# Patient Record
Sex: FEMALE | Race: WHITE | NOT HISPANIC OR LATINO | Employment: FULL TIME | URBAN - METROPOLITAN AREA
[De-identification: names, ages, dates, MRNs, and addresses within clinical notes are randomized per-mention and may not be internally consistent; named-entity substitution may affect disease eponyms.]

---

## 2019-12-16 RX ORDER — ZOLPIDEM TARTRATE 5 MG/1
TABLET ORAL
Refills: 0 | COMMUNITY
Start: 2019-11-25 | End: 2020-05-14

## 2019-12-16 RX ORDER — PREDNISONE 20 MG/1
TABLET ORAL
Refills: 0 | COMMUNITY
Start: 2019-12-06 | End: 2020-05-14

## 2019-12-16 RX ORDER — LEVOFLOXACIN 500 MG/1
TABLET, FILM COATED ORAL
Refills: 0 | COMMUNITY
Start: 2019-12-06 | End: 2020-05-14

## 2019-12-17 LAB — HBA1C MFR BLD HPLC: 6.1 %

## 2019-12-18 ENCOUNTER — OFFICE VISIT (OUTPATIENT)
Dept: GASTROENTEROLOGY | Facility: CLINIC | Age: 54
End: 2019-12-18
Payer: COMMERCIAL

## 2019-12-18 VITALS
SYSTOLIC BLOOD PRESSURE: 122 MMHG | HEIGHT: 71 IN | DIASTOLIC BLOOD PRESSURE: 82 MMHG | WEIGHT: 293 LBS | BODY MASS INDEX: 41.02 KG/M2 | HEART RATE: 86 BPM

## 2019-12-18 DIAGNOSIS — R10.11 RUQ PAIN: Primary | ICD-10-CM

## 2019-12-18 DIAGNOSIS — R11.2 NAUSEA AND VOMITING, INTRACTABILITY OF VOMITING NOT SPECIFIED, UNSPECIFIED VOMITING TYPE: ICD-10-CM

## 2019-12-18 PROCEDURE — 99203 OFFICE O/P NEW LOW 30 MIN: CPT | Performed by: PHYSICIAN ASSISTANT

## 2019-12-18 RX ORDER — OXYCODONE HYDROCHLORIDE AND ACETAMINOPHEN 5; 325 MG/1; MG/1
TABLET ORAL
COMMUNITY
Start: 2018-01-26 | End: 2020-05-14

## 2019-12-18 RX ORDER — SULFACETAMIDE SODIUM 100 MG/ML
LOTION TOPICAL
COMMUNITY
Start: 2018-03-08 | End: 2020-05-14

## 2019-12-18 RX ORDER — HYDROCODONE BITARTRATE AND ACETAMINOPHEN 5; 300 MG/1; MG/1
TABLET ORAL
COMMUNITY
Start: 2018-01-18 | End: 2020-05-14

## 2019-12-18 RX ORDER — OMEPRAZOLE 40 MG/1
CAPSULE, DELAYED RELEASE ORAL
COMMUNITY
Start: 2018-01-23

## 2019-12-18 RX ORDER — RANITIDINE 300 MG/1
TABLET ORAL
COMMUNITY
Start: 2018-01-23 | End: 2020-05-14

## 2019-12-18 RX ORDER — OLOPATADINE HYDROCHLORIDE 1 MG/ML
SOLUTION/ DROPS OPHTHALMIC
COMMUNITY
Start: 2018-02-22 | End: 2020-05-14

## 2019-12-18 RX ORDER — IBUPROFEN 800 MG/1
TABLET ORAL
COMMUNITY
Start: 2018-02-20 | End: 2020-05-14

## 2019-12-18 RX ORDER — LEVOTHYROXINE SODIUM 175 UG/1
TABLET ORAL
COMMUNITY
Start: 2019-03-16 | End: 2020-05-14

## 2019-12-18 RX ORDER — METHYLPREDNISOLONE 4 MG/1
TABLET ORAL
COMMUNITY
Start: 2018-04-21 | End: 2020-05-14

## 2019-12-18 RX ORDER — FLUTICASONE PROPIONATE 50 MCG
SPRAY, SUSPENSION (ML) NASAL
COMMUNITY
Start: 2018-02-20 | End: 2021-08-03

## 2019-12-18 RX ORDER — ONDANSETRON 4 MG/1
TABLET, FILM COATED ORAL
COMMUNITY
Start: 2018-01-19 | End: 2020-05-14

## 2019-12-18 RX ORDER — LEVOTHYROXINE SODIUM 0.15 MG/1
TABLET ORAL
COMMUNITY
Start: 2018-02-20

## 2019-12-18 NOTE — PATIENT INSTRUCTIONS
Abdominal Pain   WHAT YOU NEED TO KNOW:   Abdominal pain can be dull, achy, or sharp  You may have pain in one area of your abdomen, or in your entire abdomen  Your pain may be caused by a condition such as constipation, food sensitivity or poisoning, infection, or a blockage  Abdominal pain can also be from a hernia, appendicitis, or an ulcer  Liver, gallbladder, or kidney conditions can also cause abdominal pain  The cause of your abdominal pain may be unknown  DISCHARGE INSTRUCTIONS:   Return to the emergency department if:   · You have new chest pain or shortness of breath  · You have pulsing pain in your upper abdomen or lower back that suddenly becomes constant  · Your pain is in the right lower abdominal area and worsens with movement  · You have a fever over 100 4°F (38°C) or shaking chills  · You are vomiting and cannot keep food or liquids down  · Your pain does not improve or gets worse over the next 8 to 12 hours  · You see blood in your vomit or bowel movements, or they look black and tarry  · Your skin or the whites of your eyes turn yellow  · You are a woman and have a large amount of vaginal bleeding that is not your monthly period  Contact your healthcare provider if:   · You have pain in your lower back  · You are a man and have pain in your testicles  · You have pain when you urinate  · You have questions or concerns about your condition or care  Follow up with your healthcare provider within 24 hours or as directed:  Write down your questions so you remember to ask them during your visits  Medicines:   · Medicines  may be given to calm your stomach and prevent vomiting or to decrease pain  Ask how to take pain medicine safely  · Take your medicine as directed  Contact your healthcare provider if you think your medicine is not helping or if you have side effects  Tell him of her if you are allergic to any medicine   Keep a list of the medicines, vitamins, and herbs you take  Include the amounts, and when and why you take them  Bring the list or the pill bottles to follow-up visits  Carry your medicine list with you in case of an emergency  © 2017 [employer] Information is for End User's use only and may not be sold, redistributed or otherwise used for commercial purposes  All illustrations and images included in CareNotes® are the copyrighted property of A [employer]  or [employer]  The above information is an  only  It is not intended as medical advice for individual conditions or treatments  Talk to your doctor, nurse or pharmacist before following any medical regimen to see if it is safe and effective for you

## 2019-12-18 NOTE — PROGRESS NOTES
[facility] - Outpatient Consultation  Meeta Bahena 62 y o  female MRN: [id number]  Encounter: [id number]          ASSESSMENT AND PLAN:      1  RUQ pain  2  Nausea and vomiting, intractability of vomiting not specified, unspecified vomiting type  Will obtain laboratories from yesterday ordered by her primary care doctor  Will plan for MRI with and without contrast/MRCP  Patient may require ERCP secondary to sphincter of Oddi dysfunction or sludge within the bile duct  I have encouraged patient to eat bland foods over the next several weeks  Patient will call the office if she does have another attack  ______________________________________________________________________    HPI:    64-year-old female who is here with right upper quadrant abdominal pain and nausea that has been persistent for the past several months  Patient reports that many years ago she did undergo a cholecystectomy  She reports that she had this due to biliary dyskinesia  Patient reports that for the past several months or longer she has been suffering with right upper quadrant abdominal pain radiating to her shoulder blade as well as into her chest   Patient reports fevers and chills during these attacks with associated nausea vomiting and dry heaving  Patient reports this is really starting to impact her life  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  She denies any significant issues with lower abdominal pains diarrhea or constipation  She denies any melena or rectal bleeding  She is up-to-date with her screening colonoscopy which is a was in May of 2019  Patient recently had a right upper quadrant ultrasound done that did not show any evidence of obstruction of the duct  Patient did have laboratories with her primary care doctor yesterday that I do not have these available for viewing as of yet          REVIEW OF SYSTEMS:    CONSTITUTIONAL: Denies any fever, chills, rigors, and weight loss  HEENT: No earache or tinnitus  Denies hearing loss or visual disturbances  CARDIOVASCULAR: No chest pain or palpitations  RESPIRATORY: Denies any cough, hemoptysis, shortness of breath or dyspnea on exertion  GASTROINTESTINAL: As noted in the History of Present Illness  GENITOURINARY: No problems with urination  Denies any hematuria or dysuria  NEUROLOGIC: No dizziness or vertigo, denies headaches  MUSCULOSKELETAL: Denies any muscle or joint pain  SKIN: Denies skin rashes or itching  ENDOCRINE: Denies excessive thirst  Denies intolerance to heat or cold  PSYCHOSOCIAL: Denies depression or anxiety  Denies any recent memory loss  Historical Information   History reviewed  No pertinent past medical history  Past Surgical History:   Procedure Laterality Date    CHOLECYSTECTOMY       Social History   Social History     Substance and Sexual Activity   Alcohol Use Not on file     Social History     Substance and Sexual Activity   Drug Use Not on file     Social History     Tobacco Use   Smoking Status Not on file     No family history on file      Meds/Allergies       Current Outpatient Medications:     levothyroxine (SYNTHROID) 150 mcg tablet    omeprazole (PriLOSEC) 40 MG capsule    ranitidine (ZANTAC) 300 MG tablet    fluticasone (FLONASE) 50 mcg/act nasal spray    HYDROcodone-acetaminophen (XODOL) 5-300 MG per tablet    ibuprofen (MOTRIN) 800 mg tablet    levofloxacin (LEVAQUIN) 500 mg tablet    levothyroxine 175 mcg tablet    methylPREDNISolone 4 MG tablet therapy pack    metroNIDAZOLE (METROCREAM) 0 75 % cream    mupirocin (BACTROBAN) 2 % ointment    olopatadine (PATANOL) 0 1 % ophthalmic solution    ondansetron (ZOFRAN) 4 mg tablet    oxyCODONE-acetaminophen (PERCOCET) 5-325 mg per tablet    predniSONE 20 mg tablet    Sulfacetamide Sodium, Acne, 10 % LOTN    zolpidem (AMBIEN) 5 mg tablet    Allergies Allergen Reactions    Fluconazole Swelling     Of lips      Sulfamethoxazole-Trimethoprim Swelling     Of lips    Erythromycin Rash     Breaks aout on white pimples      Penicillins Rash     Breaks out on white pimples       Tetracycline Rash     Breaks out on white pimples             Objective     Blood pressure 122/82, pulse 86, height 5' 11" (1 803 m), weight 133 kg (294 lb 3 2 oz)  Body mass index is 41 03 kg/m²  PHYSICAL EXAM:      General Appearance:   Alert, cooperative, no distress   HEENT:   Normocephalic, atraumatic, anicteric      Neck:  Supple, symmetrical, trachea midline   Lungs:   Clear to auscultation bilaterally; no rales, rhonchi or wheezing; respirations unlabored    Heart[de-identified]   Regular rate and rhythm; no murmur, rub, or gallop  Abdomen:   Soft, non-tender, non-distended; normal bowel sounds; no masses, no organomegaly    Genitalia:   Deferred    Rectal:   Deferred    Extremities:  No cyanosis, clubbing or edema    Pulses:  2+ and symmetric    Skin:  No jaundice, rashes, or lesions    Lymph nodes:  No palpable cervical lymphadenopathy        Lab Results:   No visits with results within 1 Day(s) from this visit  Latest known visit with results is:   No results found for any previous visit  Radiology Results:   No results found

## 2019-12-18 NOTE — LETTER
December 18, 2019     Vanessa Rea MD  [address] [place] [address]    Patient: Kim Cotto   YOB: 1965   Date of Visit: 12/18/2019       Dear Dr Yeison Ochoa: Thank you for referring Kim Cotto to me for evaluation  Below are my notes for this consultation  If you have questions, please do not hesitate to call me  I look forward to following your patient along with you  Sincerely,        Danette De Souza PA-C        CC: No Recipients  Danette De Souza, Neihart Beverage  12/18/2019  5:36 PM  Sign at close encounter  [facility] - Outpatient Consult[facility]  Kim Cotto 62 y o  female MRN: [id number]  Encounter: [id number]          ASSESSMENT AND PLAN:      1  RUQ pain  2  Nausea and vomiting, intractability of vomiting not specified, unspecified vomiting type  Will obtain laboratories from yesterday ordered by her primary care doctor  Will plan for MRI with and without contrast/MRCP  Patient may require ERCP secondary to sphincter of Oddi dysfunction or sludge within the bile duct  I have encouraged patient to eat bland foods over the next several weeks  Patient will call the office if she does have another attack  ______________________________________________________________________    HPI:    41-year-old female who is here with right upper quadrant abdominal pain and nausea that has been persistent for the past several months  Patient reports that many years ago she did undergo a cholecystectomy  She reports that she had this due to biliary dyskinesia  Patient reports that for the past several months or longer she has been suffering with right upper quadrant abdominal pain radiating to her shoulder blade as well as into her chest   Patient reports fevers and chills during these attacks with associated nausea vomiting and dry heaving  Patient reports this is really starting to impact her life    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  She denies any significant issues with lower abdominal pains diarrhea or constipation  She denies any melena or rectal bleeding  She is up-to-date with her screening colonoscopy which is a was in May of 2019  Patient recently had a right upper quadrant ultrasound done that did not show any evidence of obstruction of the duct  Patient did have laboratories with her primary care doctor yesterday that I do not have these available for viewing as of yet  REVIEW OF SYSTEMS:    CONSTITUTIONAL: Denies any fever, chills, rigors, and weight loss  HEENT: No earache or tinnitus  Denies hearing loss or visual disturbances  CARDIOVASCULAR: No chest pain or palpitations  RESPIRATORY: Denies any cough, hemoptysis, shortness of breath or dyspnea on exertion  GASTROINTESTINAL: As noted in the History of Present Illness  GENITOURINARY: No problems with urination  Denies any hematuria or dysuria  NEUROLOGIC: No dizziness or vertigo, denies headaches  MUSCULOSKELETAL: Denies any muscle or joint pain  SKIN: Denies skin rashes or itching  ENDOCRINE: Denies excessive thirst  Denies intolerance to heat or cold  PSYCHOSOCIAL: Denies depression or anxiety  Denies any recent memory loss  Historical Information   History reviewed  No pertinent past medical history  Past Surgical History:   Procedure Laterality Date    CHOLECYSTECTOMY       Social History   Social History     Substance and Sexual Activity   Alcohol Use Not on file     Social History     Substance and Sexual Activity   Drug Use Not on file     Social History     Tobacco Use   Smoking Status Not on file     No family history on file      Meds/Allergies       Current Outpatient Medications:     levothyroxine (SYNTHROID) 150 mcg tablet    omeprazole (PriLOSEC) 40 MG capsule    ranitidine (ZANTAC) 300 MG tablet    fluticasone (FLONASE) 50 mcg/act nasal spray   HYDROcodone-acetaminophen (XODOL) 5-300 MG per tablet    ibuprofen (MOTRIN) 800 mg tablet    levofloxacin (LEVAQUIN) 500 mg tablet    levothyroxine 175 mcg tablet    methylPREDNISolone 4 MG tablet therapy pack    metroNIDAZOLE (METROCREAM) 0 75 % cream    mupirocin (BACTROBAN) 2 % ointment    olopatadine (PATANOL) 0 1 % ophthalmic solution    ondansetron (ZOFRAN) 4 mg tablet    oxyCODONE-acetaminophen (PERCOCET) 5-325 mg per tablet    predniSONE 20 mg tablet    Sulfacetamide Sodium, Acne, 10 % LOTN    zolpidem (AMBIEN) 5 mg tablet    Allergies   Allergen Reactions    Fluconazole Swelling     Of lips      Sulfamethoxazole-Trimethoprim Swelling     Of lips    Erythromycin Rash     Breaks aout on white pimples      Penicillins Rash     Breaks out on white pimples       Tetracycline Rash     Breaks out on white pimples             Objective     Blood pressure 122/82, pulse 86, height 5' 11" (1 803 m), weight 133 kg (294 lb 3 2 oz)  Body mass index is 41 03 kg/m²  PHYSICAL EXAM:      General Appearance:   Alert, cooperative, no distress   HEENT:   Normocephalic, atraumatic, anicteric      Neck:  Supple, symmetrical, trachea midline   Lungs:   Clear to auscultation bilaterally; no rales, rhonchi or wheezing; respirations unlabored    Heart[de-identified]   Regular rate and rhythm; no murmur, rub, or gallop  Abdomen:   Soft, non-tender, non-distended; normal bowel sounds; no masses, no organomegaly    Genitalia:   Deferred    Rectal:   Deferred    Extremities:  No cyanosis, clubbing or edema    Pulses:  2+ and symmetric    Skin:  No jaundice, rashes, or lesions    Lymph nodes:  No palpable cervical lymphadenopathy        Lab Results:   No visits with results within 1 Day(s) from this visit  Latest known visit with results is:   No results found for any previous visit  Radiology Results:   No results found

## 2019-12-19 ENCOUNTER — TELEPHONE (OUTPATIENT)
Dept: GASTROENTEROLOGY | Facility: CLINIC | Age: 54
End: 2019-12-19

## 2019-12-19 NOTE — TELEPHONE ENCOUNTER
Insurance is actually [employer] to get MRI prior auth  Tracking number [id number]   Need to send clinicals to     Faxed

## 2019-12-19 NOTE — TELEPHONE ENCOUNTER
Emmett Rg pt - Pt states that at [facility] cannot get her in for an MRI till January  If she goes to a place in the / [place] [address] she could get in on Tuesday of next week however she would need a prior auth  Please call [phone]   Ty

## 2019-12-20 ENCOUNTER — TELEPHONE (OUTPATIENT)
Dept: GASTROENTEROLOGY | Facility: CLINIC | Age: 54
End: 2019-12-20

## 2019-12-20 NOTE — TELEPHONE ENCOUNTER
Called pt and advised  Pt stated she has  The MRI scheduled at [facility] on the 30th and is concerned about getting the auth in time  Pt said she had an US last week and we should have the results since she brought them in

## 2019-12-23 ENCOUNTER — TELEPHONE (OUTPATIENT)
Dept: GASTROENTEROLOGY | Facility: CLINIC | Age: 54
End: 2019-12-23

## 2019-12-23 NOTE — TELEPHONE ENCOUNTER
Guerda  - [facility] called, they states the pre Nayeli Mohan was done with the wrong site  It was done with [place] [facility] as the site  It needs to be corrected to [facility], they said a phone call will fix the issue  Their NPI is [id number]  The prior auth # is [id number]   Ty

## 2020-01-03 ENCOUNTER — TELEPHONE (OUTPATIENT)
Dept: GASTROENTEROLOGY | Facility: CLINIC | Age: 55
End: 2020-01-03

## 2020-01-07 ENCOUNTER — TELEPHONE (OUTPATIENT)
Dept: GASTROENTEROLOGY | Facility: CLINIC | Age: 55
End: 2020-01-07

## 2020-01-07 NOTE — TELEPHONE ENCOUNTER
Please call patient and inform MRI was reviewed do  There was no evidence of stones within the bile duct  There was evidence of fatty liver disease  There was no significant abnormalities within the abdominal area    Thank you

## 2020-01-07 NOTE — TELEPHONE ENCOUNTER
Patient has been calling since Friday  Please call patient with results of MRI or to at least let patient know that we received results   ty Please call [phone] ty

## 2020-01-07 NOTE — TELEPHONE ENCOUNTER
Guerda-patient called lm on  to see if we received records please call patient and advise this is her 3rd call and she is upset

## 2020-01-07 NOTE — TELEPHONE ENCOUNTER
Pt was called about her MRI results          LMOM requesting pt to call back in regurads to her message

## 2020-05-14 ENCOUNTER — OFFICE VISIT (OUTPATIENT)
Dept: OBGYN CLINIC | Facility: CLINIC | Age: 55
End: 2020-05-14
Payer: COMMERCIAL

## 2020-05-14 ENCOUNTER — APPOINTMENT (OUTPATIENT)
Dept: RADIOLOGY | Facility: CLINIC | Age: 55
End: 2020-05-14
Payer: COMMERCIAL

## 2020-05-14 VITALS
HEART RATE: 77 BPM | SYSTOLIC BLOOD PRESSURE: 143 MMHG | BODY MASS INDEX: 37.95 KG/M2 | WEIGHT: 280.2 LBS | HEIGHT: 72 IN | DIASTOLIC BLOOD PRESSURE: 89 MMHG

## 2020-05-14 DIAGNOSIS — M79.644 THUMB PAIN, RIGHT: Primary | ICD-10-CM

## 2020-05-14 DIAGNOSIS — M79.644 THUMB PAIN, RIGHT: ICD-10-CM

## 2020-05-14 DIAGNOSIS — M18.11 ARTHRITIS OF CARPOMETACARPAL (CMC) JOINT OF RIGHT THUMB: ICD-10-CM

## 2020-05-14 PROCEDURE — 73110 X-RAY EXAM OF WRIST: CPT

## 2020-05-14 PROCEDURE — 20600 DRAIN/INJ JOINT/BURSA W/O US: CPT | Performed by: ORTHOPAEDIC SURGERY

## 2020-05-14 PROCEDURE — 99213 OFFICE O/P EST LOW 20 MIN: CPT | Performed by: ORTHOPAEDIC SURGERY

## 2020-05-14 RX ORDER — LIDOCAINE HYDROCHLORIDE 10 MG/ML
2.5 INJECTION, SOLUTION INFILTRATION; PERINEURAL
Status: COMPLETED | OUTPATIENT
Start: 2020-05-14 | End: 2020-05-14

## 2020-05-14 RX ORDER — TRIAMCINOLONE ACETONIDE 40 MG/ML
20 INJECTION, SUSPENSION INTRA-ARTICULAR; INTRAMUSCULAR
Status: COMPLETED | OUTPATIENT
Start: 2020-05-14 | End: 2020-05-14

## 2020-05-14 RX ADMIN — LIDOCAINE HYDROCHLORIDE 2.5 ML: 10 INJECTION, SOLUTION INFILTRATION; PERINEURAL at 11:17

## 2020-05-14 RX ADMIN — TRIAMCINOLONE ACETONIDE 20 MG: 40 INJECTION, SUSPENSION INTRA-ARTICULAR; INTRAMUSCULAR at 11:17

## 2020-05-14 RX ADMIN — Medication 0.05 MEQ: at 11:17

## 2020-05-28 ENCOUNTER — OFFICE VISIT (OUTPATIENT)
Dept: OBGYN CLINIC | Facility: CLINIC | Age: 55
End: 2020-05-28
Payer: COMMERCIAL

## 2020-05-28 VITALS
WEIGHT: 280.2 LBS | DIASTOLIC BLOOD PRESSURE: 84 MMHG | BODY MASS INDEX: 37.95 KG/M2 | SYSTOLIC BLOOD PRESSURE: 132 MMHG | HEART RATE: 73 BPM | HEIGHT: 72 IN

## 2020-05-28 DIAGNOSIS — M18.11 ARTHRITIS OF CARPOMETACARPAL (CMC) JOINT OF RIGHT THUMB: Primary | ICD-10-CM

## 2020-05-28 PROCEDURE — 99213 OFFICE O/P EST LOW 20 MIN: CPT | Performed by: ORTHOPAEDIC SURGERY

## 2020-05-28 PROCEDURE — 20600 DRAIN/INJ JOINT/BURSA W/O US: CPT | Performed by: ORTHOPAEDIC SURGERY

## 2020-05-28 RX ORDER — TRIAMCINOLONE ACETONIDE 40 MG/ML
20 INJECTION, SUSPENSION INTRA-ARTICULAR; INTRAMUSCULAR
Status: COMPLETED | OUTPATIENT
Start: 2020-05-28 | End: 2020-05-28

## 2020-05-28 RX ORDER — LIDOCAINE HYDROCHLORIDE 10 MG/ML
2.5 INJECTION, SOLUTION INFILTRATION; PERINEURAL
Status: COMPLETED | OUTPATIENT
Start: 2020-05-28 | End: 2020-05-28

## 2020-05-28 RX ORDER — BUDESONIDE 0.5 MG/2ML
INHALANT ORAL
COMMUNITY
Start: 2020-05-17 | End: 2021-05-18 | Stop reason: ALTCHOICE

## 2020-05-28 RX ADMIN — TRIAMCINOLONE ACETONIDE 20 MG: 40 INJECTION, SUSPENSION INTRA-ARTICULAR; INTRAMUSCULAR at 10:26

## 2020-05-28 RX ADMIN — Medication 0.05 MEQ: at 10:26

## 2020-05-28 RX ADMIN — LIDOCAINE HYDROCHLORIDE 2.5 ML: 10 INJECTION, SOLUTION INFILTRATION; PERINEURAL at 10:26

## 2020-06-11 ENCOUNTER — OFFICE VISIT (OUTPATIENT)
Dept: OBGYN CLINIC | Facility: CLINIC | Age: 55
End: 2020-06-11
Payer: COMMERCIAL

## 2020-06-11 VITALS
HEART RATE: 73 BPM | BODY MASS INDEX: 37.95 KG/M2 | DIASTOLIC BLOOD PRESSURE: 86 MMHG | HEIGHT: 72 IN | SYSTOLIC BLOOD PRESSURE: 136 MMHG | WEIGHT: 280.2 LBS

## 2020-06-11 DIAGNOSIS — M18.11 ARTHRITIS OF CARPOMETACARPAL (CMC) JOINT OF RIGHT THUMB: Primary | ICD-10-CM

## 2020-06-11 PROCEDURE — 99213 OFFICE O/P EST LOW 20 MIN: CPT | Performed by: ORTHOPAEDIC SURGERY

## 2020-06-11 RX ORDER — TOBRAMYCIN AND DEXAMETHASONE 3; 1 MG/ML; MG/ML
SUSPENSION/ DROPS OPHTHALMIC
COMMUNITY
Start: 2020-06-01 | End: 2021-08-03

## 2020-09-03 ENCOUNTER — OFFICE VISIT (OUTPATIENT)
Dept: OBGYN CLINIC | Facility: CLINIC | Age: 55
End: 2020-09-03
Payer: COMMERCIAL

## 2020-09-03 VITALS
WEIGHT: 280.2 LBS | SYSTOLIC BLOOD PRESSURE: 138 MMHG | BODY MASS INDEX: 37.95 KG/M2 | HEIGHT: 72 IN | DIASTOLIC BLOOD PRESSURE: 84 MMHG | HEART RATE: 77 BPM

## 2020-09-03 DIAGNOSIS — M71.349 SYNOVIAL CYST OF HAND: Primary | ICD-10-CM

## 2020-09-03 PROCEDURE — 20612 ASPIRATE/INJ GANGLION CYST: CPT | Performed by: ORTHOPAEDIC SURGERY

## 2020-09-03 PROCEDURE — 99213 OFFICE O/P EST LOW 20 MIN: CPT | Performed by: ORTHOPAEDIC SURGERY

## 2020-09-03 RX ORDER — TRIAMCINOLONE ACETONIDE 40 MG/ML
20 INJECTION, SUSPENSION INTRA-ARTICULAR; INTRAMUSCULAR
Status: COMPLETED | OUTPATIENT
Start: 2020-09-03 | End: 2020-09-03

## 2020-09-03 RX ORDER — LEVOTHYROXINE SODIUM 200 MCG
TABLET ORAL
COMMUNITY
Start: 2020-07-14 | End: 2021-08-03

## 2020-09-03 RX ORDER — LIDOCAINE HYDROCHLORIDE 10 MG/ML
0.5 INJECTION, SOLUTION INFILTRATION; PERINEURAL
Status: COMPLETED | OUTPATIENT
Start: 2020-09-03 | End: 2020-09-03

## 2020-09-03 RX ORDER — FAMOTIDINE 40 MG/1
TABLET, FILM COATED ORAL
COMMUNITY
Start: 2020-07-16

## 2020-09-03 RX ORDER — SULFACETAMIDE SODIUM 100 MG/ML
LOTION TOPICAL
COMMUNITY
Start: 2020-07-18 | End: 2021-08-03

## 2020-09-03 RX ADMIN — LIDOCAINE HYDROCHLORIDE 0.5 ML: 10 INJECTION, SOLUTION INFILTRATION; PERINEURAL at 13:16

## 2020-09-03 RX ADMIN — TRIAMCINOLONE ACETONIDE 20 MG: 40 INJECTION, SUSPENSION INTRA-ARTICULAR; INTRAMUSCULAR at 13:16

## 2020-09-03 NOTE — PROGRESS NOTES
CHIEF COMPLAINT:  Chief Complaint   Patient presents with    Right Thumb - Follow-up       SUBJECTIVE:  Tiffanie Pierre is a 62y o  year old female who presents right thumb pain  Patient has a history of right thumb CMC arthritis of which she was given a cortisone injection in the past   She states that this has helped for her however she noted a bump in the 1st webspace which has become tender  She denies any injuries or trauma to the area  She denies any numbness or tingling  PAST MEDICAL HISTORY:  History reviewed  No pertinent past medical history  PAST SURGICAL HISTORY:  Past Surgical History:   Procedure Laterality Date    CHOLECYSTECTOMY         FAMILY HISTORY:  History reviewed  No pertinent family history  SOCIAL HISTORY:  Social History     Tobacco Use    Smoking status: Never Smoker    Smokeless tobacco: Never Used   Substance Use Topics    Alcohol use: Never     Frequency: Never    Drug use: Never       MEDICATIONS:    Current Outpatient Medications:     budesonide (PULMICORT) 0 5 mg/2 mL nebulizer solution, MIX contents of 1 vial in 8 ounces OF NORMAL SALINE SOLUTION TO I   (REFER TO PRESCRIPTION NOTES)  , Disp: , Rfl:     famotidine (PEPCID) 40 MG tablet, , Disp: , Rfl:     fluticasone (FLONASE) 50 mcg/act nasal spray, , Disp: , Rfl:     levothyroxine (SYNTHROID) 150 mcg tablet, , Disp: , Rfl:     omeprazole (PriLOSEC) 40 MG capsule, , Disp: , Rfl:     Sulfacetamide Sodium, Acne, 10 % LOTN, apply twice a day to FACE, Disp: , Rfl:     Synthroid 200 MCG tablet, , Disp: , Rfl:     tobramycin-dexamethasone (TOBRADEX) ophthalmic suspension, 2 drops left eye twice a day, Disp: , Rfl:     ALLERGIES:  Allergies   Allergen Reactions    Fluconazole Swelling     Of lips      Sulfamethoxazole-Trimethoprim Swelling     Of lips    Erythromycin Rash     Breaks aout on white pimples      Penicillins Rash     Breaks out on white pimples       Tetracycline Rash     Breaks out on white pimples         REVIEW OF SYSTEMS:  Review of Systems  ROS:   General: no fever, no chills  HEENT:  No loss of hearing or eyesight problems  Eyes:  No red eyes  Respiratory:  No coughing, shortness of breath or wheezing  Cardiovascular:  No chest pain, no palpitations  GI:  Abdomen soft nontender, denies nausea  Endocrine:  No muscle weakness, no frequent urination, no excessive thirst  Urinary:  No dysuria, no incontinence  Musculoskeletal: see HPI and PE  SKIN:  No skin rash, no dry skin  Neurological:  No headaches, no confusion  Psychiatric:  No suicide thoughts, no anxiety, no depression  Review of all other systems is negative    VITALS:  Vitals:    09/03/20 1141   BP: 138/84   Pulse: 77       LABS:  HgA1c:   Lab Results   Component Value Date    HGBA1C 6 1 12/17/2019     BMP: No results found for: GLUCOSE, CALCIUM, NA, K, CO2, CL, BUN, CREATININE    _____________________________________________________  PHYSICAL EXAMINATION:  General: well developed and well nourished, alert, oriented times 3 and appears comfortable  Psychiatric: Normal  HEENT: Trachea Midline, No torticollis  Pulmonary: No audible wheezing or strider  Cardiovascular: No discernable arrhythmia   Skin: No masses, erythema, lacerations, fluctation, ulcerations  Neurovascular: Sensation Intact to the Median, Ulnar, Radial Nerve, Motor Intact to the Median, Ulnar, Radial Nerve and Pulses Intact    MUSCULOSKELETAL EXAMINATION:  Right hand  No erythema edema or ecchymosis noted, skin is warm to touch  Palpable bump appreciated in the 1st web space which is tender to palpation  Patient is able to move the thumb in all directions with no tenderness  Patient is neurovascularly intact     ___________________________________________________  STUDIES REVIEWED:  No studies reviewed         PROCEDURES PERFORMED:  Hand/upper extremity injection: R wrist  Date/Time: 9/3/2020 1:16 PM  Consent given by: patient  Site marked: site marked  Timeout: Immediately prior to procedure a time out was called to verify the correct patient, procedure, equipment, support staff and site/side marked as required   Supporting Documentation  Indications: pain   Procedure Details  Condition:dorsal carpal ganglion Site: R wrist (1st webspace)   Preparation: Patient was prepped and draped in the usual sterile fashion  Needle size: 25 G  Approach: radial  Medications administered: 0 5 mL lidocaine 1 %; 20 mg triamcinolone acetonide 40 mg/mL    Patient tolerance: patient tolerated the procedure well with no immediate complications  Dressing:  Sterile dressing applied               _____________________________________________________  ASSESSMENT/PLAN:    Right 1st webspace cyst  - patient was given cortisone injection to pop the cyst which was successful  - patient was advised to take Tylenol and ibuprofen for pain  - she was advised that he can return and if it were she is to give us a call  - she will follow-up with us as needed    Follow Up:  Return if symptoms worsen or fail to improve      Work/school status:  No restrictions    To Do Next Visit:  Re-evaluation of current issue        Scribe Attestation    I,:   Rhonda Caballero PA-C am acting as a scribe while in the presence of the attending physician :        I,:   July Clark MD personally performed the services described in this documentation    as scribed in my presence :

## 2020-10-05 ENCOUNTER — APPOINTMENT (EMERGENCY)
Dept: RADIOLOGY | Facility: HOSPITAL | Age: 55
End: 2020-10-05
Payer: COMMERCIAL

## 2020-10-05 ENCOUNTER — HOSPITAL ENCOUNTER (EMERGENCY)
Facility: HOSPITAL | Age: 55
Discharge: HOME | End: 2020-10-05
Attending: EMERGENCY MEDICINE | Admitting: EMERGENCY MEDICINE
Payer: COMMERCIAL

## 2020-10-05 VITALS
SYSTOLIC BLOOD PRESSURE: 157 MMHG | HEIGHT: 72 IN | HEART RATE: 85 BPM | BODY MASS INDEX: 33.18 KG/M2 | RESPIRATION RATE: 18 BRPM | DIASTOLIC BLOOD PRESSURE: 76 MMHG | WEIGHT: 245 LBS | OXYGEN SATURATION: 95 % | TEMPERATURE: 97.4 F

## 2020-10-05 DIAGNOSIS — R06.02 SOB (SHORTNESS OF BREATH): Primary | ICD-10-CM

## 2020-10-05 LAB
ALBUMIN SERPL BCP-MCNC: 3.5 G/DL (ref 3.5–5)
ALP SERPL-CCNC: 104 U/L (ref 46–116)
ALT SERPL W P-5'-P-CCNC: 31 U/L (ref 12–78)
ANION GAP SERPL CALCULATED.3IONS-SCNC: 8 MMOL/L (ref 4–13)
AST SERPL W P-5'-P-CCNC: 27 U/L (ref 5–45)
BASOPHILS # BLD AUTO: 0.07 THOUSANDS/ΜL (ref 0–0.1)
BASOPHILS NFR BLD AUTO: 1 % (ref 0–1)
BILIRUB SERPL-MCNC: 0.5 MG/DL (ref 0.2–1)
BUN SERPL-MCNC: 16 MG/DL (ref 5–25)
CALCIUM SERPL-MCNC: 8.6 MG/DL (ref 8.3–10.1)
CHLORIDE SERPL-SCNC: 107 MMOL/L (ref 100–108)
CO2 SERPL-SCNC: 27 MMOL/L (ref 21–32)
CREAT SERPL-MCNC: 0.79 MG/DL (ref 0.6–1.3)
EOSINOPHIL # BLD AUTO: 0.48 THOUSAND/ΜL (ref 0–0.61)
EOSINOPHIL NFR BLD AUTO: 9 % (ref 0–6)
ERYTHROCYTE [DISTWIDTH] IN BLOOD BY AUTOMATED COUNT: 13.2 % (ref 11.6–15.1)
GFR SERPL CREATININE-BSD FRML MDRD: 83 ML/MIN/1.73SQ M
GLUCOSE SERPL-MCNC: 105 MG/DL (ref 65–140)
HCT VFR BLD AUTO: 44.5 % (ref 34.8–46.1)
HGB BLD-MCNC: 14.3 G/DL (ref 11.5–15.4)
IMM GRANULOCYTES # BLD AUTO: 0.01 THOUSAND/UL (ref 0–0.2)
IMM GRANULOCYTES NFR BLD AUTO: 0 % (ref 0–2)
LYMPHOCYTES # BLD AUTO: 1.22 THOUSANDS/ΜL (ref 0.6–4.47)
LYMPHOCYTES NFR BLD AUTO: 22 % (ref 14–44)
MCH RBC QN AUTO: 27.2 PG (ref 26.8–34.3)
MCHC RBC AUTO-ENTMCNC: 32.1 G/DL (ref 31.4–37.4)
MCV RBC AUTO: 85 FL (ref 82–98)
MONOCYTES # BLD AUTO: 0.47 THOUSAND/ΜL (ref 0.17–1.22)
MONOCYTES NFR BLD AUTO: 9 % (ref 4–12)
NEUTROPHILS # BLD AUTO: 3.21 THOUSANDS/ΜL (ref 1.85–7.62)
NEUTS SEG NFR BLD AUTO: 59 % (ref 43–75)
NRBC BLD AUTO-RTO: 0 /100 WBCS
PLATELET # BLD AUTO: 224 THOUSANDS/UL (ref 149–390)
PMV BLD AUTO: 10.4 FL (ref 8.9–12.7)
POTASSIUM SERPL-SCNC: 4 MMOL/L (ref 3.5–5.3)
PROT SERPL-MCNC: 7.1 G/DL (ref 6.4–8.2)
RBC # BLD AUTO: 5.26 MILLION/UL (ref 3.81–5.12)
SARS-COV-2 RNA RESP QL NAA+PROBE: NEGATIVE
SODIUM SERPL-SCNC: 142 MMOL/L (ref 136–145)
WBC # BLD AUTO: 5.46 THOUSAND/UL (ref 4.31–10.16)

## 2020-10-05 PROCEDURE — 93005 ELECTROCARDIOGRAM TRACING: CPT

## 2020-10-05 PROCEDURE — 80053 COMPREHEN METABOLIC PANEL: CPT | Performed by: PHYSICIAN ASSISTANT

## 2020-10-05 PROCEDURE — 36415 COLL VENOUS BLD VENIPUNCTURE: CPT | Performed by: PHYSICIAN ASSISTANT

## 2020-10-05 PROCEDURE — 87635 SARS-COV-2 COVID-19 AMP PRB: CPT | Performed by: PHYSICIAN ASSISTANT

## 2020-10-05 PROCEDURE — 94640 AIRWAY INHALATION TREATMENT: CPT

## 2020-10-05 PROCEDURE — 71045 X-RAY EXAM CHEST 1 VIEW: CPT

## 2020-10-05 PROCEDURE — 85025 COMPLETE CBC W/AUTO DIFF WBC: CPT | Performed by: PHYSICIAN ASSISTANT

## 2020-10-05 PROCEDURE — 99285 EMERGENCY DEPT VISIT HI MDM: CPT

## 2020-10-05 PROCEDURE — 96374 THER/PROPH/DIAG INJ IV PUSH: CPT

## 2020-10-05 PROCEDURE — 99285 EMERGENCY DEPT VISIT HI MDM: CPT | Performed by: PHYSICIAN ASSISTANT

## 2020-10-05 RX ORDER — PREDNISONE 20 MG/1
50 TABLET ORAL DAILY
Qty: 13 TABLET | Refills: 0 | Status: SHIPPED | OUTPATIENT
Start: 2020-10-05 | End: 2020-10-10

## 2020-10-05 RX ORDER — IPRATROPIUM BROMIDE AND ALBUTEROL SULFATE 2.5; .5 MG/3ML; MG/3ML
3 SOLUTION RESPIRATORY (INHALATION)
Status: DISCONTINUED | OUTPATIENT
Start: 2020-10-05 | End: 2020-10-05

## 2020-10-05 RX ORDER — ALBUTEROL SULFATE 90 UG/1
1-2 AEROSOL, METERED RESPIRATORY (INHALATION) EVERY 6 HOURS PRN
Qty: 1 INHALER | Refills: 0 | Status: SHIPPED | OUTPATIENT
Start: 2020-10-05 | End: 2021-02-25

## 2020-10-05 RX ORDER — IPRATROPIUM BROMIDE AND ALBUTEROL SULFATE 2.5; .5 MG/3ML; MG/3ML
3 SOLUTION RESPIRATORY (INHALATION) ONCE
Status: COMPLETED | OUTPATIENT
Start: 2020-10-05 | End: 2020-10-05

## 2020-10-05 RX ORDER — METHYLPREDNISOLONE SODIUM SUCCINATE 125 MG/2ML
125 INJECTION, POWDER, LYOPHILIZED, FOR SOLUTION INTRAMUSCULAR; INTRAVENOUS ONCE
Status: COMPLETED | OUTPATIENT
Start: 2020-10-05 | End: 2020-10-05

## 2020-10-05 RX ADMIN — IPRATROPIUM BROMIDE AND ALBUTEROL SULFATE 3 ML: 2.5; .5 SOLUTION RESPIRATORY (INHALATION) at 06:05

## 2020-10-05 RX ADMIN — METHYLPREDNISOLONE SODIUM SUCCINATE 125 MG: 125 INJECTION, POWDER, FOR SOLUTION INTRAMUSCULAR; INTRAVENOUS at 05:59

## 2020-10-06 LAB
ATRIAL RATE: 85 BPM
P AXIS: 63 DEGREES
PR INTERVAL: 162 MS
QRS AXIS: 85 DEGREES
QRSD INTERVAL: 80 MS
QT INTERVAL: 374 MS
QTC INTERVAL: 445 MS
T WAVE AXIS: 71 DEGREES
VENTRICULAR RATE: 85 BPM

## 2020-10-06 PROCEDURE — 93010 ELECTROCARDIOGRAM REPORT: CPT | Performed by: INTERNAL MEDICINE

## 2020-12-21 ENCOUNTER — HOSPITAL ENCOUNTER (EMERGENCY)
Facility: HOSPITAL | Age: 55
Discharge: HOME | End: 2020-12-21
Attending: EMERGENCY MEDICINE | Admitting: EMERGENCY MEDICINE
Payer: COMMERCIAL

## 2020-12-21 ENCOUNTER — TELEPHONE (OUTPATIENT)
Dept: PULMONOLOGY | Facility: CLINIC | Age: 55
End: 2020-12-21

## 2020-12-21 ENCOUNTER — APPOINTMENT (EMERGENCY)
Dept: CT IMAGING | Facility: HOSPITAL | Age: 55
End: 2020-12-21
Payer: COMMERCIAL

## 2020-12-21 VITALS
HEART RATE: 90 BPM | BODY MASS INDEX: 36.43 KG/M2 | HEIGHT: 72 IN | DIASTOLIC BLOOD PRESSURE: 60 MMHG | SYSTOLIC BLOOD PRESSURE: 135 MMHG | OXYGEN SATURATION: 94 % | WEIGHT: 268.96 LBS | RESPIRATION RATE: 26 BRPM | TEMPERATURE: 97.8 F

## 2020-12-21 DIAGNOSIS — J45.901 ASTHMA EXACERBATION: Primary | ICD-10-CM

## 2020-12-21 LAB
ALBUMIN SERPL BCP-MCNC: 3.5 G/DL (ref 3.5–5)
ALP SERPL-CCNC: 93 U/L (ref 46–116)
ALT SERPL W P-5'-P-CCNC: 30 U/L (ref 12–78)
ANION GAP SERPL CALCULATED.3IONS-SCNC: 10 MMOL/L (ref 4–13)
AST SERPL W P-5'-P-CCNC: 34 U/L (ref 5–45)
BASE EX.OXY STD BLDV CALC-SCNC: 94.4 % (ref 60–80)
BASE EXCESS BLDV CALC-SCNC: -0.6 MMOL/L
BASOPHILS # BLD AUTO: 0.06 THOUSANDS/ΜL (ref 0–0.1)
BASOPHILS NFR BLD AUTO: 1 % (ref 0–1)
BILIRUB SERPL-MCNC: 0.6 MG/DL (ref 0.2–1)
BUN SERPL-MCNC: 9 MG/DL (ref 5–25)
CALCIUM SERPL-MCNC: 8.7 MG/DL (ref 8.3–10.1)
CHLORIDE SERPL-SCNC: 107 MMOL/L (ref 100–108)
CO2 SERPL-SCNC: 26 MMOL/L (ref 21–32)
CREAT SERPL-MCNC: 0.74 MG/DL (ref 0.6–1.3)
EOSINOPHIL # BLD AUTO: 0.44 THOUSAND/ΜL (ref 0–0.61)
EOSINOPHIL NFR BLD AUTO: 8 % (ref 0–6)
ERYTHROCYTE [DISTWIDTH] IN BLOOD BY AUTOMATED COUNT: 13.4 % (ref 11.6–15.1)
GFR SERPL CREATININE-BSD FRML MDRD: 90 ML/MIN/1.73SQ M
GLUCOSE SERPL-MCNC: 108 MG/DL (ref 65–140)
HCO3 BLDV-SCNC: 24.3 MMOL/L (ref 24–30)
HCT VFR BLD AUTO: 41.6 % (ref 34.8–46.1)
HGB BLD-MCNC: 12.9 G/DL (ref 11.5–15.4)
IMM GRANULOCYTES # BLD AUTO: 0.01 THOUSAND/UL (ref 0–0.2)
IMM GRANULOCYTES NFR BLD AUTO: 0 % (ref 0–2)
LYMPHOCYTES # BLD AUTO: 1.16 THOUSANDS/ΜL (ref 0.6–4.47)
LYMPHOCYTES NFR BLD AUTO: 20 % (ref 14–44)
MCH RBC QN AUTO: 25.7 PG (ref 26.8–34.3)
MCHC RBC AUTO-ENTMCNC: 31 G/DL (ref 31.4–37.4)
MCV RBC AUTO: 83 FL (ref 82–98)
MONOCYTES # BLD AUTO: 0.52 THOUSAND/ΜL (ref 0.17–1.22)
MONOCYTES NFR BLD AUTO: 9 % (ref 4–12)
NEUTROPHILS # BLD AUTO: 3.61 THOUSANDS/ΜL (ref 1.85–7.62)
NEUTS SEG NFR BLD AUTO: 62 % (ref 43–75)
NRBC BLD AUTO-RTO: 0 /100 WBCS
NT-PROBNP SERPL-MCNC: 27 PG/ML
O2 CT BLDV-SCNC: 18.5 ML/DL
PCO2 BLDV: 41.1 MM HG (ref 42–50)
PH BLDV: 7.39 [PH] (ref 7.3–7.4)
PLATELET # BLD AUTO: 218 THOUSANDS/UL (ref 149–390)
PMV BLD AUTO: 10.9 FL (ref 8.9–12.7)
PO2 BLDV: 86.8 MM HG (ref 35–45)
POTASSIUM SERPL-SCNC: 4.2 MMOL/L (ref 3.5–5.3)
PROT SERPL-MCNC: 6.9 G/DL (ref 6.4–8.2)
RBC # BLD AUTO: 5.01 MILLION/UL (ref 3.81–5.12)
SODIUM SERPL-SCNC: 143 MMOL/L (ref 136–145)
TROPONIN I SERPL-MCNC: <0.02 NG/ML
WBC # BLD AUTO: 5.8 THOUSAND/UL (ref 4.31–10.16)

## 2020-12-21 PROCEDURE — 84484 ASSAY OF TROPONIN QUANT: CPT | Performed by: PHYSICIAN ASSISTANT

## 2020-12-21 PROCEDURE — G1004 CDSM NDSC: HCPCS

## 2020-12-21 PROCEDURE — 99285 EMERGENCY DEPT VISIT HI MDM: CPT | Performed by: PHYSICIAN ASSISTANT

## 2020-12-21 PROCEDURE — 82805 BLOOD GASES W/O2 SATURATION: CPT | Performed by: PHYSICIAN ASSISTANT

## 2020-12-21 PROCEDURE — 93005 ELECTROCARDIOGRAM TRACING: CPT

## 2020-12-21 PROCEDURE — 83880 ASSAY OF NATRIURETIC PEPTIDE: CPT | Performed by: PHYSICIAN ASSISTANT

## 2020-12-21 PROCEDURE — 71260 CT THORAX DX C+: CPT

## 2020-12-21 PROCEDURE — 99285 EMERGENCY DEPT VISIT HI MDM: CPT

## 2020-12-21 PROCEDURE — 80053 COMPREHEN METABOLIC PANEL: CPT | Performed by: PHYSICIAN ASSISTANT

## 2020-12-21 PROCEDURE — 94640 AIRWAY INHALATION TREATMENT: CPT

## 2020-12-21 PROCEDURE — 96374 THER/PROPH/DIAG INJ IV PUSH: CPT

## 2020-12-21 PROCEDURE — 85025 COMPLETE CBC W/AUTO DIFF WBC: CPT | Performed by: PHYSICIAN ASSISTANT

## 2020-12-21 PROCEDURE — 36415 COLL VENOUS BLD VENIPUNCTURE: CPT | Performed by: PHYSICIAN ASSISTANT

## 2020-12-21 RX ORDER — IPRATROPIUM BROMIDE AND ALBUTEROL SULFATE 2.5; .5 MG/3ML; MG/3ML
3 SOLUTION RESPIRATORY (INHALATION)
Status: DISCONTINUED | OUTPATIENT
Start: 2020-12-21 | End: 2020-12-21 | Stop reason: HOSPADM

## 2020-12-21 RX ORDER — ALBUTEROL SULFATE 90 UG/1
2 AEROSOL, METERED RESPIRATORY (INHALATION) EVERY 4 HOURS PRN
Qty: 1 INHALER | Refills: 0 | Status: SHIPPED | OUTPATIENT
Start: 2020-12-21 | End: 2021-02-25

## 2020-12-21 RX ORDER — METHYLPREDNISOLONE SODIUM SUCCINATE 125 MG/2ML
125 INJECTION, POWDER, LYOPHILIZED, FOR SOLUTION INTRAMUSCULAR; INTRAVENOUS ONCE
Status: COMPLETED | OUTPATIENT
Start: 2020-12-21 | End: 2020-12-21

## 2020-12-21 RX ORDER — PREDNISONE 20 MG/1
40 TABLET ORAL DAILY
Qty: 10 TABLET | Refills: 0 | Status: SHIPPED | OUTPATIENT
Start: 2020-12-21 | End: 2020-12-26

## 2020-12-21 RX ADMIN — IOHEXOL 85 ML: 350 INJECTION, SOLUTION INTRAVENOUS at 06:00

## 2020-12-21 RX ADMIN — METHYLPREDNISOLONE SODIUM SUCCINATE 125 MG: 125 INJECTION, POWDER, FOR SOLUTION INTRAMUSCULAR; INTRAVENOUS at 04:56

## 2020-12-21 RX ADMIN — IPRATROPIUM BROMIDE AND ALBUTEROL SULFATE 3 ML: 2.5; .5 SOLUTION RESPIRATORY (INHALATION) at 04:56

## 2020-12-22 LAB
ATRIAL RATE: 102 BPM
P AXIS: 79 DEGREES
PR INTERVAL: 166 MS
QRS AXIS: 90 DEGREES
QRSD INTERVAL: 78 MS
QT INTERVAL: 330 MS
QTC INTERVAL: 430 MS
T WAVE AXIS: 63 DEGREES
VENTRICULAR RATE: 102 BPM

## 2020-12-22 PROCEDURE — 93010 ELECTROCARDIOGRAM REPORT: CPT | Performed by: INTERNAL MEDICINE

## 2021-01-13 ENCOUNTER — TELEPHONE (OUTPATIENT)
Dept: PULMONOLOGY | Facility: CLINIC | Age: 56
End: 2021-01-13

## 2021-01-13 ENCOUNTER — CONSULT (OUTPATIENT)
Dept: PULMONOLOGY | Facility: CLINIC | Age: 56
End: 2021-01-13
Payer: COMMERCIAL

## 2021-01-13 VITALS
SYSTOLIC BLOOD PRESSURE: 126 MMHG | DIASTOLIC BLOOD PRESSURE: 84 MMHG | HEIGHT: 72 IN | OXYGEN SATURATION: 92 % | HEART RATE: 86 BPM | WEIGHT: 266 LBS | TEMPERATURE: 97.7 F | BODY MASS INDEX: 36.03 KG/M2

## 2021-01-13 DIAGNOSIS — R06.02 SOB (SHORTNESS OF BREATH): ICD-10-CM

## 2021-01-13 DIAGNOSIS — J45.991 COUGH VARIANT ASTHMA: ICD-10-CM

## 2021-01-13 DIAGNOSIS — R06.2 WHEEZING: ICD-10-CM

## 2021-01-13 DIAGNOSIS — R05.3 CHRONIC COUGH: Primary | ICD-10-CM

## 2021-01-13 DIAGNOSIS — E66.9 OBESITY (BMI 30-39.9): ICD-10-CM

## 2021-01-13 PROCEDURE — 99204 OFFICE O/P NEW MOD 45 MIN: CPT | Performed by: INTERNAL MEDICINE

## 2021-01-13 RX ORDER — PREDNISONE 10 MG/1
TABLET ORAL
Qty: 18 TABLET | Refills: 0 | Status: SHIPPED | OUTPATIENT
Start: 2021-01-13 | End: 2021-02-25

## 2021-01-13 RX ORDER — MONTELUKAST SODIUM 10 MG/1
10 TABLET ORAL
Qty: 30 TABLET | Refills: 1 | Status: SHIPPED | OUTPATIENT
Start: 2021-01-13 | End: 2021-02-23 | Stop reason: SDUPTHER

## 2021-01-13 NOTE — PROGRESS NOTES
Assessment/Plan:     Diagnoses and all orders for this visit:    Chronic cough  -     [facility] Panel, Adult; Future  -     Hypersensitivity pnuemonitis profile; Future  -     Complete PFT with post bronchodilator; Future    Cough variant asthma  -     Peak Flow Meter  -     predniSONE 10 mg tablet; Use 3 tablets for 3 days 2 tablets for 3 days and 1 tablet for 3 days  -     montelukast (SINGULAIR) 10 mg tablet; Take 1 tablet (10 mg total) by mouth daily at bedtime  -     fluticasone-salmeterol (ADVAIR, WIXELA) 500-50 mcg/dose inhaler; Inhale 1 puff 2 (two) times a day Rinse mouth after use  SOB (shortness of breath)    Wheezing    Obesity (BMI 30-39 9)      chronic cough likely secondary to cough variant asthma  Will get respiratory allergy panel with IgE and hypersensitivity pneumonitis profile  Her office had been renovated from an old building 4 years ago and she states since then the cough had started  She does not know if there is any significant mold exposure there  Discussed regarding complete PFT with post bronchodilator  She has been using her albuterol via nebulizer 4 times  And the budesonide that was given she has been using it only once a day  Discussed that she has to use the budesonide twice a day  Given still with the expiratory wheezing bilaterally will add long-acting bronchodilator along with inhaled corticosteroid  Add Advair 500/51 puff twice daily  Rinse mouth after use  Singulair 10 mg daily at bedtime  Short course of prednisone tapering down dose  Peak flow meter to monitor asthma control and action plan discussed  Also CT of the chest report and images reviewed with the patient  Call if any worsening symptoms otherwise will see her back in 4 weeks  Return in about 4 weeks (around 2/10/2021)  All questions are answered to the patient's satisfaction and understanding  She verbalizes understanding    She is encouraged to call with any further questions or concerns  Portions of the record may have been created with voice recognition software  Occasional wrong word or "sound a like" substitutions may have occurred due to the inherent limitations of voice recognition software  Read the chart carefully and recognize, using context, where substitutions have occurred  a    Electronically Signed by Tom Riggs MD    ______________________________________________________________________    Chief Complaint:   Chief Complaint   Patient presents with    Wheezing    Shortness of Breath    Cough        Patient ID: Anibal Garcia is a 60 y o  y o  female has a past medical history of Asthma and Hypothyroidism  1/13/2021  Patient presents today for initial visit  Patient is a very pleasant 57-year-old lady who has never smoked but has been exposed to a lot of secondhand smoke when she was young from parents, currently working in a [employer]  She states she was diagnosed with asthma when she was very young and she ought grew it  For the past 4 years has been having trouble with the chronic cough for which she had the testing done with PFTs and she was given inhaler albuterol she states  Still with significant dry cough but she states she did not have any shortness of breath or wheezing then  For the past several months has been having worsening cough along with shortness of breath and wheezing for which she had to go into the ER twice with a prednisone tapering down dose and into urgent care which she was given prednisone again  And also treated with antibiotic  Recently also had a CT of the chest done  Occupational/Exposure history:  Works in a [employer]  Pets/Enviroment:  Dogs and cats at home  Travel history:  Recently has not traveled  Review of Systems   Constitutional: Positive for fatigue  HENT: Positive for postnasal drip  Eyes: Negative  Respiratory: Positive for cough, shortness of breath and wheezing      Cardiovascular: Negative  Gastrointestinal: Negative  Endocrine: Negative  Genitourinary: Negative  Musculoskeletal: Negative  Allergic/Immunologic: Positive for environmental allergies  Neurological: Negative  Hematological: Negative  Psychiatric/Behavioral: Negative  Social history: She reports that she has never smoked  She has never used smokeless tobacco  She reports that she does not drink alcohol or use drugs  Past surgical history:   Past Surgical History:   Procedure Laterality Date    APPENDECTOMY      CHOLECYSTECTOMY      TONSILLECTOMY       Family history: No family history on file  There is no immunization history on file for this patient  Current Outpatient Medications   Medication Sig Dispense Refill    albuterol (PROVENTIL HFA,VENTOLIN HFA) 90 mcg/act inhaler Inhale 2 puffs every 4 (four) hours as needed for wheezing 1 Inhaler 0    budesonide (PULMICORT) 0 5 mg/2 mL nebulizer solution MIX contents of 1 vial in 8 ounces OF NORMAL SALINE SOLUTION TO I   (REFER TO PRESCRIPTION NOTES)   famotidine (PEPCID) 40 MG tablet       fluticasone (FLONASE) 50 mcg/act nasal spray       levothyroxine (SYNTHROID) 150 mcg tablet       omeprazole (PriLOSEC) 40 MG capsule       Sulfacetamide Sodium, Acne, 10 % LOTN apply twice a day to FACE      Synthroid 200 MCG tablet       tobramycin-dexamethasone (TOBRADEX) ophthalmic suspension 2 drops left eye twice a day      albuterol (PROVENTIL HFA,VENTOLIN HFA) 90 mcg/act inhaler Inhale 1-2 puffs every 6 (six) hours as needed for wheezing (Patient not taking: Reported on 1/13/2021) 1 Inhaler 0    fluticasone-salmeterol (ADVAIR, WIXELA) 500-50 mcg/dose inhaler Inhale 1 puff 2 (two) times a day Rinse mouth after use   1 Inhaler 3    montelukast (SINGULAIR) 10 mg tablet Take 1 tablet (10 mg total) by mouth daily at bedtime 30 tablet 1    predniSONE 10 mg tablet Use 3 tablets for 3 days 2 tablets for 3 days and 1 tablet for 3 days 18 tablet 0     No current facility-administered medications for this visit  Allergies: Fluconazole, Sulfamethoxazole-trimethoprim, Erythromycin, Penicillins, and Tetracycline    Objective:  Vitals:    01/13/21 1444   BP: 126/84   Pulse: 86   Temp: 97 7 °F (36 5 °C)   SpO2: 92%   Weight: 121 kg (266 lb)   Height: 6' 1" (1 854 m)   Oxygen Therapy  SpO2: 92 %    Wt Readings from Last 3 Encounters:   01/13/21 121 kg (266 lb)   12/21/20 122 kg (268 lb 15 4 oz)   10/05/20 111 kg (245 lb)     Body mass index is 35 09 kg/m²  Physical Exam  Constitutional:       Appearance: She is well-developed  HENT:      Head: Normocephalic and atraumatic  Eyes:      Pupils: Pupils are equal, round, and reactive to light  Neck:      Musculoskeletal: Normal range of motion and neck supple  Comments: Short and wide neck  Cardiovascular:      Rate and Rhythm: Normal rate and regular rhythm  Heart sounds: Normal heart sounds  Pulmonary:      Effort: Pulmonary effort is normal       Breath sounds: Wheezing (occ expiratory rhonchi) present  No rales  Chest:      Chest wall: No tenderness  Abdominal:      General: Bowel sounds are normal       Palpations: Abdomen is soft  Musculoskeletal: Normal range of motion  Skin:     General: Skin is warm and dry  Neurological:      Mental Status: She is alert and oriented to person, place, and time  Psychiatric:         Behavior: Behavior normal            Diagnostics:  I have personally reviewed pertinent films in PACS  Ct Chest With Contrast    Result Date: 12/21/2020  Narrative: CT CHEST WITH IV CONTRAST INDICATION:   Shortness of breath sob  COMPARISON:  None  TECHNIQUE: CT examination of the chest was performed  Axial, sagittal, and coronal 2D reformatted images were created from the source data and submitted for interpretation  Radiation dose length product (DLP) for this visit:  773 mGy-cm     This examination, like all CT scans performed in the [facility], was performed utilizing techniques to minimize radiation dose exposure, including the use of iterative reconstruction and automated exposure control  IV Contrast:  85 mL of iohexol (OMNIPAQUE) FINDINGS: LUNGS:  Mild bronchial wall thickening is present, suggestive of small airways inflammation  Subsegmental atelectasis is present within the left lower lobe  There are no focal consolidations or tracheal lesions  PLEURA:  Unremarkable  HEART/GREAT VESSELS:  Unremarkable for patient's age  MEDIASTINUM AND SARAH:  Unremarkable  CHEST WALL AND LOWER NECK:   Unremarkable  VISUALIZED STRUCTURES IN THE UPPER ABDOMEN:  Unremarkable  OSSEOUS STRUCTURES:  No acute fracture or destructive osseous lesion  Impression: Mild bronchial wall thickening, suggestive of small airways inflammation  Subsegmental atelectasis is present within the left lower lobe  No focal consolidations   Workstation performed: [id number]

## 2021-01-13 NOTE — PATIENT INSTRUCTIONS
USE ALBUTEROL VIA NEBULIZER 4 TIMES DAILY  USE BUDESONIDE VIA NEBULIZER TWICE DAILY  MONTELUKAST 10 MG DAILY AT BEDTIME  USE ADVAIR 1 PUFF TWICE DAILY  PREDNISONE TAPERING DOWN DOSE TO COMPLETE  GET THE BLOOD WORK DONE FOR RESPIRATORY ALLERGY PANEL WHEN YOU ARE NOT ON THE PREDNISONE  COMPLETE PFT

## 2021-02-09 ENCOUNTER — OFFICE VISIT (OUTPATIENT)
Dept: PULMONOLOGY | Facility: CLINIC | Age: 56
End: 2021-02-09
Payer: COMMERCIAL

## 2021-02-09 VITALS
OXYGEN SATURATION: 98 % | HEART RATE: 89 BPM | HEIGHT: 72 IN | TEMPERATURE: 97.1 F | BODY MASS INDEX: 36.03 KG/M2 | WEIGHT: 266 LBS

## 2021-02-09 DIAGNOSIS — Z91.09 ENVIRONMENTAL ALLERGIES: ICD-10-CM

## 2021-02-09 DIAGNOSIS — J45.40 MODERATE PERSISTENT ASTHMA WITHOUT COMPLICATION: Primary | ICD-10-CM

## 2021-02-09 PROCEDURE — 99213 OFFICE O/P EST LOW 20 MIN: CPT | Performed by: PHYSICIAN ASSISTANT

## 2021-02-10 NOTE — PROGRESS NOTES
Virtual Regular Visit      Assessment/Plan:    Problem List Items Addressed This Visit     None      Visit Diagnoses     Moderate persistent asthma without complication    -  Primary    Environmental allergies               Video visit was done today for follow-up  She is feeling significantly improved from her last visit with us  She has been using budesonide via the nebulizer,  Singulair, albuterol as needed  She notes when she cleans the house that she has increased symptoms as well as when she is trying to exercise  Prior to Leninbrianda Clement she had someone clean her house but now notes that the dust aggravates her asthma  She she did have [facility] allergy panel which shows allergies to dust mites, cats and dogs  She does have cats and dogs at home but does not notice any worsening of her asthma symptoms with her pets  She does have Advair at home which I did tell her to start using, she can hold off on the budesonide for now if she is using Advair  She will continue with the albuterol 4 times per day as needed  I did tell her to use the albuterol prior to cleaning as well as prior to exercise  She does also wear a mask when she is cleaning which seems to help      she will follow-up with us in 3 months or sooner if necessary  Reason for visit is   Chief Complaint   Patient presents with    Follow-up    Virtual Regular Visit        Encounter provider Ashlyn Ching PA-C    Provider located at [address] [place] [address],[address] [place] [id number]      Recent Visits  Date Type Provider Dept   02/09/21 Office Visit Ashlyn Ching PA-C  [facility]   Showing recent visits within past 7 days and meeting all other requirements     Future Appointments  No visits were found meeting these conditions     Showing future appointments within next 150 days and meeting all other requirements        The patient was identified by name and date of birth  Jeremias Smith was informed that this is a telemedicine visit and that the visit is being conducted through [employer] and patient was informed that this is not a secure, HIPAA-compliant platform  She agrees to proceed     My office door was closed  No one else was in the room  She acknowledged consent and understanding of privacy and security of the video platform  The patient has agreed to participate and understands they can discontinue the visit at any time  Patient is aware this is a billable service  Subjective  Jeremias Smith is a 60 y o  female  Patient is a 75-year-old female nonsmoker with secondhand smoke exposure, past medical history of asthma  Asthma had been well controlled for many years, worsened over the last several months  Over the last several months she has had increasing asthma symptoms with ER visits and requiring prednisone and antibiotics  At her last visit, she was started on Advair  Video visit was done today for follow-up  She has noted improvement in her symptoms, has not been using the Advair but has been using budesonide via the nebulizer as well as albuterol as needed  She notices that her asthma is worsened when she cleans the house, prior to [place] she had a company come clean her house on a regular basis, finds that the dust aggravates her asthma  She does have pets at home, dog and cat, they do not seem to aggravate her asthma  primary symptoms  Pertinent negatives include no chest pain, fever, headaches, myalgias or sore throat          Past Medical History:   Diagnosis Date    Asthma     Hypothyroidism        Past Surgical History:   Procedure Laterality Date    APPENDECTOMY      CHOLECYSTECTOMY      TONSILLECTOMY         Current Outpatient Medications   Medication Sig Dispense Refill    budesonide (PULMICORT) 0 5 mg/2 mL nebulizer solution MIX contents of 1 vial in 8 ounces OF NORMAL SALINE SOLUTION TO I   (REFER TO PRESCRIPTION NOTES)   famotidine (PEPCID) 40 MG tablet       fluticasone (FLONASE) 50 mcg/act nasal spray       levothyroxine (SYNTHROID) 150 mcg tablet       omeprazole (PriLOSEC) 40 MG capsule       Sulfacetamide Sodium, Acne, 10 % LOTN apply twice a day to FACE      Synthroid 200 MCG tablet       tobramycin-dexamethasone (TOBRADEX) ophthalmic suspension 2 drops left eye twice a day      albuterol (PROVENTIL HFA,VENTOLIN HFA) 90 mcg/act inhaler Inhale 1-2 puffs every 6 (six) hours as needed for wheezing (Patient not taking: Reported on 1/13/2021) 1 Inhaler 0    albuterol (PROVENTIL HFA,VENTOLIN HFA) 90 mcg/act inhaler Inhale 2 puffs every 4 (four) hours as needed for wheezing (Patient not taking: Reported on 2/9/2021) 1 Inhaler 0    fluticasone-salmeterol (ADVAIR, WIXELA) 500-50 mcg/dose inhaler Inhale 1 puff 2 (two) times a day Rinse mouth after use  (Patient not taking: Reported on 2/9/2021) 1 Inhaler 3    montelukast (SINGULAIR) 10 mg tablet Take 1 tablet (10 mg total) by mouth daily at bedtime (Patient not taking: Reported on 2/9/2021) 30 tablet 1    predniSONE 10 mg tablet Use 3 tablets for 3 days 2 tablets for 3 days and 1 tablet for 3 days (Patient not taking: Reported on 2/9/2021) 18 tablet 0     No current facility-administered medications for this visit  Allergies   Allergen Reactions    Fluconazole Swelling     Of lips      Sulfamethoxazole-Trimethoprim Swelling     Of lips    Erythromycin Rash     Breaks aout on white pimples      Penicillins Rash     Breaks out on white pimples       Tetracycline Rash     Breaks out on white pimples         Review of Systems   Constitutional: Negative  Negative for appetite change and fever  HENT: Positive for postnasal drip  Negative for ear pain, rhinorrhea, sneezing, sore throat and trouble swallowing  Respiratory: Positive for wheezing  Cardiovascular: Negative  Negative for chest pain     Gastrointestinal: Negative  Genitourinary: Negative  Musculoskeletal: Negative  Negative for myalgias  Skin: Negative  Allergic/Immunologic: Negative  Neurological: Negative  Negative for headaches  Psychiatric/Behavioral: Negative  Video Exam    Vitals:    02/09/21 1137   Pulse: 89   Temp: (!) 97 1 °F (36 2 °C)   SpO2: 98%   Weight: 121 kg (266 lb)   Height: 6' 1" (1 854 m)       Physical Exam  Vitals signs reviewed  Constitutional:       Appearance: Normal appearance  HENT:      Head: Normocephalic and atraumatic  Eyes:      Conjunctiva/sclera: Conjunctivae normal    Neck:      Musculoskeletal: Normal range of motion  Cardiovascular:      Rate and Rhythm: Normal rate  Pulmonary:      Effort: Pulmonary effort is normal  No respiratory distress  Musculoskeletal: Normal range of motion  Right lower leg: No edema  Left lower leg: No edema  Skin:     Coloration: Skin is not pale  Neurological:      Mental Status: She is alert and oriented to person, place, and time  I spent 15 minutes directly with the patient during this visit      VIRTUAL VISIT DISCLAIMER    Jamila Skinner acknowledges that she has consented to an online visit or consultation  She understands that the online visit is based solely on information provided by her, and that, in the absence of a face-to-face physical evaluation by the physician, the diagnosis she receives is both limited and provisional in terms of accuracy and completeness  This is not intended to replace a full medical face-to-face evaluation by the physician  Jamila Skinner understands and accepts these terms    Answers for HPI/ROS submitted by the patient on 2/8/2021   Primary symptoms  Do you have difficulty breathing?: Yes  Chronicity: new  When did you first notice your symptoms?: more than 1 month ago  How often do your symptoms occur?: every several days  Since you first noticed this problem, how has it changed?: gradually improving  Do you have shortness of breath that occurs with effort or exertion?: Yes  Do you have ear congestion?: No  Do you have heartburn?: No  Do you have fatigue?: No  Do you have nasal congestion?: Yes  Do you have shortness of breath when lying flat?: No  Do you have shortness of breath when you wake up?: No  Do you have sweats?: No  Have you experienced weight loss?: No  Which of the following makes your symptoms worse?: climbing stairs, exercise, exposure to fumes  Which of the following makes your symptoms better?: oral steroids  Risk factors for lung disease: animal exposure, occupational exposure, travel

## 2021-02-23 ENCOUNTER — TELEPHONE (OUTPATIENT)
Dept: PULMONOLOGY | Facility: CLINIC | Age: 56
End: 2021-02-23

## 2021-02-23 DIAGNOSIS — J45.991 COUGH VARIANT ASTHMA: ICD-10-CM

## 2021-02-23 RX ORDER — MONTELUKAST SODIUM 10 MG/1
10 TABLET ORAL
Qty: 30 TABLET | Refills: 1 | Status: SHIPPED | OUTPATIENT
Start: 2021-02-23 | End: 2021-05-10

## 2021-02-25 ENCOUNTER — OFFICE VISIT (OUTPATIENT)
Dept: OBGYN CLINIC | Facility: CLINIC | Age: 56
End: 2021-02-25
Payer: COMMERCIAL

## 2021-02-25 VITALS
BODY MASS INDEX: 36.03 KG/M2 | HEIGHT: 72 IN | WEIGHT: 266 LBS | DIASTOLIC BLOOD PRESSURE: 79 MMHG | SYSTOLIC BLOOD PRESSURE: 122 MMHG | HEART RATE: 71 BPM

## 2021-02-25 DIAGNOSIS — M18.11 ARTHRITIS OF CARPOMETACARPAL (CMC) JOINT OF RIGHT THUMB: Primary | ICD-10-CM

## 2021-02-25 PROCEDURE — 99213 OFFICE O/P EST LOW 20 MIN: CPT | Performed by: ORTHOPAEDIC SURGERY

## 2021-02-25 PROCEDURE — 20600 DRAIN/INJ JOINT/BURSA W/O US: CPT | Performed by: ORTHOPAEDIC SURGERY

## 2021-02-25 RX ORDER — TRIAMCINOLONE ACETONIDE 40 MG/ML
20 INJECTION, SUSPENSION INTRA-ARTICULAR; INTRAMUSCULAR
Status: COMPLETED | OUTPATIENT
Start: 2021-02-25 | End: 2021-02-25

## 2021-02-25 RX ORDER — ESOMEPRAZOLE MAGNESIUM 40 MG/1
CAPSULE, DELAYED RELEASE ORAL
COMMUNITY
Start: 2021-02-16

## 2021-02-25 RX ORDER — PEAK FLOW METER
EACH MISCELLANEOUS
COMMUNITY
Start: 2021-01-15

## 2021-02-25 RX ORDER — LIDOCAINE HYDROCHLORIDE 10 MG/ML
2.5 INJECTION, SOLUTION INFILTRATION; PERINEURAL
Status: COMPLETED | OUTPATIENT
Start: 2021-02-25 | End: 2021-02-25

## 2021-02-25 RX ORDER — HYDROCODONE BITARTRATE AND ACETAMINOPHEN 7.5; 325 MG/1; MG/1
1 TABLET ORAL EVERY 6 HOURS PRN
COMMUNITY
Start: 2021-02-18 | End: 2021-08-03

## 2021-02-25 RX ORDER — LEVOFLOXACIN 500 MG/1
500 TABLET, FILM COATED ORAL DAILY
COMMUNITY
Start: 2021-02-10 | End: 2021-08-03

## 2021-02-25 RX ADMIN — Medication 0.05 MEQ: at 14:41

## 2021-02-25 RX ADMIN — LIDOCAINE HYDROCHLORIDE 2.5 ML: 10 INJECTION, SOLUTION INFILTRATION; PERINEURAL at 14:41

## 2021-02-25 RX ADMIN — TRIAMCINOLONE ACETONIDE 20 MG: 40 INJECTION, SUSPENSION INTRA-ARTICULAR; INTRAMUSCULAR at 14:41

## 2021-02-25 NOTE — PROGRESS NOTES
CHIEF COMPLAINT:  Chief Complaint   Patient presents with    Right Hand - Follow-up       SUBJECTIVE:  Edgar Jay is a 60y o  year old female who presents for follow-up regarding  Right hand CMC arthritis  Patient has had a cortisone injection which provided her much relief for the last 9 months  The cortisone injection was in May of 2020  She states the pain started to come back a few months ago  She would like a repeat cortisone injection for this issue  PAST MEDICAL HISTORY:  Past Medical History:   Diagnosis Date    Asthma     Hypothyroidism        PAST SURGICAL HISTORY:  Past Surgical History:   Procedure Laterality Date    APPENDECTOMY      CHOLECYSTECTOMY      TONSILLECTOMY         FAMILY HISTORY:  History reviewed  No pertinent family history  SOCIAL HISTORY:  Social History     Tobacco Use    Smoking status: Never Smoker    Smokeless tobacco: Never Used   Substance Use Topics    Alcohol use: Never     Frequency: Never    Drug use: Never       MEDICATIONS:    Current Outpatient Medications:     budesonide (PULMICORT) 0 5 mg/2 mL nebulizer solution, MIX contents of 1 vial in 8 ounces OF NORMAL SALINE SOLUTION TO I   (REFER TO PRESCRIPTION NOTES)  , Disp: , Rfl:     esomeprazole (NexIUM) 40 MG capsule, , Disp: , Rfl:     famotidine (PEPCID) 40 MG tablet, , Disp: , Rfl:     fluticasone (FLONASE) 50 mcg/act nasal spray, , Disp: , Rfl:     fluticasone-salmeterol (ADVAIR, WIXELA) 500-50 mcg/dose inhaler, Inhale 1 puff 2 (two) times a day Rinse mouth after use , Disp: 1 Inhaler, Rfl: 3    HYDROcodone-acetaminophen (NORCO) 7 5-325 mg per tablet, Take 1 tablet by mouth every 6 (six) hours as needed, Disp: , Rfl:     levofloxacin (LEVAQUIN) 500 mg tablet, Take 500 mg by mouth daily, Disp: , Rfl:     levothyroxine (SYNTHROID) 150 mcg tablet, , Disp: , Rfl:     montelukast (SINGULAIR) 10 mg tablet, Take 1 tablet (10 mg total) by mouth daily at bedtime, Disp: 30 tablet, Rfl: 1   omeprazole (PriLOSEC) 40 MG capsule, , Disp: , Rfl:     Peak Air Peak Flow Meter TRIP, Use as directed, Disp: , Rfl:     Sulfacetamide Sodium, Acne, 10 % LOTN, apply twice a day to FACE, Disp: , Rfl:     Synthroid 200 MCG tablet, , Disp: , Rfl:     tobramycin-dexamethasone (TOBRADEX) ophthalmic suspension, 2 drops left eye twice a day, Disp: , Rfl:     ALLERGIES:  Allergies   Allergen Reactions    Fluconazole Swelling     Of lips      Sulfamethoxazole-Trimethoprim Swelling     Of lips    Erythromycin Rash     Breaks aout on white pimples      Penicillins Rash     Breaks out on white pimples       Tetracycline Rash     Breaks out on white pimples         REVIEW OF SYSTEMS:  Review of Systems    ROS:   General: no fever, no chills  HEENT:  No loss of hearing or eyesight problems  Eyes:  No red eyes  Respiratory:  No coughing, shortness of breath or wheezing  Cardiovascular:  No chest pain, no palpitations  GI:  Abdomen soft nontender, denies nausea  Endocrine:  No muscle weakness, no frequent urination, no excessive thirst  Urinary:  No dysuria, no incontinence  Musculoskeletal: see HPI and PE  SKIN:  No skin rash, no dry skin  Neurological:  No headaches, no confusion  Psychiatric:  No suicide thoughts, no anxiety, no depression  Review of all other systems is negative    VITALS:  Vitals:    02/25/21 1415   BP: 122/79   Pulse: 71       LABS:  HgA1c:   Lab Results   Component Value Date    HGBA1C 6 1 12/17/2019     BMP:   Lab Results   Component Value Date    CALCIUM 8 7 12/21/2020    K 4 2 12/21/2020    CO2 26 12/21/2020     12/21/2020    BUN 9 12/21/2020    CREATININE 0 74 12/21/2020       _____________________________________________________  PHYSICAL EXAMINATION:  General: well developed and well nourished, alert, oriented times 3 and appears comfortable  Psychiatric: Normal  HEENT: Trachea Midline, No torticollis  Pulmonary: No audible wheezing or respiratory distress   Skin: No masses, erythema, lacerations, fluctation, ulcerations  Neurovascular: Sensation Intact to the Median, Ulnar, Radial Nerve, Motor Intact to the Median, Ulnar, Radial Nerve and Pulses Intact    MUSCULOSKELETAL EXAMINATION:  right CMC Exam:  No adduction contracture  No hyperextension deformity of MCP joint  Positive localized tenderness over radial and dorsal aspect of thumb (CMC joint)  Grind test is Positive for pain and Positive for crepitus  No triggering or tenderness over the A1 pulley  No pain with Finkelsteins maneuver             ___________________________________________________  STUDIES REVIEWED:  No studies reviewed  PROCEDURES PERFORMED:  Small joint arthrocentesis: R thumb CMC  [place] Protocol:  Consent: Verbal consent obtained  Risks and benefits: risks, benefits and alternatives were discussed  Consent given by: patient  Time out: Immediately prior to procedure a "time out" was called to verify the correct patient, procedure, equipment, support staff and site/side marked as required    Patient understanding: patient states understanding of the procedure being performed  Patient consent: the patient's understanding of the procedure matches consent given  Site marked: the operative site was marked  Patient identity confirmed: verbally with patient    Supporting Documentation  Indications: pain   Procedure Details  Location: thumb - R thumb CMC  Preparation: Patient was prepped and draped in the usual sterile fashion  Needle size: 25 G  Approach: dorsal  Medications administered: 0 05 mEq sodium bicarbonate 8 4 %; 2 5 mL lidocaine 1 %; 20 mg triamcinolone acetonide 40 mg/mL    Patient tolerance: patient tolerated the procedure well with no immediate complications  Dressing:  Sterile dressing applied             _____________________________________________________  ASSESSMENT/PLAN:      Diagnoses and all orders for this visit:    Arthritis of carpometacarpal (CMC) joint of right thumb  -  Patient was given cortisone injection today  She Tolerated well  -  She was advised that we repeat the cortisone injections every 3-4 months as needed  -she can take Tylenol and anti-inflammatories as needed for pain  - she will follow-up us as needed  Follow Up:  Return if symptoms worsen or fail to improve  Work/school status:    No restrictions    To Do Next Visit:  Re-evaluation of current issue    General Discussions:  CMC Arthritis: The anatomy and physiology of carpometacarpal joint arthritis was discussed with the patient today in the office  Deterioration of the articular cartilage eventually leads to hypermobility at the thumb [facility] joint, resulting in joint subluxation, osteophyte formation, cystic changes within the trapezium and base of the first metacarpal, as well as subchondral sclerosis  Eventually, pain, limited mobility, and compensatory hyperextension at the metacarpophalangeal joint may develop  While normal activity and usage of the thumb joint may provide a painful experience to the patient, this typically does not result in damage to the thumb or hand  Treatment options include resting thumb spica splints to decreased joint edema, pain, and inflammation  Therapy exercises to strengthen the thenar musculature may relieve pain, but do not alter the overall continued development of osteoarthritis  Oral medications, topical medications, corticosteroid injections may decrease pain and increase overall function  Eventually, approximately 5% of patients may require surgical intervention                                                                                                                                                                                                     Scribe Attestation    I,:  Rose Estrada PA-C am acting as a scribe while in the presence of the attending physician :       I,:  Conrad Montoya MD personally performed the services described in this documentation    as scribed in my presence :           Portions of the record may have been created with voice recognition software  Occasional wrong word or "sound a like" substitutions may have occurred due to the inherent limitations of voice recognition software  Read the chart carefully and recognize, using context, where substitutions have occurred

## 2021-03-10 DIAGNOSIS — Z23 ENCOUNTER FOR IMMUNIZATION: ICD-10-CM

## 2021-03-25 ENCOUNTER — TRANSCRIBE ORDERS (OUTPATIENT)
Dept: ADMINISTRATIVE | Facility: HOSPITAL | Age: 56
End: 2021-03-25

## 2021-03-25 DIAGNOSIS — R10.13 EPIGASTRIC PAIN: Primary | ICD-10-CM

## 2021-04-30 ENCOUNTER — HOSPITAL ENCOUNTER (OUTPATIENT)
Dept: NUCLEAR MEDICINE | Facility: HOSPITAL | Age: 56
Discharge: HOME | End: 2021-04-30
Payer: COMMERCIAL

## 2021-04-30 DIAGNOSIS — R10.13 EPIGASTRIC PAIN: ICD-10-CM

## 2021-04-30 PROCEDURE — 78227 HEPATOBIL SYST IMAGE W/DRUG: CPT

## 2021-04-30 PROCEDURE — A9537 TC99M MEBROFENIN: HCPCS

## 2021-04-30 RX ADMIN — SINCALIDE 2.3 MCG: 5 INJECTION, POWDER, LYOPHILIZED, FOR SOLUTION INTRAVENOUS at 13:10

## 2021-05-10 DIAGNOSIS — J45.991 COUGH VARIANT ASTHMA: ICD-10-CM

## 2021-05-10 RX ORDER — MONTELUKAST SODIUM 10 MG/1
TABLET ORAL
Qty: 30 TABLET | Refills: 1 | Status: SHIPPED | OUTPATIENT
Start: 2021-05-10 | End: 2021-06-10 | Stop reason: SDUPTHER

## 2021-05-18 ENCOUNTER — OFFICE VISIT (OUTPATIENT)
Dept: PULMONOLOGY | Facility: CLINIC | Age: 56
End: 2021-05-18
Payer: COMMERCIAL

## 2021-05-18 VITALS
HEART RATE: 78 BPM | DIASTOLIC BLOOD PRESSURE: 90 MMHG | WEIGHT: 267 LBS | TEMPERATURE: 97.4 F | OXYGEN SATURATION: 96 % | BODY MASS INDEX: 36.16 KG/M2 | SYSTOLIC BLOOD PRESSURE: 124 MMHG | HEIGHT: 72 IN

## 2021-05-18 DIAGNOSIS — R06.02 SOB (SHORTNESS OF BREATH): ICD-10-CM

## 2021-05-18 DIAGNOSIS — Z91.09 ENVIRONMENTAL ALLERGIES: ICD-10-CM

## 2021-05-18 DIAGNOSIS — R05.3 CHRONIC COUGH: ICD-10-CM

## 2021-05-18 DIAGNOSIS — J45.40 MODERATE PERSISTENT ASTHMA WITHOUT COMPLICATION: ICD-10-CM

## 2021-05-18 DIAGNOSIS — J45.991 COUGH VARIANT ASTHMA: Primary | ICD-10-CM

## 2021-05-18 DIAGNOSIS — E66.9 OBESITY (BMI 30-39.9): ICD-10-CM

## 2021-05-18 PROCEDURE — 99214 OFFICE O/P EST MOD 30 MIN: CPT | Performed by: INTERNAL MEDICINE

## 2021-05-18 NOTE — PROGRESS NOTES
Assessment/Plan:   Diagnoses and all orders for this visit:    Cough variant asthma    Moderate persistent asthma without complication    Environmental allergies    Chronic cough    SOB (shortness of breath)    Obesity (BMI 30-39  9)          Cough variant asthma significantly better with the Singulair  Respiratory allergy panel with increased IgE for dust mites, cats and dogs  continue with Advair 1 puff twice daily   Rinse mouth after use   Continue with Singulair 10 mg daily at bedtime   Continue with albuterol MDI 2 puffs 4 times daily as needed  She is is still due to schedule the PFT which she will get it done and then will follow-up after that  Call if any increased use of the rescue MDI she can and discussed with the patient that she can continue to use the albuterol MDI 2 puffs prior to half an hour prior to her exercise  CT of the chest done in December of 2020 with mild bronchial wall thickening suggestive of small airway inflammation    Return in about 6 months (around 11/18/2021)  All questions are answered to the patient's satisfaction and understanding  She verbalizes understanding  She is encouraged to call with any further questions or concerns  Portions of the record may have been created with voice recognition software  Occasional wrong word or "sound a like" substitutions may have occurred due to the inherent limitations of voice recognition software  Read the chart carefully and recognize, using context, where substitutions have occurred  Electronically Signed by Miriam Duncan MD    ______________________________________________________________________    Chief Complaint:   Chief Complaint   Patient presents with    Follow-up       Patient ID: Rocio Gross is a 60 y o  y o  female has a past medical history of Asthma and Hypothyroidism  5/18/2021  Patient presents today for follow-up visit    Patient is a very pleasant 47-year-old lady who has never smoked but has been exposed to a lot of secondhand smoke when she was young from parents, currently working in a [address] [place] [address] [employer]  She states she was diagnosed with asthma when she was very young and she ought grew it  For the past 4 years has been having trouble with the chronic cough for which she had the testing done with PFTs and she was given inhaler albuterol she states  Still with significant dry cough but she states she did not have any shortness of breath or wheezing then  For the past several months has been having worsening cough along with shortness of breath and wheezing for which she had to go into the ER twice with a prednisone tapering down dose and into urgent care which she was given prednisone again  And also treated with antibiotic  Recently also had a CT of the chest done  Since seen in January she has been placed on Singulair and Advair 1 puff twice daily which she states has been only using it once a day, and she has been doing very well the cough has significantly decreased and she is not having the need to use her rescue inhaler frequently, she has been using her rescue inhaler half an hour prior to the exercise only  primary symptoms  Pertinent negatives include no chest pain, fever, headaches or myalgias  Review of Systems   Constitutional: Negative  Negative for appetite change and fever  HENT: Negative  Negative for ear pain  Eyes: Negative  Respiratory: Negative  Cardiovascular: Negative  Negative for chest pain  Gastrointestinal: Negative  Endocrine: Negative  Genitourinary: Negative  Musculoskeletal: Negative  Negative for myalgias  Allergic/Immunologic: Negative  Neurological: Negative  Negative for headaches  Hematological: Negative  Psychiatric/Behavioral: Negative  Smoking history: She reports that she has never smoked   She has never used smokeless tobacco     The following portions of the patient's history were reviewed and updated as appropriate: allergies, current medications, past family history, past medical history, past social history, past surgical history and problem list     Immunization History   Administered Date(s) Administered    SARS-CoV-2 / COVID-19 mRNA IM (Pf[employer]) 03/11/2021, 04/01/2021     Current Outpatient Medications   Medication Sig Dispense Refill    esomeprazole (NexIUM) 40 MG capsule       famotidine (PEPCID) 40 MG tablet       fluticasone (FLONASE) 50 mcg/act nasal spray       fluticasone-salmeterol (ADVAIR, WIXELA) 500-50 mcg/dose inhaler Inhale 1 puff 2 (two) times a day Rinse mouth after use  1 Inhaler 3    levothyroxine (SYNTHROID) 150 mcg tablet       montelukast (SINGULAIR) 10 mg tablet take 1 tablet by mouth at bedtime 30 tablet 1    omeprazole (PriLOSEC) 40 MG capsule       Peak Air Peak Flow Meter TRIP Use as directed      Sulfacetamide Sodium, Acne, 10 % LOTN apply twice a day to FACE      tobramycin-dexamethasone [facility]) ophthalmic suspension 2 drops left eye twice a day      HYDROcodone-acetaminophen (NORCO) 7 5-325 mg per tablet Take 1 tablet by mouth every 6 (six) hours as needed      levofloxacin (LEVAQUIN) 500 mg tablet Take 500 mg by mouth daily      Synthroid 200 MCG tablet        No current facility-administered medications for this visit  Allergies: Fluconazole, Sulfamethoxazole-trimethoprim, Erythromycin, Penicillins, and Tetracycline    Objective:  Vitals:    05/18/21 1529   BP: 124/90   Pulse: 78   Temp: (!) 97 4 °F (36 3 °C)   SpO2: 96%   Weight: 121 kg (267 lb)   Height: 6' 1" (1 854 m)   Oxygen Therapy  SpO2: 96 %    Wt Readings from Last 3 Encounters:   05/18/21 121 kg (267 lb)   02/25/21 121 kg (266 lb)   02/09/21 121 kg (266 lb)     Body mass index is 35 23 kg/m²  Physical Exam  Vitals signs and nursing note reviewed  Constitutional:       Appearance: She is well-developed  HENT:      Head: Normocephalic and atraumatic     Eyes: Conjunctiva/sclera: Conjunctivae normal       Pupils: Pupils are equal, round, and reactive to light  Neck:      Musculoskeletal: Normal range of motion and neck supple  Thyroid: No thyromegaly  Vascular: No JVD  Cardiovascular:      Rate and Rhythm: Normal rate and regular rhythm  Heart sounds: Normal heart sounds  No murmur  No friction rub  No gallop  Pulmonary:      Effort: Pulmonary effort is normal  No respiratory distress  Breath sounds: Normal breath sounds  No wheezing or rales  Chest:      Chest wall: No tenderness  Musculoskeletal: Normal range of motion  General: No tenderness or deformity  Lymphadenopathy:      Cervical: No cervical adenopathy  Skin:     General: Skin is warm and dry  Neurological:      Mental Status: She is alert and oriented to person, place, and time  Diagnostics:  I have personally reviewed pertinent reports         Answers for HPI/ROS submitted by the patient on 5/18/2021   Primary symptoms  Do you have difficulty breathing?: Yes  Chronicity: recurrent  When did you first notice your symptoms?: more than 1 year ago  How often do your symptoms occur?: rarely  Since you first noticed this problem, how has it changed?: gradually improving  Do you have shortness of breath that occurs with effort or exertion?: No  Do you have ear congestion?: No  Do you have heartburn?: Yes  Do you have fatigue?: No  Which of the following makes your symptoms worse?: lying down  Which of the following makes your symptoms better?: steroid inhaler

## 2021-06-10 DIAGNOSIS — J45.991 COUGH VARIANT ASTHMA: ICD-10-CM

## 2021-06-10 RX ORDER — MONTELUKAST SODIUM 10 MG/1
10 TABLET ORAL
Qty: 30 TABLET | Refills: 3 | Status: SHIPPED | OUTPATIENT
Start: 2021-06-10 | End: 2021-06-23 | Stop reason: SDUPTHER

## 2021-06-23 ENCOUNTER — TELEPHONE (OUTPATIENT)
Dept: PULMONOLOGY | Facility: CLINIC | Age: 56
End: 2021-06-23

## 2021-06-23 NOTE — TELEPHONE ENCOUNTER
Patient has questions regarding her pre for tomorrow's pft test   Can you please give her a call when you have a moment? She is also requesting a 90 supply of [facility]  Can you send that over as well? Thank you

## 2021-06-24 ENCOUNTER — HOSPITAL ENCOUNTER (OUTPATIENT)
Dept: PULMONOLOGY | Facility: HOSPITAL | Age: 56
Discharge: HOME | End: 2021-06-24
Attending: INTERNAL MEDICINE
Payer: COMMERCIAL

## 2021-06-24 DIAGNOSIS — R05.3 CHRONIC COUGH: ICD-10-CM

## 2021-06-24 PROCEDURE — 94729 DIFFUSING CAPACITY: CPT

## 2021-06-24 PROCEDURE — 94060 EVALUATION OF WHEEZING: CPT | Performed by: INTERNAL MEDICINE

## 2021-06-24 PROCEDURE — 94727 GAS DIL/WSHOT DETER LNG VOL: CPT | Performed by: INTERNAL MEDICINE

## 2021-06-24 PROCEDURE — 94727 GAS DIL/WSHOT DETER LNG VOL: CPT

## 2021-06-24 PROCEDURE — 94729 DIFFUSING CAPACITY: CPT | Performed by: INTERNAL MEDICINE

## 2021-06-24 PROCEDURE — 94060 EVALUATION OF WHEEZING: CPT

## 2021-06-24 PROCEDURE — 94760 N-INVAS EAR/PLS OXIMETRY 1: CPT

## 2021-06-24 RX ORDER — ALBUTEROL SULFATE 2.5 MG/3ML
2.5 SOLUTION RESPIRATORY (INHALATION) ONCE
Status: COMPLETED | OUTPATIENT
Start: 2021-06-24 | End: 2021-06-24

## 2021-06-24 RX ADMIN — ALBUTEROL SULFATE 2.5 MG: 2.5 SOLUTION RESPIRATORY (INHALATION) at 11:51

## 2021-08-03 ENCOUNTER — OFFICE VISIT (OUTPATIENT)
Dept: OBGYN CLINIC | Facility: CLINIC | Age: 56
End: 2021-08-03
Payer: COMMERCIAL

## 2021-08-03 VITALS
SYSTOLIC BLOOD PRESSURE: 132 MMHG | HEIGHT: 72 IN | HEART RATE: 71 BPM | BODY MASS INDEX: 36.87 KG/M2 | DIASTOLIC BLOOD PRESSURE: 82 MMHG | WEIGHT: 272.2 LBS

## 2021-08-03 DIAGNOSIS — M18.11 ARTHRITIS OF CARPOMETACARPAL (CMC) JOINT OF RIGHT THUMB: Primary | ICD-10-CM

## 2021-08-03 PROCEDURE — 99213 OFFICE O/P EST LOW 20 MIN: CPT | Performed by: ORTHOPAEDIC SURGERY

## 2021-08-03 PROCEDURE — 20600 DRAIN/INJ JOINT/BURSA W/O US: CPT | Performed by: ORTHOPAEDIC SURGERY

## 2021-08-03 RX ORDER — LEVOTHYROXINE SODIUM 175 MCG
TABLET ORAL
COMMUNITY
Start: 2021-06-07 | End: 2021-08-03

## 2021-08-03 RX ORDER — TRIAMCINOLONE ACETONIDE 40 MG/ML
20 INJECTION, SUSPENSION INTRA-ARTICULAR; INTRAMUSCULAR
Status: COMPLETED | OUTPATIENT
Start: 2021-08-03 | End: 2021-08-03

## 2021-08-03 RX ORDER — PREDNISOLONE ACETATE 10 MG/ML
SUSPENSION/ DROPS OPHTHALMIC
COMMUNITY
Start: 2021-07-08 | End: 2021-08-03

## 2021-08-03 RX ORDER — ASCORBIC ACID 500 MG
TABLET ORAL
COMMUNITY

## 2021-08-03 RX ORDER — IBUPROFEN 600 MG/1
TABLET ORAL
COMMUNITY
End: 2021-08-03

## 2021-08-03 RX ORDER — BROMFENAC 1.03 MG/ML
SOLUTION/ DROPS OPHTHALMIC
COMMUNITY
Start: 2021-07-08 | End: 2021-08-03

## 2021-08-03 RX ORDER — HYOSCYAMINE SULFATE EXTENDED-RELEASE 0.38 MG/1
0.38 TABLET ORAL 2 TIMES DAILY
COMMUNITY
Start: 2021-05-03 | End: 2021-08-03

## 2021-08-03 RX ORDER — ALBUTEROL SULFATE 2.5 MG/3ML
SOLUTION RESPIRATORY (INHALATION)
COMMUNITY
End: 2021-08-03

## 2021-08-03 RX ORDER — OFLOXACIN 3 MG/ML
SOLUTION/ DROPS OPHTHALMIC
COMMUNITY
Start: 2021-07-08 | End: 2021-08-03

## 2021-08-03 RX ORDER — LIDOCAINE HYDROCHLORIDE 10 MG/ML
2.5 INJECTION, SOLUTION INFILTRATION; PERINEURAL
Status: COMPLETED | OUTPATIENT
Start: 2021-08-03 | End: 2021-08-03

## 2021-08-03 RX ADMIN — LIDOCAINE HYDROCHLORIDE 2.5 ML: 10 INJECTION, SOLUTION INFILTRATION; PERINEURAL at 10:36

## 2021-08-03 RX ADMIN — Medication 0.05 MEQ: at 10:36

## 2021-08-03 RX ADMIN — TRIAMCINOLONE ACETONIDE 20 MG: 40 INJECTION, SUSPENSION INTRA-ARTICULAR; INTRAMUSCULAR at 10:36

## 2021-08-03 NOTE — PROGRESS NOTES
CHIEF COMPLAINT:  Chief Complaint   Patient presents with    Right Thumb - Follow-up       SUBJECTIVE:  Eloina Mitchell is a 60y o  year old  female who presents to the office for a follow up for right [facility] OA  PT has been treated with CSI that give her lasting relief  Pt's last CSI was administered 2/25/2021  Pt states that her pain returned about 5 weeks ago  PAST MEDICAL HISTORY:  Past Medical History:   Diagnosis Date    Asthma     Hypothyroidism        PAST SURGICAL HISTORY:  Past Surgical History:   Procedure Laterality Date    APPENDECTOMY      CHOLECYSTECTOMY      TONSILLECTOMY         FAMILY HISTORY:  History reviewed  No pertinent family history  SOCIAL HISTORY:  Social History     Tobacco Use    Smoking status: Never Smoker    Smokeless tobacco: Never Used   Vaping Use    Vaping Use: Never used   Substance Use Topics    Alcohol use: Never    Drug use: Never       MEDICATIONS:    Current Outpatient Medications:     esomeprazole (NexIUM) 40 MG capsule, , Disp: , Rfl:     famotidine (PEPCID) 40 MG tablet, , Disp: , Rfl:     levothyroxine (SYNTHROID) 150 mcg tablet, , Disp: , Rfl:     Melatonin 300 MCG TABS, melatonin 300 mcg tablet  PRN, Disp: , Rfl:     montelukast (SINGULAIR) 10 mg tablet, Take 1 tablet (10 mg total) by mouth daily at bedtime, Disp: 90 tablet, Rfl: 3    omeprazole (PriLOSEC) 40 MG capsule, , Disp: , Rfl:     Peak Air Peak Flow Meter TRIP, Use as directed, Disp: , Rfl:     ALLERGIES:  Allergies   Allergen Reactions    Fluconazole Swelling     Of lips      Sulfamethoxazole-Trimethoprim Swelling     Of lips    Erythromycin Rash     Breaks aout on white pimples      Penicillins Rash     Breaks out on white pimples       Tetracycline Rash     Breaks out on white pimples         REVIEW OF SYSTEMS:  Review of Systems   Constitutional: Negative for chills, fever and unexpected weight change  HENT: Negative for hearing loss, nosebleeds and sore throat      Eyes: Negative for pain, redness and visual disturbance  Respiratory: Negative for cough, shortness of breath and wheezing  Cardiovascular: Negative for chest pain, palpitations and leg swelling  Gastrointestinal: Negative for abdominal pain, nausea and vomiting  Endocrine: Negative for polydipsia and polyuria  Genitourinary: Negative for dysuria and hematuria  Skin: Negative for rash and wound  Neurological: Negative for dizziness and headaches  Psychiatric/Behavioral: Negative for decreased concentration, dysphoric mood and suicidal ideas  The patient is not nervous/anxious  VITALS:  Vitals:    08/03/21 1017   BP: 132/82   Pulse: 71       LABS:  HgA1c:   Lab Results   Component Value Date    HGBA1C 6 1 12/17/2019     BMP:   Lab Results   Component Value Date    CALCIUM 8 7 12/21/2020    K 4 2 12/21/2020    CO2 26 12/21/2020     12/21/2020    BUN 9 12/21/2020    CREATININE 0 74 12/21/2020       _____________________________________________________  PHYSICAL EXAMINATION:  General: well developed and well nourished, alert, oriented times 3 and appears comfortable  Psychiatric: Normal  HEENT: Trachea Midline, No torticollis  Pulmonary: No audible wheezing or strider  Cardiovascular: No discernable arrhythmia   Skin: No masses, erythema, lacerations, fluctation, ulcerations  Neurovascular: Sensation Intact to the Median, Ulnar, Radial Nerve, Motor Intact to the Median, Ulnar, Radial Nerve and Pulses Intact    MUSCULOSKELETAL EXAMINATION:    right CMC Exam:  No adduction contracture  No hyperextension deformity of MCP joint  Positive localized tenderness over radial and dorsal aspect of thumb (CMC joint)  Grind test is Positive for pain and Positive for crepitus  No triggering or tenderness over the A1 pulley  No pain with Finkelsteins maneuver             ___________________________________________________  STUDIES REVIEWED:  No studies reviewed         PROCEDURES PERFORMED:  Small joint arthrocentesis: R thumb CMC  Universal Protocol:  Consent: Verbal consent obtained  Risks and benefits: risks, benefits and alternatives were discussed  Consent given by: patient  Patient understanding: patient states understanding of the procedure being performed  Patient consent: the patient's understanding of the procedure matches consent given  Site marked: the operative site was marked  Supporting Documentation  Indications: pain   Procedure Details  Location: thumb - R thumb CMC  Preparation: Patient was prepped and draped in the usual sterile fashion  Needle size: 27 G  Ultrasound guidance: no  Medications administered: 0 05 mEq sodium bicarbonate 8 4 %; 20 mg triamcinolone acetonide 40 mg/mL; 2 5 mL lidocaine 1 %    Patient tolerance: patient tolerated the procedure well with no immediate complications  Dressing:  Sterile dressing applied            _____________________________________________________  ASSESSMENT/PLAN:    Right CMC Osteoarthritis  * repeat CSI was administered today without complications  *Pt was advised that they may have increased pain for the next 24-36 hours from the CSI before feeling relief, during this time pt was advised to take NSAIDs and apply ice  *After the initial 36 hours pt is advised to apply heat and continue motion of the thumb to decrease discomfort  *Pt was advised that activity modification such as resting after increased activity or taking NSAIDs prior to increased activity may be necessary    *Pt was advised that these CSI should not be administered more frequently than 3 months apart  *Pt will follow up as needed          Follow Up:  Return if symptoms worsen or fail to improve        To Do Next Visit:  Re-evaluation of current issue        Scribe Attestation    I,:  Eve Hernadez am acting as a scribe while in the presence of the attending physician :       I,:  Vijaya Chaudhary MD personally performed the services described in this documentation    as scribed in my presence :

## 2021-09-09 ENCOUNTER — TELEPHONE (OUTPATIENT)
Dept: PULMONOLOGY | Facility: CLINIC | Age: 56
End: 2021-09-09

## 2021-09-09 NOTE — TELEPHONE ENCOUNTER
I called patient to make a follow up from our recall list in November  She stated she had not heard back about her PFT test   Is her November appointment ok to wait to hear for results or do you need to discuss sooner  Please advise  Thank you

## 2021-10-26 ENCOUNTER — TELEPHONE (OUTPATIENT)
Dept: PULMONOLOGY | Facility: CLINIC | Age: 56
End: 2021-10-26

## 2021-11-03 ENCOUNTER — TELEMEDICINE (OUTPATIENT)
Dept: PULMONOLOGY | Facility: CLINIC | Age: 56
End: 2021-11-03
Payer: COMMERCIAL

## 2021-11-03 VITALS — BODY MASS INDEX: 35.91 KG/M2 | HEIGHT: 72 IN

## 2021-11-03 DIAGNOSIS — J30.89 ENVIRONMENTAL AND SEASONAL ALLERGIES: ICD-10-CM

## 2021-11-03 DIAGNOSIS — K21.9 HIATAL HERNIA WITH GERD: ICD-10-CM

## 2021-11-03 DIAGNOSIS — J45.30 MILD PERSISTENT ASTHMA WITHOUT COMPLICATION: ICD-10-CM

## 2021-11-03 DIAGNOSIS — R05.3 CHRONIC COUGH: Primary | ICD-10-CM

## 2021-11-03 DIAGNOSIS — K44.9 HIATAL HERNIA WITH GERD: ICD-10-CM

## 2021-11-03 PROCEDURE — 99213 OFFICE O/P EST LOW 20 MIN: CPT | Performed by: PHYSICIAN ASSISTANT

## 2021-11-03 RX ORDER — NITROFURANTOIN 25; 75 MG/1; MG/1
100 CAPSULE ORAL 2 TIMES DAILY
COMMUNITY
Start: 2021-10-09

## 2021-11-03 RX ORDER — SULFACETAMIDE SODIUM 100 MG/ML
LOTION TOPICAL
COMMUNITY
Start: 2021-10-05

## 2021-11-03 RX ORDER — LEVOTHYROXINE SODIUM 175 MCG
TABLET ORAL
COMMUNITY
Start: 2021-08-04

## 2021-11-03 RX ORDER — MULTIVIT-MIN/IRON/FOLIC ACID/K 18-600-40
1 CAPSULE ORAL
COMMUNITY

## 2021-12-17 ENCOUNTER — OFFICE VISIT (OUTPATIENT)
Dept: OBGYN CLINIC | Facility: CLINIC | Age: 56
End: 2021-12-17
Payer: COMMERCIAL

## 2021-12-17 VITALS
HEIGHT: 72 IN | DIASTOLIC BLOOD PRESSURE: 90 MMHG | HEART RATE: 82 BPM | SYSTOLIC BLOOD PRESSURE: 131 MMHG | BODY MASS INDEX: 36.84 KG/M2 | WEIGHT: 272 LBS

## 2021-12-17 DIAGNOSIS — M18.11 ARTHRITIS OF CARPOMETACARPAL (CMC) JOINT OF RIGHT THUMB: Primary | ICD-10-CM

## 2021-12-17 DIAGNOSIS — M77.8 TENDINITIS OF RIGHT WRIST: ICD-10-CM

## 2021-12-17 PROCEDURE — 20600 DRAIN/INJ JOINT/BURSA W/O US: CPT | Performed by: FAMILY MEDICINE

## 2021-12-17 PROCEDURE — 99213 OFFICE O/P EST LOW 20 MIN: CPT | Performed by: FAMILY MEDICINE

## 2021-12-17 RX ORDER — LIDOCAINE HYDROCHLORIDE 10 MG/ML
0.5 INJECTION, SOLUTION INFILTRATION; PERINEURAL
Status: COMPLETED | OUTPATIENT
Start: 2021-12-17 | End: 2021-12-17

## 2021-12-17 RX ORDER — TRIAMCINOLONE ACETONIDE 40 MG/ML
20 INJECTION, SUSPENSION INTRA-ARTICULAR; INTRAMUSCULAR
Status: COMPLETED | OUTPATIENT
Start: 2021-12-17 | End: 2021-12-17

## 2021-12-17 RX ORDER — LIDOCAINE HYDROCHLORIDE 10 MG/ML
1 INJECTION, SOLUTION INFILTRATION; PERINEURAL
Status: COMPLETED | OUTPATIENT
Start: 2021-12-17 | End: 2021-12-17

## 2021-12-17 RX ADMIN — LIDOCAINE HYDROCHLORIDE 1 ML: 10 INJECTION, SOLUTION INFILTRATION; PERINEURAL at 11:18

## 2021-12-17 RX ADMIN — LIDOCAINE HYDROCHLORIDE 0.5 ML: 10 INJECTION, SOLUTION INFILTRATION; PERINEURAL at 11:18

## 2021-12-17 RX ADMIN — TRIAMCINOLONE ACETONIDE 20 MG: 40 INJECTION, SUSPENSION INTRA-ARTICULAR; INTRAMUSCULAR at 11:18

## 2022-03-18 ENCOUNTER — TELEPHONE (OUTPATIENT)
Dept: PULMONOLOGY | Facility: CLINIC | Age: 57
End: 2022-03-18

## 2022-03-18 NOTE — TELEPHONE ENCOUNTER
Called patient to advise her that we received a request for medication and they she was due for an appt and that we had to set one up before sending in her Advair  She started to say that she didn't understand and that she was suppose to have [facility]  I told her that is where we were going to send it but we needed to set up a follow up first  She then said she wanted to express that she always has issues with our office and our staff  She said she would see Dr Hawa Ventura and she would tell her that she did not send in a medication but she would have the receipt that has her name on it  I told her I was just going by her last virtual appt that said she needed to be seen in 3 months which was February 2022  She said "that's another thing  No one called me and told me I was due for an appt"  I told her I was just doing my job  She said she would like a call from Dr Hawa Ventura  I told her I would send a message

## 2022-03-21 ENCOUNTER — TELEPHONE (OUTPATIENT)
Dept: OTHER | Facility: OTHER | Age: 57
End: 2022-03-21

## 2022-03-21 NOTE — TELEPHONE ENCOUNTER
Called and left a message for the patient to give us a call back I did resend the Advair prescription for [facility] months to [facility]

## 2022-03-21 NOTE — TELEPHONE ENCOUNTER
I spoke w/ patient  She is aware script was sent to [facility] and scheduled w/ Dr Mare Lewis on 6/14

## 2022-03-28 ENCOUNTER — TELEPHONE (OUTPATIENT)
Dept: PULMONOLOGY | Facility: HOSPITAL | Age: 57
End: 2022-03-28

## 2022-03-28 NOTE — TELEPHONE ENCOUNTER
We do not have any Breo, correct? Can we give her a sample of Trelegy to use until she receives her medication? The other option is to send 1 month of her Advair to the local pharmacy which may be too costly for her

## 2022-03-28 NOTE — TELEPHONE ENCOUNTER
Received a call from [facility] that they needed clarification on the advair prescription because Yury Mason failed to put directions on the script  I answered their questions and they stated they would start working on getting the medication out to the patient  The patient then left a message shortly after so I called her back to let her know I spoke with them and they are working on it  She then explained she won't have enough medication to last her until the [facility] delivery comes  I stated I could contact the [address] office and see if they have a sample they could provide to last her  She then asked if we were going to "overnight fed ex" it to her house  I explained that she would have to come to the office to pick it up  She then stated "oh so now i'm being inconvenienced because of your lack of competence and i'm the loser when your office is the one who constantly has issues filling my medications for no apparent reason" I explained that the pharmacy just needed to clarify information about her script  She then stated that she wants it noted how upset she is with our office and it is our fault and not the pharmacies  She also requested to speak with a manager again since the one she spoke with "did nothing for her"  Also, if someone could coordinate her picking up a sample   Please advise

## 2022-03-28 NOTE — TELEPHONE ENCOUNTER
Can you check with phyllis to see what medication the patient can  to hold her over till her script is delivered

## 2022-03-28 NOTE — TELEPHONE ENCOUNTER
Spoke with patient and she expressed her frustration with our office  This is not the first time she has issues with her medications and she does not understand why this keeps happening  She stated that [facility] along with [facility] has called our office multiple times and never gets a call back  I did apologize for er troubles and that we would pull a sample of a medication to hold her over until her Advair gets delivered via mail  She then stated that she will let it slide this time again but the next time this happens she will go over our heads and Dr Brice Mead head to speak to the people in charge of [employer] to ensure this does not happen again

## 2022-03-29 NOTE — TELEPHONE ENCOUNTER
No we don't, Spoke to pt, she will come on Friday 4/1/22 to our office to  the trelegy sample, I asked pt if she has enough medication until Friday, she said yes, left sample at the

## 2022-03-31 ENCOUNTER — TELEPHONE (OUTPATIENT)
Dept: PULMONOLOGY | Facility: CLINIC | Age: 57
End: 2022-03-31

## 2022-03-31 DIAGNOSIS — J45.30 MILD PERSISTENT ASTHMA WITHOUT COMPLICATION: Primary | ICD-10-CM

## 2022-03-31 RX ORDER — FLUTICASONE FUROATE, UMECLIDINIUM BROMIDE AND VILANTEROL TRIFENATATE 100; 62.5; 25 UG/1; UG/1; UG/1
1 POWDER RESPIRATORY (INHALATION) DAILY
Qty: 60 BLISTER | Refills: 0 | Status: CANCELLED | OUTPATIENT
Start: 2022-03-31 | End: 2022-04-30

## 2022-03-31 NOTE — TELEPHONE ENCOUNTER
Patient calling asking to speak to Dr Stanford Chaudhary office  I told her that we answer for her office and how I can help her  She said she would like to make this message very clear and said "yet again, [facility] have not gotten the information they need " Then said "they will know what I'm talking about'  Please advise and call patient with an update

## 2022-03-31 NOTE — TELEPHONE ENCOUNTER
Rx clarification request faxed three times to [facility], spoke to [place] (Isaiah) and Buck Barajas from [facility], ref # [id number], they have everything they need from our office, patient will received the medication in 2 or 3 business days, left 1 more trelegy sample at the , patient notified

## 2022-06-14 ENCOUNTER — OFFICE VISIT (OUTPATIENT)
Dept: PULMONOLOGY | Facility: CLINIC | Age: 57
End: 2022-06-14
Payer: COMMERCIAL

## 2022-06-14 VITALS
BODY MASS INDEX: 36.57 KG/M2 | DIASTOLIC BLOOD PRESSURE: 84 MMHG | OXYGEN SATURATION: 94 % | HEIGHT: 72 IN | TEMPERATURE: 97.6 F | WEIGHT: 270 LBS | SYSTOLIC BLOOD PRESSURE: 124 MMHG | HEART RATE: 86 BPM

## 2022-06-14 DIAGNOSIS — J45.991 COUGH VARIANT ASTHMA: ICD-10-CM

## 2022-06-14 DIAGNOSIS — J30.89 ENVIRONMENTAL AND SEASONAL ALLERGIES: ICD-10-CM

## 2022-06-14 DIAGNOSIS — R05.3 CHRONIC COUGH: ICD-10-CM

## 2022-06-14 DIAGNOSIS — J45.30 MILD PERSISTENT ASTHMA WITHOUT COMPLICATION: Primary | ICD-10-CM

## 2022-06-14 DIAGNOSIS — E66.9 OBESITY (BMI 30-39.9): ICD-10-CM

## 2022-06-14 PROCEDURE — 99214 OFFICE O/P EST MOD 30 MIN: CPT | Performed by: INTERNAL MEDICINE

## 2022-06-14 RX ORDER — MONTELUKAST SODIUM 10 MG/1
10 TABLET ORAL
Qty: 90 TABLET | Refills: 3 | Status: SHIPPED | OUTPATIENT
Start: 2022-06-14

## 2022-06-14 NOTE — PROGRESS NOTES
Assessment/Plan:   Diagnoses and all orders for this visit:    Mild persistent asthma without complication    Chronic cough    Environmental and seasonal allergies    Obesity (BMI 30-39  9)    Cough variant asthma  -     montelukast (SINGULAIR) 10 mg tablet; Take 1 tablet (10 mg total) by mouth daily at bedtime       Cough variant asthma significantly better with the Singulair  Respiratory allergy panel with increased IgE for dust mites, cats and dogs  Continue with Singulair 10 mg daily at bedtime   Continue with albuterol MDI 2 puffs 4 times daily as needed  Discussed regarding Advair she can hold off on the Advair given not using it daily and not having the need to use the rescue inhaler  PFTs in June of 2021 demonstrating spirometry was normal lung volumes were normal and the DLCO was mildly decreased at 69 percent  Recommend weight loss   Follow-up in 1 year or p r n  earlier as needed  No follow-ups on file  All questions are answered to the patient's satisfaction and understanding  She verbalizes understanding  She is encouraged to call with any further questions or concerns  Portions of the record may have been created with voice recognition software  Occasional wrong word or "sound a like" substitutions may have occurred due to the inherent limitations of voice recognition software  Read the chart carefully and recognize, using context, where substitutions have occurred  Electronically Signed by Sahil Reilly MD    ______________________________________________________________________    Chief Complaint:   Chief Complaint   Patient presents with    Follow-up       Patient ID: Shikha Maldonado is a 60 y o  y o  female has a past medical history of Asthma and Hypothyroidism  6/14/2022  Patient presents today for follow-up visit    Patient is a very pleasant 24-year-old lady who has never smoked but has been exposed to a lot of secondhand smoke when she was young from parents, currently working in a medical device company  She states she was diagnosed with asthma when she was very young and she ought grew it  For the past 4 years has been having trouble with the chronic cough for which she had the testing done with PFTs and she was given inhaler albuterol she states  Still with significant dry cough but she states she did not have any shortness of breath or wheezing then  For the past several months has been having worsening cough along with shortness of breath and wheezing for which she had to go into the ER twice with a prednisone tapering down dose and into urgent care which she was given prednisone again  And also treated with antibiotic  Recently also had a CT of the chest done  Since seen in January she has been placed on Singulair and Advair 1 puff twice daily which she states has been only using it once a day, until recently currently only using it 2 or 3 times in a week and she has been doing very well the cough has significantly decreased and she is not having the need to use her rescue inhaler frequently, she has been using her rescue inhaler half an hour prior to the exercise only  Pertinent negatives include no chest pain, fever, headaches or myalgias  Review of Systems   Constitutional: Negative  Negative for fever  HENT: Negative  Eyes: Negative  Respiratory: Negative  Cardiovascular: Negative  Negative for chest pain  Gastrointestinal: Negative  Endocrine: Negative  Genitourinary: Negative  Musculoskeletal: Negative  Negative for myalgias  Allergic/Immunologic: Negative  Neurological: Negative  Negative for headaches  Hematological: Negative  Psychiatric/Behavioral: Negative  Smoking history: She reports that she has never smoked   She has never used smokeless tobacco     The following portions of the patient's history were reviewed and updated as appropriate: allergies, current medications, past family history, past medical history, past social history, past surgical history and problem list     Immunization History   Administered Date(s) Administered    COVID-19 PFIZER VACCINE 0 3 ML IM 03/11/2021, 04/01/2021     Current Outpatient Medications   Medication Sig Dispense Refill    esomeprazole (NexIUM) 40 MG capsule  (Patient not taking: Reported on 12/17/2021 )      famotidine (PEPCID) 40 MG tablet       fluticasone-salmeterol (Advair) 100-50 mcg/dose inhaler Rinse mouth after use  180 blister 1    levothyroxine (SYNTHROID) 150 mcg tablet        Melatonin 300 MCG TABS melatonin 300 mcg tablet   PRN (Patient not taking: Reported on 12/17/2021)      metroNIDAZOLE (METROCREAM) 0 75 % cream       montelukast (SINGULAIR) 10 mg tablet Take 1 tablet (10 mg total) by mouth daily at bedtime 90 tablet 3    nitrofurantoin (MACROBID) 100 mg capsule Take 100 mg by mouth 2 (two) times a day (Patient not taking: Reported on 11/3/2021)      omeprazole (PriLOSEC) 40 MG capsule       Peak Air Peak Flow Meter TRIP Use as directed (Patient not taking: Reported on 11/3/2021)      Sulfacetamide Sodium, Acne, 10 % LOTN       Synthroid 175 MCG tablet       Vitamin D, Cholecalciferol, 50 MCG (2000 UT) CAPS Take 1 capsule by mouth       No current facility-administered medications for this visit  Allergies: Fluconazole, Sulfamethoxazole-trimethoprim, Erythromycin, Penicillins, and Tetracycline    Objective:  Vitals:    06/14/22 1536   Weight: 122 kg (270 lb)   Height: 6' 1" (1 854 m)        Wt Readings from Last 3 Encounters:   06/14/22 122 kg (270 lb)   12/17/21 123 kg (272 lb)   08/03/21 123 kg (272 lb 3 2 oz)     Body mass index is 35 62 kg/m²  Physical Exam  Constitutional:       Appearance: She is well-developed  HENT:      Head: Normocephalic and atraumatic  Eyes:      Pupils: Pupils are equal, round, and reactive to light  Cardiovascular:      Rate and Rhythm: Normal rate and regular rhythm        Heart sounds: Normal heart sounds  Pulmonary:      Effort: Pulmonary effort is normal       Breath sounds: Normal breath sounds  Musculoskeletal:         General: Normal range of motion  Cervical back: Normal range of motion and neck supple  Skin:     General: Skin is warm and dry  Neurological:      Mental Status: She is alert and oriented to person, place, and time  Psychiatric:         Behavior: Behavior normal            Diagnostics:  I have personally reviewed pertinent reports

## 2022-09-22 NOTE — TELEPHONE ENCOUNTER
Patient is just calling to verify if she is taking the nebulizer with the albuterol along with the steroid tablets  Can you please confirm and I will inform patient? Thank you  X Size Of Lesion In Cm (Optional): 1.1

## 2022-10-31 ENCOUNTER — TELEPHONE (OUTPATIENT)
Dept: PULMONOLOGY | Facility: CLINIC | Age: 57
End: 2022-10-31

## 2022-10-31 DIAGNOSIS — J45.20 MILD INTERMITTENT ASTHMA WITHOUT COMPLICATION: Primary | ICD-10-CM

## 2022-10-31 RX ORDER — ALBUTEROL SULFATE 90 UG/1
2 AEROSOL, METERED RESPIRATORY (INHALATION) EVERY 6 HOURS PRN
Qty: 18 G | Refills: 2 | Status: SHIPPED | OUTPATIENT
Start: 2022-10-31 | End: 2023-01-25 | Stop reason: SDUPTHER

## 2022-10-31 NOTE — TELEPHONE ENCOUNTER
Patient called and asked if she could get a refill on her Albuterol Inhaler due to her allergies getting worse during this time of year  Please send to [facility] in [place]   Please advise

## 2022-12-01 ENCOUNTER — HOSPITAL ENCOUNTER (EMERGENCY)
Facility: HOSPITAL | Age: 57
Discharge: HOME | End: 2022-12-01
Attending: EMERGENCY MEDICINE

## 2022-12-01 ENCOUNTER — APPOINTMENT (EMERGENCY)
Dept: CT IMAGING | Facility: HOSPITAL | Age: 57
End: 2022-12-01

## 2022-12-01 VITALS
DIASTOLIC BLOOD PRESSURE: 83 MMHG | RESPIRATION RATE: 16 BRPM | TEMPERATURE: 97.9 F | OXYGEN SATURATION: 96 % | HEART RATE: 67 BPM | SYSTOLIC BLOOD PRESSURE: 146 MMHG

## 2022-12-01 DIAGNOSIS — R11.0 NAUSEA: ICD-10-CM

## 2022-12-01 DIAGNOSIS — G44.309 POST-TRAUMATIC HEADACHE: ICD-10-CM

## 2022-12-01 DIAGNOSIS — M54.50 LOW BACK PAIN: ICD-10-CM

## 2022-12-01 DIAGNOSIS — S09.90XA CLOSED HEAD INJURY, INITIAL ENCOUNTER: ICD-10-CM
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
## 2022-12-01 DIAGNOSIS — M62.838 TRAPEZIUS MUSCLE SPASM: ICD-10-CM

## 2022-12-01 RX ORDER — IBUPROFEN 600 MG/1
600 TABLET ORAL ONCE
Status: COMPLETED | OUTPATIENT
Start: 2022-12-01 | End: 2022-12-01

## 2022-12-01 RX ORDER — ONDANSETRON 4 MG/1
4 TABLET, FILM COATED ORAL EVERY 8 HOURS PRN
Qty: 20 TABLET | Refills: 0 | Status: SHIPPED | OUTPATIENT
Start: 2022-12-01 | End: 2022-12-01 | Stop reason: SDUPTHER

## 2022-12-01 RX ORDER — ONDANSETRON 4 MG/1
4 TABLET, ORALLY DISINTEGRATING ORAL ONCE
Status: COMPLETED | OUTPATIENT
Start: 2022-12-01 | End: 2022-12-01

## 2022-12-01 RX ORDER — ONDANSETRON 4 MG/1
4 TABLET, FILM COATED ORAL EVERY 8 HOURS PRN
Qty: 20 TABLET | Refills: 0 | Status: SHIPPED | OUTPATIENT
Start: 2022-12-01

## 2022-12-01 RX ORDER — ONDANSETRON 4 MG/1
4 TABLET, ORALLY DISINTEGRATING ORAL EVERY 6 HOURS PRN
Qty: 20 TABLET | Refills: 0 | Status: SHIPPED | OUTPATIENT
Start: 2022-12-01

## 2022-12-01 RX ORDER — METHOCARBAMOL 500 MG/1
500 TABLET, FILM COATED ORAL 3 TIMES DAILY PRN
Qty: 28 TABLET | Refills: 0 | Status: SHIPPED | OUTPATIENT
Start: 2022-12-01

## 2022-12-01 RX ORDER — METHOCARBAMOL 500 MG/1
500 TABLET, FILM COATED ORAL ONCE
Status: COMPLETED | OUTPATIENT
Start: 2022-12-01 | End: 2022-12-01

## 2022-12-01 RX ADMIN — ONDANSETRON 4 MG: 4 TABLET, ORALLY DISINTEGRATING ORAL at 09:37

## 2022-12-01 RX ADMIN — IBUPROFEN 600 MG: 600 TABLET, FILM COATED ORAL at 07:51

## 2022-12-01 RX ADMIN — METHOCARBAMOL 500 MG: 500 TABLET ORAL at 07:51
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
## 2022-12-01 NOTE — Clinical Note
Abram Smith was seen and treated in our emergency department on 12/1/2022  Diagnosis:     Emilia Starks  may return to work on return date  She may return on this date: 12/05/2022         If you have any questions or concerns, please don't hesitate to call        Genna Cobos RN    ______________________________           _______________          _______________  Hospital Representative                              Date                                Time Unable to assess

## 2022-12-01 NOTE — DISCHARGE INSTRUCTIONS
Take ibuprofen (Motrin, Advil) or acetaminophen (Tylenol) as needed for pain as per the instructions  Use ice or heat, and topical pain medications, as well as the muscle relaxer as needed for continued severe pain  Take the nausea medication as needed  Do activities as you are able to tolerate, but avoid ones that worsen your symptoms  Do your normal daily activities, and stretching exercises, to keep the muscles in her neck and back loose, but avoid strenuous activities or heavy lifting until you begin to feel better  Follow-up with your primary care doctor in the question clinic for further evaluation and management of your symptoms

## 2022-12-02 ENCOUNTER — TELEPHONE (OUTPATIENT)
Dept: NEUROSURGERY | Facility: CLINIC | Age: 57
End: 2022-12-02

## 2022-12-02 ENCOUNTER — TELEPHONE (OUTPATIENT)
Dept: NEUROLOGY | Facility: CLINIC | Age: 57
End: 2022-12-02
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
## 2022-12-02 NOTE — TELEPHONE ENCOUNTER
12/2/22 CALLED PT AND GAVE NUMBER FOR [facility] FOR CONCUSSION PER REQUIREMENTS FOR CONCUSSION CLINIC

## 2022-12-02 NOTE — TELEPHONE ENCOUNTER
Patient called to schedule new patient appointment for concussion on 11/30/2022  Testing done and seen in ER  Triage intake sent

## 2022-12-28 ENCOUNTER — TELEPHONE (OUTPATIENT)
Dept: NEUROLOGY | Facility: CLINIC | Age: 57
End: 2022-12-28

## 2022-12-28 NOTE — TELEPHONE ENCOUNTER
Called patient and left voicemail to confirm their upcoming appointment with Dr Deandre Maynard  Informed patient about arriving in the [place] location 15 minutes prior to appointment to get checked in and go over chart

## 2023-01-06 ENCOUNTER — OFFICE VISIT (OUTPATIENT)
Dept: NEUROLOGY | Facility: CLINIC | Age: 58
End: 2023-01-06

## 2023-01-06 VITALS
HEIGHT: 72 IN | DIASTOLIC BLOOD PRESSURE: 89 MMHG | WEIGHT: 275 LBS | BODY MASS INDEX: 37.25 KG/M2 | HEART RATE: 88 BPM | SYSTOLIC BLOOD PRESSURE: 140 MMHG | TEMPERATURE: 98.6 F

## 2023-01-06 DIAGNOSIS — G43.009 MIGRAINE WITHOUT AURA AND WITHOUT STATUS MIGRAINOSUS, NOT INTRACTABLE: ICD-10-CM

## 2023-01-06 DIAGNOSIS — G44.319 ACUTE POST-TRAUMATIC HEADACHE, NOT INTRACTABLE: Primary | ICD-10-CM

## 2023-01-06 DIAGNOSIS — R11.0 NAUSEA: ICD-10-CM

## 2023-01-06 RX ORDER — ONDANSETRON 4 MG/1
4 TABLET, ORALLY DISINTEGRATING ORAL EVERY 6 HOURS PRN
Qty: 20 TABLET | Refills: 3 | Status: SHIPPED | OUTPATIENT
Start: 2023-01-06

## 2023-01-06 RX ORDER — KETOROLAC TROMETHAMINE 30 MG/ML
60 INJECTION, SOLUTION INTRAMUSCULAR; INTRAVENOUS ONCE
Status: COMPLETED | OUTPATIENT
Start: 2023-01-06 | End: 2023-01-06

## 2023-01-06 RX ORDER — FLUTICASONE PROPIONATE AND SALMETEROL 100; 50 UG/1; UG/1
POWDER RESPIRATORY (INHALATION) AS NEEDED
COMMUNITY

## 2023-01-06 RX ORDER — METHOCARBAMOL 500 MG/1
TABLET, FILM COATED ORAL
Qty: 20 TABLET | Refills: 0 | Status: SHIPPED | OUTPATIENT
Start: 2023-01-06

## 2023-01-06 RX ORDER — FLUTICASONE PROPIONATE 50 MCG
SPRAY, SUSPENSION (ML) NASAL
COMMUNITY
Start: 2022-11-09

## 2023-01-06 RX ORDER — CLOBETASOL PROPIONATE 0.5 MG/G
CREAM TOPICAL
COMMUNITY
Start: 2022-10-26

## 2023-01-06 RX ORDER — CONJUGATED ESTROGENS 0.62 MG/G
CREAM VAGINAL
COMMUNITY

## 2023-01-06 RX ORDER — CONJUGATED ESTROGENS 0.62 MG/G
CREAM VAGINAL
COMMUNITY
Start: 2022-10-11

## 2023-01-06 RX ORDER — RIZATRIPTAN BENZOATE 10 MG/1
10 TABLET ORAL ONCE AS NEEDED
Qty: 9 TABLET | Refills: 6 | Status: SHIPPED | OUTPATIENT
Start: 2023-01-06

## 2023-01-06 RX ADMIN — KETOROLAC TROMETHAMINE 60 MG: 30 INJECTION, SOLUTION INTRAMUSCULAR; INTRAVENOUS at 11:58

## 2023-01-06 NOTE — PROGRESS NOTES
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 are negative  I have spent 73 minutes with Patient today in which greater than 50% of this time was spent in counseling/coordination of care regarding Diagnostic results, Prognosis, Risks and benefits of tx options, Intructions for management, Patient and family education, Importance of tx compliance, Risk factor reductions and Impressions  I also spent 18 minutes non face to face for this patient the same day           Author:  Ramin Jaime MD 1/6/2023 5:55 PM

## 2023-01-06 NOTE — PATIENT INSTRUCTIONS
We discussed if you had a concussion it would be over by now  This is called posttraumatic headache with migrainous features      Headache/migraine treatment:   Rescue medications (for immediate treatment of a headache): It is ok to take ibuprofen, acetaminophen or naproxen (Advil, Tylenol,  Aleve, Excedrin) if they help your headaches you should limit these to No more than 3 times a week to avoid medication overuse/rebound headaches  For nausea:  -     ondansetron (ZOFRAN-ODT) 4 mg disintegrating tablet; Take 1 tablet (4 mg total) by mouth every 6 (six) hours as needed for nausea or vomiting    For your more moderate to severe migraines take this medication early   Maxalt (rizatriptan) 10mg tabs - take one at the onset of headache  May repeat one time after 2 hours if pain has not resolved  (Max 2 a day and 9 a month)     -     methocarbamol (ROBAXIN) 500 mg tablet; Take one tab PO for muscle spasms at night if needed (can cause sedation)    Over the counter preventive supplements for headaches/migraines (if you try, try for 3 months straight)  (to take every day to help prevent headaches - not to take at the time of headache): There are combo pills online of these - none of which regulated by FDA and double check dosing - take appropriate dose only once a day- preventa migraine, migravent, mind ease, migrelief   [] Magnesium 400mg daily (If any diarrhea or upset stomach, decrease dose  as tolerated)  [] Riboflavin (Vitamin B2) 400mg daily - try online   (FYI B2 may make your urine bright/neon yellow)  AND/OR  [] Herbal medication: Petasites/Butterbur 150 mg daily - try online  (When choosing your Butterbur online or in the store, beware that there are some poor preps containing pyrrolizidine alkaloids (PAs) that can be harmful to the liver   Therefore, do not use butterbur products that are not labeled as PA-free )    Sleep and headache prevention:   [x] Melatonin - you may take 1-3 mg nightly for sleep or headache prevention  You should take this 1 hour prior to bedtime consistently every night for it to work  It works by gradually helping to adjust your sleep time over days to weeks, rather than immediately making you feel sleepy  Prescription preventive medications for headaches/migraines   (to take every day to help prevent headaches - not to take at the time of headache):  [x] we have options if needed      *Typically these types of medications take time until you see the benefit, although some may see improvement in days, often it may take weeks, especially if the medication is being titrated up to a beneficial level  Please contact us if there are any concerns or questions regarding the medication  Lifestyle Recommendations:  [x] SLEEP - Maintain a regular sleep schedule: Adults need at least 7-8 hours of uninterrupted a night  Maintain good sleep hygiene:  Going to bed and waking up at consistent times, avoiding excessive daytime naps, avoiding caffeinated beverages in the evening, avoid excessive stimulation in the evening and generally using bed primarily for sleeping  One hour before bedtime would recommend turning lights down lower, decreasing your activity (may read quietly, listen to music at a low volume)  When you get into bed, should eliminate all technology (no texting, emailing, playing with your phone, iPad or tablet in bed)  [x] HYDRATION - Maintain good hydration  Drink  2L of fluid a day (4 typical small water bottles)  [x] DIET - Maintain good nutrition  In particular don't skip meals and try and eat healthy balanced meals regularly  [x] TRIGGERS - Look for other triggers and avoid them: Limit caffeine to 1-2 cups a day or less  Avoid dietary triggers that you have noticed bring on your headaches (this could include aged cheese, peanuts, MSG, aspartame and nitrates)    [x] EXERCISE - physical exercise as we all know is good for you in many ways, and not only is good for your heart, but also is beneficial for your mental health, cognitive health and  chronic pain/headaches  I would encourage at the least 5 days of physical exercise weekly for at least 30 minutes  Education and Follow-up  [x] Please call with any questions or concerns  Of course if any new concerning symptoms go to the emergency department    [x] Follow up 2-8 weeks, sooner if needed

## 2023-01-06 NOTE — PROGRESS NOTES
Review of Systems   Constitutional: Negative for appetite change and fever  HENT: Positive for tinnitus  Negative for hearing loss, trouble swallowing and voice change  Eyes: Positive for photophobia and discharge  Negative for pain  Left eye twitching and watery   Respiratory: Negative  Negative for shortness of breath  Cardiovascular: Negative  Negative for palpitations  Gastrointestinal: Positive for nausea  Negative for vomiting  Endocrine: Negative  Negative for cold intolerance  Genitourinary: Negative  Negative for dysuria, frequency and urgency  Musculoskeletal: Negative  Negative for myalgias and neck pain  Skin: Negative  Negative for rash  Neurological: Positive for dizziness and headaches  Negative for tremors, seizures, syncope, facial asymmetry, speech difficulty, weakness, light-headedness and numbness  Hematological: Negative  Does not bruise/bleed easily  Psychiatric/Behavioral: Negative  Negative for confusion, hallucinations and sleep disturbance  All other systems reviewed and are negative

## 2023-01-25 DIAGNOSIS — J45.20 MILD INTERMITTENT ASTHMA WITHOUT COMPLICATION: ICD-10-CM

## 2023-01-25 RX ORDER — ALBUTEROL SULFATE 90 UG/1
2 AEROSOL, METERED RESPIRATORY (INHALATION) EVERY 6 HOURS PRN
Qty: 25.5 G | Refills: 3 | Status: SHIPPED | OUTPATIENT
Start: 2023-01-25

## 2023-02-17 ENCOUNTER — TELEPHONE (OUTPATIENT)
Dept: NEUROLOGY | Facility: CLINIC | Age: 58
End: 2023-02-17

## 2023-02-17 NOTE — TELEPHONE ENCOUNTER
Called and spoke to patient to confirm their upcoming appointment with Dr Lacy Perez  Informed patient about arriving in the [place] location 15 minutes prior to their appointment to get checked in and going over chart

## 2023-02-24 ENCOUNTER — OFFICE VISIT (OUTPATIENT)
Dept: NEUROLOGY | Facility: CLINIC | Age: 58
End: 2023-02-24

## 2023-02-24 VITALS
HEIGHT: 72 IN | DIASTOLIC BLOOD PRESSURE: 80 MMHG | HEART RATE: 78 BPM | BODY MASS INDEX: 37.38 KG/M2 | OXYGEN SATURATION: 96 % | WEIGHT: 276 LBS | SYSTOLIC BLOOD PRESSURE: 136 MMHG

## 2023-02-24 DIAGNOSIS — G43.009 MIGRAINE WITHOUT AURA AND WITHOUT STATUS MIGRAINOSUS, NOT INTRACTABLE: Primary | ICD-10-CM

## 2023-02-24 DIAGNOSIS — G44.319 ACUTE POST-TRAUMATIC HEADACHE, NOT INTRACTABLE: ICD-10-CM

## 2023-02-24 NOTE — PROGRESS NOTES
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 Negative  Negative for dizziness, tremors, seizures, syncope, facial asymmetry, speech difficulty, weakness, light-headedness, numbness and headaches  Hematological: Negative  Does not bruise/bleed easily  Psychiatric/Behavioral: Negative  Negative for confusion, hallucinations and sleep disturbance  All other systems reviewed and are negative  I have spent 24 minutes with Patient  today in which greater than 50% of this time was spent in counseling/coordination of care regarding Instructions for management, Risk factor reductions, Impressions, Counseling / Coordination of care, Documenting in the medical record and Obtaining or reviewing history    I also spent 8 minutes non face to face for this patient the same day         Author:  Yanick Merida MD 2/24/2023 11:31 AM

## 2023-02-24 NOTE — PATIENT INSTRUCTIONS
Follow up with ENT as scheduled and mention the tinnitus to see what they think  We discussed the multiple possible etiologies and at any time could consider MRI or sleep study if needed  Headache/migraine treatment:   Rescue medications (for immediate treatment of a headache): It is ok to take ibuprofen, acetaminophen or naproxen (Advil, Tylenol,  Aleve, Excedrin) if they help your headaches you should limit these to No more than 3 times a week to avoid medication overuse/rebound headaches  For nausea:  -     ondansetron (ZOFRAN-ODT) 4 mg disintegrating tablet; Take 1 tablet (4 mg total) by mouth every 6 (six) hours as needed for nausea or vomiting    For your more moderate to severe migraines take this medication early   Maxalt (rizatriptan) 10mg tabs - take one at the onset of headache  May repeat one time after 2 hours if pain has not resolved  (Max 2 a day and 9 a month)     -     methocarbamol (ROBAXIN) 500 mg tablet; Take one tab PO for muscle spasms at night if needed (can cause sedation)    Over the counter preventive supplements for headaches/migraines (if you try, try for 3 months straight)  (to take every day to help prevent headaches - not to take at the time of headache): There are combo pills online of these - none of which regulated by FDA and double check dosing - take appropriate dose only once a day- preventa migraine, migravent, mind ease, migrelief   [] Magnesium 400mg daily (If any diarrhea or upset stomach, decrease dose  as tolerated)  [] Riboflavin (Vitamin B2) 400mg daily - try online   (FYI B2 may make your urine bright/neon yellow)  AND/OR  [] Herbal medication: Petasites/Butterbur 150 mg daily - try online  (When choosing your Butterbur online or in the store, beware that there are some poor preps containing pyrrolizidine alkaloids (PAs) that can be harmful to the liver   Therefore, do not use butterbur products that are not labeled as PA-free )    Sleep and headache prevention:   [x] Melatonin - you may take 1-3 mg nightly for sleep or headache prevention  You should take this 1 hour prior to bedtime consistently every night for it to work  It works by gradually helping to adjust your sleep time over days to weeks, rather than immediately making you feel sleepy  Prescription preventive medications for headaches/migraines   (to take every day to help prevent headaches - not to take at the time of headache):  [x] we have options if needed      *Typically these types of medications take time until you see the benefit, although some may see improvement in days, often it may take weeks, especially if the medication is being titrated up to a beneficial level  Please contact us if there are any concerns or questions regarding the medication  Lifestyle Recommendations:  [x] SLEEP - Maintain a regular sleep schedule: Adults need at least 7-8 hours of uninterrupted a night  Maintain good sleep hygiene:  Going to bed and waking up at consistent times, avoiding excessive daytime naps, avoiding caffeinated beverages in the evening, avoid excessive stimulation in the evening and generally using bed primarily for sleeping  One hour before bedtime would recommend turning lights down lower, decreasing your activity (may read quietly, listen to music at a low volume)  When you get into bed, should eliminate all technology (no texting, emailing, playing with your phone, iPad or tablet in bed)  [x] HYDRATION - Maintain good hydration  Drink  2L of fluid a day (4 typical small water bottles)  [x] DIET - Maintain good nutrition  In particular don't skip meals and try and eat healthy balanced meals regularly  [x] TRIGGERS - Look for other triggers and avoid them: Limit caffeine to 1-2 cups a day or less  Avoid dietary triggers that you have noticed bring on your headaches (this could include aged cheese, peanuts, MSG, aspartame and nitrates)    [x] EXERCISE - physical exercise as we all know is good for you in many ways, and not only is good for your heart, but also is beneficial for your mental health, cognitive health and  chronic pain/headaches  I would encourage at the least 5 days of physical exercise weekly for at least 30 minutes  Education and Follow-up  [x] Please call with any questions or concerns  Of course if any new concerning symptoms go to the emergency department    [x] Follow up 3-4 months and if not needed can push back to 6 months, sooner if needed

## 2023-03-21 DIAGNOSIS — G44.319 ACUTE POST-TRAUMATIC HEADACHE, NOT INTRACTABLE: ICD-10-CM

## 2023-03-21 RX ORDER — METHOCARBAMOL 500 MG/1
TABLET, FILM COATED ORAL
Qty: 20 TABLET | Refills: 6 | Status: SHIPPED | OUTPATIENT
Start: 2023-03-21

## 2023-03-21 NOTE — TELEPHONE ENCOUNTER
Received fax from [facility] aid requesting refill of methocarbamol    Rx entered, Pls review and sign off    thanks

## 2023-05-18 ENCOUNTER — OFFICE VISIT (OUTPATIENT)
Dept: URGENT CARE | Facility: CLINIC | Age: 58
End: 2023-05-18

## 2023-05-18 VITALS
SYSTOLIC BLOOD PRESSURE: 140 MMHG | DIASTOLIC BLOOD PRESSURE: 90 MMHG | OXYGEN SATURATION: 95 % | BODY MASS INDEX: 37.25 KG/M2 | RESPIRATION RATE: 18 BRPM | WEIGHT: 275 LBS | HEART RATE: 85 BPM | HEIGHT: 72 IN | TEMPERATURE: 98 F
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
## 2023-05-18 DIAGNOSIS — S40.862A INSECT BITE OF LEFT UPPER EXTREMITY, INITIAL ENCOUNTER: Primary | ICD-10-CM

## 2023-05-18 RX ORDER — PREDNISONE 20 MG/1
20 TABLET ORAL DAILY
Qty: 7 TABLET | Refills: 0 | Status: SHIPPED | OUTPATIENT
Start: 2023-05-18 | End: 2023-05-25

## 2023-05-18 RX ORDER — METFORMIN HYDROCHLORIDE 500 MG/1
1000 TABLET, EXTENDED RELEASE ORAL DAILY
COMMUNITY
Start: 2023-05-01

## 2023-05-18 NOTE — PROGRESS NOTES
[facility]        NAME: Tunde Osei is a 59 y o  female  : 1965    MRN: [id number]  DATE: May 18, 2023  TIME: 10:22 AM    Assessment and Plan   Insect bite of left upper extremity, initial encounter [X60 132X, W57  XXXA]  1  Insect bite of left upper extremity, initial encounter  predniSONE 20 mg tablet        Suspect inflammatory response to insect bite  Patient instructed to proceed to ED if ankle swelling persists or worsens or if at any point she develops SOB or CP  Patient also encouraged to follow up with her PCP     Patient Instructions       Follow up with PCP in 3-5 days  Proceed to  ER if symptoms worsen  Chief Complaint     Chief Complaint   Patient presents with   • Insect Bite     Not sure exactly but red  painful spot on left forearm  Ankles and elbows swelling  3/4 days          History of Present Illness       Patient is a 58 yo female who presents for evaluation of a painful, red, swollen spot on her left forearm x 3-4 days  Patient believes she may have been bitten by an inset  She also feels as if her ankles and elbows are swollen  She denies any fevers, chills, SOB, CP, palpitations, leg pain or swelling  Review of Systems   Review of Systems   Respiratory: Negative for shortness of breath  Cardiovascular: Negative for chest pain, palpitations and leg swelling  Musculoskeletal: Positive for arthralgias and joint swelling          Right arm pain and swelling          Current Medications       Current Outpatient Medications:   •  albuterol (Ventolin HFA) 90 mcg/act inhaler, Inhale 2 puffs every 6 (six) hours as needed for wheezing, Disp: 25 5 g, Rfl: 3  •  ascorbic acid (VITAMIN C) 1000 MG tablet, Take 1,000 mg by mouth daily, Disp: , Rfl:   •  clobetasol (TEMOVATE) 0 05 % cream, , Disp: , Rfl:   •  estrogens, conjugated (Premarin) vaginal cream, Premarin 0 625 mg/gram vaginal cream  insert 0 5 applicatorfuls vaginally three times a week, Disp: , Rfl:   • fluticasone (FLONASE) 50 mcg/act nasal spray, , Disp: , Rfl:   •  fluticasone-salmeterol (Advair) 100-50 mcg/dose inhaler, Rinse mouth after use , Disp: 180 blister, Rfl: 1  •  levothyroxine 150 mcg tablet,  , Disp: , Rfl:   •  Melatonin 300 MCG TABS, melatonin 300 mcg tablet  PRN, Disp: , Rfl:   •  metFORMIN (GLUCOPHAGE-XR) 500 mg 24 hr tablet, Take 1,000 mg by mouth daily, Disp: , Rfl:   •  methocarbamol (ROBAXIN) 500 mg tablet, Take one tab PO for muscle spasms at night if needed (can cause sedation), Disp: 20 tablet, Rfl: 6  •  metroNIDAZOLE (METROCREAM) 0 75 % cream, , Disp: , Rfl:   •  montelukast (SINGULAIR) 10 mg tablet, Take 1 tablet (10 mg total) by mouth daily at bedtime, Disp: 90 tablet, Rfl: 3  •  predniSONE 20 mg tablet, Take 1 tablet (20 mg total) by mouth daily for 7 days, Disp: 7 tablet, Rfl: 0  •  Sulfacetamide Sodium, Acne, 10 % LOTN, , Disp: , Rfl:   •  Vitamin D, Cholecalciferol, 50 MCG (2000 UT) CAPS, Take 1 capsule by mouth, Disp: , Rfl:   •  esomeprazole (NexIUM) 40 MG capsule, , Disp: , Rfl:   •  famotidine (PEPCID) 40 MG tablet, , Disp: , Rfl:   •  Fluticasone-Salmeterol (Advair) 100-50 mcg/dose inhaler, if needed (Patient not taking: Reported on 2/24/2023), Disp: , Rfl:   •  nitrofurantoin (MACROBID) 100 mg capsule, Take 100 mg by mouth 2 (two) times a day (Patient not taking: Reported on 11/3/2021), Disp: , Rfl:   •  omeprazole (PriLOSEC) 40 MG capsule, , Disp: , Rfl:   •  ondansetron (ZOFRAN-ODT) 4 mg disintegrating tablet, Take 1 tablet (4 mg total) by mouth every 6 (six) hours as needed for nausea or vomiting (Patient not taking: Reported on 5/18/2023), Disp: 20 tablet, Rfl: 3  •  Peak Air Peak Flow Meter TRIP, Use as directed (Patient not taking: Reported on 11/3/2021), Disp: , Rfl:   •  Premarin vaginal cream, insert 0 5 applicatorfuls vaginally three times a week (Patient not taking: Reported on 2/24/2023), Disp: , Rfl:   •  rizatriptan (MAXALT) 10 mg tablet, Take 1 tablet (10 mg "total) by mouth once as needed for migraine May repeat in 2 hours if needed  Max 2/24 hours, 9/month  (Patient not taking: Reported on 5/18/2023), Disp: 9 tablet, Rfl: 6  •  Synthroid 175 MCG tablet, , Disp: , Rfl:     Current Allergies     Allergies as of 05/18/2023 - Reviewed 05/18/2023   Allergen Reaction Noted   • Fluconazole Swelling 04/21/2018   • Sulfamethoxazole-trimethoprim Swelling 04/21/2018   • Erythromycin Rash 04/21/2018   • Penicillins Rash 04/21/2018   • Tetracycline Rash 04/21/2018            The following portions of the patient's history were reviewed and updated as appropriate: allergies, current medications, past family history, past medical history, past social history, past surgical history and problem list      Past Medical History:   Diagnosis Date   • Asthma    • Hypothyroidism        Past Surgical History:   Procedure Laterality Date   • APPENDECTOMY     • CHOLECYSTECTOMY     • HERNIA REPAIR  11/2021   • TONSILLECTOMY         No family history on file  Medications have been verified  Objective   /90   Pulse 85   Temp 98 °F (36 7 °C)   Resp 18   Ht 6' 1\" (1 854 m)   Wt 125 kg (275 lb)   SpO2 95%   BMI 36 28 kg/m²        Physical Exam     Physical Exam  Constitutional:       General: She is not in acute distress  Appearance: She is not toxic-appearing  Cardiovascular:      Rate and Rhythm: Normal rate  Heart sounds: Normal heart sounds  No murmur heard  No friction rub  No gallop  Pulmonary:      Effort: Pulmonary effort is normal       Breath sounds: Normal breath sounds  Musculoskeletal:      Comments: Mild non-pitting edema of b/l ankles    Skin:     Comments: 2 cm erythematous papule with surrounding area of erythema on left forearm   Neurological:      Mental Status: She is alert     Psychiatric:         Mood and Affect: Mood normal          Behavior: Behavior normal                    "

## 2023-06-16 ENCOUNTER — TELEPHONE (OUTPATIENT)
Dept: NEUROLOGY | Facility: CLINIC | Age: 58
End: 2023-06-16

## 2023-06-16 NOTE — TELEPHONE ENCOUNTER
Called patient and left voicemail to confirm their upcoming appointment with Dr Sue Sen  Informed patient about arriving in the [facility] location 15 minutes prior to appointment to get checked in and go over chart

## 2023-06-16 NOTE — TELEPHONE ENCOUNTER
Pt left a VM returning our call  Transcribed VM:   Yes, I am returning a call  Bello Jones has an appointment next Friday and I was asked if I could move it to a Thursday televisit  I would absolutely like to do that  So the appointment I would need to move is Friday the 23rd with Dr Montano Ask to Thursday the 22nd as a video meeting  I can be reached at [phone]  My birthday is 1965  Thank you

## 2023-06-22 ENCOUNTER — TELEMEDICINE (OUTPATIENT)
Dept: NEUROLOGY | Facility: CLINIC | Age: 58
End: 2023-06-22
Payer: COMMERCIAL

## 2023-06-22 DIAGNOSIS — G43.009 MIGRAINE WITHOUT AURA AND WITHOUT STATUS MIGRAINOSUS, NOT INTRACTABLE: Primary | ICD-10-CM

## 2023-06-22 DIAGNOSIS — G47.33 OBSTRUCTIVE SLEEP APNEA (ADULT) (PEDIATRIC): ICD-10-CM

## 2023-06-22 PROCEDURE — 99214 OFFICE O/P EST MOD 30 MIN: CPT | Performed by: PSYCHIATRY & NEUROLOGY

## 2023-06-22 RX ORDER — CLOTRIMAZOLE AND BETAMETHASONE DIPROPIONATE 10; .64 MG/G; MG/G
CREAM TOPICAL
COMMUNITY
Start: 2023-06-07

## 2023-06-22 NOTE — PROGRESS NOTES
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     Breaks aout on white pimples     • Penicillins Rash     Breaks out on white pimples      • Tetracycline Rash     Breaks out on white pimples           Objective:     Exam limited by Video  Physical Exam:                                                                 Vitals:            Constitutional:    There were no vitals taken for this visit  BP Readings from Last 3 Encounters:   05/18/23 140/90   02/24/23 136/80   01/06/23 140/89     Pulse Readings from Last 3 Encounters:   05/18/23 85   02/24/23 78   01/06/23 88         Well developed, well nourished, No dysmorphic features  HEENT:  Normocephalic atraumatic  See neuro exam   Psychiatric:  Normal behavior and appropriate affect        Neurological Examination:     Mental status/cognitive function:   Recent and remote memory appear intact  Attention span and concentration as well as fund of knowledge appear appropriate for age  Normal language and spontaneous speech    Cranial Nerves:  VII-facial expression symmetric  Motor Exam: symmetric bulk throughout  no atrophy, fasciculations or abnormal movements noted  Coordination:  no apparent dysmetria, ataxia or tremor noted        Pertinent lab results:   No recent labs in our system, goes every 8 months outside of our system   -12/21/2020: CMP and CBC unremarkable  - 12/17/2019 A1c 6 1  - 8/24/2018 TSH normal 3 07, free T4 elevated 1 72     Imaging: I have personally reviewed imaging and radiology read   -Noncontrast head CT in 12/1/2022: No acute intracranial abnormality       Review of Systems:   ROS obtained by medical assistant Personally reviewed and updated if indicated  I recommended PCP follow up for non neurologic problems  Review of Systems   Constitutional: Negative for appetite change and fever  HENT: Negative  Negative for hearing loss, tinnitus, trouble swallowing and voice change  Eyes: Negative  Negative for photophobia and pain  Respiratory: Negative  Negative for shortness of breath  Cardiovascular: Negative  Negative for palpitations  Gastrointestinal: Negative  Negative for nausea and vomiting  Endocrine: Negative  Negative for cold intolerance  Genitourinary: Negative  Negative for dysuria, frequency and urgency  Musculoskeletal: Negative  Negative for myalgias and neck pain  Skin: Negative  Negative for rash  Neurological: Positive for headaches  Negative for dizziness, tremors, seizures, syncope, facial asymmetry, speech difficulty, weakness, light-headedness and numbness  Hematological: Negative  Does not bruise/bleed easily  Psychiatric/Behavioral: Negative  Negative for confusion, hallucinations and sleep disturbance  All other systems reviewed and are negative          I have spent 33 minutes with Patient today in which greater than 50% of this time was spent in counseling/coordination of care regarding Diagnostic results, Prognosis, Risks and benefits of tx options, Instructions for management, Patient and family education, Importance of tx compliance, Risk factor reductions, Impressions, Counseling / Coordination of care, Documenting in the medical record, Reviewing / ordering tests, medicine, procedures   and Obtaining or reviewing history    I also spent 9 minutes non face to face for this patient the same day             Visit Time  Total Visit Duration: 42

## 2023-06-22 NOTE — PATIENT INSTRUCTIONS
Consider zzoma pillow     I am placing a referral for a sleep study and if it is abnormal we will place one to the sleep medicine specialist team to further evaluate your sleep issues  Please call [phone] to schedule the sleep study and afterwards if needed I recommend you see one of the following providers:  Wayman Melnick MD Zorita Rily MD Kenard Lesch MD Gae Millard CRNP Antoinette Rock PA-C      Follow up with ENT as scheduled and mention the tinnitus to see what they think  We discussed the multiple possible etiologies and at any time could consider MRI or sleep study if needed  Headache/migraine treatment:   Rescue medications (for immediate treatment of a headache): It is ok to take ibuprofen, acetaminophen or naproxen (Advil, Tylenol,  Aleve, Excedrin) if they help your headaches you should limit these to No more than 3 times a week to avoid medication overuse/rebound headaches  For nausea:  -     ondansetron (ZOFRAN-ODT) 4 mg disintegrating tablet; Take 1 tablet (4 mg total) by mouth every 6 (six) hours as needed for nausea or vomiting    For your more moderate to severe migraines take this medication early   Maxalt (rizatriptan) 10mg tabs - take one at the onset of headache  May repeat one time after 2 hours if pain has not resolved  (Max 2 a day and 9 a month)     -     methocarbamol (ROBAXIN) 500 mg tablet; Take one tab PO for muscle spasms at night if needed (can cause sedation)    Sleep and headache prevention:   [x] Melatonin - you may take 1-3 mg nightly for sleep or headache prevention  You should take this 1 hour prior to bedtime consistently every night for it to work  It works by gradually helping to adjust your sleep time over days to weeks, rather than immediately making you feel sleepy        Prescription preventive medications for headaches/migraines   (to take every day to help prevent headaches - not to take at the time of headache):  [x] we have options if needed - diamox     I suggest trial of lower dose diamox than we typically use in more severe cases -  -     acetaZOLAMIDE (DIAMOX) 125 mg tablet; 125 mg PO nightly for 1 week, then increase to 125 mg  in am and in pm for 1 week, then 125 mg in am and 250 mg in pm for 1 week, then 250 mg in am and in pm        -If you had papilledema or swelling behind your eyes we would rapidly get you up to 500 mg twice a day and may need to go higher  I have 1 patient who is up to 3000 mg in a day just for reference and I will be starting you on 125 mg      - make sure to stay hydrated while on this as can cause dehydration since that is it's purpose to take fluid off    - most common side effect is tingling of the nerves at times  - can cause electrolyte disturbances, but not typically in any significant way  However, be cautious if taking with other meds that lower potassium like hydrochlorothiazide etc        *Typically these types of medications take time until you see the benefit, although some may see improvement in days, often it may take weeks, especially if the medication is being titrated up to a beneficial level  Please contact us if there are any concerns or questions regarding the medication  Lifestyle Recommendations:  [x] SLEEP - Maintain a regular sleep schedule: Adults need at least 7-8 hours of uninterrupted a night  Maintain good sleep hygiene:  Going to bed and waking up at consistent times, avoiding excessive daytime naps, avoiding caffeinated beverages in the evening, avoid excessive stimulation in the evening and generally using bed primarily for sleeping  One hour before bedtime would recommend turning lights down lower, decreasing your activity (may read quietly, listen to music at a low volume)  When you get into bed, should eliminate all technology (no texting, emailing, playing with your phone, iPad or tablet in bed)  [x] HYDRATION - Maintain good hydration    Drink  2L of fluid a day (4 typical small water bottles)  [x] DIET - Maintain good nutrition  In particular don't skip meals and try and eat healthy balanced meals regularly  [x] TRIGGERS - Look for other triggers and avoid them: Limit caffeine to 1-2 cups a day or less  Avoid dietary triggers that you have noticed bring on your headaches (this could include aged cheese, peanuts, MSG, aspartame and nitrates)  [x] EXERCISE - physical exercise as we all know is good for you in many ways, and not only is good for your heart, but also is beneficial for your mental health, cognitive health and  chronic pain/headaches  I would encourage at the least 5 days of physical exercise weekly for at least 30 minutes  Education and Follow-up  [x] Please call with any questions or concerns  Of course if any new concerning symptoms go to the emergency department    [x] Follow up 3-4 months and if not needed can push back to 6 months, sooner if needed

## 2023-07-03 ENCOUNTER — TELEPHONE (OUTPATIENT)
Dept: SLEEP CENTER | Facility: CLINIC | Age: 58
End: 2023-07-03

## 2023-07-03 NOTE — TELEPHONE ENCOUNTER
----- Message from Gregory Mcdaniel MD sent at 6/30/2023  6:50 PM EDT -----  Approved    ----- Message -----  From: Ana Maria Bhardwaj  Sent: 6/29/2023   8:26 AM EDT  To: Sleep Medicine [facility] Provider    This Diagnostic sleep study needs approval.     If approved please sign and return to clerical pool. If denied please include reasons why. Also provide alternative testing if warranted. Please sign and return to clerical pool.

## 2023-07-07 ENCOUNTER — OFFICE VISIT (OUTPATIENT)
Age: 58
End: 2023-07-07
Payer: COMMERCIAL

## 2023-07-07 VITALS
HEIGHT: 72 IN | DIASTOLIC BLOOD PRESSURE: 84 MMHG | HEART RATE: 82 BPM | BODY MASS INDEX: 36.41 KG/M2 | SYSTOLIC BLOOD PRESSURE: 114 MMHG | OXYGEN SATURATION: 94 % | WEIGHT: 268.8 LBS | TEMPERATURE: 98.3 F

## 2023-07-07 DIAGNOSIS — J45.30 MILD PERSISTENT ASTHMA WITHOUT COMPLICATION: Primary | ICD-10-CM

## 2023-07-07 DIAGNOSIS — J45.991 COUGH VARIANT ASTHMA: ICD-10-CM

## 2023-07-07 DIAGNOSIS — J30.89 ENVIRONMENTAL AND SEASONAL ALLERGIES: ICD-10-CM

## 2023-07-07 DIAGNOSIS — E66.9 OBESITY (BMI 30-39.9): ICD-10-CM

## 2023-07-07 PROCEDURE — 99214 OFFICE O/P EST MOD 30 MIN: CPT | Performed by: INTERNAL MEDICINE

## 2023-07-07 RX ORDER — MONTELUKAST SODIUM 10 MG/1
10 TABLET ORAL
Qty: 90 TABLET | Refills: 3 | Status: SHIPPED | OUTPATIENT
Start: 2023-07-07

## 2023-07-07 RX ORDER — FLUTICASONE PROPIONATE AND SALMETEROL 100; 50 UG/1; UG/1
1 POWDER RESPIRATORY (INHALATION) 2 TIMES DAILY
Qty: 180 BLISTER | Refills: 1 | Status: SHIPPED | OUTPATIENT
Start: 2023-07-07 | End: 2023-10-05

## 2023-07-07 NOTE — PROGRESS NOTES
Assessment/Plan:   Diagnoses and all orders for this visit:    Mild persistent asthma without complication  -     Fluticasone-Salmeterol (Advair) 100-50 mcg/dose inhaler; Inhale 1 puff 2 (two) times a day Rinse mouth after use. Cough variant asthma  -     montelukast (SINGULAIR) 10 mg tablet; Take 1 tablet (10 mg total) by mouth daily at bedtime    Environmental and seasonal allergies    Obesity (BMI 30-39. 9)      Cough variant asthma significantly better with the Singulair.    Respiratory allergy panel with increased IgE for dust mites, cats and dogs.    Continue with Singulair 10 mg daily at bedtime   Continue with albuterol MDI 2 puffs 4 times daily as needed  Continue with Advair 1 puff twice daily  Rinse mouth after use  PFTs in June of 2021 demonstrating spirometry was normal lung volumes were normal and the DLCO was mildly decreased at 69 percent. Recommend weight loss   Follow-up in 1 year or p.r.n. earlier as needed.       No follow-ups on file. All questions are answered to the patient's satisfaction and understanding. She verbalizes understanding. She is encouraged to call with any further questions or concerns. Portions of the record may have been created with voice recognition software. Occasional wrong word or "sound a like" substitutions may have occurred due to the inherent limitations of voice recognition software. Read the chart carefully and recognize, using context, where substitutions have occurred. Electronically Signed by Brent Mccarthy MD    ______________________________________________________________________    Chief Complaint:   Chief Complaint   Patient presents with   • Follow-up       Patient ID: Tiffanie Anders is a 59 y.o. y.o. female has a past medical history of Asthma and Hypothyroidism. 7/7/2023  Patient presents today for follow-up visit.   Patient is a very pleasant 47-year-old lady who has never smoked but has been exposed to a lot of secondhand smoke when she was young from parents, currently working in a [employer]. She states she was diagnosed with asthma when she was very young and she ought grew it. For the past 4 years has been having trouble with the chronic cough for which she had the testing done with PFTs and she was given inhaler albuterol she states. Still with significant dry cough but she states she did not have any shortness of breath or wheezing then. she has been placed on Singulair and Advair 1 puff twice daily which she states has been only using it once a day, until recently currently only using it 2 or 3 times in a week and she has been doing very well the cough has significantly decreased and she is not having the need to use her rescue inhaler frequently, she has been using her rescue inhaler half an hour prior to the exercise only. Also she states especially during the spring and summer season she needs the Advair every day. Again during the wintertime she can cut it down she states. Pertinent negatives include no chest pain, fever, headaches or myalgias. Review of Systems   Constitutional: Negative. Negative for fever. HENT: Negative. Eyes: Negative. Respiratory: Negative. Cardiovascular: Negative. Negative for chest pain. Gastrointestinal: Negative. Endocrine: Negative. Genitourinary: Negative. Musculoskeletal: Negative. Negative for myalgias. Allergic/Immunologic: Negative. Neurological: Negative. Negative for headaches. Hematological: Negative. Psychiatric/Behavioral: Negative. Smoking history: She reports that she has never smoked.  She has never used smokeless tobacco.    The following portions of the patient's history were reviewed and updated as appropriate: allergies, current medications, past family history, past medical history, past social history, past surgical history and problem list.    Immunization History   Administered Date(s) Administered   • COVID-19 PFIZER VACCINE 0.3 ML IM 03/11/2021, 04/01/2021     Current Outpatient Medications   Medication Sig Dispense Refill   • albuterol (Ventolin HFA) 90 mcg/act inhaler Inhale 2 puffs every 6 (six) hours as needed for wheezing 25.5 g 3   • Aspirin-Acetaminophen-Caffeine (EXCEDRIN MIGRAINE PO) Take 1 tablet by mouth if needed     • clobetasol (TEMOVATE) 0.05 % cream      • clotrimazole-betamethasone (LOTRISONE) 1-0.05 % cream      • estrogens, conjugated (Premarin) vaginal cream Premarin 0.625 mg/gram vaginal cream   insert 0.5 applicatorfuls vaginally three times a week     • fluticasone (FLONASE) 50 mcg/act nasal spray      • fluticasone-salmeterol (Advair) 100-50 mcg/dose inhaler Rinse mouth after use. 180 blister 1   • Melatonin 300 MCG TABS melatonin 300 mcg tablet   PRN     • metFORMIN (GLUCOPHAGE-XR) 500 mg 24 hr tablet Take 1,000 mg by mouth daily     • methocarbamol (ROBAXIN) 500 mg tablet Take one tab PO for muscle spasms at night if needed (can cause sedation) 20 tablet 6   • metroNIDAZOLE (METROCREAM) 0.75 % cream      • montelukast (SINGULAIR) 10 mg tablet Take 1 tablet (10 mg total) by mouth daily at bedtime 90 tablet 3   • ondansetron (ZOFRAN-ODT) 4 mg disintegrating tablet Take 1 tablet (4 mg total) by mouth every 6 (six) hours as needed for nausea or vomiting 20 tablet 3   • rizatriptan (MAXALT) 10 mg tablet Take 1 tablet (10 mg total) by mouth once as needed for migraine May repeat in 2 hours if needed. Max 2/24 hours, 9/month. 9 tablet 6   • Sulfacetamide Sodium, Acne, 10 % LOTN      • Synthroid 175 MCG tablet      • Vitamin D, Cholecalciferol, 50 MCG (2000 UT) CAPS Take 1 capsule by mouth     • levothyroxine 150 mcg tablet   (Patient not taking: Reported on 7/7/2023)       No current facility-administered medications for this visit.      Allergies: Fluconazole, Sulfamethoxazole-trimethoprim, Erythromycin, Penicillins, and Tetracycline    Objective:  Vitals:    07/07/23 1115   BP: 114/84   Pulse: 82   Temp: 98.3 °F (36.8 °C)   SpO2: 94%   Weight: 122 kg (268 lb 12.8 oz)   Height: 6' 1" (1.854 m)   Oxygen Therapy  SpO2: 94 %  . Wt Readings from Last 3 Encounters:   07/07/23 122 kg (268 lb 12.8 oz)   05/18/23 125 kg (275 lb)   02/24/23 125 kg (276 lb)     Body mass index is 35.46 kg/m². Physical Exam  Constitutional:       Appearance: She is well-developed. HENT:      Head: Normocephalic and atraumatic. Eyes:      Pupils: Pupils are equal, round, and reactive to light. Neck:      Comments: Short and wide neck  Cardiovascular:      Rate and Rhythm: Normal rate and regular rhythm. Heart sounds: Normal heart sounds. Pulmonary:      Effort: Pulmonary effort is normal.      Breath sounds: Normal breath sounds. Musculoskeletal:         General: Normal range of motion. Cervical back: Normal range of motion and neck supple. Skin:     General: Skin is warm and dry. Neurological:      Mental Status: She is alert and oriented to person, place, and time. Psychiatric:         Behavior: Behavior normal.           Diagnostics:  I have personally reviewed pertinent reports.

## 2023-08-17 NOTE — PROGRESS NOTES
No obstetric history on file. LMP: No LMP recorded. Patient has had an ablation. PMB:  SA:  YES   Very painful   HPV: NO   Birth control:  Patient had an ablation ( NONE )   Last pap: ( Per patient last pap smear was Negative )  Last mammo:   Patient is schedule for 8/25/2023  Has been going there for 20 years ( Per patient )  Last colonoscopy: in 2020  RTO 3 years  Per patient. ( per patient )  Last Dexa: Not on file  Family History: Mother - uterine cancer (D)      Patient reports that she was to establish her GYN care with [facility]'s her last GYN office closed .

## 2023-08-18 ENCOUNTER — OFFICE VISIT (OUTPATIENT)
Dept: OBGYN CLINIC | Facility: CLINIC | Age: 58
End: 2023-08-18
Payer: COMMERCIAL

## 2023-08-18 VITALS
DIASTOLIC BLOOD PRESSURE: 78 MMHG | BODY MASS INDEX: 36.84 KG/M2 | HEIGHT: 72 IN | WEIGHT: 272 LBS | SYSTOLIC BLOOD PRESSURE: 132 MMHG

## 2023-08-18 DIAGNOSIS — B37.9 YEAST INFECTION: ICD-10-CM

## 2023-08-18 DIAGNOSIS — N90.89 VULVAR IRRITATION: ICD-10-CM

## 2023-08-18 DIAGNOSIS — N94.10 DYSPAREUNIA, FEMALE: ICD-10-CM

## 2023-08-18 DIAGNOSIS — Z01.419 ENCOUNTER FOR GYNECOLOGICAL EXAMINATION WITHOUT ABNORMAL FINDING: Primary | ICD-10-CM

## 2023-08-18 PROBLEM — K63.5 POLYP OF COLON: Status: ACTIVE | Noted: 2018-06-25

## 2023-08-18 PROBLEM — R73.03 PREDIABETES: Status: ACTIVE | Noted: 2020-04-03

## 2023-08-18 PROBLEM — J45.901 ASTHMA EXACERBATION: Status: RESOLVED | Noted: 2020-12-21 | Resolved: 2023-08-18

## 2023-08-18 PROBLEM — G47.9 SLEEP DISORDER: Status: ACTIVE | Noted: 2019-11-25

## 2023-08-18 LAB
BV WHIFF TEST VAG QL: ABNORMAL
CLUE CELLS SPEC QL WET PREP: ABNORMAL
PH SMN: ABNORMAL [PH]
T VAGINALIS VAG QL WET PREP: ABNORMAL
YEAST VAG QL WET PREP: ABNORMAL

## 2023-08-18 PROCEDURE — S0610 ANNUAL GYNECOLOGICAL EXAMINA: HCPCS | Performed by: OBSTETRICS & GYNECOLOGY

## 2023-08-18 PROCEDURE — 87210 SMEAR WET MOUNT SALINE/INK: CPT | Performed by: OBSTETRICS & GYNECOLOGY

## 2023-08-18 RX ORDER — CLOTRIMAZOLE 1 %
CREAM WITH APPLICATOR VAGINAL
COMMUNITY
End: 2023-08-18 | Stop reason: ALTCHOICE

## 2023-08-18 NOTE — PROGRESS NOTES
Diagnoses and all orders for this visit:    Encounter for gynecological examination without abnormal finding    Vulvar irritation  -     POCT wet mount    Yeast infection  -     terconazole (TERAZOL 7) 0.4 % vaginal cream; Insert 1 applicator into the vagina daily at bedtime    Dyspareunia, female  -     US pelvis complete w transvaginal; Future    Other orders  -     Sermorelin Acetate Diagnostic 15 MG SOLR  -     Discontinue: clotrimazole (GYNE-LOTRIMIN) 1 % vaginal cream; insert 1 applicatorful vaginally at bedtime for 7 days      Results for orders placed or performed in visit on 08/18/23   POCT wet mount   Result Value Ref Range    Yeast, Wet Prep Pos     pH 5 atrophic     Whiff Test Neg     Clue Cells Neg     Trich, Wet Prep Neg      Call to schedule US, will follow up with results     We reviewed estrace cream usage instructions, pt already has an existing script from previous gyn     Reviewed use of Terazol for yeast infection, provided 3 refills as pt [place] of [place] for work and like to have a course with her     Advised to call with any Post Menopausal bleeding. Calcium/ Vit D dietary requirements discussed,   Weight bearing exercises minium of 150 mins/weekly advised. Kegel exercises advised   Reviewed perineal hygiene and vaginal dryness post menopause, advised coconut oil with intercourse and position changes to help relieve discomfort   SBE and yearly mammography advised. ASCCP guidelines reviewed. Will discontinue PAP's according to recommendations. Condoms encouraged with all sexual activity to prevent STI's. Health maintenance encouraged with PMD, remain current with Colonoscopy, Dexa scan, and recommended vaccines. Advised to call with any issues, all concerns & questions addressed.    See after visit summary for further information    F/U annually or Biannual if Medicare     After reviewing patient's medical records there is a notation from 8/15/22 from her previous provider that she has extra vaginal tissue irregular in appearance 2 x 1 cm non discolored area, 1/4 of the way into the vagina, patient was advised at that time to follow-up in 2 to 3 weeks for recheck of vaginal tissue and possible biopsy. I see no follow-up records on that  I was not able to appreciate similar findings on today's exam d/t body habitus and vaginal irritation and discharge     Health Maintenance:    Last PAP: 3/30/2022 Neg/Neg records available will scan into system   Next PAP Due:2025    Last Mammogram:8/19/2022 @ [facility]   Life time Tiffany Mendes %,  not documented , Density B scattered areas of fibroglandular density , Bi-Rads 2 Benign  Next Mammogram:  she is already scheduled 8/25/2023 in [place] , will transfer to [facility] next year     Last Colonoscopy: 2020 RTO 3years     Last DEXA: Not on file     New patient to practice   Pleasant 59 y.o. , female No obstetric history on file. postmenopausal here for annual exam.   GYN hx includes: Ablation, fibroids, lichen simplex chronicus, vulvar Derm,, atrophic vaginitis  No personal hx of breast, cervical, ovarian or colon CA  Family Hx: No GYN cancers  She reports no new changes in her health. Medically stable     Denies history of abnormal pap smears. Denies abnormal Mammograms   Denies postmenopausal bleeding   denies issues with vasomotor symptoms  Reports pelvic pain, occasional pain that comes and goes   Reports vulvar irritation, recently completed a course of antibiotics ,she feels raw and irritated with increase vaginal discharge. Reports symptoms of pelvic organ prolapse, urinary, or fecal incontinence. Mixed incontinence at times , advised Kegel exercises daily  Is sexually active. Monogamous relationship. Reports dyspareunia, Due to current yeast infection,  Patient was on Estrace cream which was effective for vaginal atrophy.  She has not been using on a regular basis and had tapered down to 1 or 2 times a week, she feels as if this is not maintaining her vaginal tissue. We discussed restarting her Estrace cream at 1 g 3 times a week until she starts to feel comfortable again and then she can taper her dosing down to 0.5 g 2 times a week,  She has been advised that she is not to use any more than 1 g 3 times a week in the vagina  Denies STI concerns. declines STD testing   Denies intimate partner violence    Works for a [employer]     Past Medical History:   Diagnosis Date   • Asthma    • Hypothyroidism    • Varicella      Past Surgical History:   Procedure Laterality Date   • APPENDECTOMY     • CHOLECYSTECTOMY     • HERNIA REPAIR  11/2021   • TONSILLECTOMY     • WISDOM TOOTH EXTRACTION        History reviewed. No pertinent family history.   Social History     Tobacco Use   • Smoking status: Never   • Smokeless tobacco: Never   Vaping Use   • Vaping Use: Never used   Substance Use Topics   • Alcohol use: Never   • Drug use: Never       Current Outpatient Medications:   •  albuterol (Ventolin HFA) 90 mcg/act inhaler, Inhale 2 puffs every 6 (six) hours as needed for wheezing, Disp: 25.5 g, Rfl: 3  •  Aspirin-Acetaminophen-Caffeine (EXCEDRIN MIGRAINE PO), Take 1 tablet by mouth if needed, Disp: , Rfl:   •  clobetasol (TEMOVATE) 0.05 % cream, , Disp: , Rfl:   •  clotrimazole-betamethasone (LOTRISONE) 1-0.05 % cream, , Disp: , Rfl:   •  estrogens, conjugated (Premarin) vaginal cream, Premarin 0.625 mg/gram vaginal cream  insert 0.5 applicatorfuls vaginally three times a week, Disp: , Rfl:   •  fluticasone (FLONASE) 50 mcg/act nasal spray, , Disp: , Rfl:   •  Fluticasone-Salmeterol (Advair) 100-50 mcg/dose inhaler, Inhale 1 puff 2 (two) times a day Rinse mouth after use., Disp: 180 blister, Rfl: 1  •  Melatonin 300 MCG TABS, melatonin 300 mcg tablet  PRN, Disp: , Rfl:   •  metFORMIN (GLUCOPHAGE-XR) 500 mg 24 hr tablet, Take 1,000 mg by mouth daily, Disp: , Rfl:   •  methocarbamol (ROBAXIN) 500 mg tablet, Take one tab PO for muscle spasms at night if needed (can cause sedation), Disp: 20 tablet, Rfl: 6  •  metroNIDAZOLE (METROCREAM) 0.75 % cream, , Disp: , Rfl:   •  montelukast (SINGULAIR) 10 mg tablet, Take 1 tablet (10 mg total) by mouth daily at bedtime, Disp: 90 tablet, Rfl: 3  •  ondansetron (ZOFRAN-ODT) 4 mg disintegrating tablet, Take 1 tablet (4 mg total) by mouth every 6 (six) hours as needed for nausea or vomiting, Disp: 20 tablet, Rfl: 3  •  rizatriptan (MAXALT) 10 mg tablet, Take 1 tablet (10 mg total) by mouth once as needed for migraine May repeat in 2 hours if needed.  Max 2/24 hours, /month., Disp: 9 tablet, Rfl: 6  •  Sermorelin Acetate Diagnostic 15 MG SOLR, , Disp: , Rfl:   •  Sulfacetamide Sodium, Acne, 10 % LOTN, , Disp: , Rfl:   •  Synthroid 175 MCG tablet, , Disp: , Rfl:   •  terconazole (TERAZOL 7) 0.4 % vaginal cream, Insert 1 applicator into the vagina daily at bedtime, Disp: 45 g, Rfl: 3  •  Vitamin D, Cholecalciferol, 50 MCG (2000 UT) CAPS, Take 1 capsule by mouth, Disp: , Rfl:   Patient Active Problem List    Diagnosis Date Noted   • Arthritis of carpometacarpal [place]) joint of right thumb 2021   • Prediabetes 2020   • Sleep disorder 2019   • Polyp of colon 2018   • Vitamin D deficiency 2016   • Iron deficiency 2016   • History of adenomatous polyp of colon 2012       Allergies   Allergen Reactions   • Fluconazole Swelling     Of lips     • Sulfamethoxazole-Trimethoprim Swelling     Of lips   • Erythromycin Rash     Breaks aout on white pimples     • Penicillins Rash     Breaks out on white pimples      • Tetracycline Rash     Breaks out on white pimples         OB History    Para Term  AB Living   0 0 0 0 0 0   SAB IAB Ectopic Multiple Live Births   0 0 0 0 0       Vitals:    23 1516   BP: 132/78   BP Location: Right arm   Patient Position: Sitting   Cuff Size: Large   Weight: 123 kg (272 lb)   Height: 6' 1" (1.854 m)     Body mass index is 35.89 kg/m². Review of Systems     Constitutional: Negative for chills, fatigue, fever, headaches, visual disturbances, and unexpected weight change. Respiratory: Negative for cough, & shortness of breath. Cardiovascular: Negative for chest pain. .    Gastrointestinal: Negative for Abd pain, nausea & vomiting, constipation and diarrhea. Genitourinary: Negative for difficulty urinating, dysuria, hematuria, , unusual vaginal bleeding or discharge. dyspareunia  Skin: Negative skin changes    Physical Exam   Constitutional: Alert & Oriented x3, well-developed and well-nourished. No distress. HENT: Atraumatic, Normocephalic, Conjunctivae clear  Neck: Normal range of motion. Neck supple. No thyromegaly, mass, nodules or tenderness  Pulmonary: Effort normal.   Abdominal: Soft. No tenderness or masses  Musculoskeletal: Normal ROM  Skin: Warm & Dry  Psychological: Normal mood, thought content, behavior & judgement     Breasts:   Right: tissue soft without masses, tenderness, skin changes or nipple discharge. No areas of erythema or pain. No subclavicular, axillary, pectoral adenopathy  Left:  tissue soft without masses, tenderness, skin changes or nipple discharge. No areas of erythema or pain. No subclavicular, axillary, pectoral adenopathy    Pelvic exam was performed with patient supine, lithotomy position. Exam is consistent with  atrophic changes. Vulva/ Vestibule: Bilateral erythemic rash noted. Consistent with lichen's chronicus   Urethral meatus: Negative for  tenderness, inflammation or discharge. Uterus: not deviated, enlarged, fixed or tender. Cervix: No CMT, no discharge or friability. Right adnexa: no mass, no tenderness and no fullness. Left adnexa: no mass, no tenderness and no fullness. Vagina: Consistent with  atrophic changes. Mild erythema, & tenderness. No masses, or foreign body in the vagina. No signs of injury around the vagina.  White clumpy vaginal discharge Perineum without lesions, signs of injury, erythema or swelling. Inguinal Canal:        Right: No inguinal adenopathy or hernia present. Left: No inguinal adenopathy or hernia present. Physical Exam  Genitourinary:      Genitourinary Comments: Mild erythema, consistent with lichen's chronicus      Right Labia: rash. Left Labia: rash.

## 2023-08-18 NOTE — PATIENT INSTRUCTIONS
Breast Self Exam for Women   AMBULATORY CARE:   A breast self-exam (BSE)  is a way to check your breasts for lumps and other changes. Regular BSEs can help you know how your breasts normally look and feel. Most breast lumps or changes are not cancer, but you should always have them checked by a healthcare provider. Why you should do a BSE:  Breast cancer is the most common type of cancer in women. Even if you have mammograms, you may still want to do a BSE regularly. If you know how your breasts normally feel and look, it may help you know when to contact your healthcare provider. Mammograms can miss some cancers. You may find a lump during a BSE that did not show up on a mammogram.  When you should do a BSE:  If you have periods, you may want to do your BSE 1 week after your period ends. This is the time when your breasts may be the least swollen, lumpy, or tender. You can do regular BSEs even if you are breastfeeding or have breast implants. Call your doctor if:   You find any lumps or changes in your breasts. You have breast pain or fluid coming from your nipples. You have questions or concerns about your condition or care. How to do a BSE:       Look at your breasts in a mirror. Look at the size and shape of each breast and nipple. Check for swelling, lumps, dimpling, scaly skin, or other skin changes. Look for nipple changes, such as a nipple that is painful or beginning to pull inward. Gently squeeze both nipples and check to see if fluid (that is not breast milk) comes out of them. If you find any of these or other breast changes, contact your healthcare provider. Check your breasts while you sit or  the following 3 positions:    [place] your arms down at your sides. Raise your hands and join them behind your head. Put firm pressure with your hands on your hips. Bend slightly forward while you look at your breasts in the mirror. Lie down and feel your breasts.   When you lie down, your breast tissue spreads out evenly over your chest. This makes it easier for you to feel for lumps and anything that may not be normal for your breasts. Do a BSE on one breast at a time. Place a small pillow or towel under your left shoulder. Put your left arm behind your head. Use the 3 middle fingers of your right hand. Use your fingertip pads, on the top of your fingers. Your fingertip pad is the most sensitive part of your finger. Use small circles to feel your breast tissue. Use your fingertip pads to make dime-sized, overlapping circles on your breast and armpits. Use light, medium, and firm pressure. First, press lightly. Second, press with medium pressure to feel a little deeper into the breast. Last, use firm pressure to feel deep within your breast.    Examine your entire breast area. Examine the breast area from above the breast to below the breast where you feel only ribs. Make small circles with your fingertips, starting in the middle of your armpit. Make circles going up and down the breast area. Continue toward your breast and all the way across it. Examine the area from your armpit all the way over to the middle of your chest (breastbone). Stop at the middle of your chest.    Move the pillow or towel to your right shoulder, and put your right arm behind your head. Use the 3 fingertip pads of your left hand, and repeat the above steps to do a BSE on your right breast.  What else you can do to check for breast problems or cancer:  Talk to your healthcare provider about mammograms. A mammogram is an x-ray of your breasts to screen for breast cancer or other problems. Your provider can tell you the benefits and risks of mammograms. The first mammogram is usually at age 39 or 48. Your provider may recommend you start at 36 or younger if your risk for breast cancer is high. Mammograms usually continue every 1 to 2 years until age 76.        Follow up with your doctor as directed:  Write down your questions so you remember to ask them during your visits. © Copyright [employer] 2022 Information is for End User's use only and may not be sold, redistributed or otherwise used for commercial purposes. The above information is an  only. It is not intended as medical advice for individual conditions or treatments. Talk to your doctor, nurse or pharmacist before following any medical regimen to see if it is safe and effective for you. Wellness Visit for Adults   AMBULATORY CARE:   A wellness visit  is when you see your healthcare provider to get screened for health problems. Your healthcare provider will also give you advice on how to stay healthy. Write down your questions so you remember to ask them. Ask your healthcare provider how often you should have a wellness visit. What happens at a wellness visit:  Your healthcare provider will ask about your health, and your family history of health problems. This includes high blood pressure, heart disease, and cancer. He or she will ask if you have symptoms that concern you, if you smoke, and about your mood. You may also be asked about your intake of medicines, supplements, food, and alcohol. Any of the following may be done: Your weight  will be checked. Your height may also be checked so your body mass index (BMI) can be calculated. Your BMI shows if you are at a healthy weight. Your blood pressure  and heart rate will be checked. Your temperature may also be checked. Blood and urine tests  may be done. Blood tests may be done to check your cholesterol levels. Abnormal cholesterol levels increase your risk for heart disease and stroke. You may also need a blood or urine test to check for diabetes if you are at increased risk. Urine tests may be done to look for signs of an infection or kidney disease. A physical exam  includes checking your heartbeat and lungs with a stethoscope.  Your healthcare provider may also check your skin to look for sun damage. Screening tests  may be recommended. A screening test is done to check for diseases that may not cause symptoms. The screening tests you may need depend on your age, gender, family history, and lifestyle habits. For example, colorectal screening may be recommended if you are 48years old or older. Screening tests you need if you are a woman:   A Pap smear  is used to screen for cervical cancer. Pap smears are usually done every 3 to 5 years depending on your age. You may need them more often if you have had abnormal Pap smear test results in the past. Ask your healthcare provider how often you should have a Pap smear. A mammogram  is an x-ray of your breasts to screen for breast cancer. Experts recommend mammograms every 2 years starting at age 48 years. You may need a mammogram at age 52 years or younger if you have an increased risk for breast cancer. Talk to your healthcare provider about when you should start having mammograms and how often you need them. Vaccines you may need:   Get an influenza vaccine  every year. The influenza vaccine protects you from the flu. Several types of viruses cause the flu. The viruses change over time, so new vaccines are made each year. Get a tetanus-diphtheria (Td) booster vaccine  every 10 years. This vaccine protects you against tetanus and diphtheria. Tetanus is a severe infection that may cause painful muscle spasms and lockjaw. Diphtheria is a severe bacterial infection that causes a thick covering in the back of your mouth and throat. Get a human papillomavirus (HPV) vaccine  if you are female and aged 23 to 32 or male 23 to 24 and never received it. This vaccine protects you from HPV infection. HPV is the most common infection spread by sexual contact. HPV may also cause vaginal, penile, and anal cancers. Get a pneumococcal vaccine  if you are aged 72 years or older.  The pneumococcal vaccine is an injection given to protect you from pneumococcal disease. Pneumococcal disease is an infection caused by pneumococcal bacteria. The infection may cause pneumonia, meningitis, or an ear infection. Get a shingles vaccine  if you are 60 or older, even if you have had shingles before. The shingles vaccine is an injection to protect you from the varicella-zoster virus. This is the same virus that causes chickenpox. Shingles is a painful rash that develops in people who had chickenpox or have been exposed to the virus. How to eat healthy:  My Plate is a model for planning healthy meals. It shows the types and amounts of foods that should go on your plate. Fruits and vegetables make up about half of your plate, and grains and protein make up the other half. A serving of dairy is included on the side of your plate. The amount of calories and serving sizes you need depends on your age, gender, weight, and height. Examples of healthy foods are listed below:  Eat a variety of vegetables  such as dark green, red, and orange vegetables. You can also include canned vegetables low in sodium (salt) and frozen vegetables without added butter or sauces. Eat a variety of fresh fruits , canned fruit in 100% juice, frozen fruit, and dried fruit. Include whole grains. At least half of the grains you eat should be whole grains. Examples include whole-wheat bread, wheat pasta, brown rice, and whole-grain cereals such as oatmeal.    Eat a variety of protein foods such as seafood (fish and shellfish), lean meat, and poultry without skin (turkey and chicken). Examples of lean meats include pork leg, shoulder, or tenderloin, and beef round, sirloin, tenderloin, and extra lean ground beef. Other protein foods include eggs and egg substitutes, beans, peas, soy products, nuts, and seeds. Choose low-fat dairy products such as skim or 1% milk or low-fat yogurt, cheese, and cottage cheese. Limit unhealthy fats  such as butter, hard margarine, and shortening. Exercise:  Exercise at least 30 minutes per day on most days of the week. Some examples of exercise include walking, biking, dancing, and swimming. You can also fit in more physical activity by taking the stairs instead of the elevator or parking farther away from stores. Include muscle strengthening activities 2 days each week. Regular exercise provides many health benefits. It helps you manage your weight, and decreases your risk for type 2 diabetes, heart disease, stroke, and high blood pressure. Exercise can also help improve your mood. Ask your healthcare provider about the best exercise plan for you. General health and safety guidelines:   Do not smoke. Nicotine and other chemicals in cigarettes and cigars can cause lung damage. Ask your healthcare provider for information if you currently smoke and need help to quit. E-cigarettes or smokeless tobacco still contain nicotine. Talk to your healthcare provider before you use these products. Limit alcohol. A drink of alcohol is 12 ounces of beer, 5 ounces of wine, or 1½ ounces of liquor. Lose weight, if needed. Being overweight increases your risk of certain health conditions. These include heart disease, high blood pressure, type 2 diabetes, and certain types of cancer. Protect your skin. Do not sunbathe or use tanning beds. Use sunscreen with a SPF 15 or higher. Apply sunscreen at least 15 minutes before you go outside. Reapply sunscreen every 2 hours. Wear protective clothing, hats, and sunglasses when you are outside. Drive safely. Always wear your seatbelt. Make sure everyone in your car wears a seatbelt. A seatbelt can save your life if you are in an accident. Do not use your cell phone when you are driving. This could distract you and cause an accident. Pull over if you need to make a call or send a text message. Practice safe sex. Use latex condoms if are sexually active and have more than one partner.  Your healthcare provider may recommend screening tests for sexually transmitted infections (STIs). Wear helmets, lifejackets, and protective gear. Always wear a helmet when you ride a bike or motorcycle, go skiing, or play sports that could cause a head injury. Wear protective equipment when you play sports. Wear a lifejacket when you are on a boat or doing water sports. © Copyright [employer] 2022 Information is for End User's use only and may not be sold, redistributed or otherwise used for commercial purposes. The above information is an  only. It is not intended as medical advice for individual conditions or treatments. Talk to your doctor, nurse or pharmacist before following any medical regimen to see if it is safe and effective for you. Kegel Exercises for Women   AMBULATORY CARE:   Kegel exercises  help strengthen your pelvic muscles. Pelvic muscles hold your pelvic organs, such as your bladder and uterus, in place. Kegel exercises help prevent or control certain conditions, such as urine incontinence (leakage) or uterine prolapse. Call your doctor or physical therapist if:   You cannot feel your muscles tighten or relax. You continue to leak urine. You have questions or concerns about your condition or care. Use the correct muscles:  Pelvic muscles are the muscles you use to control urine flow. To target these muscles, stop and start the flow of urine several times. This will help you become familiar with how it feels to tighten and relax these muscles. How to do Kegel exercises:   Get into a comfortable position. You may lie down, stand up, or sit down to do these exercises. When you first try to do these exercises, it may be easier if you lie down. Tighten or squeeze your pelvic muscles slowly. It may feel like you are trying to hold back urine or gas. Hold this position for 3 seconds. Relax for 3 seconds. Repeat this cycle 10 times.  Do not hold your breath when you do Kegel exercises. Keep your stomach, back, and leg muscles relaxed. Do 10 sets of Kegel exercises, at least 3 times a day. When you know how to do Kegel exercises, use different positions. This will help to strengthen your pelvic muscles as much as possible. You can do these exercises while you lie on the floor, watch TV, or while you stand. Tighten your pelvic muscles before you sneeze, cough, or lift to prevent urine leakage. You may notice improved bladder control within about 6 weeks. Follow up with your doctor or physical therapist as directed:  Write down your questions so you remember to ask them during your visits. © Copyright [employer] 2022 Information is for End User's use only and may not be sold, redistributed or otherwise used for commercial purposes. The above information is an  only. It is not intended as medical advice for individual conditions or treatments. Talk to your doctor, nurse or pharmacist before following any medical regimen to see if it is safe and effective for you. Perineal Hygiene      Your vaginal naturally takes care of its self, it is a self washing system, the less you mess the healthier it will be     No soaps or feminine wash to the vulva, these products can cause dermitis, bacterial infections and other vulvar problems. Use only water to cleanse, or water with Dove or [employer] if necessary. No scented lotions or products are advised in or near your vulva. Use only coconut oil for moisture if needed. No douching this may cause imbalance in your vaginal PH and further issues. If you wear panty liners, you may apply a thin coating of Vaseline, A&D ointment or coconut oil to the vulvar tissues as a skin barrier     [place] underware, loose fitting clothing  Only perfume-free, dye-free laundry detergent, use a second rinse cycle   Avoid fabric softeners/dryer sheets. Partner should avoid the same products as well.        Over the counter probiotic to restore vaginal richard may be helpful as well, take daily. You may also look into Boric Acid vaginal suppositories to restore vaginal PH balance for up to 2 weeks as directed on the box. You may not use these if you are pregnant      For vaginal dryness: You may use:     Coconut oil (organic, pure, unscented) as needed for moisture or lubrication. ( Do not use if allergic)       Replens moisture restore external comfort gel daily ( use as directed on the box)        Replens long lasting vaginal moisturizer  ( use as directed on the box)         For Vaginal Lubrication:          You may use:     Coconut oil (organic, pure, unscented) as a lubricant or another scent-free lubricant (Astroglide, Uberlube) if needed. Do not use coconut oil or silicone if using a condom as this may break down the integrity of the condom and cause an unplanned pregnancy              Do not use coconut oil if allergic               Replens silky smooth lubricant, premium silicone based lubricant for intercourse. ( use as directed, a small amount will provide an enhanced natural feeling)     Any premium over the counter vaginal lubricant water or silicone based. Silicone based will have more staying power. Menopause   WHAT YOU NEED TO KNOW:   What is menopause? Menopause is a normal stage in a woman's life when her monthly periods stop. You are considered to be in menopause when you have not had a period for a full year after the age of 36. Menopause usually occurs between ages 52 to 48. Perimenopause is a stage before menopause that may cause signs and symptoms similar to menopause. Perimenopause may start about 4 years before menopause. What causes menopause? Menopause starts when the ovaries stop making the female hormones estrogen and progesterone. After menopause, you are no longer able to become pregnant.  Any of the following may trigger menopause or early menopause:  Surgery, including a hysterectomy or oophorectomy    Family history of early menopause    Smoking    Chemotherapy or pelvic radiation    Chromosome abnormalities, including Dowling syndrome and Fragile X syndrome    Premature ovarian insufficiency (the ovaries stop producing eggs before age 36)    What are the signs and symptoms of menopause? You may have any of the following:  Irregular menstrual cycles with heavy vaginal bleeding followed by decreased bleeding until it stops    Hot flashes (feeling warm, flushed, and sweaty)    Vaginal changes such as increased dryness    Mood changes such as anxiety, depression, or decreased desire to have sex    Trouble sleeping, joint pain, headaches    Brittle nails, hair on chin or chest where it is normally absent    Decrease in breast size and change in skin texture    Weight gain    How is menopause treated or managed? Hormone replacement therapy (HRT) is medicine that replaces your low hormone levels. HRT contains estrogen and sometimes progestin. HRT has several benefits. HRT helps prevent osteoporosis, which decreases your risk for bone fractures. HRT also protects you from colorectal cancer. HRT also has some risks. HRT increases your risk for breast cancer, blood clots, heart disease, a heart attack, or a stroke. If you are 72 years or older, HRT can also increase your risk for dementia. Your risk for uterine or endometrial cancer is higher if you take estrogen-only HRT. Manage hot flashes. Hot flashes are brief periods of feeling very warm, flushed, and sweaty. Hot flashes can last from a few seconds to several minutes. They may happen many times during the day, and are common at night. Layer your clothing so that you can easily remove some clothing and cool yourself during a hot flash. Cold drinks may also be helpful. Non-hormone medicines can help relieve or prevent hot flashes.  Examples include certain antidepressants, nerve medicines, and high blood pressure medicines. Reduce vaginal dryness by using over-the-counter vaginal creams. Vaginal dryness may cause you to have pain or discomfort during sex. Only use creams that are made for vaginal use. Do  not  use petroleum jelly. You may put an estrogen cream in and around your vagina. Estrogen cream may help decrease vaginal dryness and lower your risk of vaginal infections. Continue to use birth control during perimenopause if you do not want to get pregnant. You may need to use birth control until it has been 1 year since your periods stopped. Ask your healthcare provider when you can stop using birth control to prevent pregnancy. How can I live a healthy lifestyle during and after menopause? After menopause, your risk for heart disease and bone loss increases. Ask about these and other ways to stay healthy:  Exercise regularly. Exercise helps you maintain a healthy weight. Exercise can also help to control your blood pressure and cholesterol levels. Include weight-bearing exercise for strong bones. Weight bearing exercise is recommended for at least 30 minutes, 3 times a week. Ask your healthcare provider about the best exercise plan for you. Eat a variety of healthy foods. Include fruits, vegetables, whole grains (whole-wheat bread, pasta, and cereals), low-fat dairy, and lean protein foods (beans, poultry, and fish). Limit foods high in sodium (salt). Ask your healthcare provider for more information about a meal plan that is right for you. Do not smoke. If you smoke, it is never too late to quit. You are more likely to have a heart attack, lung disease, blood clots, and cancer if you smoke. Ask your healthcare provider for information if you need help quitting. Take supplements as directed. You may need extra calcium and vitamin D to help prevent osteoporosis. Limit alcohol and caffeine. Alcohol and caffeine may worsen your symptoms. When should I call my doctor? You have vaginal bleeding after menopause. You have questions or concerns about your condition or care. CARE AGREEMENT:   You have the right to help plan your care. Learn about your health condition and how it may be treated. Discuss treatment options with your healthcare providers to decide what care you want to receive. You always have the right to refuse treatment. The above information is an  only. It is not intended as medical advice for individual conditions or treatments. Talk to your doctor, nurse or pharmacist before following any medical regimen to see if it is safe and effective for you. © Copyright [employer] 2022 Information is for End User's use only and may not be sold, redistributed or otherwise used for commercial purposes. Dermatitis   WHAT YOU NEED TO KNOW:   Dermatitis is skin inflammation. You may have an itchy rash, redness, or swelling. You may also have bumps or blisters that crust over or ooze clear fluid. Dermatitis can be caused by allergens such as dust mites, pet dander, pollen, and certain foods. It can also develop when something touches your skin and irritates it or causes an allergic reaction. Examples include soaps, chemicals, latex, and poison ivy. DISCHARGE INSTRUCTIONS:   Call your local emergency number (911 in the [address]) or have someone call if:   You have symptoms of anaphylaxis, such as sudden trouble breathing, throat swelling, or feeling dizzy or lightheaded. Return to the emergency department if:   You develop a fever or have red streaks going up your arm or leg. Your rash gets more swollen, red, or hot. Call your doctor or dermatologist if:   Your skin blisters, oozes white or yellow pus, or has a foul-smelling discharge. Your rash spreads or does not get better, even after treatment. You have questions or concerns about your condition or care.     Medicines:   Medicines  help decrease itching and inflammation, or treat a bacterial infection. They may be given as a topical cream, shot, or a pill. Take your medicine as directed. Contact your healthcare provider if you think your medicine is not helping or if you have side effects. Tell your provider if you are allergic to any medicine. Keep a list of the medicines, vitamins, and herbs you take. Include the amounts, and when and why you take them. Bring the list or the pill bottles to follow-up visits. Carry your medicine list with you in case of an emergency. Manage dermatitis:   Apply a cool compress to your rash. This will help soothe your skin. Apply lotions or creams to the area. These help keep your skin moist and decrease itching. Apply the lotion or cream right after a lukewarm bath or shower when your skin is still damp. Use products that do not contain dye or a scent. Avoid skin irritants. Examples include makeup, hair products, soaps, and cleansers. Use products that do not contain a scent or dye. Follow up with your doctor or dermatologist as directed:  Write down your questions so you remember to ask them during your visits. © Copyright Haydee Medina 2022 Information is for End User's use only and may not be sold, redistributed or otherwise used for commercial purposes. The above information is an  only. It is not intended as medical advice for individual conditions or treatments. Talk to your doctor, nurse or pharmacist before following any medical regimen to see if it is safe and effective for you. Eczema   WHAT YOU NEED TO KNOW:   Eczema is an itchy, red skin rash. You are more likely to have it if your parent or a family member has eczema, asthma, or hay fever. Eczema is a long-term condition. You may have flare-ups from time to time for the rest of your life. DISCHARGE INSTRUCTIONS:   Return to the emergency department if:   You develop a fever or have red streaks going up your arm or leg.     Your rash gets more swollen, red, or hot.    Call your doctor if:   Most of your skin is red, swollen, painful, and covered with scales. You develop bloody, red, painful crusts. Your skin blisters and oozes white or yellow pus. You have questions or concerns about your condition or care. Medicines:   Medicines may help reduce itching, redness, pain, and swelling. They may be given as a cream or pill. You may also receive antibiotics if you have a skin infection. Take your medicine as directed. Contact your healthcare provider if you think your medicine is not helping or if you have side effects. Tell your provider if you are allergic to any medicine. Keep a list of the medicines, vitamins, and herbs you take. Include the amounts, and when and why you take them. Bring the list or the pill bottles to follow-up visits. Carry your medicine list with you in case of an emergency. Care for your skin:   Do not scratch. Pat or press on your skin to relieve itching. Your symptoms will get worse if you scratch. Keep your fingernails short so you do not tear your skin if you do scratch. Keep your skin moist.  Rub lotion, cream, or ointment into your skin at least 2 times a day. Ask your healthcare provider what to use and how often to use it. Take baths or showers  with warm water for 10 minutes or less. Use mild bar soap. Ask your healthcare provider for the best soap for you to use. Wear cotton clothes. Wear loose-fitting clothes made from cotton or cotton blends. Avoid wool. Use a humidifier  to add moisture to the air in your home. Avoid changes in temperature , especially activities that cause you to sweat a lot. Sweat can cause itching. Remove blankets from your bed if you get hot while you sleep. Avoid allergens, dust, and skin irritants. Do not use perfume, fabric softener, or makeup that burns or itches.     Follow up with your doctor as directed:  Write down your questions so you remember to ask them during your visits. © Copyright [employer] 2022 Information is for End User's use only and may not be sold, redistributed or otherwise used for commercial purposes. The above information is an  only. It is not intended as medical advice for individual conditions or treatments. Talk to your doctor, nurse or pharmacist before following any medical regimen to see if it is safe and effective for you. Lichen Sclerosus   AMBULATORY CARE:   Lichen sclerosus  is a skin condition that most often affects the vulva, penis, or skin around the anus. Lichen sclerosus occurs most often in females. The cause of lichen sclerosus may not be known. Common symptoms include the following: You may not have any symptoms, or you may have any of the following:  Pain, itching, or burning    Areas of skin that are thin, wrinkled, and white    Blisters    Bleeding, bruises, or tears on affected skin    Contact your healthcare provider if:   Your symptoms do not get better with treatment. You have questions or concerns about your condition or care. Treatment and management:  You do not need treatment if you do not have any symptoms. Your healthcare provider may recommend a steroid cream or ointment. Your provider may also recommend a topical medicine that prevents your immune system from attacking your skin. If you are female, your healthcare provider may recommend that you do not take bubble baths, or use scented soaps and scented detergents. This may help decrease irritation of the vulva. Rarely, adults may need surgery to repair scarring that can occur over time. Follow up with your doctor as directed:  Write down your questions so you remember to ask them during your visits. © Copyright [employer] 2022 Information is for End User's use only and may not be sold, redistributed or otherwise used for commercial purposes. The above information is an  only.  It is not intended as medical advice for individual conditions or treatments. Talk to your doctor, nurse or pharmacist before following any medical regimen to see if it is safe and effective for you. Yeast Infection   AMBULATORY CARE:   A yeast infection , or vaginal candidiasis, is a common vaginal infection. A yeast infection is caused by a fungus, or yeast-like germ. Fungi are normally found in your vagina. Too many fungi can cause an infection. Common signs and symptoms: Thick, white, cheese-like discharge from your vagina    Itching, swelling, and redness in your vagina    Pain or burning when you urinate    Pain during sexual intercourse    Call your doctor or gynecologist if:   You have a fever and chills. You develop abdominal or pelvic pain. Your discharge is bloody and it is not your monthly period. Your signs and symptoms get worse, even after treatment. You have questions or concerns about your condition or care. Treatment for a yeast infection  includes medicines to treat the fungal infection and decrease inflammation. The medicine may be a pill, cream, ointment, or vaginal tablet or suppository. Keep your vagina healthy:   Clean your genital area with mild soap and warm water each day. Do not get soap inside your vagina. Gently dry the area after washing. Do not use hot tubs. The heat and moisture from hot tubs can increase your risk for another yeast infection. Always wipe from front to back  after you use the toilet. This prevents spreading bacteria from your rectal area into your vagina. Do not wear tight-fitting clothes or undergarments  for long periods of time. Wear cotton underwear during the day. Cotton helps keep your genital area dry and does not hold in warmth or moisture. Do not wear underwear at night. Do not douche  or use feminine hygiene sprays or bubble bath. Do not use pads or tampons that are scented, or colored or perfumed toilet paper. Do not have sex until your symptoms go away.   Have your partner wear a condom until you complete your course of medication. Ask your healthcare provider about birth control options if necessary. Condoms have latex and diaphragms have gel that kills sperm. Both of these may irritate your genital area. Follow up with your doctor or gynecologist as directed:  Write down your questions so you remember to ask them during your visits. © Copyright [employer] 2022 Information is for End User's use only and may not be sold, redistributed or otherwise used for commercial purposes. The above information is an  only. It is not intended as medical advice for individual conditions or treatments. Talk to your doctor, nurse or pharmacist before following any medical regimen to see if it is safe and effective for you.

## 2023-08-31 ENCOUNTER — HOSPITAL ENCOUNTER (OUTPATIENT)
Dept: ULTRASOUND IMAGING | Facility: HOSPITAL | Age: 58
End: 2023-08-31
Payer: COMMERCIAL

## 2023-08-31 DIAGNOSIS — N94.10 DYSPAREUNIA, FEMALE: ICD-10-CM

## 2023-08-31 PROCEDURE — 76856 US EXAM PELVIC COMPLETE: CPT

## 2023-08-31 PROCEDURE — 76830 TRANSVAGINAL US NON-OB: CPT

## 2023-09-01 ENCOUNTER — TELEPHONE (OUTPATIENT)
Dept: NEUROLOGY | Facility: CLINIC | Age: 58
End: 2023-09-01

## 2023-09-01 NOTE — TELEPHONE ENCOUNTER
Called patient and left voicemail to confirm their upcoming appointment with Dr. Luis Alberto Cooper. Informed patient about calling 15 minutes prior to appointment to get checked in and go over chart.

## 2023-09-05 ENCOUNTER — TELEMEDICINE (OUTPATIENT)
Dept: NEUROLOGY | Facility: CLINIC | Age: 58
End: 2023-09-05
Payer: COMMERCIAL

## 2023-09-05 DIAGNOSIS — G43.009 MIGRAINE WITHOUT AURA AND WITHOUT STATUS MIGRAINOSUS, NOT INTRACTABLE: Primary | ICD-10-CM

## 2023-09-05 PROCEDURE — 99213 OFFICE O/P EST LOW 20 MIN: CPT | Performed by: PSYCHIATRY & NEUROLOGY

## 2023-09-05 NOTE — PATIENT INSTRUCTIONS
Consider zzoma pillow     I am placing a referral for a sleep study and if it is abnormal we will place one to the sleep medicine specialist team to further evaluate your sleep issues. Please call [phone] to schedule the sleep study and afterwards if needed I recommend you see one of the following providers:  Bryant Duval PA-C    Headache/migraine treatment:   Rescue medications (for immediate treatment of a headache): It is ok to take ibuprofen, acetaminophen or naproxen (Advil, Tylenol,  Aleve, Excedrin) if they help your headaches you should limit these to No more than 3 times a week to avoid medication overuse/rebound headaches. For nausea:  -     ondansetron (ZOFRAN-ODT) 4 mg disintegrating tablet; Take 1 tablet (4 mg total) by mouth every 6 (six) hours as needed for nausea or vomiting    For your more moderate to severe migraines take this medication early   Maxalt (rizatriptan) 10mg tabs - take one at the onset of headache. May repeat one time after 2 hours if pain has not resolved. (Max 2 a day and 9 a month)     -     methocarbamol (ROBAXIN) 500 mg tablet; Take one tab PO for muscle spasms at night if needed (can cause sedation)    Sleep and headache prevention:   [x] Melatonin - you may take 1-3 mg nightly for sleep or headache prevention. You should take this 1 hour prior to bedtime consistently every night for it to work. It works by gradually helping to adjust your sleep time over days to weeks, rather than immediately making you feel sleepy.       Prescription preventive medications for headaches/migraines   (to take every day to help prevent headaches - not to take at the time of headache):  [x] we have options if needed       *Typically these types of medications take time until you see the benefit, although some may see improvement in days, often it may take weeks, especially if the medication is being titrated up to a beneficial level. Please contact us if there are any concerns or questions regarding the medication. Lifestyle Recommendations:  [x] SLEEP - Maintain a regular sleep schedule: Adults need at least 7-8 hours of uninterrupted a night. Maintain good sleep hygiene:  Going to bed and waking up at consistent times, avoiding excessive daytime naps, avoiding caffeinated beverages in the evening, avoid excessive stimulation in the evening and generally using bed primarily for sleeping. One hour before bedtime would recommend turning lights down lower, decreasing your activity (may read quietly, listen to music at a low volume). When you get into bed, should eliminate all technology (no texting, emailing, playing with your phone, iPad or tablet in bed). [x] HYDRATION - Maintain good hydration. Drink  2L of fluid a day (4 typical small water bottles)  [x] DIET - Maintain good nutrition. In particular don't skip meals and try and eat healthy balanced meals regularly. [x] TRIGGERS - Look for other triggers and avoid them: Limit caffeine to 1-2 cups a day or less. Avoid dietary triggers that you have noticed bring on your headaches (this could include aged cheese, peanuts, MSG, aspartame and nitrates). [x] EXERCISE - physical exercise as we all know is good for you in many ways, and not only is good for your heart, but also is beneficial for your mental health, cognitive health and  chronic pain/headaches. I would encourage at the least 5 days of physical exercise weekly for at least 30 minutes. Education and Follow-up  [x] Please call with any questions or concerns. Of course if any new concerning symptoms go to the emergency department.   [x] Follow up 6 months, sooner if needed

## 2023-09-05 NOTE — PROGRESS NOTES
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 pimples     • Penicillins Rash     Breaks out on white pimples      • Tetracycline Rash     Breaks out on white pimples           Objective:     Exam limited by Video  Physical Exam:                                                                 Vitals:            Constitutional:    There were no vitals taken for this visit. BP Readings from Last 3 Encounters:   08/18/23 132/78   07/07/23 114/84   05/18/23 140/90     Pulse Readings from Last 3 Encounters:   07/07/23 82   05/18/23 85   02/24/23 78         Well developed, well nourished, No dysmorphic features. HEENT:  Normocephalic atraumatic. See neuro exam   Psychiatric:  Normal behavior and appropriate affect        Neurological Examination:     Mental status/cognitive function:   Recent and remote memory appear intact. Attention span and concentration as well as fund of knowledge appear appropriate for age.  Normal language and spontaneous speech. Cranial Nerves:  VII-facial expression symmetric  Motor Exam: symmetric bulk throughout. no atrophy, fasciculations or abnormal movements noted. Coordination:  no apparent dysmetria, ataxia or tremor noted        Pertinent lab results:   No recent labs in our system, goes every 8 months outside of our system   -12/21/2020: CMP and CBC unremarkable  - 12/17/2019 A1c 6.1  - 8/24/2018 TSH normal 3.07, free T4 elevated 1.72     Imaging: I have personally reviewed imaging and radiology read   -Noncontrast head CT in 12/1/2022: No acute intracranial abnormality. Review of Systems:   ROS obtained by medical assistant and personally reviewed, but if any symptoms listed below say negative, does not mean patient has not had this symptom since last visit, please see HPI for details of symptoms discussed this visit. I recommended PCP follow up for non neurologic problems. Review of Systems   Constitutional: Negative for appetite change and fever. HENT: Negative. Negative for hearing loss, tinnitus, trouble swallowing and voice change. Eyes: Negative. Negative for photophobia and pain. Respiratory: Negative. Negative for shortness of breath. Cardiovascular: Negative. Negative for palpitations. Gastrointestinal: Negative. Negative for nausea and vomiting. Endocrine: Negative. Negative for cold intolerance. Genitourinary: Negative. Negative for dysuria, frequency and urgency. Musculoskeletal: Negative. Negative for myalgias and neck pain. Skin: Negative. Negative for rash. Neurological: Positive for headaches. Negative for dizziness, tremors, seizures, syncope, facial asymmetry, speech difficulty, weakness, light-headedness and numbness. Hematological: Negative. Does not bruise/bleed easily. Psychiatric/Behavioral: Negative for confusion, hallucinations and sleep disturbance. All other systems reviewed and are negative.       I have spent 16 minutes with Patient today in which greater than 50% of this time was spent in counseling/coordination of care regarding Prognosis, Risks and benefits of tx options, Instructions for management, Patient and family education, Importance of tx compliance, Risk factor reductions, Impressions, Counseling / Coordination of care, Documenting in the medical record, Reviewing / ordering tests, medicine, procedures   and Obtaining or reviewing history  . I also spent 5 minutes non face to face for this patient the same day.          Visit Time  Total Visit Duration: 21

## 2023-09-05 NOTE — PROGRESS NOTES
Review of Systems   Constitutional: Negative for appetite change and fever. HENT: Negative. Negative for hearing loss, tinnitus, trouble swallowing and voice change. Eyes: Negative. Negative for photophobia and pain. Respiratory: Negative. Negative for shortness of breath. Cardiovascular: Negative. Negative for palpitations. Gastrointestinal: Negative. Negative for nausea and vomiting. Endocrine: Negative. Negative for cold intolerance. Genitourinary: Negative. Negative for dysuria, frequency and urgency. Musculoskeletal: Negative. Negative for myalgias and neck pain. Skin: Negative. Negative for rash. Neurological: Positive for headaches. Negative for dizziness, tremors, seizures, syncope, facial asymmetry, speech difficulty, weakness, light-headedness and numbness. Hematological: Negative. Does not bruise/bleed easily. Psychiatric/Behavioral: Negative for confusion, hallucinations and sleep disturbance. All other systems reviewed and are negative.

## 2023-09-06 ENCOUNTER — PATIENT MESSAGE (OUTPATIENT)
Dept: OBGYN CLINIC | Facility: CLINIC | Age: 58
End: 2023-09-06

## 2023-09-06 NOTE — TELEPHONE ENCOUNTER
Her mammo was from last year, her previous provider should have made her aware of results, however I did look and it is normal   She was scheduled on 8/25/23 for mammo those results are not there

## 2023-09-08 DIAGNOSIS — N94.10 DYSPAREUNIA IN FEMALE: Primary | ICD-10-CM

## 2023-09-08 NOTE — PROGRESS NOTES
Poke with patient. Reviewed ultrasound findings. Referral for pelvic floor physical therapy placed. Patient provided phone number of nearest facility. Patient still reports dyspareunia,  patient has increased her topical estrogen dosing to 1 g 3 times a week for the past 2 weeks with little effectiveness. Advised patient to continue with this dosing as it may take some time to be fully effective.   Advised patient to reach out with any further problems or concerns

## 2023-09-12 ENCOUNTER — TELEPHONE (OUTPATIENT)
Dept: OBGYN CLINIC | Facility: CLINIC | Age: 58
End: 2023-09-12

## 2023-09-12 NOTE — TELEPHONE ENCOUNTER
Spoke with patient today at length. She continues to have concerns about dyspareunia. she has increased her estrogen to 1 g 3 times a week as recommended. After a very long conversation we decided to do a jumpstart of estrogen. She has been advised to use 1 g nightly for 2 weeks then taper back to 1 g 3 times a week. We reviewed Kegel exercises at length. I sent her instructions through her [employer] rupali. She is unable to get into physical therapy at this time due to back log. I have advised her to do her Kegel's 20-30 a day. She reports having this similar pelvic pain years ago prior to her endometrial ablation. She was wondering if her fibroids could be causing this pain. I have advised her her fibroids are 2 cm in size intramural location. It is not probable that this is the cause of her pain however that remains to be determined. I have advised her that she can have a surgical consultation with Dr. Haim NORWOOD for management of fibroids. Gaviota Lopez was also concerned about uterine cancer. We discussed indications for further testing. At this time her endometrial stripe is 2 mm which is well below the threshold. And she has no postmenopausal bleeding. Gaviota Lopez was happy for this information. She will follow my recommendations of increasing her estrogen doing her pelvic floor PT and will call back in several weeks. In the interim she is going to set up an appointment for consultation  If we are unable to manage her symptoms I will refer to urogyn for further evaluation.   I also mention the [facility] for sexual pain  Gaviota Lopez was satisfied with our conversation today

## 2023-10-25 ENCOUNTER — OFFICE VISIT (OUTPATIENT)
Dept: OBGYN CLINIC | Facility: CLINIC | Age: 58
End: 2023-10-25
Payer: COMMERCIAL

## 2023-10-25 VITALS
DIASTOLIC BLOOD PRESSURE: 84 MMHG | HEIGHT: 72 IN | WEIGHT: 269 LBS | BODY MASS INDEX: 36.44 KG/M2 | SYSTOLIC BLOOD PRESSURE: 128 MMHG

## 2023-10-25 DIAGNOSIS — R10.2 PELVIC PAIN: Primary | ICD-10-CM

## 2023-10-25 PROCEDURE — 99213 OFFICE O/P EST LOW 20 MIN: CPT | Performed by: OBSTETRICS & GYNECOLOGY

## 2023-10-25 NOTE — PROGRESS NOTES
Assessment/Plan:    Pelvic pain  H/o fibroids, usually postmenopausal patients do no have pain from these, usually shrink overtime. Treatment would be hysterectomy. Not recommended at this time. Dysparuenia - no relief with estrogen cream use over last few years. Physical therapy - pelvic floor weakness/spasm/adhesions respond well to this. Patient is motivated to try this. Diagnoses and all orders for this visit:    Pelvic pain          Subjective:      Patient ID: Lynne Armstrong is a 59 y.o. female. Patient here to discuss treatment for fibroids noted on recent pelvic US 8/31/23. States her pelvic pain has been ongoing for years can be bad enough to wake her up out of her sleep. Unclear cause of pain. Musculoskeletal, bladder, bowel, gyn reviewed. Has been using estrogen cream for several years without relief of vaginal dryness. 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 Interested in pelvic floor physical therapy. The following portions of the patient's history were reviewed and updated as appropriate: She  has a past medical history of Abnormal Pap smear of cervix (2000's), Anemia, Asthma, Fibroid (2000's), Hypothyroidism, and Varicella. She   Patient Active Problem List    Diagnosis Date Noted    Pelvic pain 11/01/2023    Arthritis of carpometacarpal [place]) joint of right thumb 02/25/2021    Prediabetes 04/03/2020    Sleep disorder 11/25/2019    Polyp of colon 06/25/2018    Vitamin D deficiency 08/04/2016    Iron deficiency 08/04/2016    History of adenomatous polyp of colon 05/04/2012     She  has a past surgical history that includes Cholecystectomy; Appendectomy; Tonsillectomy; Hernia repair (11/2021); [place] tooth extraction; Endometrial ablation (2005); and Myomectomy (2002,3,4). Her family history includes Breast cancer in her mother; Miscarriages / Irving Dykes in her mother. She  reports that she has never smoked.  She has never used smokeless tobacco. She reports that she does not drink alcohol and does not use drugs. Current Outpatient Medications   Medication Sig Dispense Refill    albuterol (Ventolin HFA) 90 mcg/act inhaler Inhale 2 puffs every 6 (six) hours as needed for wheezing 25.5 g 3    Aspirin-Acetaminophen-Caffeine (EXCEDRIN MIGRAINE PO) Take 1 tablet by mouth if needed      clobetasol (TEMOVATE) 0.05 % cream       clotrimazole-betamethasone (LOTRISONE) 1-0.05 % cream       estrogens, conjugated (Premarin) vaginal cream Premarin 0.625 mg/gram vaginal cream   insert 0.5 applicatorfuls vaginally three times a week      fluticasone (FLONASE) 50 mcg/act nasal spray       Fluticasone-Salmeterol (Advair) 100-50 mcg/dose inhaler Inhale 1 puff 2 (two) times a day Rinse mouth after use. 180 blister 1    Melatonin 300 MCG TABS melatonin 300 mcg tablet   PRN      metFORMIN (GLUCOPHAGE-XR) 500 mg 24 hr tablet Take 1,000 mg by mouth daily      methocarbamol (ROBAXIN) 500 mg tablet Take one tab PO for muscle spasms at night if needed (can cause sedation) 20 tablet 6    metroNIDAZOLE (METROCREAM) 0.75 % cream       montelukast (SINGULAIR) 10 mg tablet Take 1 tablet (10 mg total) by mouth daily at bedtime 90 tablet 3    ondansetron (ZOFRAN-ODT) 4 mg disintegrating tablet Take 1 tablet (4 mg total) by mouth every 6 (six) hours as needed for nausea or vomiting 20 tablet 3    S[place] Acetate Diagnostic 15 MG SOLR       Sulfacetamide Sodium, Acne, 10 % LOTN       Synthroid 175 MCG tablet       terconazole (TERAZOL 7) 0.4 % vaginal cream Insert 1 applicator into the vagina daily at bedtime 45 g 3    Vitamin D, Cholecalciferol, 50 MCG (2000 UT) CAPS Take 1 capsule by mouth      naproxen (Naprosyn) 500 mg tablet Take 1 tablet (500 mg total) by mouth 2 (two) times a day with meals 60 tablet 0     No current facility-administered medications for this visit.      Current Outpatient Medications on File Prior to Visit   Medication Sig    albuterol (Ventolin HFA) 90 mcg/act inhaler Inhale 2 puffs every 6 (six) hours as needed for wheezing    Aspirin-Acetaminophen-Caffeine (EXCEDRIN MIGRAINE PO) Take 1 tablet by mouth if needed    clobetasol (TEMOVATE) 0.05 % cream     clotrimazole-betamethasone (LOTRISONE) 1-0.05 % cream     estrogens, conjugated (Premarin) vaginal cream Premarin 0.625 mg/gram vaginal cream   insert 0.5 applicatorfuls vaginally three times a week    fluticasone (FLONASE) 50 mcg/act nasal spray     Fluticasone-Salmeterol (Advair) 100-50 mcg/dose inhaler Inhale 1 puff 2 (two) times a day Rinse mouth after use. Melatonin 300 MCG TABS melatonin 300 mcg tablet   PRN    metFORMIN (GLUCOPHAGE-XR) 500 mg 24 hr tablet Take 1,000 mg by mouth daily    methocarbamol (ROBAXIN) 500 mg tablet Take one tab PO for muscle spasms at night if needed (can cause sedation)    metroNIDAZOLE (METROCREAM) 0.75 % cream     montelukast (SINGULAIR) 10 mg tablet Take 1 tablet (10 mg total) by mouth daily at bedtime    ondansetron (ZOFRAN-ODT) 4 mg disintegrating tablet Take 1 tablet (4 mg total) by mouth every 6 (six) hours as needed for nausea or vomiting    Sermorelin Acetate Diagnostic 15 MG SOLR     Sulfacetamide Sodium, Acne, 10 % LOTN     Synthroid 175 MCG tablet     terconazole (TERAZOL 7) 0.4 % vaginal cream Insert 1 applicator into the vagina daily at bedtime    Vitamin D, Cholecalciferol, 50 MCG (2000 UT) CAPS Take 1 capsule by mouth     No current facility-administered medications on file prior to visit. She is allergic to fluconazole, sulfamethoxazole-trimethoprim, erythromycin, penicillins, and tetracycline. .    Review of Systems      Objective:      /84 (BP Location: Left arm, Patient Position: Sitting, Cuff Size: Standard)   Ht 6' 1" (1.854 m)   Wt 122 kg (269 lb)   BMI 35.49 kg/m²          Physical Exam

## 2023-10-30 ENCOUNTER — APPOINTMENT (OUTPATIENT)
Dept: RADIOLOGY | Facility: CLINIC | Age: 58
End: 2023-10-30
Payer: COMMERCIAL

## 2023-10-30 ENCOUNTER — OFFICE VISIT (OUTPATIENT)
Dept: OBGYN CLINIC | Facility: CLINIC | Age: 58
End: 2023-10-30
Payer: COMMERCIAL

## 2023-10-30 VITALS
HEART RATE: 83 BPM | HEIGHT: 72 IN | WEIGHT: 269.2 LBS | OXYGEN SATURATION: 98 % | SYSTOLIC BLOOD PRESSURE: 106 MMHG | RESPIRATION RATE: 18 BRPM | DIASTOLIC BLOOD PRESSURE: 73 MMHG | BODY MASS INDEX: 36.46 KG/M2

## 2023-10-30 DIAGNOSIS — M16.12 PRIMARY OSTEOARTHRITIS OF LEFT HIP: Primary | ICD-10-CM

## 2023-10-30 DIAGNOSIS — M25.552 LEFT HIP PAIN: ICD-10-CM

## 2023-10-30 PROCEDURE — 99214 OFFICE O/P EST MOD 30 MIN: CPT | Performed by: FAMILY MEDICINE

## 2023-10-30 PROCEDURE — 73502 X-RAY EXAM HIP UNI 2-3 VIEWS: CPT

## 2023-10-30 RX ORDER — NAPROXEN 500 MG/1
500 TABLET ORAL 2 TIMES DAILY WITH MEALS
Qty: 60 TABLET | Refills: 0 | Status: SHIPPED | OUTPATIENT
Start: 2023-10-30

## 2023-10-30 NOTE — PROGRESS NOTES
Chief Complaint   Patient presents with    Left Hip - Clicking, Swelling, Pain       Wu Alvarez is a 59 y.o. female is presenting today for initial evaluation of left hip pain. Patient states that the pain symptoms have been ongoing for the past year. She states that she was using a rental car for approximately 10 months as her prior vehicle was undergoing repair. She states that the vehicle was a sedan and had caused increased stress since the hip area. She had developed left hip pain with chronic use of the vehicle. She states that the pain is experienced on the anterior aspect of the hip and radiates into the groin. She denies any numbness or tingling however does report occasional lower back pain    The following portions of the patient's history were reviewed and updated as appropriate: allergies, current medications, past family history, past medical history, past social history, past surgical history and problem list.         Objective:  /73   Pulse 83   Resp 18   Ht 6' (1.829 m)   Wt 122 kg (269 lb 3.2 oz)   SpO2 98%   BMI 36.51 kg/m²     Skin: no rashes, lesions, skin discolorations, lacerations  Vasculature: normal popliteal and pedal pulse, normal skin color, normal capillary refill in extremity, no lower extremity edema  Neurologic: Neurologic exam is normal throughout lower extremities, Awake, alert, and oriented x3, no apparent distress. Neuro: no weakness in the L4-S1 nerve distribution  Musculoskeletal:  Inspection: no observable abnormalities  Palpation no tenderness to palpation over greater trochanter  ROM: Full flexion and extension of the hip. full internal and external rotation  Special tests: Positive FADIR and negative BRIONNA test          Assessment/Plan:  1. Primary osteoarthritis of left hip  Radiographs of the left hip are obtained in office today and reviewed independently. No acute fractures or dislocations were noted.   Degenerative changes appreciated bilateral hips. Follow-up on official radiology report. My clinical impression is that patient is symptomatic from underlying hip osteoarthritis. She has reproduction of pain with FADIR testing. We discussed the nature of hip osteoarthritis and the recommended treatment plan. Physical therapy prescription was placed. Patient will begin naproxen 5 mg twice daily for the next 2 to 3 weeks to be taken with food. She will follow-up in office in about 8 weeks for reevaluation. If symptoms fail to improve we will consider corticosteroid injection for the left hip under ultrasound guidance. Return precautions were provided  - XR hip/pelv 2-3 vws left if performed; Future  - Ambulatory Referral to Physical Therapy; Future  - naproxen (Naprosyn) 500 mg tablet; Take 1 tablet (500 mg total) by mouth 2 (two) times a day with meals  Dispense: 60 tablet;  Refill: 0

## 2023-11-01 PROBLEM — R10.2 PELVIC PAIN: Status: ACTIVE | Noted: 2023-11-01

## 2023-11-01 NOTE — ASSESSMENT & PLAN NOTE
H/o fibroids, usually postmenopausal patients do no have pain from these, usually shrink overtime. Treatment would be hysterectomy. Not recommended at this time. Dysparuenia - no relief with estrogen cream use over last few years. Physical therapy - pelvic floor weakness/spasm/adhesions respond well to this. Patient is motivated to try this.

## 2023-11-29 NOTE — PROGRESS NOTES
PT Evaluation     Today's date: 2023  Patient name: Diana Ortiz  : 1965  MRN: [id number]  Referring provider: Francisco Arteaga DO  Dx:   Encounter Diagnosis     ICD-10-CM    1. Primary osteoarthritis of left hip  M16.12 Ambulatory Referral to Physical Therapy      2. Pain in left hip  M25.552           Start Time: 0800  Stop Time: 0845  Total time in clinic (min): 45 minutes    Assessment  Assessment details: Patient is a 58 y/o female reporting to physical therapy for evaluation of L hip pain. Upon examination, patient demonstrates impairments of decreased ROM, decreased strength, and increased pain which are resulting in functional limitations of her performance of ADL's/self-care, household chores, and work duties. PT plan of care will focus on ROM, strengthening, and manual therapy to improve her impairments to be able to return to her prior level of function. Patient is a good candidate for and would benefit from skilled physical therapy to improve above listed impairments to facilitate a return to her prior level of function. Impairments: abnormal gait, abnormal or restricted ROM, activity intolerance, impaired physical strength, lacks appropriate home exercise program, pain with function and weight-bearing intolerance  Understanding of Dx/Px/POC: good   Prognosis: good    Goals  ST weeks  1. Patient's subjective pain levels at worst will decrease by at least 25% for improvements in daily function. 2. Patient's L hip ROM will increase by at least 5 degrees for improved stair negotiation and ambulation. LT weeks  1. Patient's L hip strength will improve to 5/5.  2. Patient's FOTO score will improve from a 38 upon initial evaluation to a 56 or better indicating improvements in her performance of functional activities.      Plan  Patient would benefit from: PT eval and skilled physical therapy  Planned modality interventions: cryotherapy, thermotherapy: hydrocollator packs and unattended electrical stimulation  Planned therapy interventions: IASTM, joint mobilization, kinesiology taping, manual therapy, nerve gliding, neuromuscular re-education, patient education, postural training, Cowart taping, strengthening, stretching, therapeutic activities, therapeutic exercise, home exercise program, gait training, functional ROM exercises, flexibility, balance/weight bearing training and abdominal trunk stabilization  Frequency: 2x week  Duration in weeks: 8  Plan of Care beginning date: 11/30/2023  Plan of Care expiration date: 1/25/2024  Treatment plan discussed with: patient        Subjective Evaluation    History of Present Illness  Mechanism of injury: Patient is a 58 y/o female presenting to outpatient physical therapy today with complaints of L hip pain increasing in the last 3-4 months. She notes she was getting out of the car a few months ago and heard two pops in her L hip. She also reports using a rental car for 10 months and notes she began developing L hip pain after its prolonged use. She reports her pain is in her anterior hip and sometimes radiates by wrapping around into her low back. She notes she previously had radicular symptoms down to her toes, but notes the pain is mostly centralized now to the hip. Patient reports difficulties with walking, bending, sit to stand transfers, sleeping, stair negotiation, and has decreased gait speed. She feels the more stretching she does the worse it gets. Patient notes the pain feels better with use of Motrin PM, but doesn't take it that much. Patient reports using ice when her increase in pain first occurred. 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 Patient imaging notes b/l hip OA.  Patient states her goals for physical therapy are to decrease her L hip pain for improved performance of functional activities. Quality of life: good    Patient Goals  Patient goals for therapy: decreased pain, increased motion, increased strength and independence with ADLs/IADLs    Pain  Current pain ratin  At best pain ratin  At worst pain rating: 10  Quality: dull ache and sharp  Aggravating factors: stair climbing, walking and standing    Treatments  Previous treatment: chiropractic and massage        Objective     Palpation   Left   Hypertonic in the gluteus medius, lumbar paraspinals and piriformis. Tenderness of the gluteus medius, lumbar paraspinals and piriformis. Active Range of Motion     Lumbar   Flexion:  with pain Restriction level: moderate  Extension:  with pain Restriction level: moderate  Left lateral flexion:  with pain Restriction level: moderate  Right lateral flexion:  Restriction level: moderate  Left rotation:  with pain Restriction level: moderate  Right rotation:  Restriction level: moderate  Left Hip   Flexion: 98 degrees   Abduction: 35 degrees     Right Hip   Flexion: 85 degrees   Abduction: 20 degrees     Additional Active Range of Motion Details  Pain in L hip with flexion, extension, L rotation, and L lateral flexion    Strength/Myotome Testing     Left Hip   Planes of Motion   Flexion: 5  Abduction: 5  Adduction: 5    Right Hip   Planes of Motion   Flexion: 4  Abduction: 4+  Adduction: 4    Left Knee   Flexion: 4+  Extension: 4+    Right Knee   Flexion: 5  Extension: 5    Left Ankle/Foot   Dorsiflexion: 5  Plantar flexion: 5    Right Ankle/Foot   Dorsiflexion: 5  Plantar flexion: 5    Tests     Lumbar     Left   Negative passive SLR and slump test.     Right   Negative passive SLR and slump test.     Left Hip   Positive FADIR and scour. Negative BRIONNA and Trini. SLR: Positive. Right Hip   Negative BRIONNA and FADIR. SLR: Negative. Ambulation     Observational Gait   Gait: antalgic   Decreased walking speed and stride length.    Base of support: decreased Precautions: Hx L hip OA, pelvic pain  POC expires Unit limit Auth Expiration date PT/OT + Visit Limit?    1/25/24 N/A 6/1/24 BOMN                 Visit/Unit Tracking  Date 11/30                                              FOTO:       Manuals 11/30            L hip lat distraction mob c belt TB                                                   Neuro Re-Ed             PT education about HEP, POC, dx 5'            Quad sets  2x10 3"            Glute sets 2x10 3"                                                                Ther Ex             Hip add squeze 5" x 15                                                                                                       Ther Activity                                       Gait Training                                       Modalities

## 2023-11-30 ENCOUNTER — EVALUATION (OUTPATIENT)
Dept: PHYSICAL THERAPY | Facility: CLINIC | Age: 58
End: 2023-11-30
Payer: COMMERCIAL

## 2023-11-30 DIAGNOSIS — M16.12 PRIMARY OSTEOARTHRITIS OF LEFT HIP: Primary | ICD-10-CM

## 2023-11-30 DIAGNOSIS — M25.552 PAIN IN LEFT HIP: ICD-10-CM

## 2023-11-30 PROCEDURE — 97112 NEUROMUSCULAR REEDUCATION: CPT

## 2023-11-30 PROCEDURE — 97161 PT EVAL LOW COMPLEX 20 MIN: CPT

## 2023-11-30 PROCEDURE — 97110 THERAPEUTIC EXERCISES: CPT

## 2023-12-07 ENCOUNTER — OFFICE VISIT (OUTPATIENT)
Dept: PHYSICAL THERAPY | Facility: CLINIC | Age: 58
End: 2023-12-07
Payer: COMMERCIAL

## 2023-12-07 DIAGNOSIS — M25.552 PAIN IN LEFT HIP: ICD-10-CM

## 2023-12-07 DIAGNOSIS — M16.12 PRIMARY OSTEOARTHRITIS OF LEFT HIP: Primary | ICD-10-CM

## 2023-12-07 PROCEDURE — 97112 NEUROMUSCULAR REEDUCATION: CPT

## 2023-12-07 PROCEDURE — 97110 THERAPEUTIC EXERCISES: CPT

## 2023-12-07 PROCEDURE — 97530 THERAPEUTIC ACTIVITIES: CPT

## 2023-12-07 NOTE — PROGRESS NOTES
Daily Note     Today's date: 2023  Patient name: Dariela Flowers  : 1965  MRN: [id number]  Referring provider: Leonides Killian DO  Dx:   Encounter Diagnosis     ICD-10-CM    1. Primary osteoarthritis of left hip  M16.12       2. Pain in left hip  M25.552           Start Time: 1500  Stop Time: 1545  Total time in clinic (min): 45 minutes    Subjective: Patient states that L hip makes it hard to sleep at night. She states that she is trying to go up step over steps on stairs. Objective: See treatment diary below      Assessment: Session initiated on recumbent bike for hip/ knee ROM, strength and endurance. Patient encourage to rock fwd/ bwd on bike initially to loosen hip prior to full ROM. Patient demonstrates positive response to manuals. All activities demonstrated/ explained prior to patient initiation. Patient educated on DOMS and to reports any abnormal effects of TE this visit to therapist. Patient would benefit from continued PT to improve LE strength for improved gait and function during daily activities. Plan: Continue per plan of care. Precautions: Hx L hip OA, pelvic pain  POC expires Unit limit Auth Expiration date PT/OT + Visit Limit?    24 N/A 24 BOMN                 Visit/Unit Tracking  Date                                              FOTO:       Manuals            L hip lat distraction mob c belt TB TB           LAD  LQ                                     Neuro Re-Ed             PT education about HEP, POC, dx 5' 5'           Quad sets  2x10 3" 2x10 3"           Glute sets 2x10 3" 2x10 3"                                                               Ther Ex             Hip add squeze 5" x 15 5"x20           SLR  4x5 AAROM Mod>Min           Paloff press  5 against wall,  5 no weight  X5 ea YTB                                                                            Ther Activity             Bike Hip knee mobility and strength  5 min Lvl 0 Step ups  2x5 ea           Gait Training                                       Modalities

## 2023-12-11 ENCOUNTER — OFFICE VISIT (OUTPATIENT)
Dept: PHYSICAL THERAPY | Facility: CLINIC | Age: 58
End: 2023-12-11
Payer: COMMERCIAL

## 2023-12-11 DIAGNOSIS — M16.12 PRIMARY OSTEOARTHRITIS OF LEFT HIP: Primary | ICD-10-CM

## 2023-12-11 DIAGNOSIS — M25.552 PAIN IN LEFT HIP: ICD-10-CM

## 2023-12-11 PROCEDURE — 97140 MANUAL THERAPY 1/> REGIONS: CPT

## 2023-12-11 PROCEDURE — 97110 THERAPEUTIC EXERCISES: CPT

## 2023-12-11 PROCEDURE — 97112 NEUROMUSCULAR REEDUCATION: CPT

## 2023-12-11 NOTE — PROGRESS NOTES
Daily Note     Today's date: 2023  Patient name: Barbie Villegas  : 1965  MRN: [id number]  Referring provider: Mahogany Taylor DO  Dx:   Encounter Diagnosis     ICD-10-CM    1. Primary osteoarthritis of left hip  M16.12       2. Pain in left hip  M25.552           Start Time: 1545  Stop Time: 1630  Total time in clinic (min): 45 minutes    Subjective: Patient reports to physical therapy today stating her L hip feels like it keeps "coming out" and she notes when it is out it can give her pain for 12 hours at a time. She notes she has been feeling strength improvements in her L hip since beginning therapy. Objective: See treatment diary below      Assessment: Tolerated treatment well. Patient demonstrated fatigue post treatment, exhibited good technique with therapeutic exercises, and would benefit from continued PT. Continued good effort provided during performance of therapeutic exercises. Verbal cues provided to ensure correct form during SLR and hip add squeeze exercises. Will continue to assess patient tolerance and progress next visit, as able. Plan: Continue per plan of care. Precautions: Hx L hip OA, pelvic pain  POC expires Unit limit Auth Expiration date PT/OT + Visit Limit?    24 N/A 24 BOMN                 Visit/Unit Tracking  Date                                             FOTO:       Manuals           L hip lat distraction mob c belt TB TB TB          LAD  LQ TB                                    Neuro Re-Ed             PT education about HEP, POC, dx 5' 5' 5'          Quad sets  2x10 3" 2x10 3" 2x10 3"           Glute sets 2x10 3" 2x10 3" 2x10 3"                                                              Ther Ex             Hip add squeze 5" x 15 5"x20 10" x 20          SLR  4x5 AAROM Mod>Min 2x10 L LE          Paloff press  5 against wall,  5 no weight  X5 ea YTB Nv time Ther Activity             Bike Hip knee mobility and strength  5 min Lvl 0 5 min Lvl 0          Step ups  2x5 ea 2x10 ea 6"          Gait Training                                       Modalities

## 2023-12-13 ENCOUNTER — APPOINTMENT (OUTPATIENT)
Dept: PHYSICAL THERAPY | Facility: CLINIC | Age: 58
End: 2023-12-13
Payer: COMMERCIAL

## 2023-12-15 ENCOUNTER — OFFICE VISIT (OUTPATIENT)
Dept: PHYSICAL THERAPY | Facility: CLINIC | Age: 58
End: 2023-12-15
Payer: COMMERCIAL

## 2023-12-15 DIAGNOSIS — M25.552 PAIN IN LEFT HIP: ICD-10-CM

## 2023-12-15 DIAGNOSIS — M16.12 PRIMARY OSTEOARTHRITIS OF LEFT HIP: Primary | ICD-10-CM

## 2023-12-15 PROCEDURE — 97140 MANUAL THERAPY 1/> REGIONS: CPT

## 2023-12-15 PROCEDURE — 97110 THERAPEUTIC EXERCISES: CPT

## 2023-12-15 PROCEDURE — 97112 NEUROMUSCULAR REEDUCATION: CPT

## 2023-12-21 ENCOUNTER — OFFICE VISIT (OUTPATIENT)
Dept: PHYSICAL THERAPY | Facility: CLINIC | Age: 58
End: 2023-12-21
Payer: COMMERCIAL

## 2023-12-21 DIAGNOSIS — M25.552 PAIN IN LEFT HIP: ICD-10-CM

## 2023-12-21 DIAGNOSIS — M16.12 PRIMARY OSTEOARTHRITIS OF LEFT HIP: Primary | ICD-10-CM

## 2023-12-21 PROCEDURE — 97140 MANUAL THERAPY 1/> REGIONS: CPT

## 2023-12-21 PROCEDURE — 97112 NEUROMUSCULAR REEDUCATION: CPT

## 2023-12-21 PROCEDURE — 97110 THERAPEUTIC EXERCISES: CPT

## 2023-12-21 NOTE — PROGRESS NOTES
"Daily Note     Today's date: 2023  Patient name: Shelby Foster  : 1965  MRN: [id number]  Referring provider: Michoacano Galo DO  Dx:   Encounter Diagnosis     ICD-10-CM    1. Primary osteoarthritis of left hip  M16.12       2. Pain in left hip  M25.552           Start Time: 1545  Stop Time: 1630  Total time in clinic (min): 45 minutes    Subjective: Patient reports to physical therapy today stating her hip is feeling the same.         Objective: See treatment diary below      Assessment: Tolerated treatment well. Patient demonstrated fatigue post treatment, exhibited good technique with therapeutic exercises, and would benefit from continued PT. Continues to provide good effort during performance of therapeutic exercises. Minimal verbal cues provided to correct patient form during exercises. Will continue to assess patient tolerance and progress next visit, as able.       Plan: Continue per plan of care.      Precautions: Hx L hip OA, pelvic pain  POC expires Unit limit Auth Expiration date PT/OT + Visit Limit?   24 N/A 24 BOMN                 Visit/Unit Tracking  Date 11/30 12/7 12/11 12/15 12/21                                          FOTO:       Manuals 11/30 12/7 12/11 12/15 12/21        L hip lat distraction mob c belt TB TB TB TB TB        LAD  LQ TB TB TB        L piriformis stretching    TB TB                     Neuro Re-Ed             PT education about HEP, POC, dx 5' 5' 5' 5' 5'        Quad sets  2x10 3\" 2x10 3\" 2x10 3\"  3x10 3\"  3x10 3\"         Glute sets 2x10 3\" 2x10 3\" 2x10 3\" 2x10 3\" 3x10 3\"                                                            Ther Ex             Hip add squeze 5\" x 15 5\"x20 10\" x 20 10\" x 20 10\" x 20        SLR  4x5 AAROM Mod>Min 2x10 L LE 2x10 L LE  3x10 L LE        Paloff press  5 against wall,  5 no weight  X5 ea YTB Nv time           LTR    X15 3\" to R X15 3\" to R                                                            Ther Activity           " "  Bike Hip knee mobility and strength  5 min Lvl 0 5 min Lvl 0 6 min lvl 2 6 min lvl 2        Step ups  2x5 ea 2x10 ea 6\" 2x10 ea 6\" 3x10 ea 6\"        Gait Training                                       Modalities                                                  "

## 2023-12-22 ENCOUNTER — OFFICE VISIT (OUTPATIENT)
Dept: PHYSICAL THERAPY | Facility: CLINIC | Age: 58
End: 2023-12-22
Payer: COMMERCIAL

## 2023-12-22 DIAGNOSIS — M25.552 PAIN IN LEFT HIP: ICD-10-CM

## 2023-12-22 DIAGNOSIS — M16.12 PRIMARY OSTEOARTHRITIS OF LEFT HIP: Primary | ICD-10-CM

## 2023-12-22 PROCEDURE — 97140 MANUAL THERAPY 1/> REGIONS: CPT

## 2023-12-22 PROCEDURE — 97112 NEUROMUSCULAR REEDUCATION: CPT

## 2023-12-22 PROCEDURE — 97110 THERAPEUTIC EXERCISES: CPT

## 2023-12-22 NOTE — PROGRESS NOTES
"Daily Note     Today's date: 2023  Patient name: Shelby Foster  : 1965  MRN: [id number]  Referring provider: Michoacano Galo DO  Dx:   Encounter Diagnosis     ICD-10-CM    1. Primary osteoarthritis of left hip  M16.12       2. Pain in left hip  M25.552           Start Time: 0845  Stop Time: 930  Total time in clinic (min): 45 minutes    Subjective: Patient reports to physical therapy today stating her hip is a little sore today.         Objective: See treatment diary below      Assessment: Tolerated treatment well. Patient demonstrated fatigue post treatment, exhibited good technique with therapeutic exercises, and would benefit from continued PT. Continued good effort noted during performance of therapeutic exercises. Added supine quad stretch c strap and seated pball flexion this visit to which patient responded well. Verbal cues provided to ensure correct form during new exercises. Will continue to assess patient tolerance and progress next visit, as able.       Plan: Continue per plan of care.      Precautions: Hx L hip OA, pelvic pain  POC expires Unit limit Auth Expiration date PT/OT + Visit Limit?   24 N/A 24 BOMN                 Visit/Unit Tracking  Date 11/30 12/7 12/11 12/15 12/21 12/22                                         FOTO:       Manuals 11/30 12/7 12/11 12/15 12/21 12/22       L hip lat distraction mob c belt TB TB TB TB TB TB       LAD  LQ TB TB TB TB       L piriformis stretching    TB TB TB                    Neuro Re-Ed             PT education about HEP, POC, dx 5' 5' 5' 5' 5' 5'       Quad sets  2x10 3\" 2x10 3\" 2x10 3\"  3x10 3\"  3x10 3\"  3x10 3\"        Glute sets 2x10 3\" 2x10 3\" 2x10 3\" 2x10 3\" 3x10 3\" HEP                                                           Ther Ex             Hip add squeze 5\" x 15 5\"x20 10\" x 20 10\" x 20 10\" x 20 10\" x 20       SLR  4x5 AAROM Mod>Min 2x10 L LE 2x10 L LE  3x10 L LE 3x10 L LE        Paloff press  5 against wall,  5 no " "weight  X5 ea YTB Nv time           LTR    X15 3\" to R X15 3\" to R X20 3\" to R        Supine quad stretch c strap      20\" x 5        Seated pball flexion      10\" x 10 forward/R                                 Ther Activity             Bike Hip knee mobility and strength  5 min Lvl 0 5 min Lvl 0 6 min lvl 2 6 min lvl 2 6 min lvl 2       Step ups  2x5 ea 2x10 ea 6\" 2x10 ea 6\" 3x10 ea 6\" 3x10 ea 6\"       Gait Training                                       Modalities                                                    "

## 2023-12-26 NOTE — PROGRESS NOTES
"Daily Note     Today's date: 2023  Patient name: Shelby Foster  : 1965  MRN: [id number]  Referring provider: Michoacano Galo DO  Dx:   Encounter Diagnosis     ICD-10-CM    1. Primary osteoarthritis of left hip  M16.12       2. Pain in left hip  M25.552           Start Time: 0800  Stop Time: 0845  Total time in clinic (min): 45 minutes    Subjective: Patient reports to physical therapy today stating her hip is feeling stronger, but her hip still feels like it is coming \"out\" and has been giving her pain at night.         Objective: See treatment diary below      Assessment: Tolerated treatment well. Patient demonstrated fatigue post treatment, exhibited good technique with therapeutic exercises, and would benefit from continued PT. Continues to provide good effort during performance of exercises. Notes some relief with manual therapy techniques and mild discomfort with quad stretching. Observational gait following session improved from gait pattern at arrival to therapy. Will continues to assess patient tolerance and progress next visit, as able.       Plan: Continue per plan of care.      Precautions: Hx L hip OA, pelvic pain  POC expires Unit limit Auth Expiration date PT/OT + Visit Limit?   24 N/A 24 BOMN                 Visit/Unit Tracking  Date 11/30 12/7 12/11 12/15 12/21 12/22 12/27                                        FOTO:       Manuals 11/30 12/7 12/11 12/15 12/21 12/22 12/27      L hip lat distraction mob c belt TB TB TB TB TB TB TB      LAD  LQ TB TB TB TB TB      L piriformis stretching    TB TB TB TB                   Neuro Re-Ed             PT education about HEP, POC, dx 5' 5' 5' 5' 5' 5' 5'      Quad sets  2x10 3\" 2x10 3\" 2x10 3\"  3x10 3\"  3x10 3\"  3x10 3\"  3x10 3\"       Glute sets 2x10 3\" 2x10 3\" 2x10 3\" 2x10 3\" 3x10 3\" HEP                                                           Ther Ex             Hip add squeze 5\" x 15 5\"x20 10\" x 20 10\" x 20 10\" x 20 10\" x 20 10\" x 20  " "    SLR  4x5 AAROM Mod>Min 2x10 L LE 2x10 L LE  3x10 L LE 3x10 L LE  3x10 L LE       Paloff press  5 against wall,  5 no weight  X5 ea YTB Nv time           LTR    X15 3\" to R X15 3\" to R X20 3\" to R  X20 3\" to R       Supine quad stretch c strap      20\" x 5  20\" x 5       Seated pball flexion      10\" x 10 forward/R 10\" x 10 all dir                                Ther Activity             Bike Hip knee mobility and strength  5 min Lvl 0 5 min Lvl 0 6 min lvl 2 6 min lvl 2 6 min lvl 2 6 min lvl 2      Step ups  2x5 ea 2x10 ea 6\" 2x10 ea 6\" 3x10 ea 6\" 3x10 ea 6\" nv      Gait Training                                       Modalities                                                      "

## 2023-12-27 ENCOUNTER — OFFICE VISIT (OUTPATIENT)
Dept: PHYSICAL THERAPY | Facility: CLINIC | Age: 58
End: 2023-12-27
Payer: COMMERCIAL

## 2023-12-27 DIAGNOSIS — M16.12 PRIMARY OSTEOARTHRITIS OF LEFT HIP: Primary | ICD-10-CM

## 2023-12-27 DIAGNOSIS — M25.552 PAIN IN LEFT HIP: ICD-10-CM

## 2023-12-27 PROCEDURE — 97140 MANUAL THERAPY 1/> REGIONS: CPT

## 2023-12-27 PROCEDURE — 97112 NEUROMUSCULAR REEDUCATION: CPT

## 2023-12-27 PROCEDURE — 97110 THERAPEUTIC EXERCISES: CPT

## 2023-12-29 ENCOUNTER — OFFICE VISIT (OUTPATIENT)
Dept: PHYSICAL THERAPY | Facility: CLINIC | Age: 58
End: 2023-12-29
Payer: COMMERCIAL

## 2023-12-29 DIAGNOSIS — M25.552 PAIN IN LEFT HIP: ICD-10-CM

## 2023-12-29 DIAGNOSIS — M16.12 PRIMARY OSTEOARTHRITIS OF LEFT HIP: Primary | ICD-10-CM

## 2023-12-29 PROCEDURE — 97110 THERAPEUTIC EXERCISES: CPT

## 2023-12-29 PROCEDURE — 97140 MANUAL THERAPY 1/> REGIONS: CPT

## 2023-12-29 PROCEDURE — 97112 NEUROMUSCULAR REEDUCATION: CPT

## 2023-12-29 NOTE — PROGRESS NOTES
"Daily Note     Today's date: 2023  Patient name: Shelby Foster  : 1965  MRN: [id number]  Referring provider: Michoacano Galo DO  Dx:   Encounter Diagnosis     ICD-10-CM    1. Primary osteoarthritis of left hip  M16.12       2. Pain in left hip  M25.552           Start Time: 0845  Stop Time: 09  Total time in clinic (min): 45 minutes    Subjective: Patient reports to physical therapy today stating her hip is feeling okay today.         Objective: See treatment diary below      Assessment: Tolerated treatment well. Patient demonstrated fatigue post treatment, exhibited good technique with therapeutic exercises, and would benefit from continued PT. Continues good effort during performance of exercises. Added half kneeling hip flexor stretch this visit to which patient responded well and noted her hip felt more loose. Verbal cues provided to ensure correct form during half kneeling hip flexor stretch. Will continue to assess patient tolerance and progress next visit, as able.       Plan: Continue per plan of care.      Precautions: Hx L hip OA, pelvic pain  POC expires Unit limit Auth Expiration date PT/OT + Visit Limit?   24 N/A 24 BOMN                 Visit/Unit Tracking  Date 11/30 12/7 12/11 12/15 12/21 12/22 12/27 12/29                                       FOTO:       Manuals 11/30 12/7 12/11 12/15 12/21 12/22 12/27 12/29     L hip lat distraction mob c belt TB TB TB TB TB TB TB TB     LAD  LQ TB TB TB TB TB TB     L piriformis stretching    TB TB TB TB TB                  Neuro Re-Ed             PT education about HEP, POC, dx 5' 5' 5' 5' 5' 5' 5' 5'     Quad sets  2x10 3\" 2x10 3\" 2x10 3\"  3x10 3\"  3x10 3\"  3x10 3\"  3x10 3\"  3x10 3\"      Glute sets 2x10 3\" 2x10 3\" 2x10 3\" 2x10 3\" 3x10 3\" HEP                                                           Ther Ex             Hip add squeze 5\" x 15 5\"x20 10\" x 20 10\" x 20 10\" x 20 10\" x 20 10\" x 20 10\" x 20     SLR  4x5 AAROM Mod>Min 2x10 L LE " "2x10 L LE  3x10 L LE 3x10 L LE  3x10 L LE  3x10 L LE      Paloff press  5 against wall,  5 no weight  X5 ea YTB Nv time           LTR    X15 3\" to R X15 3\" to R X20 3\" to R  X20 3\" to R  X20 3\" to R      Supine quad stretch c strap      20\" x 5  20\" x 5  20\" x 5      Seated pball flexion      10\" x 10 forward/R 10\" x 10 all dir 10\" x 10 all dir     Half kneeling hip flexor stretch        20\" x 5                   Ther Activity             Bike Hip knee mobility and strength  5 min Lvl 0 5 min Lvl 0 6 min lvl 2 6 min lvl 2 6 min lvl 2 6 min lvl 2 6 min lvl 2, nustep (bike taken)     Step ups  2x5 ea 2x10 ea 6\" 2x10 ea 6\" 3x10 ea 6\" 3x10 ea 6\" nv 3x10 ea 6\"     Gait Training                                       Modalities                                                        "

## 2024-01-02 ENCOUNTER — OFFICE VISIT (OUTPATIENT)
Dept: PHYSICAL THERAPY | Facility: CLINIC | Age: 59
End: 2024-01-02
Payer: COMMERCIAL

## 2024-01-02 DIAGNOSIS — M25.552 PAIN IN LEFT HIP: ICD-10-CM

## 2024-01-02 DIAGNOSIS — M16.12 PRIMARY OSTEOARTHRITIS OF LEFT HIP: Primary | ICD-10-CM

## 2024-01-02 PROCEDURE — 97112 NEUROMUSCULAR REEDUCATION: CPT

## 2024-01-02 PROCEDURE — 97140 MANUAL THERAPY 1/> REGIONS: CPT

## 2024-01-02 PROCEDURE — 97110 THERAPEUTIC EXERCISES: CPT

## 2024-01-02 NOTE — PROGRESS NOTES
"Daily Note     Today's date: 2024  Patient name: Shelby Foster  : 1965  MRN: [id number]  Referring provider: Michoacano Galo DO  Dx:   Encounter Diagnosis     ICD-10-CM    1. Primary osteoarthritis of left hip  M16.12       2. Pain in left hip  M25.552                      Subjective: Pt reports she wants to try the distraction on her R hip to see if it helps her back a bit.       Objective: See treatment diary below      Assessment: Tolerated treatment well. No increase in pain noted during session today. Good effort demonstrated with all exercises. No adverse sx reported with manual therapy. Patient demonstrated fatigue post treatment, exhibited good technique with therapeutic exercises, and would benefit from continued PT      Plan: Continue per plan of care.      Precautions: Hx L hip OA, pelvic pain  POC expires Unit limit Auth Expiration date PT/OT + Visit Limit?   24 N/A 24 BOMN                 Visit/Unit Tracking  Date 11/30 12/7 12/11 12/15 12/21 12/22 12/27 12/29 1/2                                      FOTO:       Manuals 11/30 12/7 12/11 12/15 12/21 12/22 12/27 12/29 1/2    L hip lat distraction mob c belt TB TB TB TB TB TB TB TB Just distraction  KT    LAD  LQ TB TB TB TB TB TB KT    L piriformis stretching    TB TB TB TB TB KT                 Neuro Re-Ed             PT education about HEP, POC, dx 5' 5' 5' 5' 5' 5' 5' 5'     Quad sets  2x10 3\" 2x10 3\" 2x10 3\"  3x10 3\"  3x10 3\"  3x10 3\"  3x10 3\"  3x10 3\"  3x10 3\"    Glute sets 2x10 3\" 2x10 3\" 2x10 3\" 2x10 3\" 3x10 3\" HEP                                                           Ther Ex             Hip add squeze 5\" x 15 5\"x20 10\" x 20 10\" x 20 10\" x 20 10\" x 20 10\" x 20 10\" x 20 10\"x20    SLR  4x5 AAROM Mod>Min 2x10 L LE 2x10 L LE  3x10 L LE 3x10 L LE  3x10 L LE  3x10 L LE  3x10 L LE    Paloff press  5 against wall,  5 no weight  X5 ea YTB Nv time           LTR    X15 3\" to R X15 3\" to R X20 3\" to R  X20 3\" to R  X20 3\" to R  X20 3\" " "   Supine quad stretch c strap      20\" x 5  20\" x 5  20\" x 5  20\"x5    Seated pball flexion      10\" x 10 forward/R 10\" x 10 all dir 10\" x 10 all dir 10\" x 10 all dir    Half kneeling hip flexor stretch        20\" x 5                   Ther Activity             Bike Hip knee mobility and strength  5 min Lvl 0 5 min Lvl 0 6 min lvl 2 6 min lvl 2 6 min lvl 2 6 min lvl 2 6 min lvl 2, nustep (bike taken) 6' lvl 2    Step ups  2x5 ea 2x10 ea 6\" 2x10 ea 6\" 3x10 ea 6\" 3x10 ea 6\" nv 3x10 ea 6\" 3x10 6\"    Gait Training                                       Modalities                                                          "

## 2024-01-04 ENCOUNTER — OFFICE VISIT (OUTPATIENT)
Dept: OBGYN CLINIC | Facility: CLINIC | Age: 59
End: 2024-01-04
Payer: COMMERCIAL

## 2024-01-04 VITALS
RESPIRATION RATE: 18 BRPM | HEART RATE: 74 BPM | HEIGHT: 72 IN | BODY MASS INDEX: 37.11 KG/M2 | WEIGHT: 274 LBS | DIASTOLIC BLOOD PRESSURE: 78 MMHG | OXYGEN SATURATION: 99 % | SYSTOLIC BLOOD PRESSURE: 138 MMHG

## 2024-01-04 DIAGNOSIS — M16.12 PRIMARY OSTEOARTHRITIS OF LEFT HIP: Primary | ICD-10-CM

## 2024-01-04 PROCEDURE — 99213 OFFICE O/P EST LOW 20 MIN: CPT | Performed by: FAMILY MEDICINE

## 2024-01-04 RX ORDER — ATORVASTATIN CALCIUM 20 MG/1
TABLET, FILM COATED ORAL
COMMUNITY
End: 2024-01-04 | Stop reason: CLARIF

## 2024-01-04 NOTE — PROGRESS NOTES
Chief Complaint   Patient presents with    Left Hip - Swelling, Follow-up, Pain     Seems to slip out and almost falls pain increase to 10       Shelby Foster is a 58 y.o. female is presenting today for follow-up visit 2 months for left hip pain.  She has been experiencing left hip pain symptoms that were ongoing for the past year following switch to rental car for several months while her main car was getting repaired.  The pain symptoms she localizes on the lateral and anterior aspect of the hip and does state occasionally does radiate into the groin.  She has been following with physical therapy for the past 2 months and reports no improvement with pain symptoms.  She states that has helped with strengthening of the hip muscles however she still feels that her hip is giving out.  She continues to deny any numbness or tingling in the lower extremities and no bowel or bladder incontinence      The following portions of the patient's history were reviewed and updated as appropriate: allergies, current medications, past family history, past medical history, past social history, past surgical history and problem list.         Objective:  /78   Pulse 74   Resp 18   Ht 6' (1.829 m)   Wt 124 kg (274 lb)   SpO2 99%   BMI 37.16 kg/m²     Skin: no rashes, lesions, skin discolorations, lacerations  Vasculature: normal popliteal and pedal pulse, normal skin color, normal capillary refill in extremity, no lower extremity edema  Neurologic: Neurologic exam is normal throughout lower extremities, Awake, alert, and oriented x3, no apparent distress.    Neuro: no weakness in the L4-S1 nerve distribution  Musculoskeletal:  Inspection: no observable abnormalities  Palpation no tenderness to palpation over greater trochanter  ROM: Full flexion and extension of the hip. full internal and external rotation  Special tests: Positive FADIR and negative BRIONNA test  Negative straight leg raise  5 out of 5 strength in the  L1-L5 distribution     Assessment/Plan:  1. Primary osteoarthritis of left hip    Her examination again reveals reproduction with FADIR testing and radiographs did reveal moderate degree of osteoarthritis in the left hip.  Clinical impression is that she is symptomatic from left hip osteoarthritis.  Unfortunately she has not received any improvement with physical therapy and anti-inflammatory medication treatment.  Advised that we can consider a corticosteroid injection under ultrasound guidance for the left hip joint to see if this may help alleviate pain symptoms.  Patient is agreeable and will be scheduled for ultrasound injection.

## 2024-01-05 ENCOUNTER — APPOINTMENT (OUTPATIENT)
Dept: PHYSICAL THERAPY | Facility: CLINIC | Age: 59
End: 2024-01-05
Payer: COMMERCIAL

## 2024-01-10 ENCOUNTER — HOSPITAL ENCOUNTER (OUTPATIENT)
Dept: SLEEP CENTER | Facility: CLINIC | Age: 59
Discharge: HOME | End: 2024-01-10
Payer: COMMERCIAL

## 2024-01-10 DIAGNOSIS — G47.33 OBSTRUCTIVE SLEEP APNEA (ADULT) (PEDIATRIC): ICD-10-CM

## 2024-01-10 PROCEDURE — 95810 POLYSOM 6/> YRS 4/> PARAM: CPT

## 2024-01-10 PROCEDURE — 95810 POLYSOM 6/> YRS 4/> PARAM: CPT | Performed by: INTERNAL MEDICINE

## 2024-01-11 ENCOUNTER — APPOINTMENT (OUTPATIENT)
Dept: PHYSICAL THERAPY | Facility: CLINIC | Age: 59
End: 2024-01-11
Payer: COMMERCIAL

## 2024-01-11 ENCOUNTER — PROCEDURE VISIT (OUTPATIENT)
Dept: OBGYN CLINIC | Facility: CLINIC | Age: 59
End: 2024-01-11
Payer: COMMERCIAL

## 2024-01-11 VITALS
HEIGHT: 72 IN | DIASTOLIC BLOOD PRESSURE: 74 MMHG | HEART RATE: 82 BPM | WEIGHT: 272 LBS | OXYGEN SATURATION: 96 % | RESPIRATION RATE: 18 BRPM | SYSTOLIC BLOOD PRESSURE: 118 MMHG | BODY MASS INDEX: 36.84 KG/M2

## 2024-01-11 DIAGNOSIS — M16.12 PRIMARY OSTEOARTHRITIS OF LEFT HIP: Primary | ICD-10-CM

## 2024-01-11 PROBLEM — G47.33 OSA (OBSTRUCTIVE SLEEP APNEA): Status: ACTIVE | Noted: 2024-01-11

## 2024-01-11 PROCEDURE — 20611 DRAIN/INJ JOINT/BURSA W/US: CPT | Performed by: FAMILY MEDICINE

## 2024-01-11 RX ORDER — BUPIVACAINE HYDROCHLORIDE 2.5 MG/ML
8 INJECTION, SOLUTION INFILTRATION; PERINEURAL
Status: COMPLETED | OUTPATIENT
Start: 2024-01-11 | End: 2024-01-11

## 2024-01-11 RX ORDER — TRIAMCINOLONE ACETONIDE 40 MG/ML
40 INJECTION, SUSPENSION INTRA-ARTICULAR; INTRAMUSCULAR
Status: COMPLETED | OUTPATIENT
Start: 2024-01-11 | End: 2024-01-11

## 2024-01-11 RX ADMIN — TRIAMCINOLONE ACETONIDE 40 MG: 40 INJECTION, SUSPENSION INTRA-ARTICULAR; INTRAMUSCULAR at 14:30

## 2024-01-11 RX ADMIN — BUPIVACAINE HYDROCHLORIDE 8 ML: 2.5 INJECTION, SOLUTION INFILTRATION; PERINEURAL at 14:30

## 2024-01-11 NOTE — PROGRESS NOTES
Large joint arthrocentesis: L hip joint  Universal Protocol:  Consent: Verbal consent obtained.  Risks and benefits: risks, benefits and alternatives were discussed  Consent given by: patient  Supporting Documentation  Indications: pain   Procedure Details  Location: hip - L hip joint  Preparation: Patient was prepped and draped in the usual sterile fashion  Needle size: 22 G  Ultrasound guidance: yes  Approach: anterolateral  Medications administered: 8 mL bupivacaine 0.25 %; 40 mg triamcinolone acetonide 40 mg/mL    Patient tolerance: patient tolerated the procedure well with no immediate complications    Risks and benefits of CSI were discussed with patient extensively. Risks were highlighted which included but were not limited to infection, pain, local site swelling, and chance that injection may not be effective. Patient was also counseled regarding glucose elevation days after receiving CSI and to be mindful of diet and check sugars daily. Patient agreeable to proceed with CSI after counseling.     US video clip was saved to the AIT

## 2024-01-11 NOTE — PROGRESS NOTES
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  Caffeine use:YES- soda  12 ounces    Alcohol:Dayshift workers after 5PM: Alcohol use:NO    Typical day for patient:YES       Study Documentation    Sleep Study Indications: snoring and excessive daytime sleepiness    Sleep Study: Diagnostic   Snore: soft to loud  Supplemental O2: no    Minimum SaO2 85  Baseline SaO2 91      EKG abnormalities: Occasional PAC's noted    EEG abnormalities: no    Were abnormal behaviors in sleep observed:NO    Is Total Sleep Study Recording Time < 2 hours: N/A    Is Total Sleep Study Recording Time > 2 hours but study is incomplete: N/A    Is Total Sleep Study Recording Time 6 hours or more but sleep was not obtained: NO    Patient classification: employed       Post-Sleep Study    Medication used at bedtime or during sleep study:YES over the counter sleep aid meletonin    Patient reports time it took to fall asleep:less than 20 minutes    Patient reports waking up during study:3 or more times.  Patient reports returning to sleep in 10 to 30 minutes.    Patient reports sleeping 6 to 8 hours without dreaming.    Does the Patient feel this is a typical night of sleep:better than usual    Patient rated sleepiness: Not sleepy or tired    PAP treatment:no.

## 2024-01-12 ENCOUNTER — APPOINTMENT (OUTPATIENT)
Dept: PHYSICAL THERAPY | Facility: CLINIC | Age: 59
End: 2024-01-12
Payer: COMMERCIAL

## 2024-01-15 ENCOUNTER — APPOINTMENT (OUTPATIENT)
Dept: PHYSICAL THERAPY | Facility: CLINIC | Age: 59
End: 2024-01-15
Payer: COMMERCIAL

## 2024-01-16 ENCOUNTER — TELEPHONE (OUTPATIENT)
Age: 59
End: 2024-01-16

## 2024-01-16 NOTE — TELEPHONE ENCOUNTER
Will wait for follow up visit to see if CSI therapy is effective. Will discuss folow up plan thereafter

## 2024-01-16 NOTE — TELEPHONE ENCOUNTER
Caller: Patient    Doctor: Iraj    Reason for call: Patient stated the injection didn't help her hip pain. Should she cancel her PT appt 1/18? Patient mentioned that Dr Galo stated she would be referred to a spine doctor. Patient asked which spine doctor she would be referred to? Please contact patient advise    Call back#: [phone]

## 2024-01-18 ENCOUNTER — APPOINTMENT (OUTPATIENT)
Dept: PHYSICAL THERAPY | Facility: CLINIC | Age: 59
End: 2024-01-18
Payer: COMMERCIAL

## 2024-01-19 ENCOUNTER — EVALUATION (OUTPATIENT)
Dept: PHYSICAL THERAPY | Age: 59
End: 2024-01-19
Payer: COMMERCIAL

## 2024-01-19 DIAGNOSIS — N94.10 DYSPAREUNIA IN FEMALE: Primary | ICD-10-CM

## 2024-01-19 PROCEDURE — 97162 PT EVAL MOD COMPLEX 30 MIN: CPT | Performed by: PHYSICAL THERAPIST

## 2024-01-19 PROCEDURE — 97110 THERAPEUTIC EXERCISES: CPT | Performed by: PHYSICAL THERAPIST

## 2024-01-19 PROCEDURE — 97530 THERAPEUTIC ACTIVITIES: CPT | Performed by: PHYSICAL THERAPIST

## 2024-01-19 NOTE — PROGRESS NOTES
PT Evaluation     Today's date: 2024  Patient name: Shelby Foster  : 1965  MRN: [id number]  Referring provider: Hanny Mensah  Dx:   Encounter Diagnosis     ICD-10-CM    1. Dyspareunia in female  N94.10           Start Time: 945  Stop Time: 1045  Total time in clinic (min): 60 minutes    Assessment  Assessment details: Shelby is a 58 year old female presenting to physical therapy with concern of dyspareunia and urinary incontinence. She presents with impairments as outlined including pain and increased tone PFM. Time spent in patient education regarding toileting body mechanics, PFM anatomy, potential benefits of PFPT and bladder health. Patient could benefit from a trial of PFPT to address presenting impairments and functional limitations and improve overall quality of life.   Impairments: abnormal muscle tone, abnormal or restricted ROM, lacks appropriate home exercise program and pain with function  Barriers to therapy: Chronic left hip pain  Understanding of Dx/Px/POC: good   Prognosis: good    Goals  STG 2-5 weeks  1. Independent in home exercise program ongoing throughout POC  2. Instruction and full understanding of bladder health  3. Decrease use of bladder irritants  4. Positive use of urge suppression techniques to increase void interval to > 2 hours    LTG 6-12  1. Full participation in social and recreational activities not limited by bladder/pelvic symptoms  2.  3/5 PFM or greater with full relaxation post activation  3. Improved tone with decreased TTP PFM  4. Decreased dyspareunia by 50% or greater  5. Decreased episodes of UUI    Plan  Patient would benefit from: skilled physical therapy  Planned modality interventions: biofeedback  Planned therapy interventions: manual therapy, motor coordination training, neuromuscular re-education, patient education, behavior modification, therapeutic activities, therapeutic exercise and home exercise program  Frequency: 1x  "week  Duration in visits: 10  Plan of Care beginning date: 1/19/2024  Plan of Care expiration date: 4/18/2024  Treatment plan discussed with: patient        PT Pelvic Floor Subjective:   History of Present Illness:   Ongoing hip pain since December 2022 when tree fell onto car. Increased hip pain over the past 5 plus months after hip popped. Notes hip is unpredictable and \"goes out\" at times with fear of falling. Attended PT but reported pain persisted. No relief with recent hip injections.    Notes had incontinence over the past 1.5 years that has gotten worse.  Pain with intercourse around two years ago onset and progressively worse.   Stopped using premarin 5 months ago because it caused increased incontinence; feels maybe she was allergic to it.     Notes she is concerned that recent testing showed fibroids and feels that this may be contributing to her pelvic pain.     Patient notes her primary concerns for attending PFPT are to address incontinence and pain with intercourse. Notes pain does not stop participation in intercourse but has limited the frequency.         Recurrent probem    Quality of life: good    Social Support:     Lives with:  Spouse    Relationship status: /committed    Work status: employed full time (works from home and also travels for work)    Life stress severity: mild and moderate (feels she manages her stress well)  Diet and Exercise:    Diet:balanced nutrition    Unable due to pain  Stretches daily - LE    Not exercising due to: pain  OB/ gyn History    Gestational History:     Number of term pregnancies: 0  Bladder Function:     Voiding Difficulties positive for: urgency       Voiding Difficulties comments:     Voiding frequency: every 1-2 hours    Urinary leakage: urine leakage    Urinary leakage aggravated by: walking to the bathroom, key-in-the-door syndrome and unknown    Nocturia (episodes per night): 3    Fluid Intake Type:  Water and diet soda    Intake (ounces): Soda: " "16,     Intake (ounces) comment: Water - \"two big bottles\"  Patient to complete a bladder diary  Incontinence Management:     Pads/Diaper Use:  None    Pads/Diapers Additional Comments: occasionally wears when out in community depending on activity.A few times per week urge related incontinence :  Bowel Function:     Bowel Function comments:  Denies bowel issue    Bowel frequency: daily  Sexual Function:     Sexually Active:  Sexually active    Pain during intercourse: Yes      Lubrication Use: Yes    pain does not cause abstinence (reduced frequency)Sexual function: vulvar pain and vaginal pain  Pain:     Current pain ratin    At best pain ratin    At worst pain rating:  10    Location:  Vaginal pain equal bilaterally ; left lower abdoiminal pain at times; left hip pain    Quality:  Sharp, knife-like and dull ache    Aggravating factors:  [place] and unknown    Relieving factors:  Rest    Progression:  Worsening  Patient Goals:     Patient goals for therapy:  Improved bladder or bowel function and improved quality of life    Other patient goals:  Primary concern to address incontinence; reduce pain with intercourse      Objective       Abdominal Assessment:      Abdominal Assessment: Deferred until next visit due to time constraint      General Perineum Exam:   Positive for no pelvic organ prolapse at rest and perianal erythema.     Visual Inspection of Perineum:   Excursion of perineal body in cephalad direction with contraction of pelvic floor muscles (PFM): fair   Excursion of perineal body in caudal direction with relaxation of pelvic floor muscles (PFM): fair   Involuntary contraction with coughing: yes    Pelvic Organ Prolapse   At rest: none   Atrophic changes: erythema noted       Pelvic Floor Muscle Exam:      Breathing pattern with contraction: holding breath   Pelvic floor muscle relaxation is delayed.         PERFECT Score   Power right: 3/5   Power left: 2+/5   Endurance (seconds to max): " 3   Repetitions (before fatigue): 4   Fast flicks (in 10 seconds): 4   Perfect Score: TTP with tightness ischiocav and KS, worse left versus right      pelvic floor exam consent given by patient    Pelvic exam completed: vaginally                  POC expires Unit limit Auth Expiration date PT/OT + Visit Limit?   4/17/24 4 NA BOMN                           Visit/Unit Tracking  AUTH Status:  Date 1/19              NA Used 1               Remaining                 VQ                         Precautions: hernia repair 2021      Manuals 1/19                                                                Neuro Re-Ed                                                                                                        Ther Ex             Kegel technique Eval  8                                                                                                       Ther Activity             Pfm education PP            Bladder health PP  HO            Urge suppression             Bladder diary nv                         Gait Training                                       Modalities

## 2024-01-22 ENCOUNTER — OFFICE VISIT (OUTPATIENT)
Dept: OBGYN CLINIC | Facility: CLINIC | Age: 59
End: 2024-01-22
Payer: COMMERCIAL

## 2024-01-22 VITALS
SYSTOLIC BLOOD PRESSURE: 118 MMHG | DIASTOLIC BLOOD PRESSURE: 83 MMHG | OXYGEN SATURATION: 98 % | RESPIRATION RATE: 18 BRPM | HEART RATE: 77 BPM

## 2024-01-22 DIAGNOSIS — M16.12 PRIMARY OSTEOARTHRITIS OF LEFT HIP: Primary | ICD-10-CM

## 2024-01-22 DIAGNOSIS — M25.552 PAIN IN LEFT HIP: ICD-10-CM

## 2024-01-22 PROCEDURE — 99214 OFFICE O/P EST MOD 30 MIN: CPT | Performed by: FAMILY MEDICINE

## 2024-01-22 RX ORDER — NAPROXEN 500 MG/1
500 TABLET ORAL 2 TIMES DAILY WITH MEALS
Qty: 60 TABLET | Refills: 0 | Status: SHIPPED | OUTPATIENT
Start: 2024-01-22

## 2024-01-25 NOTE — PROGRESS NOTES
"Daily Note     Today's date: 2024  Patient name: Shelby Foster  : 1965  MRN: [id number]  Referring provider: Hanny Mensah*  Dx:   Encounter Diagnosis     ICD-10-CM    1. Dyspareunia in female  N94.10           Start Time: 1325  Stop Time: 1425  Total time in clinic (min): 60 minutes    Subjective:  Notes increased left hip pain,  greater anteriorly that continues to \"go out\" and feel unstable. Saw ortho and will have MRI next week. Also scheduled to see pain management. Notes she has been practicing increasing void interval and limiting fluids after 5PM with improved control and decreased nocturia.       Objective: See treatment diary below      Assessment: Tolerated treatment well.  Noted left hip discomfort with gentle ROM left hip especially ER. Patient would benefit from continued PTHEP instruction this date. Verbalized and demonstrated understanding. Written handouts provided. TTP left lower abdominal quad with psoas tightness. Patient noted improved walking post session.       Plan: Continue per plan of care.  Direct PFM STM NV as able.     POC expires Unit limit Auth Expiration date PT/OT + Visit Limit?   24 4 NA BOMN                           Visit/Unit Tracking  AUTH Status:  Date              NA Used 1 2              Remaining                 VQ                         Precautions: hernia repair       Manuals                                                                Neuro Re-Ed                                                                                                        Ther Ex             Kegel technique Eval  8            Diaphragmatic brathing  5'  HEP           Pfm relaxation  5'  HEP           CRK  3\"/10\"/10x  HEP           LE stretch  PP  10'  P! left                                                  Ther Activity             Pfm education PP            Bladder health PP  HO            Urge suppression             Bladder diary nv    "                      Gait Training                                       Modalities

## 2024-01-26 ENCOUNTER — OFFICE VISIT (OUTPATIENT)
Dept: PHYSICAL THERAPY | Age: 59
End: 2024-01-26
Payer: COMMERCIAL

## 2024-01-26 DIAGNOSIS — N94.10 DYSPAREUNIA IN FEMALE: Primary | ICD-10-CM

## 2024-01-26 PROCEDURE — 97140 MANUAL THERAPY 1/> REGIONS: CPT | Performed by: PHYSICAL THERAPIST

## 2024-01-26 PROCEDURE — 97110 THERAPEUTIC EXERCISES: CPT | Performed by: PHYSICAL THERAPIST

## 2024-01-26 NOTE — PROGRESS NOTES
Chief Complaint   Patient presents with    Left Hip - Pain, Follow-up     Feels like it goes out of place       Shelby Foster is a 58 y.o. female is presenting for a 2-week follow-up visit in regards to left hip pain.  The patient was treated with a corticosteroid injection under ultrasound guidance for left hip osteoarthritis.  She reports no improvement with injection.  Pain continues to be experienced along the anterior lateral aspect and her biggest complaint is that she feels the hip wants to give out and elicits significant pain symptoms when this occurs.        The following portions of the patient's history were reviewed and updated as appropriate: allergies, current medications, past family history, past medical history, past social history, past surgical history and problem list.         Objective:  /83   Pulse 77   Resp 18   SpO2 98%     Skin: no rashes, lesions, skin discolorations, lacerations  Vasculature: normal popliteal and pedal pulse, normal skin color, normal capillary refill in extremity, no lower extremity edema  Neurologic: Neurologic exam is normal throughout lower extremities, Awake, alert, and oriented x3, no apparent distress.    Neuro: no weakness in the L4-S1 nerve distribution  Musculoskeletal:  Inspection: no observable abnormalities  Palpation no tenderness to palpation over greater trochanter  ROM: Full flexion and extension of the hip. full internal and external rotation  Special tests: Positive FADIR and negative BRIONNA test  Negative straight leg raise  5 out of 5 strength in the L1-L5 distribution     Assessment/Plan:  1. Primary osteoarthritis of left hip    - MRI hip left wo contrast; Future  - naproxen (Naprosyn) 500 mg tablet; Take 1 tablet (500 mg total) by mouth 2 (two) times a day with meals  Dispense: 60 tablet; Refill: 0    2. Pain in left hip    - MRI hip left wo contrast; Future  - Ambulatory referral to Spine & Pain Management; Future    My clinical  impression is that patient left hip pain symptoms are most likely related to moderate degree of hip osteoarthritis that has been seen on prior imaging studies.  She has not received any improvement with conservative treatment options that we have provided with both physical therapy, oral anti-inflammatory medication and corticosteroid injection.  Given her ongoing symptoms I advised that we follow-up with an MRI of the left hip.  Additionally I would recommend a consultation with spine and pain to rule out possible referred pain from lumbar etiology.

## 2024-02-02 ENCOUNTER — HOSPITAL ENCOUNTER (OUTPATIENT)
Dept: MRI IMAGING | Facility: HOSPITAL | Age: 59
End: 2024-02-02
Attending: FAMILY MEDICINE
Payer: COMMERCIAL

## 2024-02-02 DIAGNOSIS — M25.552 PAIN IN LEFT HIP: ICD-10-CM

## 2024-02-02 DIAGNOSIS — M16.12 PRIMARY OSTEOARTHRITIS OF LEFT HIP: ICD-10-CM

## 2024-02-02 PROCEDURE — G1004 CDSM NDSC: HCPCS

## 2024-02-02 PROCEDURE — 73721 MRI JNT OF LWR EXTRE W/O DYE: CPT

## 2024-02-08 NOTE — PROGRESS NOTES
Assessment:  1. Primary osteoarthritis of left hip    2. Left buttock pain        Plan:  Orders Placed This Encounter   Procedures    [place] spine and pain procedure     Standing Status:   Future     Standing Expiration Date:   2/9/2028     Order Specific Question:   Reason for Exam:     Answer:   LEFT intraarticular hip injection- ok to db     Order Specific Question:   Anticoagulant hold needed?     Answer:   no     Order Specific Question:   Is the patient pregnant?     Answer:   Unknown       New Medications Ordered This Visit   Medications    meloxicam (MOBIC) 15 mg tablet     Sig: Take 1 tablet (15 mg total) by mouth daily as needed for moderate pain     Dispense:  30 tablet     Refill:  0       My impressions and treatment recommendations were discussed in detail with the patient, who verbalized understanding and had no further questions.    Patient 58-year-old female presents her office with chief complaint of left hip pain.  The patient is reporting pain primarily in the left groin region.  She has noted hip osteoarthritis on imaging.  Unfortunately, she failed ultrasound-guided left intra-articular hip injection.  However, patient did not have any relief even in the local anesthetic phase of the injection.  Exam today strongly suggests ongoing pain for the left hip.  There is a positive Stinchfield maneuver, significant groin pain with external rotation, and pain with left hip flexion.  She has mild low back pain, however hip issues are much worse.  We discussed trial of left intra-articular hip injection under fluoroscopic guidance with contrast for definitive placement which she is agreeable to.  I am also going to trial her on meloxicam 15 mg daily as needed.  She will discontinue naproxen.    [place] Prescription Drug Monitoring Program report was reviewed and was appropriate     Complete risks and benefits including bleeding, infection, tissue reaction, nerve injury and allergic reaction were  discussed. The approach was demonstrated using models and literature was provided. Verbal and written consent was obtained.    Discharge instructions were provided. I personally saw and examined the patient and I agree with the above discussed plan of care.    History of Present Illness:    Shelby Foster is a 58 y.o. female who presents to [facility] for initial evaluation of the above stated pain complaints. The patient has a past medical and chronic pain history as outlined in the assessment section. He was referred by Michoacano Galo DO  [address] [place] [address],  [place] [address] .    Patient reports left-sided hip pain rating into the upper buttock and across into the groin.  Began about 1 year ago when she was getting out of a car and heard a pop and crack.  Moderate to severe over the past month.  6 out of 10 but can escalate to a 10 out of 10.  It is nearly constant.  Worse at night.  Burning, shooting, sharp, throbbing, dull/aching in nature.    She also reports history of chronic back issues    Pain is increased with lying down, standing, bending, walking.    She had MRI of the left hip showing mild to moderate left hip arthritis.  She has multilevel degenerative changes in the lumbar spine.  CT lumbar spine in 2022 without high-grade stenosis.    She had corticosteroid injection under ultrasound guidance with sports medicine without any relief.    Reports no relief with PT, exercise, osteopathic manipulation, TENS unit, chiropractic manipulation.  Reports moderate leaf with heat/ice therapy.    No tobacco, marijuana use.  Not allergic to contrast dye.    Currently using naproxen.    Review of Systems:    Review of Systems   Musculoskeletal:  Positive for myalgias.           Past Medical History:   Diagnosis Date    Abnormal Pap smear of cervix 2000's    Anemia     Corrected with endometrial ablation    Asthma     Fibroid 2000's    Hypothyroidism     Varicella        Past Surgical  History:   Procedure Laterality Date    APPENDECTOMY      CHOLECYSTECTOMY      ENDOMETRIAL ABLATION  2005    HERNIA REPAIR  11/2021    MYOMECTOMY  2002,3,4    TONSILLECTOMY      WISDOM TOOTH EXTRACTION         Family History   Problem Relation Age of Onset    Breast cancer Mother     Miscarriages / Stillbirths Mother        Social History     Occupational History    Occupation: EMPLOYED   Tobacco Use    Smoking status: Never    Smokeless tobacco: Never   Vaping Use    Vaping status: Never Used   Substance and Sexual Activity    Alcohol use: Never    Drug use: Never    Sexual activity: Yes     Partners: Male     Birth control/protection: Post-menopausal         Current Outpatient Medications:     albuterol (Ventolin HFA) 90 mcg/act inhaler, Inhale 2 puffs every 6 (six) hours as needed for wheezing, Disp: 25.5 g, Rfl: 3    Aspirin-Acetaminophen-Caffeine (EXCEDRIN MIGRAINE PO), Take 1 tablet by mouth if needed, Disp: , Rfl:     clobetasol (TEMOVATE) 0.05 % cream, , Disp: , Rfl:     clotrimazole-betamethasone (LOTRISONE) 1-0.05 % cream, , Disp: , Rfl:     estrogens, conjugated (Premarin) vaginal cream, Premarin 0.625 mg/gram vaginal cream  insert 0.5 applicatorfuls vaginally three times a week, Disp: , Rfl:     fluticasone (FLONASE) 50 mcg/act nasal spray, , Disp: , Rfl:     Melatonin 300 MCG TABS, melatonin 300 mcg tablet  PRN, Disp: , Rfl:     metroNIDAZOLE (METROCREAM) 0.75 % cream, , Disp: , Rfl:     montelukast (SINGULAIR) 10 mg tablet, Take 1 tablet (10 mg total) by mouth daily at bedtime, Disp: 90 tablet, Rfl: 3    Sermorelin Acetate Diagnostic 15 MG SOLR, , Disp: , Rfl:     Sulfacetamide Sodium, Acne, 10 % LOTN, , Disp: , Rfl:     Synthroid 175 MCG tablet, , Disp: , Rfl:     terconazole (TERAZOL 7) 0.4 % vaginal cream, Insert 1 applicator into the vagina daily at bedtime, Disp: 45 g, Rfl: 3    Vitamin D, Cholecalciferol, 50 MCG (2000 UT) CAPS, Take 1 capsule by mouth, Disp: , Rfl:     Fluticasone-Salmeterol  (Advair) 100-50 mcg/dose inhaler, Inhale 1 puff 2 (two) times a day Rinse mouth after use., Disp: 180 blister, Rfl: 1    meloxicam (MOBIC) 15 mg tablet, Take 1 tablet (15 mg total) by mouth daily as needed for moderate pain, Disp: 30 tablet, Rfl: 0    methocarbamol (ROBAXIN) 500 mg tablet, Take one tab PO for muscle spasms at night if needed (can cause sedation) (Patient not taking: Reported on 2/9/2024), Disp: 20 tablet, Rfl: 6    ondansetron (ZOFRAN-ODT) 4 mg disintegrating tablet, Take 1 tablet (4 mg total) by mouth every 6 (six) hours as needed for nausea or vomiting (Patient not taking: Reported on 2/9/2024), Disp: 20 tablet, Rfl: 3    Allergies   Allergen Reactions    Fluconazole Swelling     Of lips      Sulfamethoxazole-Trimethoprim Swelling     Of lips    Erythromycin Rash     Breaks aout on white pimples      Penicillins Rash     Breaks out on white pimples       Tetracycline Rash     Breaks out on white pimples         Physical Exam:    /85   Pulse 77   Ht 6' (1.829 m)   Wt 121 kg (266 lb)   BMI 36.08 kg/m²     Constitutional: normal, well developed, well nourished, alert, in no distress and non-toxic and no overt pain behavior.  Eyes: anicteric  HEENT: grossly intact  Neck: supple, symmetric, trachea midline and no masses   Pulmonary:even and unlabored  Cardiovascular:No edema or pitting edema present  Skin:Normal without rashes or lesions and well hydrated  Psychiatric:Mood and affect appropriate  Neurologic:Cranial Nerves II-XII grossly intact  Musculoskeletal: Pain with flexion of the left hip.  Increased left groin pain with external rotation during Robert maneuver.  Positive Stinchfield test on the left.    Lumbar Spine Exam    Appearance:  Normal lordosis  Palpation/Tenderness:  left lumbar paraspinal tenderness  right lumbar paraspinal tenderness  Sensory:  no sensory deficits noted  Range of Motion:  Flexion:  No limitation  without pain  Extension:  Minimally limited  with pain  Motor  Strength:  Left hip flexion:  5/5  Left hip extension:  5/5  Right hip flexion:  5/5  Right hip extension:  5/5  Left knee flexion:  5/5  Left knee extension:  5/5  Right knee flexion:  5/5  Right knee extension:  5/5  Left foot dorsiflexion:  5/5  Left foot plantar flexion:  5/5  Right foot dorsiflexion:  5/5  Right foot plantar flexion:  5/5  Reflexes:  Left Patellar:  2+   Right Patellar:  2+   Left Achilles:  2+   Right Achilles:  2+   Special Tests:  Mild facet loading positive bilaterlly      Imaging    MRI HIP LEFT WO CONTRAST           2/2/24     INDICATION:   M16.12: Unilateral primary osteoarthritis, left hip  M25.552: Pain in left hip.     COMPARISON: Correlation is made with the prior radiograph dated 10/30/2023.     TECHNIQUE:  Multiplanar/multisequence MR was obtained of the entire pelvis, and also small field of view images of left hip.  Imaging performed on 1.5T MRI     FINDINGS:     LEFT HIP:  Joint Effusion: None.     Bones: Normal marrow signal demonstrated without hip fracture or AVN. Subchondral cysts along the superior left acetabulum.     Articular Surface: Mild chondral thinning superiorly.     Acetabular Labrum: Intact.     Trochanteric Bursa: Normal.     Muscles:Intact.     Tendons: Intact.     RIGHT HIP:  Mild osteoarthrosis     REST OF PELVIS:  Bones: Normal.     Si Joints And Symphysis Pubis:  Intact.     Visualized Lumbar Spine: Moderate multilevel degenerative disc disease partially seen in the lower lumbar spine.     Muscles: Intact.     Pelvic Soft Tissues: Small uterine fibroids.     Subcutaneous Tissues: Normal.     IMPRESSION:     Mild to moderate left hip osteoarthrosis. Mild right hip osteoarthrosis.     Moderate multilevel degenerative disc disease partially seen in the lower lumbar spine.     Small uterine fibroids.        [place] spine and pain procedure    (Results Pending)       Orders Placed This Encounter   Procedures    [place] spine and pain procedure

## 2024-02-08 NOTE — H&P (VIEW-ONLY)
Assessment:  1. Primary osteoarthritis of left hip    2. Left buttock pain        Plan:  Orders Placed This Encounter   Procedures    FL spine and pain procedure     Standing Status:   Future     Standing Expiration Date:   2/9/2028     Order Specific Question:   Reason for Exam:     Answer:   LEFT intraarticular hip injection- ok to db     Order Specific Question:   Anticoagulant hold needed?     Answer:   no     Order Specific Question:   Is the patient pregnant?     Answer:   Unknown       New Medications Ordered This Visit   Medications    meloxicam (MOBIC) 15 mg tablet     Sig: Take 1 tablet (15 mg total) by mouth daily as needed for moderate pain     Dispense:  30 tablet     Refill:  0       My impressions and treatment recommendations were discussed in detail with the patient, who verbalized understanding and had no further questions.    Patient 58-year-old female presents her office with chief complaint of left hip pain.  The patient is reporting pain primarily in the left groin region.  She has noted hip osteoarthritis on imaging.  Unfortunately, she failed ultrasound-guided left intra-articular hip injection.  However, patient did not have any relief even in the local anesthetic phase of the injection.  Exam today strongly suggests ongoing pain for the left hip.  There is a positive Stinchfield maneuver, significant groin pain with external rotation, and pain with left hip flexion.  She has mild low back pain, however hip issues are much worse.  We discussed trial of left intra-articular hip injection under fluoroscopic guidance with contrast for definitive placement which she is agreeable to.  I am also going to trial her on meloxicam 15 mg daily as needed.  She will discontinue naproxen.    [place] Prescription Drug Monitoring Program report was reviewed and was appropriate     Complete risks and benefits including bleeding, infection, tissue reaction, nerve injury and allergic reaction were  discussed. The approach was demonstrated using models and literature was provided. Verbal and written consent was obtained.    Discharge instructions were provided. I personally saw and examined the patient and I agree with the above discussed plan of care.    History of Present Illness:    Shelby Foster is a 58 y.o. female who presents to [facility] for initial evaluation of the above stated pain complaints. The patient has a past medical and chronic pain history as outlined in the assessment section. He was referred by Michoacano Galo DO  [address] [place] [address],  [place] [address] .    Patient reports left-sided hip pain rating into the upper buttock and across into the groin.  Began about 1 year ago when she was getting out of a car and heard a pop and crack.  Moderate to severe over the past month.  6 out of 10 but can escalate to a 10 out of 10.  It is nearly constant.  Worse at night.  Burning, shooting, sharp, throbbing, dull/aching in nature.    She also reports history of chronic back issues    Pain is increased with lying down, standing, bending, walking.    She had MRI of the left hip showing mild to moderate left hip arthritis.  She has multilevel degenerative changes in the lumbar spine.  CT lumbar spine in 2022 without high-grade stenosis.    She had corticosteroid injection under ultrasound guidance with sports medicine without any relief.    Reports no relief with PT, exercise, osteopathic manipulation, TENS unit, chiropractic manipulation.  Reports moderate leaf with heat/ice therapy.    No tobacco, marijuana use.  Not allergic to contrast dye.    Currently using naproxen.    Review of Systems:    Review of Systems   Musculoskeletal:  Positive for myalgias.           Past Medical History:   Diagnosis Date    Abnormal Pap smear of cervix 2000's    Anemia     Corrected with endometrial ablation    Asthma     Fibroid 2000's    Hypothyroidism     Varicella        Past Surgical  History:   Procedure Laterality Date    APPENDECTOMY      CHOLECYSTECTOMY      ENDOMETRIAL ABLATION  2005    HERNIA REPAIR  11/2021    MYOMECTOMY  2002,3,4    TONSILLECTOMY      WISDOM TOOTH EXTRACTION         Family History   Problem Relation Age of Onset    Breast cancer Mother     Miscarriages / Stillbirths Mother        Social History     Occupational History    Occupation: EMPLOYED   Tobacco Use    Smoking status: Never    Smokeless tobacco: Never   Vaping Use    Vaping status: Never Used   Substance and Sexual Activity    Alcohol use: Never    Drug use: Never    Sexual activity: Yes     Partners: Male     Birth control/protection: Post-menopausal         Current Outpatient Medications:     albuterol (Ventolin HFA) 90 mcg/act inhaler, Inhale 2 puffs every 6 (six) hours as needed for wheezing, Disp: 25.5 g, Rfl: 3    Aspirin-Acetaminophen-Caffeine (EXCEDRIN MIGRAINE PO), Take 1 tablet by mouth if needed, Disp: , Rfl:     clobetasol (TEMOVATE) 0.05 % cream, , Disp: , Rfl:     clotrimazole-betamethasone (LOTRISONE) 1-0.05 % cream, , Disp: , Rfl:     estrogens, conjugated (Premarin) vaginal cream, Premarin 0.625 mg/gram vaginal cream  insert 0.5 applicatorfuls vaginally three times a week, Disp: , Rfl:     fluticasone (FLONASE) 50 mcg/act nasal spray, , Disp: , Rfl:     Melatonin 300 MCG TABS, melatonin 300 mcg tablet  PRN, Disp: , Rfl:     metroNIDAZOLE (METROCREAM) 0.75 % cream, , Disp: , Rfl:     montelukast (SINGULAIR) 10 mg tablet, Take 1 tablet (10 mg total) by mouth daily at bedtime, Disp: 90 tablet, Rfl: 3    Sermorelin Acetate Diagnostic 15 MG SOLR, , Disp: , Rfl:     Sulfacetamide Sodium, Acne, 10 % LOTN, , Disp: , Rfl:     Synthroid 175 MCG tablet, , Disp: , Rfl:     terconazole (TERAZOL 7) 0.4 % vaginal cream, Insert 1 applicator into the vagina daily at bedtime, Disp: 45 g, Rfl: 3    Vitamin D, Cholecalciferol, 50 MCG (2000 UT) CAPS, Take 1 capsule by mouth, Disp: , Rfl:     Fluticasone-Salmeterol  (Advair) 100-50 mcg/dose inhaler, Inhale 1 puff 2 (two) times a day Rinse mouth after use., Disp: 180 blister, Rfl: 1    meloxicam (MOBIC) 15 mg tablet, Take 1 tablet (15 mg total) by mouth daily as needed for moderate pain, Disp: 30 tablet, Rfl: 0    methocarbamol (ROBAXIN) 500 mg tablet, Take one tab PO for muscle spasms at night if needed (can cause sedation) (Patient not taking: Reported on 2/9/2024), Disp: 20 tablet, Rfl: 6    ondansetron (ZOFRAN-ODT) 4 mg disintegrating tablet, Take 1 tablet (4 mg total) by mouth every 6 (six) hours as needed for nausea or vomiting (Patient not taking: Reported on 2/9/2024), Disp: 20 tablet, Rfl: 3    Allergies   Allergen Reactions    Fluconazole Swelling     Of lips      Sulfamethoxazole-Trimethoprim Swelling     Of lips    Erythromycin Rash     Breaks aout on white pimples      Penicillins Rash     Breaks out on white pimples       Tetracycline Rash     Breaks out on white pimples         Physical Exam:    /85   Pulse 77   Ht 6' (1.829 m)   Wt 121 kg (266 lb)   BMI 36.08 kg/m²     Constitutional: normal, well developed, well nourished, alert, in no distress and non-toxic and no overt pain behavior.  Eyes: anicteric  HEENT: grossly intact  Neck: supple, symmetric, trachea midline and no masses   Pulmonary:even and unlabored  Cardiovascular:No edema or pitting edema present  Skin:Normal without rashes or lesions and well hydrated  Psychiatric:Mood and affect appropriate  Neurologic:Cranial Nerves II-XII grossly intact  Musculoskeletal: Pain with flexion of the left hip.  Increased left groin pain with external rotation during Robert maneuver.  Positive Stinchfield test on the left.    Lumbar Spine Exam    Appearance:  Normal lordosis  Palpation/Tenderness:  left lumbar paraspinal tenderness  right lumbar paraspinal tenderness  Sensory:  no sensory deficits noted  Range of Motion:  Flexion:  No limitation  without pain  Extension:  Minimally limited  with pain  Motor  Strength:  Left hip flexion:  5/5  Left hip extension:  5/5  Right hip flexion:  5/5  Right hip extension:  5/5  Left knee flexion:  5/5  Left knee extension:  5/5  Right knee flexion:  5/5  Right knee extension:  5/5  Left foot dorsiflexion:  5/5  Left foot plantar flexion:  5/5  Right foot dorsiflexion:  5/5  Right foot plantar flexion:  5/5  Reflexes:  Left Patellar:  2+   Right Patellar:  2+   Left Achilles:  2+   Right Achilles:  2+   Special Tests:  Mild facet loading positive bilaterlly      Imaging    MRI HIP LEFT WO CONTRAST           2/2/24     INDICATION:   M16.12: Unilateral primary osteoarthritis, left hip  M25.552: Pain in left hip.     COMPARISON: Correlation is made with the prior radiograph dated 10/30/2023.     TECHNIQUE:  Multiplanar/multisequence MR was obtained of the entire pelvis, and also small field of view images of left hip.  Imaging performed on 1.5T MRI     FINDINGS:     LEFT HIP:  Joint Effusion: None.     Bones: Normal marrow signal demonstrated without hip fracture or AVN. Subchondral cysts along the superior left acetabulum.     Articular Surface: Mild chondral thinning superiorly.     Acetabular Labrum: Intact.     Trochanteric Bursa: Normal.     Muscles:Intact.     Tendons: Intact.     RIGHT HIP:  Mild osteoarthrosis     REST OF PELVIS:  Bones: Normal.     Si Joints And Symphysis Pubis:  Intact.     Visualized Lumbar Spine: Moderate multilevel degenerative disc disease partially seen in the lower lumbar spine.     Muscles: Intact.     Pelvic Soft Tissues: Small uterine fibroids.     Subcutaneous Tissues: Normal.     IMPRESSION:     Mild to moderate left hip osteoarthrosis. Mild right hip osteoarthrosis.     Moderate multilevel degenerative disc disease partially seen in the lower lumbar spine.     Small uterine fibroids.        FL spine and pain procedure    (Results Pending)       Orders Placed This Encounter   Procedures    FL spine and pain procedure

## 2024-02-09 ENCOUNTER — CONSULT (OUTPATIENT)
Dept: PAIN MEDICINE | Facility: CLINIC | Age: 59
End: 2024-02-09
Payer: COMMERCIAL

## 2024-02-09 VITALS
WEIGHT: 266 LBS | SYSTOLIC BLOOD PRESSURE: 135 MMHG | DIASTOLIC BLOOD PRESSURE: 85 MMHG | HEART RATE: 77 BPM | BODY MASS INDEX: 36.03 KG/M2 | HEIGHT: 72 IN

## 2024-02-09 DIAGNOSIS — M16.12 PRIMARY OSTEOARTHRITIS OF LEFT HIP: Primary | ICD-10-CM

## 2024-02-09 DIAGNOSIS — M79.18 LEFT BUTTOCK PAIN: ICD-10-CM

## 2024-02-09 PROCEDURE — 99204 OFFICE O/P NEW MOD 45 MIN: CPT | Performed by: STUDENT IN AN ORGANIZED HEALTH CARE EDUCATION/TRAINING PROGRAM

## 2024-02-09 RX ORDER — MELOXICAM 15 MG/1
15 TABLET ORAL DAILY PRN
Qty: 30 TABLET | Refills: 0 | Status: SHIPPED | OUTPATIENT
Start: 2024-02-09

## 2024-02-09 NOTE — PATIENT INSTRUCTIONS
Steroid Joint Injection   AMBULATORY CARE:   What you need to know about steroid joint injection:  A steroid joint injection is a procedure to inject steroid medicine into a joint. Steroid medicine decreases pain and inflammation. The injection may also contain an anesthetic (numbing medicine) to decrease pain. It may be done to treat conditions such as arthritis, gout, or carpal tunnel syndrome. The injections may be given in your knee, ankle, shoulder, elbow, or wrist. Injections may also be given in your hip, toe, thumb, or finger.  How to prepare for steroid joint injection:  Your healthcare provider will talk to you about how to prepare for this procedure. He or she will tell you what medicines to take or not take on the day of your procedure. You may need to stop taking blood thinners several days before your procedure.   What will happen during steroid joint injection:  You may be given local anesthesia to numb the area where the injection will be given. With local anesthesia, you may still feel pressure during the procedure, but you should not feel any pain. Your provider may use ultrasound or fluoroscopy (a type of x-ray) to guide the needle to the right area. He or she will then inject the steroid into your joint. A bandage will be placed on the injection site.   What will happen after steroid joint injection:  You may have redness, warmth, or sweating in your face and chest right after the steroid injection. Steroids can affect blood sugar levels. If you have diabetes, you should check your blood sugars closely in the first 24 hours after your procedure.   Risks of steroid joint injection:  You may get an infection in your joint. The injection may also cause more pain during the first 24 to 36 hours. You may need more than one injection to feel pain relief. The skin near the injection site may be damaged and become discolored or indented. This can happen if the steroid is placed too close to your skin. A  tendon near the injection site may rupture or a nerve can be damaged.  Contact your healthcare provider if:   You have fever or chills.     You have redness or swelling in the injection site.     You have more pain than usual in your joint for more than 72 hours.     You have questions or concerns about your condition or care.    Medicines:   Pain medicine  may be given. Ask how to take this medicine safely.     Take your medicine as directed.  Contact your healthcare provider if you think your medicine is not helping or if you have side effects. Tell your provider if you are allergic to any medicine. Keep a list of the medicines, vitamins, and herbs you take. Include the amounts, and when and why you take them. Bring the list or the pill bottles to follow-up visits. Carry your medicine list with you in case of an emergency.    Self-care:   Leave the bandage on for 8 to 12 hours.  Care for your wound as directed.    Rest the area  as directed. You may need to decrease weight on certain joints, such as the knee, for a period of time. Ask when you can return to your daily activities.     Elevate  your limb where the steroid injection was given. Elevate the limb above the level of your heart as often as you can. This will help decrease swelling and pain. Prop your limb on pillows or blankets to keep it elevated comfortably.         Apply ice  on your joint for 15 to 20 minutes every hour or as directed. Use an ice pack, or put crushed ice in a plastic bag. Cover it with a towel. Ice helps prevent tissue damage and decreases swelling and pain.    Follow up with your doctor as directed:  Write down your questions so you remember to ask them during your visits.  © Copyright [employer] 2023 Information is for End User's use only and may not be sold, redistributed or otherwise used for commercial purposes.  The above information is an  only. It is not intended as medical advice for individual conditions or  treatments. Talk to your doctor, nurse or pharmacist before following any medical regimen to see if it is safe and effective for you.

## 2024-02-15 NOTE — PROGRESS NOTES
"Daily Note     Today's date: 2024  Patient name: Shelby Foster  : 1965  MRN: [id number]  Referring provider: Hanny Mensah*  Dx:   Encounter Diagnosis     ICD-10-CM    1. Dyspareunia in female  N94.10                      Subjective: Patient notes she is a bit frustrated having to wait for hip injections and additional testing.  Notes the day following last treatment had increased left hip burning. Left hip went 4 times yesterday while working. Notes also one episode of urge related incontinence while shopping but overall not pelvic floor is feeling stronger.   Notes at time of treatment lateral hip pain 6/10. Notes no pelvic pain or pressure in the perineum.       Objective: See treatment diary below      Assessment: Tolerated treatment well. Patient would benefit from continued PT No increased pain with treatment. Noted feeling more open and relaxed in pelvic area. Delayed relaxation post Pfm relaxation. Increased tone PC/VA left that improved with direct STM.       Plan: Continue per plan of care.      POC expires Unit limit Auth Expiration date PT/OT + Visit Limit?   24 4 NA BOMN                           Visit/Unit Tracking  AUTH Status:  Date             NA Used 1 2 3             Remaining                 VQ                         Precautions: hernia repair       Manuals           Pelvic transverse plane   PP          Direct PFM   PP                                    Neuro Re-Ed                                                                                                        Ther Ex             Kegel technique Eval  8            Diaphragmatic brathing  5'  HEP           Pfm relaxation  5'  HEP           CRK  3\"/10\"/10x  HEP           LE stretch  PP  10'  P! left                                                  Ther Activity             Pfm education PP            Bladder health PP  HO            Urge suppression             Bladder diary nv "                         Gait Training                                       Modalities

## 2024-02-16 ENCOUNTER — OFFICE VISIT (OUTPATIENT)
Dept: PHYSICAL THERAPY | Age: 59
End: 2024-02-16
Payer: COMMERCIAL

## 2024-02-16 DIAGNOSIS — N94.10 DYSPAREUNIA IN FEMALE: Primary | ICD-10-CM

## 2024-02-16 PROCEDURE — 97140 MANUAL THERAPY 1/> REGIONS: CPT | Performed by: PHYSICAL THERAPIST

## 2024-02-16 PROCEDURE — 97110 THERAPEUTIC EXERCISES: CPT | Performed by: PHYSICAL THERAPIST

## 2024-02-19 ENCOUNTER — TELEPHONE (OUTPATIENT)
Age: 59
End: 2024-02-19

## 2024-02-19 NOTE — TELEPHONE ENCOUNTER
Called patient back to let her know that I have Feb 13th available. Patient declined. She will continue to call back to check if their are other cancellations.

## 2024-02-19 NOTE — TELEPHONE ENCOUNTER
Caller: Patient    Doctor: Melvin    Reason for call: Pt calling to see if there is a sooner available appointment for her procedure. Surgery Coordinator unavailable at the time of call, advised pt she will get a call back.    Call back#: [phone]

## 2024-02-20 ENCOUNTER — HOSPITAL ENCOUNTER (OUTPATIENT)
Dept: RADIOLOGY | Facility: CLINIC | Age: 59
Discharge: HOME | End: 2024-02-20
Payer: COMMERCIAL

## 2024-02-20 VITALS
HEART RATE: 69 BPM | TEMPERATURE: 98 F | RESPIRATION RATE: 18 BRPM | DIASTOLIC BLOOD PRESSURE: 69 MMHG | SYSTOLIC BLOOD PRESSURE: 134 MMHG | OXYGEN SATURATION: 96 %

## 2024-02-20 DIAGNOSIS — M16.12 PRIMARY OSTEOARTHRITIS OF LEFT HIP: ICD-10-CM

## 2024-02-20 PROCEDURE — 77002 NEEDLE LOCALIZATION BY XRAY: CPT | Performed by: STUDENT IN AN ORGANIZED HEALTH CARE EDUCATION/TRAINING PROGRAM

## 2024-02-20 PROCEDURE — 77002 NEEDLE LOCALIZATION BY XRAY: CPT

## 2024-02-20 PROCEDURE — 20610 DRAIN/INJ JOINT/BURSA W/O US: CPT | Performed by: STUDENT IN AN ORGANIZED HEALTH CARE EDUCATION/TRAINING PROGRAM

## 2024-02-20 RX ORDER — BUPIVACAINE HCL/PF 2.5 MG/ML
4 VIAL (ML) INJECTION ONCE
Status: COMPLETED | OUTPATIENT
Start: 2024-02-20 | End: 2024-02-20

## 2024-02-20 RX ORDER — METHYLPREDNISOLONE ACETATE 80 MG/ML
80 INJECTION, SUSPENSION INTRA-ARTICULAR; INTRALESIONAL; INTRAMUSCULAR; PARENTERAL; SOFT TISSUE ONCE
Status: COMPLETED | OUTPATIENT
Start: 2024-02-20 | End: 2024-02-20

## 2024-02-20 RX ADMIN — Medication 4 ML: at 10:44

## 2024-02-20 RX ADMIN — IOHEXOL 1 ML: 300 INJECTION, SOLUTION INTRAVENOUS at 10:44

## 2024-02-20 RX ADMIN — METHYLPREDNISOLONE ACETATE 80 MG: 80 INJECTION, SUSPENSION INTRA-ARTICULAR; INTRALESIONAL; INTRAMUSCULAR; PARENTERAL; SOFT TISSUE at 10:44

## 2024-02-20 NOTE — DISCHARGE INSTR - LAB

## 2024-02-20 NOTE — INTERVAL H&P NOTE
Update: (This section must be completed if the H&P was completed greater than 24 hrs to procedure or admission)    H&P reviewed. After examining the patient, I find no changed to the H&P since it had been written.    Patient re-evaluated. Accept as history and physical.    Huey Charles MD/February 20, 2024/10:32 AM

## 2024-02-22 NOTE — PROGRESS NOTES
"Daily Note     Today's date: 2024  Patient name: Shelby Foster  : 1965  MRN: [id number]  Referring provider: Hanny Mensah*  Dx:   Encounter Diagnosis     ICD-10-CM    1. Dyspareunia in female  N94.10           Start Time: 1030  Stop Time: 1125  Total time in clinic (min): 55 minutes    Subjective: Saw pain management with injection with no relief. Notes feels worse since injection. Now hurting with sitting and with straight walking. Patient notes she continues to be frustrated with continued pain.   Notes urinary symptoms better this week. No bladder loss. Notes post session last visit having some relief pelvic area feeling more open. Using breathing techniques throughout the week.      Objective: See treatment diary below      Assessment: Tolerated treatment well. Patient would benefit from continued PT Gentle ROM left hip with good tolerance. Direct PFM STM with moderate bilateral PC/NC tone , greater left, that improved with direct techniques. Patient noted feeling \"lighter\" post session.      Plan: Continue per plan of care.      POC expires Unit limit Auth Expiration date PT/OT + Visit Limit?   24 4 NA BOMN                           Visit/Unit Tracking  AUTH Status:  Date            NA Used 1 2 3 4            Remaining                 VQ                         Precautions: hernia repair       Manuals          Pelvic transverse plane   PP PP         Direct PFM   PP PP                                   Neuro Re-Ed                                                                                                        Ther Ex             Kegel technique Eval  8            Diaphragmatic brathing  5'  HEP  T/o  10         Pfm relaxation  5'  HEP  T/o  5         CRK  3\"/10\"/10x  HEP           LE stretch  PP  10'  P! left  PP  10'                                                Ther Activity             Pfm education PP            Bladder health " PP  HO            Urge suppression             Bladder diary nv                         Gait Training                                       Modalities

## 2024-02-23 ENCOUNTER — OFFICE VISIT (OUTPATIENT)
Dept: PHYSICAL THERAPY | Age: 59
End: 2024-02-23
Payer: COMMERCIAL

## 2024-02-23 DIAGNOSIS — N94.10 DYSPAREUNIA IN FEMALE: Primary | ICD-10-CM

## 2024-02-23 PROCEDURE — 97140 MANUAL THERAPY 1/> REGIONS: CPT | Performed by: PHYSICAL THERAPIST

## 2024-02-23 PROCEDURE — 97110 THERAPEUTIC EXERCISES: CPT | Performed by: PHYSICAL THERAPIST

## 2024-02-29 ENCOUNTER — TELEPHONE (OUTPATIENT)
Dept: NEUROLOGY | Facility: CLINIC | Age: 59
End: 2024-02-29

## 2024-02-29 NOTE — TELEPHONE ENCOUNTER
Called and spoke to patient to confirm their upcoming appointment with Dr. Hutson. Informed patient about calling 15 minutes prior to their appointment to get checked in and going over chart.

## 2024-02-29 NOTE — PROGRESS NOTES
"Daily Note     Today's date: 3/1/2024  Patient name: Shelby Foster  : 1965  MRN: [id number]  Referring provider: Hanny Mensah*  Dx:   Encounter Diagnosis     ICD-10-CM    1. Dyspareunia in female  N94.10           Start Time: 1030  Stop Time: 1125  Total time in clinic (min): 55 minutes    Subjective:  Patient notes that she thinks the hip injection has been successful Notes has been more active physically since hip is feeling better. Notes hip \"went out\" last night going to bed last night. Better this morning.  Notes pain  2-3/10 lateral hip at time of treatment. Has been doing more stretching including butterfly stretch with better tolerance. Notes urinary symptoms have been non-issue.       Objective: See treatment diary below      Assessment: Tolerated treatment well. Patient would benefit from continued PT Better tolerance for LE stretch left LE today. Improved left hip ER. Improved tone PFM with no reported TTP.       Plan: Continue per plan of care.      POC expires Unit limit Auth Expiration date PT/OT + Visit Limit?   24 4 NA BOMN                           Visit/Unit Tracking  AUTH Status:  Date 1/19 1/26 2/16 2/23 3/1          NA Used 1 2 3 4 5           Remaining                 VQ                         Precautions: hernia repair       Manuals 1/19 1/26 2/16 2/23 3/1        Pelvic transverse plane   PP PP PP        Direct PFM   PP PP PP                                  Neuro Re-Ed                                                                                                        Ther Ex             Kegel technique Eval  8            Diaphragmatic brathing  5'  HEP  T/o  10 10  T/o        Pfm relaxation  5'  HEP  T/o  5         CRK  3\"/10\"/10x  HEP           LE stretch  PP  10'  P! left  PP  10' 15                                               Ther Activity             Pfm education PP            Bladder health PP  HO            Urge suppression             Bladder " diary nv                         Gait Training                                       Modalities

## 2024-03-01 ENCOUNTER — OFFICE VISIT (OUTPATIENT)
Dept: PHYSICAL THERAPY | Age: 59
End: 2024-03-01
Payer: COMMERCIAL

## 2024-03-01 DIAGNOSIS — N94.10 DYSPAREUNIA IN FEMALE: Primary | ICD-10-CM

## 2024-03-01 PROCEDURE — 97110 THERAPEUTIC EXERCISES: CPT | Performed by: PHYSICAL THERAPIST

## 2024-03-01 PROCEDURE — 97140 MANUAL THERAPY 1/> REGIONS: CPT | Performed by: PHYSICAL THERAPIST

## 2024-03-05 ENCOUNTER — TELEMEDICINE (OUTPATIENT)
Dept: NEUROLOGY | Facility: CLINIC | Age: 59
End: 2024-03-05
Payer: COMMERCIAL

## 2024-03-05 DIAGNOSIS — G47.33 OSA (OBSTRUCTIVE SLEEP APNEA): ICD-10-CM

## 2024-03-05 DIAGNOSIS — M16.12 PRIMARY OSTEOARTHRITIS OF LEFT HIP: ICD-10-CM

## 2024-03-05 DIAGNOSIS — G43.009 MIGRAINE WITHOUT AURA AND WITHOUT STATUS MIGRAINOSUS, NOT INTRACTABLE: Primary | ICD-10-CM

## 2024-03-05 PROCEDURE — 99215 OFFICE O/P EST HI 40 MIN: CPT | Performed by: PSYCHIATRY & NEUROLOGY

## 2024-03-05 RX ORDER — MELOXICAM 15 MG/1
15 TABLET ORAL DAILY PRN
Qty: 30 TABLET | Refills: 0 | Status: SHIPPED | OUTPATIENT
Start: 2024-03-05

## 2024-03-05 NOTE — PATIENT INSTRUCTIONS
Follow-up with your pulmonary doctor Dr. Villafana who also read the sleep study in July as already sleep study scheduled.  Personally, I would recommend using CPAP and treating this anytime you sleep.  We also discussed the hypoxia which concerns me, but she will be the expert for any further recommendations regarding that as well.      Headache/migraine treatment:   Rescue medications (for immediate treatment of a headache):   It is ok to take ibuprofen, acetaminophen or naproxen (Advil, Tylenol,  Aleve, Excedrin) if they help your headaches you should limit these to No more than 3 times a week to avoid medication overuse/rebound headaches.         Prescription preventive medications for headaches/migraines   (to take every day to help prevent headaches - not to take at the time of headache):  [x] we have options if needed       *Typically these types of medications take time until you see the benefit, although some may see improvement in days, often it may take weeks, especially if the medication is being titrated up to a beneficial level. Please contact us if there are any concerns or questions regarding the medication.     Lifestyle Recommendations:  [x] SLEEP - Maintain a regular sleep schedule: Adults need at least 7-8 hours of uninterrupted a night. Maintain good sleep hygiene:  Going to bed and waking up at consistent times, avoiding excessive daytime naps, avoiding caffeinated beverages in the evening, avoid excessive stimulation in the evening and generally using bed primarily for sleeping.  One hour before bedtime would recommend turning lights down lower, decreasing your activity (may read quietly, listen to music at a low volume). When you get into bed, should eliminate all technology (no texting, emailing, playing with your phone, iPad or tablet in bed).  [x] HYDRATION - Maintain good hydration.  Drink  2L of fluid a day (4 typical small water bottles)  [x] DIET - Maintain good nutrition. In particular  don't skip meals and try and eat healthy balanced meals regularly.  [x] TRIGGERS - Look for other triggers and avoid them: Limit caffeine to 1-2 cups a day or less. Avoid dietary triggers that you have noticed bring on your headaches (this could include aged cheese, peanuts, MSG, aspartame and nitrates).  [x] EXERCISE - physical exercise as we all know is good for you in many ways, and not only is good for your heart, but also is beneficial for your mental health, cognitive health and  chronic pain/headaches. I would encourage at the least 5 days of physical exercise weekly for at least 30 minutes.     Education and Follow-up  [x] Please call with any questions or concerns. Of course if any new concerning symptoms go to the emergency department.  [x] Follow up 1 year, sooner if needed

## 2024-03-05 NOTE — PROGRESS NOTES
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 20 years she could not sleep and melatonin gummy 30 mg   Problems falling asleep?:   No  Problems staying asleep?:  Yes, 3 times a night to pee and falls right back to sleep  Snores prior to surgery, sleep study in the past negative, 10 years ago     Physical activity:   - walks daily, weights normally 4 days a week     Water: prob 64 oz per day  Caffeine: 1 a day per day    Have you seen someone else for headaches or pain? No  Have you had trigger point injection performed and how often? No  Have you had Botox injection performed and how often? No   Have you had epidural injections or transforaminal injections performed? No  Are you current pregnant or planning on getting pregnant? No periods since age 53 with endometrial ablation  Have you ever had any Brain imaging? No    Mood:   Denies history of anxiety or depression or other diagnosed mood disorder  \"Good\"      The following portions of the patient's history were reviewed and updated as appropriate: allergies, current medications, past family history, past medical history, past social history, past surgical history and problem list.    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   Lives with     Past Medical History:   Diagnosis Date    Abnormal Pap smear of cervix 2000's    Anemia     Corrected with endometrial ablation    Asthma     Fibroid 2000's    Hypothyroidism     Varicella        Past Surgical History:   Procedure Laterality Date    APPENDECTOMY      CHOLECYSTECTOMY      ENDOMETRIAL ABLATION  2005    HERNIA REPAIR  11/2021    MYOMECTOMY  2002,3,4    TONSILLECTOMY      WISDOM TOOTH EXTRACTION         Current Outpatient Medications   Medication Sig Dispense Refill    albuterol (Ventolin HFA) 90 mcg/act inhaler Inhale 2 puffs every 6 (six) hours as needed for wheezing 25.5 g 3    Aspirin-Acetaminophen-Caffeine (EXCEDRIN MIGRAINE PO) Take 1 tablet by mouth if needed      clobetasol (TEMOVATE) 0.05 % cream       clotrimazole-betamethasone (LOTRISONE) 1-0.05 % cream       estrogens, conjugated (Premarin) vaginal cream Insert 0.5 g into the vagina if needed      fluticasone (FLONASE) 50 mcg/act nasal spray       Fluticasone-Salmeterol (Advair) 100-50 mcg/dose inhaler Inhale 1 puff 2 (two) times a day Rinse mouth after use. 180 blister 1    Melatonin 300 MCG TABS melatonin 300 mcg tablet   PRN      meloxicam (MOBIC) 15 mg tablet Take 1 tablet (15 mg total) by mouth daily as needed for moderate pain 30 tablet 0    methocarbamol (ROBAXIN) 500 mg tablet Take one tab PO for muscle spasms at night if needed (can cause sedation) 20 tablet 6    metroNIDAZOLE (METROCREAM) 0.75 % cream       montelukast (SINGULAIR) 10 mg tablet Take 1 tablet (10 mg total) by mouth daily at bedtime 90 tablet 3    ondansetron (ZOFRAN-ODT) 4 mg disintegrating tablet Take 1 tablet (4 mg total) by mouth every 6 (six) hours as needed for nausea or vomiting 20 tablet 3    Sermorelin Acetate Diagnostic 15 MG SOLR       Sulfacetamide Sodium, Acne, 10 % LOTN       Synthroid 175 MCG  tablet       terconazole (TERAZOL 7) 0.4 % vaginal cream Insert 1 applicator into the vagina daily at bedtime 45 g 3    Vitamin D, Cholecalciferol, 50 MCG (2000 UT) CAPS Take 1 capsule by mouth       No current facility-administered medications for this visit.        Allergies   Allergen Reactions    Fluconazole Swelling     Of lips      Sulfamethoxazole-Trimethoprim Swelling     Of lips    Erythromycin Rash     Breaks aout on white pimples      Penicillins Rash     Breaks out on white pimples       Tetracycline Rash     Breaks out on white pimples           Objective:     Exam limited by Video  Physical Exam:                                                                 Vitals:            Constitutional:    There were no vitals taken for this visit.  BP Readings from Last 3 Encounters:   02/20/24 134/69   02/09/24 135/85   01/22/24 118/83     Pulse Readings from Last 3 Encounters:   02/20/24 69   02/09/24 77   01/22/24 77         Well developed, well nourished, No dysmorphic features.       HEENT:  Normocephalic atraumatic. See neuro exam   Psychiatric:  Normal behavior and appropriate affect        Neurological Examination:     Mental status/cognitive function:   Recent and remote memory appear intact. Attention span and concentration as well as fund of knowledge appear appropriate for age. Normal language and spontaneous speech.  Cranial Nerves:  VII-facial expression symmetric  Motor Exam: symmetric bulk throughout. no atrophy, fasciculations or abnormal movements noted.   Coordination:  no apparent dysmetria, ataxia or tremor noted        Pertinent lab results:   No recent labs in our system, goes every 8 months outside of our system   -12/21/2020: CMP and CBC unremarkable  - 12/17/2019 A1c 6.1  - 8/24/2018 TSH normal 3.07, free T4 elevated 1.72     Imaging: I have personally reviewed imaging and radiology read   - Noncontrast head CT in 12/1/2022: No acute intracranial abnormality.  *We discussed on  retrospective review, she does not have empty sella, possible prominence and tortuosity of optic nerve sheaths bilaterally, thankfully no papilledema     Review of Systems:   ROS obtained by medical assistant and personally reviewed, but if any symptoms listed below say negative, does not mean patient has not had this symptom since last visit, please see HPI for details of symptoms discussed this visit. I recommended PCP follow up for non neurologic problems.    Review of Systems   Constitutional:  Negative for appetite change and fever.   HENT: Negative.  Negative for hearing loss, tinnitus, trouble swallowing and voice change.    Eyes: Negative.  Negative for photophobia and pain.   Respiratory: Negative.  Negative for shortness of breath.    Cardiovascular: Negative.  Negative for palpitations.   Gastrointestinal: Negative.  Negative for nausea and vomiting.   Endocrine: Negative.  Negative for cold intolerance.   Genitourinary: Negative.  Negative for dysuria, frequency and urgency.   Musculoskeletal: Negative.  Negative for myalgias and neck pain.   Skin: Negative.  Negative for rash.   Neurological:  Positive for headaches. Negative for dizziness, tremors, seizures, syncope, facial asymmetry, speech difficulty, weakness, light-headedness and numbness.   Hematological: Negative.  Does not bruise/bleed easily.   Psychiatric/Behavioral: Negative.  Negative for confusion, hallucinations and sleep disturbance.    All other systems reviewed and are negative.         I have spent 25 minutes with Patient today in which greater than 50% of this time was spent in counseling/coordination of care regarding Diagnostic results, Prognosis, Risks and benefits of tx options, Instructions for management, Patient education, Importance of tx compliance, Risk factor reductions, Impressions, Counseling / Coordination of care, Documenting in the medical record, Reviewing / ordering tests, medicine, procedures  , Obtaining or  reviewing history  , and Communicating with other healthcare professionals . I also spent 17 minutes non face to face for this patient the same day.           Visit Time  Total Visit Duration: 42

## 2024-03-07 NOTE — PROGRESS NOTES
"Daily Note     Today's date: 3/8/2024  Patient name: Shelby Foster  : 1965  MRN: [id number]  Referring provider: Hanny Mensah*  Dx:   Encounter Diagnosis     ICD-10-CM    1. Dyspareunia in female  N94.10           Start Time: 1130  Stop Time: 1225  Total time in clinic (min): 55 minutes    Subjective: Patient notes her left hip has been feeling very good this week. \"Has not gone out once.\" Has been stretching more and intends to return to the gym tomorrow for exercise.      Objective: See treatment diary below      Assessment: Tolerated treatment well. Mild increased tone left PC/GA that eased with direct techniques. Decreased pain and decreased urinary symptoms.       Plan: Continue per plan of care.      POC expires Unit limit Auth Expiration date PT/OT + Visit Limit?   24 4 NA BOMN                           Visit/Unit Tracking  AUTH Status:  Date 1/19 1/26 2/16 2/23 3/1 3/8         NA Used 1 2 3 4 5 6          Remaining                 VQ                         Precautions: hernia repair       Manuals 1/19 1/26 2/16 2/23 3/1 3       Pelvic transverse plane   PP PP PP PP       Direct PFM   PP PP PP PP                                 Neuro Re-Ed                                                                                                        Ther Ex             Kegel technique Eval  8            Diaphragmatic brathing  5'  HEP  T/o  10 10  T/o 10       Pfm relaxation  5'  HEP  T/o  5  5       CRK  3\"/10\"/10x  HEP           LE stretch  PP  10'  P! left  PP  10' 15 15                                              Ther Activity             Pfm education PP            Bladder health PP  HO            Urge suppression             Bladder diary nv                         Gait Training                                       Modalities                                                    "

## 2024-03-08 ENCOUNTER — OFFICE VISIT (OUTPATIENT)
Dept: PHYSICAL THERAPY | Age: 59
End: 2024-03-08
Payer: COMMERCIAL

## 2024-03-08 DIAGNOSIS — N94.10 DYSPAREUNIA IN FEMALE: Primary | ICD-10-CM

## 2024-03-08 PROCEDURE — 97110 THERAPEUTIC EXERCISES: CPT | Performed by: PHYSICAL THERAPIST

## 2024-03-08 PROCEDURE — 97140 MANUAL THERAPY 1/> REGIONS: CPT | Performed by: PHYSICAL THERAPIST

## 2024-03-13 ENCOUNTER — OFFICE VISIT (OUTPATIENT)
Dept: FAMILY MEDICINE CLINIC | Facility: CLINIC | Age: 59
End: 2024-03-13
Payer: COMMERCIAL

## 2024-03-13 VITALS
RESPIRATION RATE: 14 BRPM | HEART RATE: 75 BPM | DIASTOLIC BLOOD PRESSURE: 72 MMHG | SYSTOLIC BLOOD PRESSURE: 124 MMHG | WEIGHT: 274 LBS | HEIGHT: 70 IN | TEMPERATURE: 97.7 F | BODY MASS INDEX: 39.22 KG/M2 | OXYGEN SATURATION: 96 %

## 2024-03-13 DIAGNOSIS — E03.9 HYPOTHYROIDISM, UNSPECIFIED TYPE: ICD-10-CM

## 2024-03-13 DIAGNOSIS — E66.01 CLASS 2 SEVERE OBESITY WITH SERIOUS COMORBIDITY AND BODY MASS INDEX (BMI) OF 39.0 TO 39.9 IN ADULT, UNSPECIFIED OBESITY TYPE: ICD-10-CM

## 2024-03-13 DIAGNOSIS — Z13.220 LIPID SCREENING: ICD-10-CM

## 2024-03-13 DIAGNOSIS — E55.9 VITAMIN D DEFICIENCY: ICD-10-CM

## 2024-03-13 DIAGNOSIS — Z13.1 DIABETES MELLITUS SCREENING: ICD-10-CM

## 2024-03-13 DIAGNOSIS — Z13.29 THYROID DISORDER SCREEN: ICD-10-CM

## 2024-03-13 DIAGNOSIS — G47.33 OSA (OBSTRUCTIVE SLEEP APNEA): ICD-10-CM

## 2024-03-13 DIAGNOSIS — Z00.00 ANNUAL PHYSICAL EXAM: Primary | ICD-10-CM

## 2024-03-13 DIAGNOSIS — Z12.31 ENCOUNTER FOR SCREENING MAMMOGRAM FOR MALIGNANT NEOPLASM OF BREAST: ICD-10-CM

## 2024-03-13 DIAGNOSIS — Z12.11 COLON CANCER SCREENING: ICD-10-CM

## 2024-03-13 DIAGNOSIS — E61.1 IRON DEFICIENCY: ICD-10-CM

## 2024-03-13 PROBLEM — M18.11 ARTHRITIS OF CARPOMETACARPAL (CMC) JOINT OF RIGHT THUMB: Status: RESOLVED | Noted: 2021-02-25 | Resolved: 2024-03-13

## 2024-03-13 PROBLEM — G47.9 SLEEP DISORDER: Status: RESOLVED | Noted: 2019-11-25 | Resolved: 2024-03-13

## 2024-03-13 PROBLEM — E03.8 OTHER SPECIFIED HYPOTHYROIDISM: Status: ACTIVE | Noted: 2024-03-13

## 2024-03-13 PROBLEM — E66.812 CLASS 2 SEVERE OBESITY WITH SERIOUS COMORBIDITY AND BODY MASS INDEX (BMI) OF 39.0 TO 39.9 IN ADULT (HCC): Status: ACTIVE | Noted: 2024-03-13

## 2024-03-13 PROBLEM — R10.2 PELVIC PAIN: Status: RESOLVED | Noted: 2023-11-01 | Resolved: 2024-03-13

## 2024-03-13 PROBLEM — K63.5 POLYP OF COLON: Status: RESOLVED | Noted: 2018-06-25 | Resolved: 2024-03-13

## 2024-03-13 PROBLEM — R73.03 PREDIABETES: Status: RESOLVED | Noted: 2020-04-03 | Resolved: 2024-03-13

## 2024-03-13 PROCEDURE — 99396 PREV VISIT EST AGE 40-64: CPT | Performed by: STUDENT IN AN ORGANIZED HEALTH CARE EDUCATION/TRAINING PROGRAM

## 2024-03-13 NOTE — ASSESSMENT & PLAN NOTE
Annual Physical Exam completed  Patient due for mammogram and Colon cancer screening, both orders have been placed  I have also ordered blood work and will review with patient once received.

## 2024-03-13 NOTE — ASSESSMENT & PLAN NOTE
With hx of hypothyroidism  Will check TSH level, A1c and cmp  Will refer to weight management for further evaluation

## 2024-03-13 NOTE — PROGRESS NOTES
ADULT ANNUAL PHYSICAL  [facility] PRIMARY CARE    NAME: Shelby Foster  AGE: 58 y.o. SEX: female  : 1965     DATE: 3/13/2024     Assessment and Plan:     Problem List Items Addressed This Visit     Vitamin D deficiency     Currently taking Vit D 2000IU daily  Will check Vitamin D level and f/u pending results         Relevant Orders    Vitamin D 25 hydroxy    Iron deficiency    Relevant Orders    CBC and Platelet    PAPI (obstructive sleep apnea)    Hypothyroidism     Takes Synthroid 175mcg daily  Has Endocrinologist in [facility] but would like to establish with Endocrinology within Network  Will check TSH  Will refer to Dr. Franz for further evaluation   Patient to continue synthroid 175mcg daily for now         Relevant Orders    Ambulatory Referral to Endocrinology    Annual physical exam - Primary     Annual Physical Exam completed  Patient due for mammogram and Colon cancer screening, both orders have been placed  I have also ordered blood work and will review with patient once received.          Class 2 severe obesity with serious comorbidity and body mass index (BMI) of 39.0 to 39.9 in adult      With hx of hypothyroidism  Will check TSH level, A1c and cmp  Will refer to weight management for further evaluation          Relevant Orders    Ambulatory Referral to Weight Management   Other Visit Diagnoses     Lipid screening        Relevant Orders    Lipid panel    Thyroid disorder screen        Relevant Orders    TSH, 3rd generation with Free T4 reflex    Diabetes mellitus screening        Relevant Orders    Comprehensive metabolic panel    Hemoglobin A1C    Encounter for screening mammogram for malignant neoplasm of breast        Relevant Orders    Mammo screening bilateral w 3d & cad    Colon cancer screening        Relevant Orders    Ambulatory referral to Gastroenterology            Immunizations and preventive care screenings were discussed  with patient today. Appropriate education was printed on patient's after visit summary.    Counseling:  Alcohol/drug use: discussed moderation in alcohol intake, the recommendations for healthy alcohol use, and avoidance of illicit drug use.  Dental Health: discussed importance of regular tooth brushing, flossing, and dental visits.  Exercise: the importance of regular exercise/physical activity was discussed. Recommend exercise 3-5 times per week for at least 30 minutes.          No follow-ups on file.     Chief Complaint:     Chief Complaint   Patient presents with   • Establish Care   • Physical Exam      History of Present Illness:     Adult Annual Physical   Patient here for a comprehensive physical exam. The patient reports problems - Would like to transfer all care to [facility], is also concerned about lack of weight loss. .    Diet and Physical Activity  Diet/Nutrition: well balanced diet.   Exercise: no formal exercise.      Depression Screening  PHQ-2/9 Depression Screening    Little interest or pleasure in doing things: 0 - not at all  Feeling down, depressed, or hopeless: 0 - not at all  PHQ-2 Score: 0  PHQ-2 Interpretation: Negative depression screen       General Health  Sleep: sleeps well.   Hearing: normal - bilateral.  Vision: most recent eye exam <1 year ago and Prior cataract surgery  .   Dental: regular dental visits.       /GYN Health  Follows with gynecology? yes   Patient is: postmenopausal  Last Pap 08/2022  Last Mammogram 08/2022  Last Colonoscopy 3-4 years ago, Has hx of polyps and should have repeated in 3 years     Advanced Care Planning  Do you have an advanced directive? no  Do you have a durable medical power of ? no  ACP document given to the patient? 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   HENT:   Negative for congestion and rhinorrhea.    Respiratory:  Negative for cough and shortness of breath.    Cardiovascular:  Negative for chest pain and palpitations.   Gastrointestinal:  Negative for abdominal pain, constipation and diarrhea.   Neurological:  Negative for dizziness and headaches.          Past Medical History:     Past Medical History:   Diagnosis Date   • Abnormal Pap smear of cervix 2000's   • Anemia     Corrected with endometrial ablation   • Asthma    • Fibroid 2000's   • Hypothyroidism    • Varicella       Past Surgical History:     Past Surgical History:   Procedure Laterality Date   • APPENDECTOMY     • CHOLECYSTECTOMY     • ENDOMETRIAL ABLATION  2005   • HERNIA REPAIR  11/2021   • MYOMECTOMY  2002,3,4   • TONSILLECTOMY     • WISDOM TOOTH EXTRACTION        Social History:     Social History     Socioeconomic History   • Marital status: /Civil Union     Spouse name: None   • Number of children: None   • Years of education: None   • Highest education level: None   Occupational History   • Occupation: EMPLOYED   Tobacco Use   • Smoking status: Never   • Smokeless tobacco: Never   Vaping Use   • Vaping status: Never Used   Substance and Sexual Activity   • Alcohol use: Never   • Drug use: Never   • Sexual activity: Yes     Partners: Male     Birth control/protection: Post-menopausal   Other Topics Concern   • None   Social History Narrative   • None     Social Determinants of Health     Financial Resource Strain: Not on file   Food Insecurity: Not on file   Transportation Needs: Not on file   Physical Activity: Not on file   Stress: Not on file   Social Connections: Not on file   Intimate Partner Violence: Not on file   Housing Stability: Not on file      Family History:     Family History   Problem Relation Age of Onset   • Breast cancer Mother    • Miscarriages / Stillbirths Mother       Current Medications:     Current Outpatient Medications   Medication Sig Dispense Refill   • albuterol  "(Ventolin HFA) 90 mcg/act inhaler Inhale 2 puffs every 6 (six) hours as needed for wheezing 25.5 g 3   • Aspirin-Acetaminophen-Caffeine (EXCEDRIN MIGRAINE PO) Take 1 tablet by mouth if needed     • clobetasol (TEMOVATE) 0.05 % cream      • clotrimazole-betamethasone (LOTRISONE) 1-0.05 % cream      • fluticasone (FLONASE) 50 mcg/act nasal spray      • Fluticasone-Salmeterol (Advair) 100-50 mcg/dose inhaler Inhale 1 puff 2 (two) times a day Rinse mouth after use. 180 blister 1   • Melatonin 300 MCG TABS melatonin 300 mcg tablet   PRN     • meloxicam (MOBIC) 15 mg tablet Take 1 tablet (15 mg total) by mouth daily as needed for moderate pain 30 tablet 0   • methocarbamol (ROBAXIN) 500 mg tablet Take one tab PO for muscle spasms at night if needed (can cause sedation) 20 tablet 6   • metroNIDAZOLE (METROCREAM) 0.75 % cream      • montelukast (SINGULAIR) 10 mg tablet Take 1 tablet (10 mg total) by mouth daily at bedtime 90 tablet 3   • ondansetron (ZOFRAN-ODT) 4 mg disintegrating tablet Take 1 tablet (4 mg total) by mouth every 6 (six) hours as needed for nausea or vomiting 20 tablet 3   • Sermorelin Acetate Diagnostic 15 MG SOLR      • Sulfacetamide Sodium, Acne, 10 % LOTN      • Synthroid 175 MCG tablet      • terconazole (TERAZOL 7) 0.4 % vaginal cream Insert 1 applicator into the vagina daily at bedtime 45 g 3   • Vitamin D, Cholecalciferol, 50 MCG (2000 UT) CAPS Take 1 capsule by mouth       No current facility-administered medications for this visit.      Allergies:     Allergies   Allergen Reactions   • Fluconazole Swelling     Of lips     • Sulfamethoxazole-Trimethoprim Swelling     Of lips   • Erythromycin Rash     Breaks aout on white pimples     • Penicillins Rash     Breaks out on white pimples      • Tetracycline Rash     Breaks out on white pimples        Physical Exam:     /72 (BP Location: Left arm, Patient Position: Sitting)   Pulse 75   Temp 97.7 °F (36.5 °C) (Temporal)   Resp 14   Ht 5' 10\" " (1.778 m)   Wt 124 kg (274 lb)   SpO2 96%   BMI 39.31 kg/m²     Physical Exam  Vitals reviewed.   Constitutional:       Appearance: She is obese.   HENT:      Head: Normocephalic and atraumatic.      Mouth/Throat:      Mouth: Mucous membranes are moist.      Pharynx: No oropharyngeal exudate or posterior oropharyngeal erythema.   Eyes:      Extraocular Movements: Extraocular movements intact.   Neck:      Comments: Slight goiter  Cardiovascular:      Rate and Rhythm: Normal rate and regular rhythm.      Heart sounds: Normal heart sounds.   Pulmonary:      Effort: Pulmonary effort is normal.      Breath sounds: Normal breath sounds.   Neurological:      General: No focal deficit present.      Mental Status: She is alert and oriented to person, place, and time.   Psychiatric:         Mood and Affect: Mood normal.         Behavior: Behavior normal.          Ree Vasquez,   [facility] PRIMARY CARE

## 2024-03-13 NOTE — ASSESSMENT & PLAN NOTE
Takes Synthroid 175mcg daily  Has Endocrinologist in [facility] but would like to establish with Endocrinology within Network  Will check TSH  Will refer to Dr. Franz for further evaluation   Patient to continue synthroid 175mcg daily for now

## 2024-03-13 NOTE — PATIENT INSTRUCTIONS
Wellness Visit for Adults   AMBULATORY CARE:   A wellness visit  is when you see your healthcare provider to get screened for health problems. Your healthcare provider will also give you advice on how to stay healthy. Write down your questions so you remember to ask them. Ask your healthcare provider how often you should have a wellness visit.  What happens at a wellness visit:  Your healthcare provider will ask about your health, and your family history of health problems. This includes high blood pressure, heart disease, and cancer. He or she will ask if you have symptoms that concern you, if you smoke, and about your mood. You may also be asked about your intake of medicines, supplements, food, and alcohol. Any of the following may be done:  Your weight  will be checked. Your height may also be checked so your body mass index (BMI) can be calculated. Your BMI shows if you are at a healthy weight.    Your blood pressure  and heart rate will be checked. Your temperature may also be checked.    Blood and urine tests  may be done. Blood tests may be done to check your cholesterol levels. Abnormal cholesterol levels increase your risk for heart disease and stroke. You may also need a blood or urine test to check for diabetes if you are at increased risk. Urine tests may be done to look for signs of an infection or kidney disease.    A physical exam  includes checking your heartbeat and lungs with a stethoscope. Your healthcare provider may also check your skin to look for sun damage.    Screening tests  may be recommended. A screening test is done to check for diseases that may not cause symptoms. The screening tests you may need depend on your age, gender, family history, and lifestyle habits. For example, colorectal screening may be recommended if you are 50 years old or older.    Screening tests you need if you are a woman:   A Pap smear  is used to screen for cervical cancer. Pap smears are usually done every 3 to  5 years depending on your age. You may need them more often if you have had abnormal Pap smear test results in the past. Ask your healthcare provider how often you should have a Pap smear.    A mammogram  is an x-ray of your breasts to screen for breast cancer. Experts recommend mammograms every 2 years starting at age 50 years. You may need a mammogram at age 49 years or younger if you have an increased risk for breast cancer. Talk to your healthcare provider about when you should start having mammograms and how often you need them.    Vaccines you may need:   Get an influenza vaccine  every year. The influenza vaccine protects you from the flu. Several types of viruses cause the flu. The viruses change over time, so new vaccines are made each year.    Get a tetanus-diphtheria (Td) booster vaccine  every 10 years. This vaccine protects you against tetanus and diphtheria. Tetanus is a severe infection that may cause painful muscle spasms and lockjaw. Diphtheria is a severe bacterial infection that causes a thick covering in the back of your mouth and throat.    Get a human papillomavirus (HPV) vaccine  if you are female and aged 19 to 26 or male 19 to 21 and never received it. This vaccine protects you from HPV infection. HPV is the most common infection spread by sexual contact. HPV may also cause vaginal, penile, and anal cancers.    Get a pneumococcal vaccine  if you are aged 65 years or older. The pneumococcal vaccine is an injection given to protect you from pneumococcal disease. Pneumococcal disease is an infection caused by pneumococcal bacteria. The infection may cause pneumonia, meningitis, or an ear infection.    Get a shingles vaccine  if you are 60 or older, even if you have had shingles before. The shingles vaccine is an injection to protect you from the varicella-zoster virus. This is the same virus that causes chickenpox. Shingles is a painful rash that develops in people who had chickenpox or have  been exposed to the virus.    How to eat healthy:  My Plate is a model for planning healthy meals. It shows the types and amounts of foods that should go on your plate. Fruits and vegetables make up about half of your plate, and grains and protein make up the other half. A serving of dairy is included on the side of your plate. The amount of calories and serving sizes you need depends on your age, gender, weight, and height. Examples of healthy foods are listed below:  Eat a variety of vegetables  such as dark green, red, and orange vegetables. You can also include canned vegetables low in sodium (salt) and frozen vegetables without added butter or sauces.    Eat a variety of fresh fruits , canned fruit in 100% juice, frozen fruit, and dried fruit.    Include whole grains.  At least half of the grains you eat should be whole grains. Examples include whole-wheat bread, wheat pasta, brown rice, and whole-grain cereals such as oatmeal.    Eat a variety of protein foods such as seafood (fish and shellfish), lean meat, and poultry without skin (turkey and chicken). Examples of lean meats include pork leg, shoulder, or tenderloin, and beef round, sirloin, tenderloin, and extra lean ground beef. Other protein foods include eggs and egg substitutes, beans, peas, soy products, nuts, and seeds.    Choose low-fat dairy products such as skim or 1% milk or low-fat yogurt, cheese, and cottage cheese.    Limit unhealthy fats  such as butter, hard margarine, and shortening.       Exercise:  Exercise at least 30 minutes per day on most days of the week. Some examples of exercise include walking, biking, dancing, and swimming. You can also fit in more physical activity by taking the stairs instead of the elevator or parking farther away from stores. Include muscle strengthening activities 2 days each week. Regular exercise provides many health benefits. It helps you manage your weight, and decreases your risk for type 2 diabetes,  heart disease, stroke, and high blood pressure. Exercise can also help improve your mood. Ask your healthcare provider about the best exercise plan for you.       General health and safety guidelines:   Do not smoke.  Nicotine and other chemicals in cigarettes and cigars can cause lung damage. Ask your healthcare provider for information if you currently smoke and need help to quit. E-cigarettes or smokeless tobacco still contain nicotine. Talk to your healthcare provider before you use these products.    Limit alcohol.  A drink of alcohol is 12 ounces of beer, 5 ounces of wine, or 1½ ounces of liquor.    Lose weight, if needed.  Being overweight increases your risk of certain health conditions. These include heart disease, high blood pressure, type 2 diabetes, and certain types of cancer.    Protect your skin.  Do not sunbathe or use tanning beds. Use sunscreen with a SPF 15 or higher. Apply sunscreen at least 15 minutes before you go outside. Reapply sunscreen every 2 hours. Wear protective clothing, hats, and sunglasses when you are outside.    Drive safely.  Always wear your seatbelt. Make sure everyone in your car wears a seatbelt. A seatbelt can save your life if you are in an accident. Do not use your cell phone when you are driving. This could distract you and cause an accident. Pull over if you need to make a call or send a text message.    Practice safe sex.  Use latex condoms if are sexually active and have more than one partner. Your healthcare provider may recommend screening tests for sexually transmitted infections (STIs).    Wear helmets, lifejackets, and protective gear.  Always wear a helmet when you ride a bike or motorcycle, go skiing, or play sports that could cause a head injury. Wear protective equipment when you play sports. Wear a lifejacket when you are on a boat or doing water sports.    © Copyright [employer] 2023 Information is for End User's use only and may not be sold, redistributed or  otherwise used for commercial purposes.  The above information is an  only. It is not intended as medical advice for individual conditions or treatments. Talk to your doctor, nurse or pharmacist before following any medical regimen to see if it is safe and effective for you.     to be started in OR/no

## 2024-03-14 ENCOUNTER — PREP FOR PROCEDURE (OUTPATIENT)
Age: 59
End: 2024-03-14

## 2024-03-14 ENCOUNTER — TELEPHONE (OUTPATIENT)
Age: 59
End: 2024-03-14

## 2024-03-14 DIAGNOSIS — Z86.010 HISTORY OF ADENOMATOUS POLYP OF COLON: Primary | ICD-10-CM

## 2024-03-14 NOTE — PROGRESS NOTES
"Daily Note     Today's date: 3/15/2024  Patient name: Shelby Foster  : 1965  MRN: [id number]  Referring provider: Hanny Mensah*  Dx:   Encounter Diagnosis     ICD-10-CM    1. Dyspareunia in female  N94.10           Start Time: 1030  Stop Time: 1123  Total time in clinic (min): 53 minutes    Subjective: Patient notes she awoke during the night with burning pain left anterior lateral hip. No pain with walking when out of bed this morning. No pain at time of treatment. Mild burning post session. Patient returned to gym exercise program this week for 2 days with emphasis on UE and walking.      Objective: See treatment diary below      Assessment: Tolerated treatment well. Patient would benefit from continued PT Increased tone left PC/IA musculature today with mild TTP. Pain left hip with return to neutral after hooklying positioning. Left psoas tightness noted with transverse plane that eased with manual therapy. Encouraged PFM relaxation/ drops with check ins/ body scan during the day.       Plan: Continue per plan of care.      POC expires Unit limit Auth Expiration date PT/OT + Visit Limit?   24 4 NA BOMN                           Visit/Unit Tracking  AUTH Status:  Date 1/19 1/26 2/16 2/23 3/1 3/8 3/15        NA Used 1 2 3 4 5 6 7         Remaining                 VQ                         Precautions: hernia repair       Manuals 1/19 1/26 2/16 2/23 3/1 3/8 3/15      Pelvic transverse plane   PP PP PP PP PP      Direct PFM   PP PP PP PP PP                                Neuro Re-Ed                                                                                                        Ther Ex             Kegel technique Eval  8            Diaphragmatic brathing  5'  HEP  T/o  10 10  T/o 10 3      Pfm relaxation  5'  HEP  T/o  5  5 5  drop      CRK  3\"/10\"/10x  HEP           LE stretch  PP  10'  P! left  PP  10' 15 15 15                                             Ther Activity          "    Pfm education PP            Bladder health PP  HO            Urge suppression             Bladder diary nv                         Gait Training                                       Modalities

## 2024-03-14 NOTE — TELEPHONE ENCOUNTER
03/14/24  Screened by: Claribel Isidro    Referring Provider Christina    Pre- Screening:     There is no height or weight on file to calculate BMI.  Has patient been referred for a routine screening Colonoscopy? yes  Is the patient between 45-75 years old? yes      Previous Colonoscopy yes   If yes:    Date: 3 - 5 yrs    Facility:     Reason:       Does the patient want to see a Gastroenterologist prior to their procedure OR are they having any GI symptoms? no    Has the patient been hospitalized or had abdominal surgery in the past 6 months? no    Does the patient use supplemental oxygen? no    Does the patient take Coumadin, Lovenox, Plavix, Elliquis, Xarelto, or other blood thinning medication? no    Has the patient had a stroke, cardiac event, or stent placed in the past year? no

## 2024-03-14 NOTE — TELEPHONE ENCOUNTER
Scheduled date of colonoscopy (as of today): 4/19/24    Physician performing colonoscopy: Shala    Location of colonoscopy: [facility]    Bowel prep reviewed with patient:  NELL/LIZZIE    Instructions reviewed with patient by: justine    Clearances: na    3 - 5 yr recall  Outside facility  Hx of Polyps  Mychart

## 2024-03-15 ENCOUNTER — OFFICE VISIT (OUTPATIENT)
Dept: PHYSICAL THERAPY | Age: 59
End: 2024-03-15
Payer: COMMERCIAL

## 2024-03-15 DIAGNOSIS — N94.10 DYSPAREUNIA IN FEMALE: Primary | ICD-10-CM

## 2024-03-15 PROCEDURE — 97110 THERAPEUTIC EXERCISES: CPT | Performed by: PHYSICAL THERAPIST

## 2024-03-15 PROCEDURE — 97140 MANUAL THERAPY 1/> REGIONS: CPT | Performed by: PHYSICAL THERAPIST

## 2024-03-20 ENCOUNTER — TELEPHONE (OUTPATIENT)
Dept: ADMINISTRATIVE | Facility: OTHER | Age: 59
End: 2024-03-20

## 2024-03-20 NOTE — TELEPHONE ENCOUNTER
----- Message from Dinora Figueroa MA sent at 3/19/2024  1:26 PM EDT -----  Regarding: Pap  03/19/24 1:26 PM    Hello, our patient attached above has had Pap Smear (HPV) aka Cervical Cancer Screening completed/performed. Please assist in updating the patient chart by pulling the document from the Media Tab. The date of service is 8/15/2022. Document scanned in on 8/21/2023 as Pathology Report.    Thank you,  Dinora Figueroa  [facility]

## 2024-03-20 NOTE — TELEPHONE ENCOUNTER
Upon review of the In Basket request we were able to locate, review, and update the patient chart as requested for Pap Smear (HPV) aka Cervical Cancer Screening.    Any additional questions or concerns should be emailed to the Practice Liaisons via the appropriate education email address, please do not reply via In Basket.    Thank you  Eileen Bailey

## 2024-03-22 ENCOUNTER — APPOINTMENT (OUTPATIENT)
Dept: PHYSICAL THERAPY | Age: 59
End: 2024-03-22
Payer: COMMERCIAL

## 2024-03-28 ENCOUNTER — APPOINTMENT (OUTPATIENT)
Dept: LAB | Facility: CLINIC | Age: 59
End: 2024-03-28
Payer: COMMERCIAL

## 2024-03-28 DIAGNOSIS — Z13.220 LIPID SCREENING: ICD-10-CM

## 2024-03-28 DIAGNOSIS — E55.9 VITAMIN D DEFICIENCY: ICD-10-CM

## 2024-03-28 DIAGNOSIS — E61.1 IRON DEFICIENCY: ICD-10-CM

## 2024-03-28 DIAGNOSIS — Z13.1 DIABETES MELLITUS SCREENING: ICD-10-CM

## 2024-03-28 DIAGNOSIS — Z13.29 THYROID DISORDER SCREEN: ICD-10-CM

## 2024-03-28 LAB
25(OH)D3 SERPL-MCNC: 30.8 NG/ML (ref 30–100)
ALBUMIN SERPL BCP-MCNC: 4.1 G/DL (ref 3.5–5)
ALP SERPL-CCNC: 82 U/L (ref 34–104)
ALT SERPL W P-5'-P-CCNC: 20 U/L (ref 7–52)
ANION GAP SERPL CALCULATED.3IONS-SCNC: 10 MMOL/L (ref 4–13)
AST SERPL W P-5'-P-CCNC: 25 U/L (ref 13–39)
BILIRUB SERPL-MCNC: 0.85 MG/DL (ref 0.2–1)
BUN SERPL-MCNC: 19 MG/DL (ref 5–25)
CALCIUM SERPL-MCNC: 9.3 MG/DL (ref 8.4–10.2)
CHLORIDE SERPL-SCNC: 106 MMOL/L (ref 96–108)
CHOLEST SERPL-MCNC: 195 MG/DL
CO2 SERPL-SCNC: 24 MMOL/L (ref 21–32)
CREAT SERPL-MCNC: 0.8 MG/DL (ref 0.6–1.3)
ERYTHROCYTE [DISTWIDTH] IN BLOOD BY AUTOMATED COUNT: 13.1 % (ref 11.6–15.1)
EST. AVERAGE GLUCOSE BLD GHB EST-MCNC: 117 MG/DL
GFR SERPL CREATININE-BSD FRML MDRD: 80 ML/MIN/1.73SQ M
GLUCOSE P FAST SERPL-MCNC: 94 MG/DL (ref 65–99)
HBA1C MFR BLD: 5.7 %
HCT VFR BLD AUTO: 45.7 % (ref 34.8–46.1)
HDLC SERPL-MCNC: 54 MG/DL
HGB BLD-MCNC: 14.9 G/DL (ref 11.5–15.4)
LDLC SERPL CALC-MCNC: 118 MG/DL (ref 0–100)
MCH RBC QN AUTO: 29 PG (ref 26.8–34.3)
MCHC RBC AUTO-ENTMCNC: 32.6 G/DL (ref 31.4–37.4)
MCV RBC AUTO: 89 FL (ref 82–98)
NONHDLC SERPL-MCNC: 141 MG/DL
PLATELET # BLD AUTO: 233 THOUSANDS/UL (ref 149–390)
PMV BLD AUTO: 11 FL (ref 8.9–12.7)
POTASSIUM SERPL-SCNC: 3.7 MMOL/L (ref 3.5–5.3)
PROT SERPL-MCNC: 6.6 G/DL (ref 6.4–8.4)
RBC # BLD AUTO: 5.14 MILLION/UL (ref 3.81–5.12)
SODIUM SERPL-SCNC: 140 MMOL/L (ref 135–147)
TRIGL SERPL-MCNC: 116 MG/DL
TSH SERPL DL<=0.05 MIU/L-ACNC: 1.13 UIU/ML (ref 0.45–4.5)
WBC # BLD AUTO: 6.27 THOUSAND/UL (ref 4.31–10.16)

## 2024-03-28 PROCEDURE — 84443 ASSAY THYROID STIM HORMONE: CPT

## 2024-03-28 PROCEDURE — 36415 COLL VENOUS BLD VENIPUNCTURE: CPT

## 2024-03-28 PROCEDURE — 85027 COMPLETE CBC AUTOMATED: CPT

## 2024-03-28 PROCEDURE — 83036 HEMOGLOBIN GLYCOSYLATED A1C: CPT

## 2024-03-28 PROCEDURE — 80053 COMPREHEN METABOLIC PANEL: CPT

## 2024-03-28 PROCEDURE — 80061 LIPID PANEL: CPT

## 2024-03-28 PROCEDURE — 82306 VITAMIN D 25 HYDROXY: CPT

## 2024-03-29 ENCOUNTER — APPOINTMENT (OUTPATIENT)
Dept: PHYSICAL THERAPY | Age: 59
End: 2024-03-29
Payer: COMMERCIAL

## 2024-04-01 ENCOUNTER — CONSULT (OUTPATIENT)
Dept: ENDOCRINOLOGY | Facility: CLINIC | Age: 59
End: 2024-04-01
Payer: COMMERCIAL

## 2024-04-01 VITALS
WEIGHT: 277.1 LBS | HEART RATE: 70 BPM | BODY MASS INDEX: 39.67 KG/M2 | OXYGEN SATURATION: 98 % | SYSTOLIC BLOOD PRESSURE: 140 MMHG | DIASTOLIC BLOOD PRESSURE: 90 MMHG | HEIGHT: 70 IN

## 2024-04-01 DIAGNOSIS — E66.01 CLASS 2 SEVERE OBESITY DUE TO EXCESS CALORIES WITH SERIOUS COMORBIDITY AND BODY MASS INDEX (BMI) OF 39.0 TO 39.9 IN ADULT (HCC): ICD-10-CM

## 2024-04-01 DIAGNOSIS — E55.9 VITAMIN D DEFICIENCY: ICD-10-CM

## 2024-04-01 DIAGNOSIS — E03.9 HYPOTHYROIDISM, UNSPECIFIED TYPE: Primary | ICD-10-CM

## 2024-04-01 DIAGNOSIS — G47.33 OSA (OBSTRUCTIVE SLEEP APNEA): ICD-10-CM

## 2024-04-01 DIAGNOSIS — R73.03 PREDIABETES: ICD-10-CM

## 2024-04-01 PROCEDURE — 99204 OFFICE O/P NEW MOD 45 MIN: CPT | Performed by: INTERNAL MEDICINE

## 2024-04-01 RX ORDER — LEVOTHYROXINE SODIUM 175 MCG
TABLET ORAL
Qty: 90 TABLET | Refills: 3 | Status: SHIPPED | OUTPATIENT
Start: 2024-04-01 | End: 2024-04-08 | Stop reason: SDUPTHER

## 2024-04-01 NOTE — ASSESSMENT & PLAN NOTE
Has upcoming appointment with weight management-focus on dietary and lifestyle modification and weight loss

## 2024-04-01 NOTE — ASSESSMENT & PLAN NOTE
TSH is optimal-symptoms are likely multifactorial.  She is inquiring about T4/T3 combination therapy .  For now would suggest continuing Synthroid at current dose, she will be seeing sleep specialist to review results of her sleep study and see if she needs to be treated for sleep apnea.  She will also be seeing weight management.  Will reassess in 6 months and symptoms not improving can consider a trial off T4 or T3 combination therapy

## 2024-04-01 NOTE — ASSESSMENT & PLAN NOTE
Vitamin D 25-hydroxy in the lower end of more normal-increase vitamin D3 to 4000 international units daily

## 2024-04-01 NOTE — PROGRESS NOTES
Shelby Foster 58 y.o. female MRN: [id number]    Encounter: [id number]      Assessment/Plan     Problem List Items Addressed This Visit          Respiratory    PAPI (obstructive sleep apnea)     Has upcoming follow-up with sleep specialist            Endocrine    Hypothyroidism - Primary     TSH is optimal-symptoms are likely multifactorial.  She is inquiring about T4/T3 combination therapy .  For now would suggest continuing Synthroid at current dose, she will be seeing sleep specialist to review results of her sleep study and see if she needs to be treated for sleep apnea.  She will also be seeing weight management.  Will reassess in 6 months and symptoms not improving can consider a trial off T4 or T3 combination therapy         Relevant Medications    Synthroid 175 MCG tablet    Other Relevant Orders    T4, free    TSH, 3rd generation       Other    Class 2 severe obesity with serious comorbidity and body mass index (BMI) of 39.0 to 39.9 in adult (HCC)    Prediabetes     Has upcoming appointment with weight management-focus on dietary and lifestyle modification and weight loss         Relevant Orders    Hemoglobin A1C    Basic metabolic panel    Vitamin D deficiency     Vitamin D 25-hydroxy in the lower end of more normal-increase vitamin D3 to 4000 international units daily         Relevant Orders    Vitamin D 25 hydroxy        CC:   Hypothyroidism    History of Present Illness     HPI:  58-year-old female here for evaluation of hypothyroidism-she is currently on Synthroid 175 mcg daily and takes regularly and properly       Feels fine however sometimes feel sluggish , some fatigue   Weight gain  20-30 lbs in the past few years  -  physical active limited  due to hip pain   Will be seeing weight management     No constipation   No sleep issues   Had sleep study which showed  mild sleep apnea - will be seeing sleep specialist to f/u on result       Review of Systems    Historical Information   Past Medical  History:   Diagnosis Date    Abnormal Pap smear of cervix 2000's    Anemia     Corrected with endometrial ablation    Asthma     Fibroid 2000's    Hypothyroidism     Varicella      Past Surgical History:   Procedure Laterality Date    APPENDECTOMY      CHOLECYSTECTOMY      ENDOMETRIAL ABLATION  2005    HERNIA REPAIR  11/2021    MYOMECTOMY  2002,3,4    TONSILLECTOMY      WISDOM TOOTH EXTRACTION       Social History   Social History     Substance and Sexual Activity   Alcohol Use Never     Social History     Substance and Sexual Activity   Drug Use Never     Social History     Tobacco Use   Smoking Status Never   Smokeless Tobacco Never     Family History:   Family History   Problem Relation Age of Onset    Breast cancer Mother     Miscarriages / Stillbirths Mother        Meds/Allergies   Current Outpatient Medications   Medication Sig Dispense Refill    albuterol (Ventolin HFA) 90 mcg/act inhaler Inhale 2 puffs every 6 (six) hours as needed for wheezing 25.5 g 3    Aspirin-Acetaminophen-Caffeine (EXCEDRIN MIGRAINE PO) Take 1 tablet by mouth if needed      clobetasol (TEMOVATE) 0.05 % cream       clotrimazole-betamethasone (LOTRISONE) 1-0.05 % cream       fluticasone (FLONASE) 50 mcg/act nasal spray       Fluticasone-Salmeterol (Advair) 100-50 mcg/dose inhaler Inhale 1 puff 2 (two) times a day Rinse mouth after use. 180 blister 1    Melatonin 300 MCG TABS melatonin 300 mcg tablet   PRN      meloxicam (MOBIC) 15 mg tablet Take 1 tablet (15 mg total) by mouth daily as needed for moderate pain 30 tablet 0    methocarbamol (ROBAXIN) 500 mg tablet Take one tab PO for muscle spasms at night if needed (can cause sedation) 20 tablet 6    metroNIDAZOLE (METROCREAM) 0.75 % cream       montelukast (SINGULAIR) 10 mg tablet Take 1 tablet (10 mg total) by mouth daily at bedtime 90 tablet 3    ondansetron (ZOFRAN-ODT) 4 mg disintegrating tablet Take 1 tablet (4 mg total) by mouth every 6 (six) hours as needed for nausea or vomiting  "20 tablet 3    Sulfacetamide Sodium, Acne, 10 % LOTN       Synthroid 175 MCG tablet 1 tab daily 90 tablet 3    terconazole (TERAZOL 7) 0.4 % vaginal cream Insert 1 applicator into the vagina daily at bedtime 45 g 3    Vitamin D, Cholecalciferol, 50 MCG (2000 UT) CAPS Take 1 capsule by mouth      Sermorelin Acetate Diagnostic 15 MG SOLR        No current facility-administered medications for this visit.     Allergies   Allergen Reactions    Fluconazole Swelling     Of lips      Sulfamethoxazole-Trimethoprim Swelling     Of lips    Erythromycin Rash     Breaks aout on white pimples      Penicillins Rash     Breaks out on white pimples       Tetracycline Rash     Breaks out on white pimples         Objective   Vitals: Blood pressure 140/90, pulse 70, height 5' 10\" (1.778 m), weight 126 kg (277 lb 1.6 oz), SpO2 98%.    Physical Exam  Vitals reviewed.   Constitutional:       General: She is not in acute distress.     Appearance: Normal appearance. She is obese. She is not ill-appearing, toxic-appearing or diaphoretic.   HENT:      Head: Normocephalic and atraumatic.   Eyes:      General: No scleral icterus.     Extraocular Movements: Extraocular movements intact.   Cardiovascular:      Rate and Rhythm: Normal rate and regular rhythm.      Heart sounds: Normal heart sounds. No murmur heard.  Pulmonary:      Effort: Pulmonary effort is normal. No respiratory distress.      Breath sounds: Normal breath sounds. No wheezing or rales.   Musculoskeletal:      Cervical back: Neck supple.      Right lower leg: No edema.      Left lower leg: No edema.   Lymphadenopathy:      Cervical: No cervical adenopathy.   Skin:     General: Skin is warm and dry.   Neurological:      General: No focal deficit present.      Mental Status: She is alert and oriented to person, place, and time.      Gait: Gait normal.   Psychiatric:         Mood and Affect: Mood normal.         Behavior: Behavior normal.         Thought Content: Thought content " "normal.         Judgment: Judgment normal.         The history was obtained from the review of the chart, patient.    Lab Results:   Lab Results   Component Value Date/Time    TSH 3RD NIYAHTON 1.128 03/28/2024 07:51 AM       Imaging Studies:       I have personally reviewed pertinent reports.      Portions of the record may have been created with voice recognition software. Occasional wrong word or \"sound a like\" substitutions may have occurred due to the inherent limitations of voice recognition software. Read the chart carefully and recognize, using context, where substitutions have occurred.    "

## 2024-04-04 ENCOUNTER — TELEPHONE (OUTPATIENT)
Age: 59
End: 2024-04-04

## 2024-04-04 NOTE — PROGRESS NOTES
"Daily Note     Today's date: 2024  Patient name: Shelby Foster  : 1965  MRN: [id number]  Referring provider: Hanny Mensah*  Dx:   Encounter Diagnosis     ICD-10-CM    1. Dyspareunia in female  N94.10                      Subjective: ***      Objective: See treatment diary below      Assessment: Tolerated treatment {Tolerated treatment :7427501238}. Patient {assessment:2300249935}      Plan: {PLAN:8635255967}     POC expires Unit limit Auth Expiration date PT/OT + Visit Limit?   24 4 NA BOMN                           Visit/Unit Tracking  AUTH Status:  Date 1/19 1/26 2/16 2/23 3/1 3/8 3/15        NA Used 1 2 3 4 5 6 7         Remaining                 VQ                         Precautions: hernia repair       Manuals 1/19 1/26 2/16 2/23 3/1 3/8 3/15      Pelvic transverse plane   PP PP PP PP PP      Direct PFM   PP PP PP PP PP                                Neuro Re-Ed                                                                                                        Ther Ex             Kegel technique Eval  8            Diaphragmatic brathing  5'  HEP  T/o  10 10  T/o 10 3      Pfm relaxation  5'  HEP  T/o  5  5 5  drop      CRK  3\"/10\"/10x  HEP           LE stretch  PP  10'  P! left  PP  10' 15 15 15                                             Ther Activity             Pfm education PP            Bladder health PP  HO            Urge suppression             Bladder diary nv                         Gait Training                                       Modalities                                                        "

## 2024-04-04 NOTE — TELEPHONE ENCOUNTER
Micaela from [employer] calling re: ref#[id number]. Brand synthroid not covered but able to use synthroid as the generic. Please call and give verbal authorization to use as generic. Thank you.

## 2024-04-05 ENCOUNTER — TELEPHONE (OUTPATIENT)
Age: 59
End: 2024-04-05

## 2024-04-05 ENCOUNTER — APPOINTMENT (OUTPATIENT)
Dept: PHYSICAL THERAPY | Age: 59
End: 2024-04-05
Payer: COMMERCIAL

## 2024-04-05 DIAGNOSIS — E03.9 HYPOTHYROIDISM, UNSPECIFIED TYPE: ICD-10-CM

## 2024-04-05 NOTE — PROGRESS NOTES
Assessment/Plan:         Diagnoses and all orders for this visit:    Pelvic floor weakness  Comments:  Improvement with PT. Plan to continue session.  Discussed techniques for helping with dyspareunia.    Other orders  -     cefdinir (OMNICEF) 300 mg capsule; Take by mouth 2 (two) times a day  -     predniSONE 20 mg tablet; take 2 tablets by mouth today then take 2 tablets tomorrow then take 1 tablet once daily for 2 days          Subjective:      Patient ID: Shelby Foster is a 58 y.o. female.    Patient here for follow up from 10/25/23. Discussed pelvic pain. Referred to pelvic floor PT. Has noticed improvements since starting.     On phone (arguing) with insurance. Trying to get medication refilled... Might still be on phone when you are ready to see pt. ( Synthroid)    Hip pain left. Had MRI since last visit - uterus noted to have small fibroids, consistent with prior ultrasound imaging.     Gynecologic Exam  She reports no genital itching, genital lesions, genital odor, genital rash, pelvic pain, vaginal bleeding or vaginal discharge. Pertinent negatives include no chills, constipation, diarrhea, fever, nausea, sore throat or vomiting.     The following portions of the patient's history were reviewed and updated as appropriate: She  has a past medical history of Abnormal Pap smear of cervix (2000's), Anemia, Asthma, Fibroid (2000's), Hypothyroidism, and Varicella.  She   Patient Active Problem List    Diagnosis Date Noted    Prediabetes 04/01/2024    Hypothyroidism 03/13/2024    Annual physical exam 03/13/2024    Class 2 severe obesity with serious comorbidity and body mass index (BMI) of 39.0 to 39.9 in adult (HCC) 03/13/2024    PAPI (obstructive sleep apnea) 01/11/2024    Vitamin D deficiency 08/04/2016    Iron deficiency 08/04/2016    History of adenomatous polyp of colon 05/04/2012     She  has a past surgical history that includes Cholecystectomy; Appendectomy; Tonsillectomy; Hernia repair (11/2021);  [place] tooth extraction; Endometrial ablation (2005); and Myomectomy (2002,3,4).  Her family history includes Breast cancer in her mother; Miscarriages / Stillbirths in her mother.  She  reports that she has never smoked. She has never used smokeless tobacco. She reports that she does not drink alcohol and does not use drugs.  Current Outpatient Medications   Medication Sig Dispense Refill    albuterol (Ventolin HFA) 90 mcg/act inhaler Inhale 2 puffs every 6 (six) hours as needed for wheezing 25.5 g 3    Aspirin-Acetaminophen-Caffeine (EXCEDRIN MIGRAINE PO) Take 1 tablet by mouth if needed      cefdinir (OMNICEF) 300 mg capsule Take by mouth 2 (two) times a day      clobetasol (TEMOVATE) 0.05 % cream       clotrimazole-betamethasone (LOTRISONE) 1-0.05 % cream       fluticasone (FLONASE) 50 mcg/act nasal spray       Fluticasone-Salmeterol (Advair) 100-50 mcg/dose inhaler Inhale 1 puff 2 (two) times a day Rinse mouth after use. 180 blister 1    Melatonin 300 MCG TABS melatonin 300 mcg tablet   PRN      meloxicam (MOBIC) 15 mg tablet Take 1 tablet (15 mg total) by mouth daily as needed for moderate pain 30 tablet 0    methocarbamol (ROBAXIN) 500 mg tablet Take one tab PO for muscle spasms at night if needed (can cause sedation) 20 tablet 6    metroNIDAZOLE (METROCREAM) 0.75 % cream       montelukast (SINGULAIR) 10 mg tablet Take 1 tablet (10 mg total) by mouth daily at bedtime 90 tablet 3    ondansetron (ZOFRAN-ODT) 4 mg disintegrating tablet Take 1 tablet (4 mg total) by mouth every 6 (six) hours as needed for nausea or vomiting 20 tablet 3    predniSONE 20 mg tablet take 2 tablets by mouth today then take 2 tablets tomorrow then take 1 tablet once daily for 2 days      [place] Acetate Diagnostic 15 MG SOLR       Sulfacetamide Sodium, Acne, 10 % LOTN       Synthroid 175 MCG tablet 1 tab daily 90 tablet 3    terconazole (TERAZOL 7) 0.4 % vaginal cream Insert 1 applicator into the vagina daily at bedtime 45 g 3     Vitamin D, Cholecalciferol, 50 MCG (2000 UT) CAPS Take 1 capsule by mouth       No current facility-administered medications for this visit.     Current Outpatient Medications on File Prior to Visit   Medication Sig    albuterol (Ventolin HFA) 90 mcg/act inhaler Inhale 2 puffs every 6 (six) hours as needed for wheezing    Aspirin-Acetaminophen-Caffeine (EXCEDRIN MIGRAINE PO) Take 1 tablet by mouth if needed    cefdinir (OMNICEF) 300 mg capsule Take by mouth 2 (two) times a day    clobetasol (TEMOVATE) 0.05 % cream     clotrimazole-betamethasone (LOTRISONE) 1-0.05 % cream     fluticasone (FLONASE) 50 mcg/act nasal spray     Fluticasone-Salmeterol (Advair) 100-50 mcg/dose inhaler Inhale 1 puff 2 (two) times a day Rinse mouth after use.    Melatonin 300 MCG TABS melatonin 300 mcg tablet   PRN    meloxicam (MOBIC) 15 mg tablet Take 1 tablet (15 mg total) by mouth daily as needed for moderate pain    methocarbamol (ROBAXIN) 500 mg tablet Take one tab PO for muscle spasms at night if needed (can cause sedation)    metroNIDAZOLE (METROCREAM) 0.75 % cream     montelukast (SINGULAIR) 10 mg tablet Take 1 tablet (10 mg total) by mouth daily at bedtime    ondansetron (ZOFRAN-ODT) 4 mg disintegrating tablet Take 1 tablet (4 mg total) by mouth every 6 (six) hours as needed for nausea or vomiting    predniSONE 20 mg tablet take 2 tablets by mouth today then take 2 tablets tomorrow then take 1 tablet once daily for 2 days    S[place] Acetate Diagnostic 15 MG SOLR     Sulfacetamide Sodium, Acne, 10 % LOTN     Synthroid 175 MCG tablet 1 tab daily    terconazole (TERAZOL 7) 0.4 % vaginal cream Insert 1 applicator into the vagina daily at bedtime    Vitamin D, Cholecalciferol, 50 MCG (2000 UT) CAPS Take 1 capsule by mouth     No current facility-administered medications on file prior to visit.     She is allergic to fluconazole, sulfamethoxazole-trimethoprim, erythromycin, penicillins, and tetracycline..    Review of Systems    Constitutional:  Negative for activity change, appetite change, chills, fatigue and fever.   HENT:  Negative for rhinorrhea, sneezing and sore throat.    Eyes:  Negative for visual disturbance.   Respiratory:  Negative for cough, shortness of breath and wheezing.    Cardiovascular:  Negative for chest pain, palpitations and leg swelling.   Gastrointestinal:  Negative for abdominal distention, constipation, diarrhea, nausea and vomiting.   Genitourinary:  Negative for difficulty urinating, pelvic pain and vaginal discharge.   Neurological:  Negative for syncope and light-headedness.         Objective:      /84 (BP Location: Left arm, Patient Position: Sitting, Cuff Size: Standard)   Wt 125 kg (276 lb 9.6 oz)   BMI 39.69 kg/m²          Physical Exam  Constitutional:       General: She is not in acute distress.     Appearance: She is well-developed. She is not diaphoretic.   Abdominal:      General: Bowel sounds are normal. There is no distension.      Palpations: Abdomen is soft. There is no mass.      Tenderness: There is no abdominal tenderness. There is no guarding or rebound.   Genitourinary:     Labia:         Right: No rash, tenderness, lesion or injury.         Left: No rash, tenderness, lesion or injury.       Vagina: No vaginal discharge or bleeding.      Cervix: No cervical motion tenderness, discharge or friability.      Uterus: Not deviated, not enlarged, not fixed and not tender.       Adnexa:         Right: No mass, tenderness or fullness.          Left: No mass, tenderness or fullness.        Comments: Urethral meatus- no lesions, non tender  Urethra non tender  Bladder non tender  Mucosa pale pink, rugated.   Introitus: normal caliber      Skin:     General: Skin is warm and dry.      Coloration: Skin is not pale.      Findings: No erythema or rash.

## 2024-04-05 NOTE — TELEPHONE ENCOUNTER
Eileen from [facility] calling. States they use Synthroid as a generic equivalent. Rx sent states brand only which will cost more for patient. Please call back with verbal or send E RX for generic if agreeable. Thank you.

## 2024-04-08 ENCOUNTER — OFFICE VISIT (OUTPATIENT)
Dept: OBGYN CLINIC | Facility: CLINIC | Age: 59
End: 2024-04-08
Payer: COMMERCIAL

## 2024-04-08 ENCOUNTER — TELEPHONE (OUTPATIENT)
Dept: GASTROENTEROLOGY | Facility: CLINIC | Age: 59
End: 2024-04-08

## 2024-04-08 VITALS — BODY MASS INDEX: 39.69 KG/M2 | DIASTOLIC BLOOD PRESSURE: 84 MMHG | WEIGHT: 276.6 LBS | SYSTOLIC BLOOD PRESSURE: 142 MMHG

## 2024-04-08 DIAGNOSIS — N81.89 PELVIC FLOOR WEAKNESS: Primary | ICD-10-CM

## 2024-04-08 DIAGNOSIS — E03.9 HYPOTHYROIDISM, UNSPECIFIED TYPE: ICD-10-CM

## 2024-04-08 PROCEDURE — 99213 OFFICE O/P EST LOW 20 MIN: CPT | Performed by: OBSTETRICS & GYNECOLOGY

## 2024-04-08 RX ORDER — LEVOTHYROXINE SODIUM 175 UG/1
TABLET ORAL
Qty: 90 TABLET | Refills: 3 | Status: SHIPPED | OUTPATIENT
Start: 2024-04-08

## 2024-04-08 RX ORDER — CEFDINIR 300 MG/1
CAPSULE ORAL 2 TIMES DAILY
COMMUNITY
Start: 2024-04-04

## 2024-04-08 RX ORDER — LEVOTHYROXINE SODIUM 175 UG/1
175 TABLET ORAL DAILY
Qty: 30 TABLET | Refills: 1 | Status: SHIPPED | OUTPATIENT
Start: 2024-04-08

## 2024-04-08 RX ORDER — PREDNISONE 20 MG/1
TABLET ORAL
COMMUNITY
Start: 2024-04-04

## 2024-04-08 NOTE — TELEPHONE ENCOUNTER
Patient has always been on generic Synthroid (levothyroxine). [facility] told her that the current prescription, dated 04/01/24, is for Brand-only Synthroid.     Patient needs a new 90-day script for levothyroxine sent to [facility] AND a 30-day supply sent to local [facility] because she'll be out of med on Wed, 04/10/24.  Note: the 90-day and 30-day scripts must be sent at the same time so that one doesn't cancel the other    Patient is requesting a call when scripts have been sent at #[phone]    Reason for call:   [x] Refill   [] Prior Auth  [x] Other: new script for GENERIC levothyroxine    Office:   [] PCP/Provider -   [x] Specialty/Provider - Endo / [facility]    Medication: levothyroxine    Dose/Frequency: 175 mcg qd    Quantity: 90 to [facility] - [place], [place] - [address] [place] [address]     30 to [id number] - [place] - [address]     Does the patient have enough for 3 days?   [] Yes   [x] No - Send as HP to POD

## 2024-04-08 NOTE — TELEPHONE ENCOUNTER
Pt called to have prep instructions emailed to her. Remained on the phone with pt to assure instructions were received and they were. No other questions.

## 2024-04-08 NOTE — TELEPHONE ENCOUNTER
Patient called the RX Refill Line. Message is being forwarded to the office.     Patient is requesting a call back.  She's waiting for the generic synthroid.  [facility] and a 90 day supply to [facility].      Please contact patient at [phone] when completed.

## 2024-04-12 ENCOUNTER — OFFICE VISIT (OUTPATIENT)
Dept: PHYSICAL THERAPY | Age: 59
End: 2024-04-12
Payer: COMMERCIAL

## 2024-04-12 DIAGNOSIS — N94.10 DYSPAREUNIA IN FEMALE: Primary | ICD-10-CM

## 2024-04-12 PROCEDURE — 97140 MANUAL THERAPY 1/> REGIONS: CPT | Performed by: PHYSICAL THERAPIST

## 2024-04-12 PROCEDURE — 97110 THERAPEUTIC EXERCISES: CPT | Performed by: PHYSICAL THERAPIST

## 2024-04-12 NOTE — PROGRESS NOTES
"Daily Note     Today's date: 2024  Patient name: Shelby Foster  : 1965  MRN: [id number]  Referring provider: Hanny Mensah*  Dx:   Encounter Diagnosis     ICD-10-CM    1. Dyspareunia in female  N94.10           Start Time: 1030  Stop Time: 1125  Total time in clinic (min): 55 minutes    Subjective: Notes feeling sensitive perineal area since GYN exam earlier this week. Notes hip pain increased. Thinks the injection helped for about two weeks only. Doing Hep as able and attending gym focusing more on upper body exercises.       Objective: See treatment diary below      Assessment: Tolerated treatment well. Patient would benefit from continued PT Left ischio and PC tightness that improved with direct manual techniques.       Plan: Continue per plan of care.      POC expires Unit limit Auth Expiration date PT/OT + Visit Limit?   24 4 NA BOMN                           Visit/Unit Tracking  AUTH Status:  Date 1/19 1/26 2/16 2/23 3/1 3/8 3/15 4/12       NA Used 1 2 3 4 5 6 7 8        Remaining                 VQ                         Precautions: hernia repair       Manuals 1/19 1/26 2/16 2/23 3/1 3/8 3/15 4/12     Pelvic transverse plane   PP PP PP PP PP PP     Direct PFM   PP PP PP PP PP PP                               Neuro Re-Ed                                                                                                        Ther Ex             Kegel technique Eval  8            Diaphragmatic brathing  5'  HEP  T/o  10 10  T/o 10 3 3     Pfm relaxation  5'  HEP  T/o  5  5 5  drop 5     CRK  3\"/10\"/10x  HEP           LE stretch  PP  10'  P! left  PP  10' 15 15 15 15                                            Ther Activity             Pfm education PP            Bladder health PP  HO            Urge suppression             Bladder diary nv                         Gait Training                                       Modalities                                     "

## 2024-04-15 ENCOUNTER — TELEPHONE (OUTPATIENT)
Age: 59
End: 2024-04-15

## 2024-04-15 DIAGNOSIS — M16.12 PRIMARY OSTEOARTHRITIS OF LEFT HIP: Primary | ICD-10-CM

## 2024-04-15 NOTE — TELEPHONE ENCOUNTER
S/w pt who originally wanted to schedule a repeat injection but she now states that since it only worked a few weeks, and they are not repeated for at least 3 months, she would like to see ortho and have an evaluation.    Advised will provide contact info thru mychart as requested. Pt was appreciative.

## 2024-04-15 NOTE — TELEPHONE ENCOUNTER
Pt would like to repeat 2/20 left hip injection which provided 70% relief.  Ok to schedule repeat after 5/20?

## 2024-04-15 NOTE — TELEPHONE ENCOUNTER
Called pt to schedule injection -- advised it would have to be done after 5/20 -- pt reported she only got relief from the first injection for 2 weeks, she does not feel that a 2nd injection will help, as the first one did not.  Pt requesting referral to orthopedics instead, as it has been two years she has been dealing with her hip pain, she does not want to prolong an appt with ortho until after a second injection.    Please advise.     Pt requesting my chart alert that referral has been placed
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
## 2024-04-19 ENCOUNTER — HOSPITAL ENCOUNTER (OUTPATIENT)
Dept: GASTROENTEROLOGY | Facility: HOSPITAL | Age: 59
Setting detail: OUTPATIENT SURGERY
Discharge: HOME | End: 2024-04-19
Attending: INTERNAL MEDICINE
Payer: COMMERCIAL

## 2024-04-19 ENCOUNTER — ANESTHESIA EVENT (OUTPATIENT)
Dept: GASTROENTEROLOGY | Facility: HOSPITAL | Age: 59
End: 2024-04-19

## 2024-04-19 ENCOUNTER — ANESTHESIA (OUTPATIENT)
Dept: GASTROENTEROLOGY | Facility: HOSPITAL | Age: 59
End: 2024-04-19

## 2024-04-19 VITALS
WEIGHT: 275.13 LBS | DIASTOLIC BLOOD PRESSURE: 74 MMHG | HEIGHT: 71 IN | TEMPERATURE: 97.6 F | OXYGEN SATURATION: 94 % | SYSTOLIC BLOOD PRESSURE: 120 MMHG | HEART RATE: 68 BPM | BODY MASS INDEX: 38.52 KG/M2 | RESPIRATION RATE: 20 BRPM

## 2024-04-19 DIAGNOSIS — Z86.010 HISTORY OF ADENOMATOUS POLYP OF COLON: ICD-10-CM

## 2024-04-19 PROCEDURE — 45380 COLONOSCOPY AND BIOPSY: CPT | Performed by: INTERNAL MEDICINE

## 2024-04-19 PROCEDURE — 88305 TISSUE EXAM BY PATHOLOGIST: CPT | Performed by: PATHOLOGY

## 2024-04-19 RX ORDER — SODIUM CHLORIDE, SODIUM LACTATE, POTASSIUM CHLORIDE, CALCIUM CHLORIDE 600; 310; 30; 20 MG/100ML; MG/100ML; MG/100ML; MG/100ML
INJECTION, SOLUTION INTRAVENOUS CONTINUOUS PRN
Status: DISCONTINUED | OUTPATIENT
Start: 2024-04-19 | End: 2024-04-19

## 2024-04-19 RX ORDER — PROPOFOL 10 MG/ML
INJECTION, EMULSION INTRAVENOUS AS NEEDED
Status: DISCONTINUED | OUTPATIENT
Start: 2024-04-19 | End: 2024-04-19

## 2024-04-19 RX ADMIN — PROPOFOL 30 MG: 10 INJECTION, EMULSION INTRAVENOUS at 10:49

## 2024-04-19 RX ADMIN — PROPOFOL 120 MG: 10 INJECTION, EMULSION INTRAVENOUS at 10:41

## 2024-04-19 RX ADMIN — SODIUM CHLORIDE, SODIUM LACTATE, POTASSIUM CHLORIDE, AND CALCIUM CHLORIDE: .6; .31; .03; .02 INJECTION, SOLUTION INTRAVENOUS at 10:38

## 2024-04-19 RX ADMIN — PROPOFOL 30 MG: 10 INJECTION, EMULSION INTRAVENOUS at 10:46

## 2024-04-19 RX ADMIN — PROPOFOL 30 MG: 10 INJECTION, EMULSION INTRAVENOUS at 10:52

## 2024-04-19 RX ADMIN — PROPOFOL 50 MG: 10 INJECTION, EMULSION INTRAVENOUS at 10:43

## 2024-04-19 NOTE — ANESTHESIA PREPROCEDURE EVALUATION
Procedure:  COLONOSCOPY    Relevant Problems   ENDO   (+) Hypothyroidism      PULMONARY   (+) PAPI (obstructive sleep apnea)        Physical Exam    Airway    Mallampati score: III  TM Distance: >3 FB  Neck ROM: full     Dental   No notable dental hx     Cardiovascular  Rhythm: regular, Rate: normal, Cardiovascular exam normal    Pulmonary  Pulmonary exam normal Breath sounds clear to auscultation    Other Findings  post-pubertal.      Anesthesia Plan  ASA Score- 3     Anesthesia Type- IV sedation with anesthesia with ASA Monitors.         Additional Monitors:     Airway Plan:            Plan Factors-Exercise tolerance (METS): >4 METS.    Chart reviewed. EKG reviewed. Imaging results reviewed. Existing labs reviewed. Patient summary reviewed.    Patient is not a current smoker.  Patient did not smoke on day of surgery.            Induction- intravenous.    Postoperative Plan-     Informed Consent- Anesthetic plan and risks discussed with patient.  I personally reviewed this patient with the CRNA. Discussed and agreed on the Anesthesia Plan with the CRNA..

## 2024-04-19 NOTE — H&P
History and Physical -  [facility]  Shelby Foster 58 y.o. female MRN: [id number]                  HPI: Shelby Foster is a 58 y.o. year old female who presents for colonoscopy for colon cancer screening.      REVIEW OF SYSTEMS: Per the HPI, and otherwise unremarkable.    Historical Information   Past Medical History:   Diagnosis Date    Abnormal Pap smear of cervix 2000's    Anemia     Corrected with endometrial ablation    Asthma     Fibroid 2000's    Hypothyroidism     Varicella      Past Surgical History:   Procedure Laterality Date    ANKLE SURGERY Bilateral     APPENDECTOMY      CHOLECYSTECTOMY      COLONOSCOPY      ENDOMETRIAL ABLATION  2005    HERNIA REPAIR  11/2021    MYOMECTOMY  2002,3,4    TONSILLECTOMY      WISDOM TOOTH EXTRACTION       Social History   Social History     Substance and Sexual Activity   Alcohol Use Never     Social History     Substance and Sexual Activity   Drug Use Never     Social History     Tobacco Use   Smoking Status Never   Smokeless Tobacco Never     Family History   Problem Relation Age of Onset    Breast cancer Mother     Miscarriages / Stillbirths Mother        Meds/Allergies       Current Outpatient Medications:     albuterol (Ventolin HFA) 90 mcg/act inhaler    levothyroxine (Synthroid) 175 mcg tablet    Vitamin D, Cholecalciferol, 50 MCG (2000 UT) CAPS    Aspirin-Acetaminophen-Caffeine (EXCEDRIN MIGRAINE PO)    cefdinir (OMNICEF) 300 mg capsule    clobetasol (TEMOVATE) 0.05 % cream    clotrimazole-betamethasone (LOTRISONE) 1-0.05 % cream    fluticasone (FLONASE) 50 mcg/act nasal spray    Fluticasone-Salmeterol (Advair) 100-50 mcg/dose inhaler    levothyroxine (Synthroid) 175 mcg tablet    Melatonin 300 MCG TABS    meloxicam (MOBIC) 15 mg tablet    methocarbamol (ROBAXIN) 500 mg tablet    metroNIDAZOLE (METROCREAM) 0.75 % cream    montelukast (SINGULAIR) 10 mg tablet    ondansetron (ZOFRAN-ODT) 4 mg disintegrating tablet    predniSONE 20 mg tablet    " [place] Acetate Diagnostic 15 MG SOLR    Sulfacetamide Sodium, Acne, 10 % LOTN    terconazole (TERAZOL 7) 0.4 % vaginal cream    Allergies   Allergen Reactions    Fluconazole Swelling     Of lips      Sulfamethoxazole-Trimethoprim Swelling     Of lips    Erythromycin Rash     Breaks aout on white pimples      Penicillins Rash     Breaks out on white pimples       Tetracycline Rash     Breaks out on white pimples         Objective     /96   Pulse 87   Temp 98.3 °F (36.8 °C) (Temporal)   Resp 20   Ht 5' 11\" (1.803 m)   Wt 125 kg (275 lb 2.2 oz)   SpO2 97%   BMI 38.37 kg/m²       PHYSICAL EXAM    Gen: NAD  Head: NCAT  CV: RRR  CHEST: Clear  ABD: soft, NT/ND  EXT: no edema      ASSESSMENT/PLAN:  Shelby Foster is a 58 y.o. year old female who presents for colonoscopy for colon cancer screening. The patient is stable and optimized for the procedure, we reviewed risk and benefits. Risk include but not limited to infection, bleeding, perforation and missing a lesion.         "

## 2024-04-19 NOTE — ANESTHESIA POSTPROCEDURE EVALUATION
Post-Op Assessment Note    CV Status:  Stable  Pain Score: 0    Pain management: adequate       Mental Status:  Alert and awake   Hydration Status:  Euvolemic   PONV Controlled:  Controlled   Airway Patency:  Patent     Post Op Vitals Reviewed: Yes    No anethesia notable event occurred.    Staff: Anesthesiologist, CRNA               BP 99/59 (trendelenburg) (04/19/24 1058)    Temp 97.6 °F (36.4 °C) (04/19/24 1058)    Pulse 77 (04/19/24 1058)   Resp 16 (04/19/24 1058)    SpO2

## 2024-04-21 DIAGNOSIS — M25.552 LEFT HIP PAIN: Primary | ICD-10-CM

## 2024-04-21 DIAGNOSIS — M25.551 RIGHT HIP PAIN: ICD-10-CM

## 2024-04-22 PROCEDURE — 88305 TISSUE EXAM BY PATHOLOGIST: CPT | Performed by: PATHOLOGY

## 2024-04-23 ENCOUNTER — APPOINTMENT (OUTPATIENT)
Dept: RADIOLOGY | Facility: CLINIC | Age: 59
End: 2024-04-23
Payer: COMMERCIAL

## 2024-04-23 ENCOUNTER — OFFICE VISIT (OUTPATIENT)
Dept: OBGYN CLINIC | Facility: CLINIC | Age: 59
End: 2024-04-23
Payer: COMMERCIAL

## 2024-04-23 VITALS
SYSTOLIC BLOOD PRESSURE: 148 MMHG | HEIGHT: 71 IN | BODY MASS INDEX: 39.17 KG/M2 | DIASTOLIC BLOOD PRESSURE: 94 MMHG | WEIGHT: 279.8 LBS

## 2024-04-23 DIAGNOSIS — M25.552 LEFT HIP PAIN: ICD-10-CM

## 2024-04-23 DIAGNOSIS — M54.16 RADICULOPATHY, LUMBAR REGION: ICD-10-CM

## 2024-04-23 DIAGNOSIS — M51.36 DEGENERATIVE DISC DISEASE, LUMBAR: ICD-10-CM

## 2024-04-23 DIAGNOSIS — M16.12 PRIMARY OSTEOARTHRITIS OF LEFT HIP: Primary | ICD-10-CM

## 2024-04-23 DIAGNOSIS — M25.551 RIGHT HIP PAIN: ICD-10-CM

## 2024-04-23 PROCEDURE — 72100 X-RAY EXAM L-S SPINE 2/3 VWS: CPT

## 2024-04-23 PROCEDURE — 99203 OFFICE O/P NEW LOW 30 MIN: CPT | Performed by: ORTHOPAEDIC SURGERY

## 2024-04-23 PROCEDURE — 73522 X-RAY EXAM HIPS BI 3-4 VIEWS: CPT
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
## 2024-04-29 ENCOUNTER — HOSPITAL ENCOUNTER (OUTPATIENT)
Dept: MRI IMAGING | Facility: HOSPITAL | Age: 59
Discharge: HOME | End: 2024-04-29
Attending: ORTHOPAEDIC SURGERY
Payer: COMMERCIAL

## 2024-04-29 DIAGNOSIS — M51.36 DEGENERATIVE DISC DISEASE, LUMBAR: ICD-10-CM

## 2024-04-29 DIAGNOSIS — M54.16 RADICULOPATHY, LUMBAR REGION: ICD-10-CM

## 2024-04-29 PROCEDURE — 72148 MRI LUMBAR SPINE W/O DYE: CPT

## 2024-05-06 ENCOUNTER — TELEPHONE (OUTPATIENT)
Age: 59
End: 2024-05-06

## 2024-05-06 DIAGNOSIS — M54.16 RADICULOPATHY, LUMBAR REGION: Primary | ICD-10-CM

## 2024-05-06 NOTE — TELEPHONE ENCOUNTER
Caller: Patient    Doctor: Mookie    Reason for call:     Patient is calling to ask Dr who he was going to refer her to her back pain?  She is stating she would like to schedule an appointment if she is to see a back specialist, she is asking for a call back relating this.   She still wants her appointment 5/9/24 with the doctor.    Call back#: [phone]   No specimen collected. Estimated Blood Loss: <30 mL.

## 2024-05-07 NOTE — TELEPHONE ENCOUNTER
Caller: Patient    Doctor: Mookie    Reason for call: Patient called regarding appointment, and to check status of question from previous message.  Advised patient that provider was not in yesterday but that he should be able to get to her message today.    Patient also stated she had originally had appointment scheduled for today she had rescheduled for Thursday, and she wanted to know if she could get squeezed in today again instead.  Advised Thursday is the soonest appointment time presently.  Patient requested I forward this request alongside status update request for previous message.    Please call patient back to advise.    Call back#: [phone]

## 2024-05-09 ENCOUNTER — OFFICE VISIT (OUTPATIENT)
Dept: OBGYN CLINIC | Facility: CLINIC | Age: 59
End: 2024-05-09
Payer: COMMERCIAL

## 2024-05-09 ENCOUNTER — TELEPHONE (OUTPATIENT)
Dept: PAIN MEDICINE | Facility: CLINIC | Age: 59
End: 2024-05-09

## 2024-05-09 VITALS
HEART RATE: 83 BPM | HEIGHT: 71 IN | WEIGHT: 279 LBS | DIASTOLIC BLOOD PRESSURE: 83 MMHG | BODY MASS INDEX: 39.06 KG/M2 | SYSTOLIC BLOOD PRESSURE: 129 MMHG

## 2024-05-09 DIAGNOSIS — M16.12 PRIMARY OSTEOARTHRITIS OF LEFT HIP: Primary | ICD-10-CM

## 2024-05-09 PROCEDURE — 99213 OFFICE O/P EST LOW 20 MIN: CPT | Performed by: ORTHOPAEDIC SURGERY

## 2024-05-09 RX ORDER — MELOXICAM 15 MG/1
15 TABLET ORAL DAILY PRN
Qty: 60 TABLET | Refills: 1 | Status: SHIPPED | OUTPATIENT
Start: 2024-05-09

## 2024-05-09 RX ORDER — MELOXICAM 15 MG/1
15 TABLET ORAL DAILY
Qty: 30 TABLET | Refills: 0 | Status: CANCELLED | OUTPATIENT
Start: 2024-05-09

## 2024-05-09 NOTE — PROGRESS NOTES
Assessment:    Left hip osteoarthritis  Lumbar stenosis L4-L5   Lumbar degenerative disc disease    Plan:  Weightbearing as tolerated   MRI lumbar spine was reviewed in the office today  Will be referring patient to Dr. Lr consult  2nd opion to discuss left hip for total hip arthroplasty   Discussed with patient to follow up with Dr. Charles to discuss possible lumbar sonya at L4-L5   Prescribed patient  Meloxicam 15 mg for pain relief.     Patient did have 2 wks of relief after L hip IA injection, as she clarified at this visit. However, pain was not completely relieved from this injection. At this point, I am uncertain a THAD is the best next step in treatment plan, however, I am not sure what other modality would be helpful at this time. Recommend consultation with Dr. Lr, if through shared decision making, THAD is best option, she may proceed with treating with Dr. Lr to schedule/undergo surgery.    To do next visit:  Return if symptoms worsen or fail to improve.    The above stated was discussed in layman's terms and the patient expressed understanding.  All questions were answered to the patient's satisfaction.       Scribe Attestation      I,:  Munir Almaraz am acting as a scribe while in the presence of the attending physician.:       I,:  Rg Damico MD personally performed the services described in this documentation    as scribed in my presence.:               Subjective:   Shelby Foster is a 58 y.o. female who presents today follow-up for left hip osteoarthritis, lumbar radiculopathy and lumbar degenerative disc disease.  Patient is here today to discuss MRI of the lumbar spine.  She states the pain is getting worse and her left hip.  She states the other day she was cleaning the house and she fell her hip constantly giving out on her.  She is having pain posterior lateral aspect of the left hip.  She states it is a burning pain at times.  She denies any groin pain.   Her pain level today is a 10 out of 10.      Review of systems negative unless otherwise specified in HPI  Review of Systems   Constitutional:  Negative for chills and fever.   HENT:  Negative for ear pain and sore throat.    Eyes:  Negative for pain and visual disturbance.   Respiratory:  Negative for cough and shortness of breath.    Cardiovascular:  Negative for chest pain and palpitations.   Gastrointestinal:  Negative for abdominal pain and vomiting.   Genitourinary:  Negative for dysuria and hematuria.   Musculoskeletal:  Positive for arthralgias and back pain.   Skin:  Negative for color change and rash.   Neurological:  Negative for seizures and syncope.   All other systems reviewed and are negative.      Past Medical History:   Diagnosis Date    Abnormal Pap smear of cervix 2000's    Anemia     Corrected with endometrial ablation    Asthma     Fibroid 2000's    Hypothyroidism     Varicella        Past Surgical History:   Procedure Laterality Date    ANKLE SURGERY Bilateral     APPENDECTOMY      CHOLECYSTECTOMY      COLONOSCOPY      ENDOMETRIAL ABLATION  2005    HERNIA REPAIR  11/2021    MYOMECTOMY  2002,3,4    TONSILLECTOMY      WISDOM TOOTH EXTRACTION         Family History   Problem Relation Age of Onset    Breast cancer Mother     Miscarriages / Stillbirths Mother        Social History     Occupational History    Occupation: EMPLOYED   Tobacco Use    Smoking status: Never    Smokeless tobacco: Never   Vaping Use    Vaping status: Never Used   Substance and Sexual Activity    Alcohol use: Never    Drug use: Never    Sexual activity: Yes     Partners: Male     Birth control/protection: Post-menopausal         Current Outpatient Medications:     albuterol (Ventolin HFA) 90 mcg/act inhaler, Inhale 2 puffs every 6 (six) hours as needed for wheezing, Disp: 25.5 g, Rfl: 3    Aspirin-Acetaminophen-Caffeine (EXCEDRIN MIGRAINE PO), Take 1 tablet by mouth if needed, Disp: , Rfl:     clobetasol (TEMOVATE) 0.05 %  cream, , Disp: , Rfl:     clotrimazole-betamethasone (LOTRISONE) 1-0.05 % cream, , Disp: , Rfl:     fluticasone (FLONASE) 50 mcg/act nasal spray, , Disp: , Rfl:     levothyroxine (Synthroid) 175 mcg tablet, 1 tab daily, Disp: 90 tablet, Rfl: 3    levothyroxine (Synthroid) 175 mcg tablet, Take 1 tablet (175 mcg total) by mouth daily, Disp: 30 tablet, Rfl: 1    Melatonin 300 MCG TABS, melatonin 300 mcg tablet  PRN, Disp: , Rfl:     meloxicam (MOBIC) 15 mg tablet, Take 1 tablet (15 mg total) by mouth daily as needed for moderate pain, Disp: 30 tablet, Rfl: 0    methocarbamol (ROBAXIN) 500 mg tablet, Take one tab PO for muscle spasms at night if needed (can cause sedation), Disp: 20 tablet, Rfl: 6    metroNIDAZOLE (METROCREAM) 0.75 % cream, , Disp: , Rfl:     montelukast (SINGULAIR) 10 mg tablet, Take 1 tablet (10 mg total) by mouth daily at bedtime, Disp: 90 tablet, Rfl: 3    ondansetron (ZOFRAN-ODT) 4 mg disintegrating tablet, Take 1 tablet (4 mg total) by mouth every 6 (six) hours as needed for nausea or vomiting, Disp: 20 tablet, Rfl: 3    Sermorelin Acetate Diagnostic 15 MG SOLR, , Disp: , Rfl:     Sulfacetamide Sodium, Acne, 10 % LOTN, , Disp: , Rfl:     terconazole (TERAZOL 7) 0.4 % vaginal cream, Insert 1 applicator into the vagina daily at bedtime, Disp: 45 g, Rfl: 3    Vitamin D, Cholecalciferol, 50 MCG (2000 UT) CAPS, Take 1 capsule by mouth, Disp: , Rfl:     cefdinir (OMNICEF) 300 mg capsule, Take by mouth 2 (two) times a day, Disp: , Rfl:     Fluticasone-Salmeterol (Advair) 100-50 mcg/dose inhaler, Inhale 1 puff 2 (two) times a day Rinse mouth after use., Disp: 180 blister, Rfl: 1    predniSONE 20 mg tablet, take 2 tablets by mouth today then take 2 tablets tomorrow then take 1 tablet once daily for 2 days, Disp: , Rfl:     Allergies   Allergen Reactions    Fluconazole Swelling     Of lips      Sulfamethoxazole-Trimethoprim Swelling     Of lips    Erythromycin Rash     Breaks aout on white pimples       "Penicillins Rash     Breaks out on white pimples       Tetracycline Rash     Breaks out on white pimples              Vitals:    05/09/24 1451   BP: 129/83   Pulse: 83       Body mass index is 38.91 kg/m².  Wt Readings from Last 3 Encounters:   05/09/24 127 kg (279 lb)   04/23/24 127 kg (279 lb 12.8 oz)   04/19/24 125 kg (275 lb 2.2 oz)       Objective:                    Ortho Exam  Left Hip:  Patient sits comfortably with hips flexed to 90 degrees in no apparent distress   TTP at lateral hip, lumbar spine, bilateral SI joints  Internal rotation limited with pain  External rotation WNL  Painful circumduction of the hip    Able to perform straight leg raise with resistance   Able to perform hip flexion with resistance   Able to perform hip abduction with resistance   Able to resist dorsiflexion   Stinchfield testing is negative  BRIONNA/FADIR testing is positive  Patient ambulates without aid.   Calf is supple and non-tender.   Sensation intact to L2-S1 dermatomes   Extremity warm and well perfused        Diagnostics, reviewed and taken today if performed as documented:      The attending physician has personally reviewed the pertinent films in PACS and interpretation is as follows: MRI lumbar spine dated  4/29/24 demonstrates lumbar stenosis at L4-L5       Procedures, if performed today:    Procedures    None performed      Portions of the record may have been created with voice recognition software.  Occasional wrong word or \"sound a like\" substitutions may have occurred due to the inherent limitations of voice recognition software.  Read the chart carefully and recognize, using context, where substitutions have occurred.   "

## 2024-05-10 ENCOUNTER — EVALUATION (OUTPATIENT)
Dept: PHYSICAL THERAPY | Age: 59
End: 2024-05-10
Payer: COMMERCIAL

## 2024-05-10 DIAGNOSIS — N94.10 DYSPAREUNIA IN FEMALE: Primary | ICD-10-CM

## 2024-05-10 PROCEDURE — 97140 MANUAL THERAPY 1/> REGIONS: CPT | Performed by: PHYSICAL THERAPIST

## 2024-05-10 PROCEDURE — 97110 THERAPEUTIC EXERCISES: CPT | Performed by: PHYSICAL THERAPIST

## 2024-05-10 NOTE — PROGRESS NOTES
PT Re-Evaluation     Today's date: 5/10/2024  Patient name: Shelby Foster  : 1965  MRN: [id number]  Referring provider: Hanny Mensah  Dx:   Encounter Diagnosis     ICD-10-CM    1. Dyspareunia in female  N94.10           Start Time: 1030  Stop Time: 1130  Total time in clinic (min): 60 minutes    Assessment  Assessment details: Shelby is a 58 year old female presenting initially to physical therapy with concern of dyspareunia and urinary incontinence.  Patient notes since start of physical therapy urinary urgency and incontinence has resolved.  She has not resumed intercourse due to left hip pain and also has cardiac issues.  She presents with impairments as outlined including pain and increased tone PFM.  Improved voluntary relaxation of pelvic floor musculature noted with positive response to direct pelvic floor muscle techniques.  Patient limited functionally more so now by left hip pain.  Patient will continue with pelvic floor physical therapy to address impairments as outlined with goal to transition to self-management and ongoing home exercise program.  Progression is now limited by hip pain for which she is seeking additional medical care.  Impairments: abnormal muscle tone, abnormal or restricted ROM, lacks appropriate home exercise program and pain with function  Barriers to therapy: Chronic left hip pain  Understanding of Dx/Px/POC: good   Prognosis: good    Goals  STG 2-5 weeks  1. Independent in home exercise program ongoing throughout POC MET  2. Instruction and full understanding of bladder health MET  3. Decrease use of bladder irritants MET  4. Positive use of urge suppression techniques to increase void interval to > 2 hours MET    LTG 6-12  1. Full participation in social and recreational activities not limited by bladder/pelvic symptoms MET  2.  3/5 PFM or greater with full relaxation post activation - partially MET  3. Improved tone with decreased TTP PFM - progressing  "towards  4. Decreased dyspareunia by 50% or greater - unsure/progressing towards  5. Decreased episodes of UUI - MET    New Goals  1. 0/10 pelvic pain  2. 0/10 TTP left PF musculature  3. 0/10 dyspareunia  4. Independent in self management techniques  5. Discharge Planning.       Plan  Patient would benefit from: skilled physical therapy  Planned modality interventions: biofeedback  Planned therapy interventions: manual therapy, motor coordination training, neuromuscular re-education, patient education, behavior modification, therapeutic activities, therapeutic exercise and home exercise program  Frequency: 1x week  Duration in visits: 8  Duration in weeks: 12  Plan of Care beginning date: 5/10/2024  Plan of Care expiration date: 8/8/2024  Treatment plan discussed with: patient        PT Pelvic Floor Subjective:   History of Present Illness:   Ongoing hip pain since December 2022 when tree fell onto car. Increased hip pain over the past 5 plus months after hip popped. Notes hip is unpredictable and \"goes out\" at times with fear of falling. Attended PT but reported pain persisted.     Notes had incontinence over the past 1.5 years that has gotten worse.  Pain with intercourse around two years ago onset and progressively worse.   Stopped using premarin 5 months ago because it caused increased incontinence; feels maybe she was allergic to it.     Notes she is concerned that recent testing showed fibroids and feels that this may be contributing to her pelvic pain.     Patient notes her primary concerns for attending PFPT are to address incontinence and pain with intercourse. Notes pain does not stop participation in intercourse but has limited the frequency.     5/10/24 Last seen in PT near one month ago. Since that time, patient notes hip has gotten progressively worse. She is scheduled to see both new hip surgeon and pain management for possible spinal injections. Did have 2 week relief from last hip injection. " Patient presents with complaints of significant left hip pain greatest at lateral aspect of hip.  Patient noted since start of physical therapy urinary symptoms have resolved.  She denies urinary incontinence.  She has been unable to resume intercourse due to hip pain and history of cardiac issues.  She notes she generally feels relief of pelvic floor muscle tension following PT sessions.        Recurrent probem    Quality of life: good    Social Support:     Lives with:  Spouse    Relationship status: /committed    Work status: employed full time (works from home and also travels for work)    Life stress severity: mild and moderate (feels she manages her stress well)  Diet and Exercise:    Diet:balanced nutrition      Stretches daily - LE    Not exercising due to: pain  OB/ gyn History    Gestational History:     Number of term pregnancies: 0  Bladder Function:      Voiding Difficulties comments:     Voiding frequency: every 1-2 hours and every 3-4 hours    Urinary leakage: no urine leakage    Urinary leakage not aggravated by: walking to the bathroom, key-in-the-door syndrome and unknown    Nocturia (episodes per night): 3 and 2    Fluid Intake Type:  Water and diet soda    Intake (ounces): Soda: 16,     Intake (ounces) comment: Notes improved urinary function since PT start  Incontinence Management:     Pads/Diaper Use:  None  Bowel Function:     Bowel Function comments:  Denies bowel issue    Bowel frequency: daily  Sexual Function:     Sexually Active:  Not sexually active (limited by hip pain)    Pain during intercourse: Yes      Lubrication Use: Yes    pain causes abstinence  Pain:     Current pain ratin    At best pain ratin    At worst pain rating:  10    Location:  Hip pain ( vaginal and lower pelvic pain greatly reduced since PT start)    Quality:  Sharp and dull ache    Aggravating factors:  Unknown    Relieving factors:  Rest    Progression:  WorseningPelvic pain and vaginal pain  reduced  Limited now by high levels hip pain:  Patient Goals:     Patient goals for therapy:  Improved bladder or bowel function and improved quality of life    Other patient goals:  Initial primary concern of incontinence now resolved; woudl like to resume intercourse      Objective     Tenderness     Left Hip   Tenderness in the greater trochanter.     Passive Range of Motion   Left Hip   Flexion: 90 degrees   Abduction: 20 degrees with pain  External rotation (90/90): WFL and with pain  Internal rotation (90/90): with pain    Right Hip   Flexion: WFL  Abduction: WFL  External rotation (90/90): WFL  Internal rotation (90/90): WFL    Tests     Left Hip   Positive BRIONNA and FADIR.       Abdominal Assessment:      Abdominal Assessment: Improved lower quadrants soft tissue mobility      General Perineum Exam:   Positive for perianal erythema.     Visual Inspection of Perineum:   Excursion of perineal body in cephalad direction with contraction of pelvic floor muscles (PFM): fair   Excursion of perineal body in caudal direction with relaxation of pelvic floor muscles (PFM): fair   Involuntary contraction with coughing: yes  Involuntary relaxation with bearing down: yes   Atrophic changes: erythema noted       Pelvic Floor Muscle Exam:      Breathing pattern with contraction: within normal limits and holding breath   Pelvic floor muscle relaxation is delayed.         PERFECT Score   Power right: 3/5   Power left: 2+/5   Endurance (seconds to max): 3   Repetitions (before fatigue): 4   Fast flicks (in 10 seconds): 4   Perfect Score: TTP with tightness ischiocav and LA, left side  No TTP right; mild increased tone    TTP and tone improved with direct STM  No change with hip pain today. Palpation left obturator internus reproduces lateral hip pain but no change in pain post STM      pelvic floor exam consent given by patient    Pelvic exam completed: vaginally                       POC expires Unit limit Auth Expiration  "date PT/OT + Visit Limit?   4/17/24 4 NA BOMN                           Visit/Unit Tracking  AUTH Status:  Date 1/19 1/26 2/16 2/23 3/1 3/8 3/15 4/12 5/10      NA Used 1 2 3 4 5 6 7 8 9       Remaining          RE  EVAL       VQ                         Precautions: hernia repair 2021      Manuals 1/19 1/26 2/16 2/23 3/1 3/8 3/15 4/12 5/10    Pelvic transverse plane   PP PP PP PP PP PP PP    Direct PFM   PP PP PP PP PP PP PP                              Neuro Re-Ed                                                                                                        Ther Ex             Kegel technique Eval  8            Diaphragmatic brathing  5'  HEP  T/o  10 10  T/o 10 3 3 3    Pfm relaxation  5'  HEP  T/o  5  5 5  drop 5 5    CRK  3\"/10\"/10x  HEP       3    LE stretch  PP  10'  P! left  PP  10' 15 15 15 15 15                                           Ther Activity             Pfm education PP            Bladder health PP  HO            Urge suppression             Bladder diary nv                         Gait Training                                       Modalities                                                               "

## 2024-05-14 DIAGNOSIS — G44.319 ACUTE POST-TRAUMATIC HEADACHE, NOT INTRACTABLE: ICD-10-CM

## 2024-05-14 NOTE — PROGRESS NOTES
Assessment:  1. Primary osteoarthritis of left hip    2. Chronic left hip pain        Plan:    58-year-old female returns her office with ongoing left groin and left anterior thigh pain.  She was seen for left intra-articular hip injection in February 2024 with about 2 weeks of significant relief.  Has now worsening pain in a similar pattern as previous.  She denies any significant lower back pain and does not have reproducible lower back symptoms on exam today.  We reviewed MRI of the lumbar spine which shows mild multilevel degenerative changes but no significant compressive pathology.  Her groin and left anterior thigh pain is not coming from her back it is related to the arthritis in her hip as she has increased pain with dynamic movements such as left hip flexion and external rotation.  She will be seeing hip specialist on Monday for second opinion.    At this juncture given lack of significant back pain or radicular symptoms, do not recommend any interventional treatments for the lumbar spine.  If surgery is not an option for her hip, then may consider repeat left hip injection.      [place] Prescription Drug Monitoring Program report was reviewed and was appropriate     Complete risks and benefits including bleeding, infection, tissue reaction, nerve injury and allergic reaction were discussed. The approach was demonstrated using models and literature was provided. Verbal and written consent was obtained.    My impressions and treatment recommendations were discussed in detail with the patient who verbalized understanding and had no further questions.  Discharge instructions were provided. I personally saw and examined the patient and I agree with the above discussed plan of care.    No orders of the defined types were placed in this encounter.    No orders of the defined types were placed in this encounter.      History of Present Illness:  Shelby Foster is a 58 y.o. female who presents for a follow  "up office visit in regards to Hip Pain (Left sided hip pain down to Lt leg follow up).   The patient’s current symptoms include left-sided hip pain down the left leg.    She was last seen for left intra-articular hip injection in February 2024 with about 2 weeks of notable relief.  Returns today with 7-8 out of 10 pain that is present at all times of the day it is constant, burning, dull/aching, sharp, throbbing, shooting.  Reports that her hip \"goes out\" .    Recently seen by Dr. Damico.  Referred to another joint specialist for second opinion in regards to total hip replacement.    New imaging since last visit includes MRI of the lumbar spine which does not show any significant compressive pathology.    I have personally reviewed and/or updated the patient's past medical history, past surgical history, family history, social history, current medications, allergies, and vital signs today.     Review of Systems   Musculoskeletal:  Positive for arthralgias, gait problem and joint swelling.        DROM  Muscle weakness       Patient Active Problem List   Diagnosis    Vitamin D deficiency    Iron deficiency    History of adenomatous polyp of colon    PAPI (obstructive sleep apnea)    Hypothyroidism    Annual physical exam    Class 2 severe obesity with serious comorbidity and body mass index (BMI) of 39.0 to 39.9 in adult (HCC)    Prediabetes    Primary osteoarthritis of left hip       Past Medical History:   Diagnosis Date    Abnormal Pap smear of cervix 2000's    Anemia     Corrected with endometrial ablation    Asthma     Fibroid 2000's    Hypothyroidism     Varicella        Past Surgical History:   Procedure Laterality Date    ANKLE SURGERY Bilateral     APPENDECTOMY      CHOLECYSTECTOMY      COLONOSCOPY      ENDOMETRIAL ABLATION  2005    HERNIA REPAIR  11/2021    MYOMECTOMY  2002,3,4    TONSILLECTOMY      WISDOM TOOTH EXTRACTION         Family History   Problem Relation Age of Onset    Breast cancer Mother     " Miscarriages / Stillbirths Mother        Social History     Occupational History    Occupation: EMPLOYED   Tobacco Use    Smoking status: Never    Smokeless tobacco: Never   Vaping Use    Vaping status: Never Used   Substance and Sexual Activity    Alcohol use: Never    Drug use: Never    Sexual activity: Yes     Partners: Male     Birth control/protection: Post-menopausal       Current Outpatient Medications on File Prior to Visit   Medication Sig    albuterol (Ventolin HFA) 90 mcg/act inhaler Inhale 2 puffs every 6 (six) hours as needed for wheezing    Aspirin-Acetaminophen-Caffeine (EXCEDRIN MIGRAINE PO) Take 1 tablet by mouth if needed    clobetasol (TEMOVATE) 0.05 % cream     clotrimazole-betamethasone (LOTRISONE) 1-0.05 % cream     fluticasone (FLONASE) 50 mcg/act nasal spray     levothyroxine (Synthroid) 175 mcg tablet 1 tab daily    levothyroxine (Synthroid) 175 mcg tablet Take 1 tablet (175 mcg total) by mouth daily    Melatonin 300 MCG TABS melatonin 300 mcg tablet   PRN    meloxicam (MOBIC) 15 mg tablet Take 1 tablet (15 mg total) by mouth daily as needed for moderate pain    meloxicam (Mobic) 15 mg tablet Take 1 tablet (15 mg total) by mouth daily as needed for moderate pain (pain)    methocarbamol (ROBAXIN) 500 mg tablet take 1 tablet by mouth for muscle spasm IN THE EVENING if needed ( MAY CAUSE SEDATION. )    metroNIDAZOLE (METROCREAM) 0.75 % cream     montelukast (SINGULAIR) 10 mg tablet Take 1 tablet (10 mg total) by mouth daily at bedtime    ondansetron (ZOFRAN-ODT) 4 mg disintegrating tablet Take 1 tablet (4 mg total) by mouth every 6 (six) hours as needed for nausea or vomiting    S[place] Acetate Diagnostic 15 MG SOLR     Sulfacetamide Sodium, Acne, 10 % LOTN     terconazole (TERAZOL 7) 0.4 % vaginal cream Insert 1 applicator into the vagina daily at bedtime    Vitamin D, Cholecalciferol, 50 MCG (2000 UT) CAPS Take 1 capsule by mouth    cefdinir (OMNICEF) 300 mg capsule Take by mouth 2 (two)  "times a day    Fluticasone-Salmeterol (Advair) 100-50 mcg/dose inhaler Inhale 1 puff 2 (two) times a day Rinse mouth after use.    predniSONE 20 mg tablet take 2 tablets by mouth today then take 2 tablets tomorrow then take 1 tablet once daily for 2 days    [DISCONTINUED] methocarbamol (ROBAXIN) 500 mg tablet Take one tab PO for muscle spasms at night if needed (can cause sedation)     No current facility-administered medications on file prior to visit.       Allergies   Allergen Reactions    Fluconazole Swelling     Of lips      Sulfamethoxazole-Trimethoprim Swelling     Of lips    Erythromycin Rash     Breaks aout on white pimples      Penicillins Rash     Breaks out on white pimples       Tetracycline Rash     Breaks out on white pimples         Physical Exam:    /86   Pulse 82   Ht 5' 11\" (1.803 m)   Wt 127 kg (279 lb)   BMI 38.91 kg/m²     Constitutional:normal, well developed, well nourished, alert, in no distress and non-toxic and no overt pain behavior.  Eyes:anicteric  HEENT:grossly intact  Neck:supple, symmetric, trachea midline and no masses   Pulmonary:even and unlabored  Cardiovascular:No edema or pitting edema present  Skin:Normal without rashes or lesions and well hydrated  Psychiatric:Mood and affect appropriate  Neurologic:Cranial Nerves II-XII grossly intact 5 out of 5 strength in all myotomes of the bilateral lower extremities.  Musculoskeletal: No significant pain with lumbar extension or facet loading.  There is notable pain with left hip flexion and external rotation produces left groin and left anterior thigh pain    Imaging    MRI LUMBAR SPINE WITHOUT CONTRAST  4/29/24     INDICATION: M54.16: Radiculopathy, lumbar region  M51.36: Other intervertebral disc degeneration, lumbar region.     COMPARISON: 12/1/2022     TECHNIQUE:  Multiplanar, multisequence imaging of the lumbar spine was performed. .        IMAGE QUALITY:  Diagnostic     FINDINGS:     VERTEBRAL BODIES:  There are 5 " lumbar type vertebral bodies.  Normal alignment of the lumbar spine.  No spondylolysis or spondylolisthesis. No scoliosis.  No compression fracture.    Normal marrow signal is identified within the visualized bony   structures.  No discrete marrow lesion.     SACRUM:  Normal signal within the sacrum. No evidence of insufficiency or stress fracture.     DISTAL CORD AND CONUS:  Normal size and signal within the distal cord and conus.     PARASPINAL SOFT TISSUES:  Paraspinal soft tissues are unremarkable.     LOWER THORACIC DISC SPACES: Mild bulge at T12-L1 without significant canal stenosis or foraminal narrowing.     LUMBAR DISC SPACES:     L1-L2: Mild bulge. Facet arthrosis. No significant canal stenosis or foraminal narrowing.     L2-L3: Mild bulge. Facet arthrosis. No significant canal stenosis. Mild foraminal narrowing.     L3-L4: Facet arthrosis. No significant canal stenosis or foraminal narrowing.     L4-L5: Bulging annulus. Facet arthrosis. Marginal osteophytosis. Mild bilateral foraminal narrowing. No significant canal stenosis.     L5-S1: Mild bulge. Marginal osteophytosis. Facet arthrosis. There is no significant canal stenosis. There is mild left foraminal encroachment.     OTHER FINDINGS:  None.     IMPRESSION:     Degenerative changes of the lumbar spine, as described above. No significant canal stenosis identified.     Mild bilateral foraminal narrowing is present at L4-L5.      LUMBAR SPINE  4/23/24     INDICATION:   Pain in left hip. Pain in right hip.      COMPARISON: CT 12/1/2022     VIEWS:  XR SPINE LUMBAR 2 OR 3 VIEWS INJURY  Images: 2     FINDINGS:     There are 5 non rib bearing lumbar vertebral bodies.      There is no evidence of acute fracture or destructive osseous lesion.     Alignment is unremarkable.      Progressive, advanced L4-5 and L5-S1 disc space narrowing with advanced L4-5 and L5-S1 facet arthropathy.     The pedicles appear intact.     Soft tissues are unremarkable.      IMPRESSION:        No acute osseous abnormality.       Degenerative changes as described.       BILATERAL HIPS AND PELVIS  4/23/24     INDICATION:   Pain in left hip. Pain in right hip.      COMPARISON: None.     VIEWS:  XR HIPS BILATERAL 3-4 VW W PELVIS IF PERFORMED  Images: 5     FINDINGS:  The bony pelvis appears intact.  Visualized lower lumbar spine is unremarkable.     LEFT HIP:  There is no acute fracture or dislocation.  There is mild joint space narrowing osteophytosis..  No lytic or blastic osseous lesion.  Soft tissues are unremarkable.     RIGHT HIP:  There is no acute fracture or dislocation.  There is mild joint space narrowing with osteophytosis.  No lytic or blastic osseous lesion.  Soft tissues are unremarkable.     IMPRESSION:        Mild to moderate degenerative changes in the hips bilaterally. No acute abnormality seen.

## 2024-05-15 ENCOUNTER — OFFICE VISIT (OUTPATIENT)
Dept: PAIN MEDICINE | Facility: CLINIC | Age: 59
End: 2024-05-15
Payer: COMMERCIAL

## 2024-05-15 VITALS
HEART RATE: 82 BPM | BODY MASS INDEX: 39.06 KG/M2 | SYSTOLIC BLOOD PRESSURE: 134 MMHG | WEIGHT: 279 LBS | HEIGHT: 71 IN | DIASTOLIC BLOOD PRESSURE: 86 MMHG

## 2024-05-15 DIAGNOSIS — M25.552 CHRONIC LEFT HIP PAIN: ICD-10-CM

## 2024-05-15 DIAGNOSIS — M16.12 PRIMARY OSTEOARTHRITIS OF LEFT HIP: Primary | ICD-10-CM

## 2024-05-15 DIAGNOSIS — G89.29 CHRONIC LEFT HIP PAIN: ICD-10-CM

## 2024-05-15 PROCEDURE — 99214 OFFICE O/P EST MOD 30 MIN: CPT | Performed by: STUDENT IN AN ORGANIZED HEALTH CARE EDUCATION/TRAINING PROGRAM

## 2024-05-15 RX ORDER — METHOCARBAMOL 500 MG/1
TABLET, FILM COATED ORAL
Qty: 20 TABLET | Refills: 6 | Status: SHIPPED | OUTPATIENT
Start: 2024-05-15

## 2024-05-17 ENCOUNTER — OFFICE VISIT (OUTPATIENT)
Dept: PHYSICAL THERAPY | Age: 59
End: 2024-05-17
Payer: COMMERCIAL

## 2024-05-17 DIAGNOSIS — N94.10 DYSPAREUNIA IN FEMALE: Primary | ICD-10-CM

## 2024-05-17 PROCEDURE — 97140 MANUAL THERAPY 1/> REGIONS: CPT | Performed by: PHYSICAL THERAPIST

## 2024-05-17 PROCEDURE — 97110 THERAPEUTIC EXERCISES: CPT | Performed by: PHYSICAL THERAPIST

## 2024-05-17 NOTE — PROGRESS NOTES
"Daily Note     Today's date: 2024  Patient name: Shelby Foster  : 1965  MRN: [id number]  Referring provider: Hanny Mensah*  Dx:   Encounter Diagnosis     ICD-10-CM    1. Dyspareunia in female  N94.10           Start Time: 1030  Stop Time: 1125  Total time in clinic (min): 55 minutes    Subjective: Patient indicates that the pain gets intensely worse every day.  She notes that she is limited in functional activities and is now having challenges donning and doffing pants shoes and socks. No urinary symptoms. Denies pelvic pain.       Objective: See treatment diary below      Assessment: Tolerated treatment fairly well.  Significant guarding and pain with left hip ER and abduction. Positive BRIONNA. Some TTP with decreased soft tissue mobility left LQ. Minimal tone left PFM that improved with direct techniques. Patient would benefit from continued PT       Plan: Continue per plan of care. Patient to see hip surgeon on Monday.         POC expires Unit limit Auth Expiration date PT/OT + Visit Limit?   24 4 NA BOMN                           Visit/Unit Tracking  AUTH Status:  Date 1/19 1/26 2/16 2/23 3/1 3/8 3/15 4/12 5/10 5/17     NA Used 1 2 3 4 5 6 7 8 9 10      Remaining          RE  EVAL       VQ                         Precautions: hernia repair       Manuals 1/19 1/26 2/16 2/23 3/1 3/8 3/15 4/12 5/10 5/17   Pelvic transverse plane   PP PP PP PP PP PP PP PP   Direct PFM   PP PP PP PP PP PP PP PP                             Neuro Re-Ed                                                                                                        Ther Ex             Kegel technique Eval  8            Diaphragmatic brathing  5'  HEP  T/o  10 10  T/o 10 3 3 3 3   Pfm relaxation  5'  HEP  T/o  5  5 5  drop 5 5 5   CRK  3\"/10\"/10x  HEP       3 3   LE stretch  PP  10'  P! left  PP  10' 15 15 15 15 15 15                                          Ther Activity             Pfm education PP          "   Bladder health PP  HO            Urge suppression             Bladder diary nv                         Gait Training                                       Modalities

## 2024-05-20 ENCOUNTER — OFFICE VISIT (OUTPATIENT)
Dept: OBGYN CLINIC | Facility: HOSPITAL | Age: 59
End: 2024-05-20
Payer: COMMERCIAL

## 2024-05-20 VITALS
WEIGHT: 280 LBS | SYSTOLIC BLOOD PRESSURE: 139 MMHG | BODY MASS INDEX: 39.2 KG/M2 | HEIGHT: 71 IN | DIASTOLIC BLOOD PRESSURE: 83 MMHG | HEART RATE: 83 BPM

## 2024-05-20 DIAGNOSIS — M16.12 PRIMARY OSTEOARTHRITIS OF LEFT HIP: Primary | ICD-10-CM

## 2024-05-20 PROCEDURE — 99215 OFFICE O/P EST HI 40 MIN: CPT | Performed by: STUDENT IN AN ORGANIZED HEALTH CARE EDUCATION/TRAINING PROGRAM

## 2024-05-20 RX ORDER — MULTIVIT-MIN/IRON FUM/FOLIC AC 7.5 MG-4
1 TABLET ORAL DAILY
Qty: 30 TABLET | Refills: 0 | Status: SHIPPED | OUTPATIENT
Start: 2024-05-20 | End: 2024-06-19

## 2024-05-20 RX ORDER — CHLORHEXIDINE GLUCONATE ORAL RINSE 1.2 MG/ML
15 SOLUTION DENTAL ONCE
OUTPATIENT
Start: 2024-05-20 | End: 2024-05-20

## 2024-05-20 RX ORDER — ASCORBIC ACID 500 MG
500 TABLET ORAL 2 TIMES DAILY
Qty: 60 TABLET | Refills: 0 | Status: SHIPPED | OUTPATIENT
Start: 2024-05-20 | End: 2024-06-19

## 2024-05-20 RX ORDER — TRANEXAMIC ACID 10 MG/ML
1000 INJECTION, SOLUTION INTRAVENOUS ONCE
OUTPATIENT
Start: 2024-05-20 | End: 2024-05-20

## 2024-05-20 RX ORDER — FOLIC ACID 1 MG/1
1 TABLET ORAL DAILY
Qty: 30 TABLET | Refills: 0 | Status: SHIPPED | OUTPATIENT
Start: 2024-05-20 | End: 2024-06-19

## 2024-05-20 RX ORDER — CEFAZOLIN SODIUM 2 G/50ML
2000 SOLUTION INTRAVENOUS ONCE
OUTPATIENT
Start: 2024-05-20 | End: 2024-05-20

## 2024-05-20 RX ORDER — FERROUS SULFATE 324(65)MG
324 TABLET, DELAYED RELEASE (ENTERIC COATED) ORAL EVERY OTHER DAY
Qty: 15 TABLET | Refills: 0 | Status: SHIPPED | OUTPATIENT
Start: 2024-05-20 | End: 2024-06-19

## 2024-05-20 RX ORDER — ACETAMINOPHEN 325 MG/1
975 TABLET ORAL ONCE
OUTPATIENT
Start: 2024-05-20 | End: 2024-05-20

## 2024-05-20 RX ORDER — CHLORHEXIDINE GLUCONATE 40 MG/ML
SOLUTION TOPICAL DAILY PRN
OUTPATIENT
Start: 2024-05-20

## 2024-05-20 NOTE — PROGRESS NOTES
Date: 24  Shelby Foster   MRN# [id number]  : 1965      Chief Complaint: Left Hip Pain    Assessment and Plan:  The patient verbalized understanding of exam findings and treatment plan. We engaged in the shared decision-making process and treatment options were discussed at length with the patient. Surgical and conservative management discussed today along with risks and benefits. Patient was agreeable with the plan and all questions were answered to satisfaction.     Primary osteoarthritis of left hip  Patient is a Middle-Aged (Approx 40-64yo) female with functionally limiting hip pain with moderate to long distances and identified modifiable risk factors.  Furthermore, they have radiographs which demonstrate moderate OA and a physical exam revealing moderate range of motion limitation. Given these findings, I recommend:    I recommend Total Hip Arthroplasty (Anterior approach)     - WBAT  - Patient has failed conservative treatment including anti-inflammatories, activity modification, PT/HEP, CSI injections  - Consent signed. All questions and concerns addressed  - F/u in 2 weeks post op    TOTAL HIP REPLACEMENT INDICATIONS AND RISKS  We had a lengthy discussion with the patient regarding the potential options for treatment.  Based on current presentation, radiographic exam, and lack of sufficient response to nonsurgical management including activity modification, NSAIDs and therapeutic exercise, I would offer treatment in the form of total hip arthroplasty at this time.      The potential risks and benefits were discussed in detail.    Patient current BMI is Body mass index is 39.05 kg/m²., I have recommended nutritionist and wt loss regimen if BMI over 35.    While no guarantees can be made, total hip replacement has a very high success rate in terms of relieving a patient's hip pain and returning them to a more active, independent lifestyle for 10-15 years or more. All surgery carries  some risk; for hip replacement, the complication rate is low but may include: death (very rare), infection, bleeding requiring transfusion, blood clots in legs traveling to lungs, nerve and/or blood vessel damage, bone fracture, leg length difference, prosthetic hip dislocation, persistent hip pain and/or stiffness, and repeat surgery(ies). The risk of a major complication is generally 1-2 per 1000 cases. Total hip replacement should only be done if conservative treatment has failed. The predicted revision rate is approximately 1% per year; in other words, 90% of hip replacements last 10 years or more, 80% last 20 years or more, and so on, assuming no injury. Additionally, we discussed anesthesia related complications which will be discussed in greater detail with the anesthesia team before surgery. The patient voiced their understanding of the surgical plan and potential complications and wishes to proceed with surgery.             Subjective:     Hip Pain  58 y.o. female presents to the office for evaluation of left hip pain. Patient states she has been worked up for her back and hip in the past. This is evaluated as a personal injury. The pain began a few years ago. The pain is located groin and lateral and is made worse with walking, standing, and sleeping on it. Patient rates their pain as 7/10.  She describes the symptoms as aching, dull, and sharp. Symptoms improve with medication: Ibuprofen used but not effective, physical therapy, chiropractic, cortisone injection. The symptoms are worse with activity, stair climbing, getting up from a chair, sleeping, sitting for prolonged periods of time. The hip has not given out or felt unstable.  The patient is active in none. Treatment to date has been Tylenol, NSAID's, cortisone injection, therapy, without significant relief. No orthopedic PMH noted.    External Records Reviewed: none    Prior treatment:  NSAIDs Yes    Bracing No   Physical Therapy Yes   Cortisone  Injections Yes     Allergy:  Allergies   Allergen Reactions    Fluconazole Swelling     Of lips      Sulfamethoxazole-Trimethoprim Swelling     Of lips    Erythromycin Rash     Breaks aout on white pimples      Penicillins Rash     Breaks out on white pimples       Tetracycline Rash     Breaks out on white pimples       Medications:  all current active meds have been reviewed  Past Medical History:  Past Medical History:   Diagnosis Date    Abnormal Pap smear of cervix 2000's    Anemia     Corrected with endometrial ablation    Asthma     Fibroid 2000's    Hypothyroidism     Varicella      Past Surgical History:  Past Surgical History:   Procedure Laterality Date    ANKLE SURGERY Bilateral     APPENDECTOMY      CHOLECYSTECTOMY      COLONOSCOPY      ENDOMETRIAL ABLATION  2005    HERNIA REPAIR  11/2021    MYOMECTOMY  2002,3,4    TONSILLECTOMY      WISDOM TOOTH EXTRACTION       Family History:  Family History   Problem Relation Age of Onset    Breast cancer Mother     Miscarriages / Stillbirths Mother      Social History:  Social History     Substance and Sexual Activity   Alcohol Use Never     Social History     Substance and Sexual Activity   Drug Use Never     Social History     Tobacco Use   Smoking Status Never   Smokeless Tobacco Never           Review of Systems:  General- denies fever/chills  HEENT- denies hearing loss or sore throat  Eyes- denies eye pain or visual disturbances, denies red eyes  Respiratory- denies cough or SOB  Cardio- denies chest pain or palpitations  GI- denies abdominal pain  Endocrine- denies urinary frequency  Urinary- denies pain with urination  Musculoskeletal- Negative except noted above  Skin- denies rashes or wounds  Neurological- denies dizziness or headache  Psychiatric- denies anxiety or difficulty concentrating      Objective:   BP Readings from Last 1 Encounters:   05/20/24 139/83      Wt Readings from Last 1 Encounters:   05/20/24 127 kg (280 lb)      Pulse Readings from Last  "1 Encounters:   05/20/24 83        BMI: Estimated body mass index is 39.05 kg/m² as calculated from the following:    Height as of this encounter: 5' 11\" (1.803 m).    Weight as of this encounter: 127 kg (280 lb).      Physical Exam  /83   Pulse 83   Ht 5' 11\" (1.803 m)   Wt 127 kg (280 lb)   BMI 39.05 kg/m²   General/Constitutional: No apparent distress: well-nourished and well developed.  Eyes: normal ocular motion  Cardio: RRR, Normal S1S2, No m/r/g.   Lymphatic: No appreciable lymphadenopathy  Respiratory: Non-labored breathing, CTA b/l no w/c/r  Vascular: No edema, swelling or tenderness, except as noted in detailed exam. Extremities well perfused. No LE edema  Integumentary: No impressive skin lesions present, except as noted in detailed exam.  Neuro: No ataxia or tremors noted  Psych: Normal mood and affect, oriented to person, place and time. Appropriate affect.  Musculoskeletal: Normal, except as noted in detailed exam and in HPI.    Gait and Station:   antalgic    Left Hip Exam    Inspection: normal color, temperature, turgor and moisture    Range of Motion: flexion: 120 ER: 30 IR: 10with pain    negative  log roll   negative Trendelenburg sign  positive Stinchfield  positive BRIONNA  positive FADDIR    Motor: 5/5 Q/HS/TA/GS/EHL/FHL    Vascular: Toes WWP with BCR    SILT DP/SP/Grayson/Saph/Tib      Images:    I personally reviewed relevant images in the PACS system and my interpretation is as follows:    X-rays of the left hip reveals moderate joint space narrowing, subchondral sclerosis, subchondral cysts, osteophyte formation. No fracture or dislocation.       Scribe Attestation      I,:  Rey Rodriguez am acting as a scribe while in the presence of the attending physician.:       I,:  Stephen Lr MD personally performed the services described in this documentation    as scribed in my presence.:               Stephen Lr MD  Adult Reconstruction Specialist   [facility] " [facility]

## 2024-05-21 NOTE — ASSESSMENT & PLAN NOTE
Patient is a Middle-Aged (Approx 40-66yo) female with functionally limiting hip pain with moderate to long distances and identified modifiable risk factors.  Furthermore, they have radiographs which demonstrate moderate OA and a physical exam revealing moderate range of motion limitation. Given these findings, I recommend:    I recommend Total Hip Arthroplasty (Anterior approach)     - WBAT  - Patient has failed conservative treatment including anti-inflammatories, activity modification, PT/HEP, CSI injections  - Consent signed. All questions and concerns addressed  - F/u in 2 weeks post op    TOTAL HIP REPLACEMENT INDICATIONS AND RISKS  We had a lengthy discussion with the patient regarding the potential options for treatment.  Based on current presentation, radiographic exam, and lack of sufficient response to nonsurgical management including activity modification, NSAIDs and therapeutic exercise, I would offer treatment in the form of total hip arthroplasty at this time.      The potential risks and benefits were discussed in detail.    Patient current BMI is Body mass index is 39.05 kg/m²., I have recommended nutritionist and wt loss regimen if BMI over 35.    While no guarantees can be made, total hip replacement has a very high success rate in terms of relieving a patient's hip pain and returning them to a more active, independent lifestyle for 10-15 years or more. All surgery carries some risk; for hip replacement, the complication rate is low but may include: death (very rare), infection, bleeding requiring transfusion, blood clots in legs traveling to lungs, nerve and/or blood vessel damage, bone fracture, leg length difference, prosthetic hip dislocation, persistent hip pain and/or stiffness, and repeat surgery(ies). The risk of a major complication is generally 1-2 per 1000 cases. Total hip replacement should only be done if conservative treatment has failed. The predicted revision rate is approximately  1% per year; in other words, 90% of hip replacements last 10 years or more, 80% last 20 years or more, and so on, assuming no injury. Additionally, we discussed anesthesia related complications which will be discussed in greater detail with the anesthesia team before surgery. The patient voiced their understanding of the surgical plan and potential complications and wishes to proceed with surgery.

## 2024-05-22 ENCOUNTER — TELEPHONE (OUTPATIENT)
Age: 59
End: 2024-05-22

## 2024-05-22 NOTE — TELEPHONE ENCOUNTER
Would need if appointment to see 1 of us 30 days prior to her surgical date with me Yan or Nabila to clear for the surgery.

## 2024-05-22 NOTE — TELEPHONE ENCOUNTER
Established patient needing pre-op clearance.    Surgery: ARTHROPLASTY HIP TOTAL ANTERIOR, LEFT (Left: Hip)  Surgery date: 7/1/24  Last seen by us: 7/7/23  On oxygen: no  Kind of Sedation: spinal  Length of surgery: n/a  Surgeon: Dr. Stephen Lr  Office contact: [phone]   Fax(To send office note): n/a    Would you like to clear patient via a phone call from last visit or would like us to schedule them with you or an AP?

## 2024-05-29 NOTE — PROGRESS NOTES
"Daily Note     Today's date: 2024  Patient name: Shelby Foster  : 1965  MRN: [id number]  Referring provider: Hanny Mensah*  Dx:   Encounter Diagnosis     ICD-10-CM    1. Dyspareunia in female  N94.10           Start Time: 1030  Stop Time: 1128  Total time in clinic (min): 58 minutes    Subjective: Patient notes she was seen by ortho and scheduled THR in July. Continues with daily hip pain with hip more consistently \"going out.\"  Performing stretching and exercises daily. Notes no pelvic pain and improved urinary symptoms.Has not been sexually active due to pain and husbands health status so uncertain about dyspareunia.      Objective: See treatment diary below      Assessment: Tolerated treatment well. Patient limited functionally by hip pain. Independent in HEP and self care techniques as instructed. Decreased urinary symptoms. Has not been sexually active due to pain and 's health status so uncertain about dyspareunia.At this time, patient has achieved their maximum functional benefit from skilled physical therapy services and will be discharged to their HEP.  Patient is in agreement with the plan of care.  As a result, patient is discharged from physical therapy.        Plan: Discharge PT     POC expires Unit limit Auth Expiration date PT/OT + Visit Limit?   24 4 NA BOMN                           Visit/Unit Tracking  AUTH Status:  Date 1/19 1/26 2/16 2/23 3/1 3/8 3/15 4/12 5/10 5/17 5/31    NA Used 1 2 3 4 5 6 7 8 9 10 11     Remaining          RE  EVAL       VQ                         Precautions: hernia repair       Manuals 5/31 1/26 2/16 2/23 3/1 3/8 3/15 4/12 5/10 5/17   Pelvic transverse plane PP  PP PP PP PP PP PP PP PP   Direct PFM nt  PP PP PP PP PP PP PP PP                             Neuro Re-Ed                                                                                                        Ther Ex             Kegel technique Eval  8          " "  Diaphragmatic brathing 3 5'  HEP  T/o  10 10  T/o 10 3 3 3 3   Pfm relaxation 5 5'  HEP  T/o  5  5 5  drop 5 5 5   CRK  3\"/10\"/10x  HEP       3 3   LE stretch 15 PP  10'  P! left  PP  10' 15 15 15 15 15 15                                          Ther Activity             Pfm education PP            Bladder health PP  HO            Urge suppression             Bladder diary nv                         Gait Training                                       Modalities                                                              "

## 2024-05-30 ENCOUNTER — TELEPHONE (OUTPATIENT)
Dept: OBGYN CLINIC | Facility: HOSPITAL | Age: 59
End: 2024-05-30

## 2024-05-30 NOTE — TELEPHONE ENCOUNTER
"Preoperative Elective Admission Assessment    Living Situation:    Who does pt live with: spouse  What kind of home: multi-level  How do they enter the home:  Basement or front     How many levels in home: 2   # of steps to enter home: Basement: 2 \"platforms\" + 11 steps, Front: \"incline\"  # of steps to second floor: 6 steps, landing, 6 steps -- Per pt, spouse will be able to help w/ guidance support.   Are there handrails: Yes  Are there landings: Yes  Sleeping arrangement: second floor  Where is Bathroom: first and second floor   Where is the tub or shower: second floor, walk in shower w/ handicap rails     First Floor Setup:   Is there a bathroom: Yes, 1/2 bath   Where would pt sleep: recliner - lifting recliner chair      DME:  DME ordered, RW 5/30/24. Instructed pt to bring RW on DOS.     We discussed clearing pathways in the home and making sure there is accessibly to use the walker, for example, removing throw rugs.      Patient's Current Level of Function: Ambulates: Independently and ADLs: Independent    Post-op Caregiver: spouse -  is retired   Caregiver Name and phone number for Inpatient discharge needs:     iain albright (Spouse)  [phone]     Currently receive any HHC/aides/community supports: No     Post-op Transport: spouse  To/from hospital: spouse  To/from PT 2-3x/week:  N/A, surgeon preference   Uses community transport now: No     Outpatient Physical Therapy Site:  Site: N/A, surgeon preference -- Discussed frequent and early ambulation postop, every 1 hour while awake and home exercises provided by PT.  pre and post-op appts scheduled? No     Medication Management: self and out of bottle  Preferred Pharmacy for Post-op Medications: [facility]  LIZET QUACH - [address], [[id number]] (Meds to Beds)  Blood Management Vitamin Regimen:  Pt confirms she has preop BM vitamins at home and will begin 28 days prior.   Post-op anticoagulant: to be determined by surgical team " postoperatively     DC Plan: Pt plans to be discharged home    Barriers to DC identified preoperatively: none identified    BMI: 39.05    Patient Education:  Pt educated on post-op pain, early mobilization (POD0), LOS goals, OP PT goals, and preoperative bathing. Patient educated that our goal is to appropriately discharge patient based off their post-op function while striving to maintain maximal independence. The goal is to discharge patient to home and for them to attend outpatient physical therapy.    Assigned to care team? Yes

## 2024-05-31 ENCOUNTER — OFFICE VISIT (OUTPATIENT)
Dept: PHYSICAL THERAPY | Age: 59
End: 2024-05-31
Payer: COMMERCIAL

## 2024-05-31 DIAGNOSIS — N94.10 DYSPAREUNIA IN FEMALE: Primary | ICD-10-CM

## 2024-05-31 LAB
DME PARACHUTE DELIVERY DATE ACTUAL: NORMAL
DME PARACHUTE DELIVERY DATE REQUESTED: NORMAL
DME PARACHUTE ITEM DESCRIPTION: NORMAL
DME PARACHUTE ORDER STATUS: NORMAL
DME PARACHUTE SUPPLIER NAME: NORMAL
DME PARACHUTE SUPPLIER PHONE: NORMAL

## 2024-05-31 PROCEDURE — 97110 THERAPEUTIC EXERCISES: CPT | Performed by: PHYSICAL THERAPIST

## 2024-05-31 PROCEDURE — 97140 MANUAL THERAPY 1/> REGIONS: CPT | Performed by: PHYSICAL THERAPIST

## 2024-06-03 ENCOUNTER — APPOINTMENT (OUTPATIENT)
Dept: LAB | Facility: HOSPITAL | Age: 59
End: 2024-06-03
Payer: COMMERCIAL

## 2024-06-03 ENCOUNTER — OFFICE VISIT (OUTPATIENT)
Dept: LAB | Facility: HOSPITAL | Age: 59
End: 2024-06-03
Payer: COMMERCIAL

## 2024-06-03 ENCOUNTER — LAB REQUISITION (OUTPATIENT)
Dept: LAB | Facility: HOSPITAL | Age: 59
End: 2024-06-03
Payer: COMMERCIAL

## 2024-06-03 ENCOUNTER — OFFICE VISIT (OUTPATIENT)
Age: 59
End: 2024-06-03
Payer: COMMERCIAL

## 2024-06-03 VITALS
RESPIRATION RATE: 18 BRPM | HEART RATE: 60 BPM | HEIGHT: 72 IN | SYSTOLIC BLOOD PRESSURE: 129 MMHG | DIASTOLIC BLOOD PRESSURE: 72 MMHG | OXYGEN SATURATION: 98 % | WEIGHT: 278 LBS | BODY MASS INDEX: 37.65 KG/M2 | TEMPERATURE: 98.1 F

## 2024-06-03 DIAGNOSIS — M16.12 PRIMARY OSTEOARTHRITIS OF LEFT HIP: ICD-10-CM

## 2024-06-03 DIAGNOSIS — J30.89 ENVIRONMENTAL AND SEASONAL ALLERGIES: ICD-10-CM

## 2024-06-03 DIAGNOSIS — Z01.818 PRE-OP EVALUATION: ICD-10-CM

## 2024-06-03 DIAGNOSIS — M16.12 UNILATERAL PRIMARY OSTEOARTHRITIS, LEFT HIP: ICD-10-CM

## 2024-06-03 DIAGNOSIS — Z01.818 PRE-OPERATIVE CLEARANCE: ICD-10-CM

## 2024-06-03 DIAGNOSIS — Z01.818 ENCOUNTER FOR OTHER PREPROCEDURAL EXAMINATION: ICD-10-CM

## 2024-06-03 DIAGNOSIS — J45.991 COUGH VARIANT ASTHMA: Primary | ICD-10-CM

## 2024-06-03 DIAGNOSIS — E66.9 OBESITY (BMI 30-39.9): ICD-10-CM

## 2024-06-03 DIAGNOSIS — G47.33 OSA (OBSTRUCTIVE SLEEP APNEA): ICD-10-CM

## 2024-06-03 LAB
ABO GROUP BLD: NORMAL
ALBUMIN SERPL BCP-MCNC: 4.3 G/DL (ref 3.5–5)
ALP SERPL-CCNC: 77 U/L (ref 34–104)
ALT SERPL W P-5'-P-CCNC: 17 U/L (ref 7–52)
ANION GAP SERPL CALCULATED.3IONS-SCNC: 5 MMOL/L (ref 4–13)
APTT PPP: 28 SECONDS (ref 23–37)
AST SERPL W P-5'-P-CCNC: 20 U/L (ref 13–39)
ATRIAL RATE: 65 BPM
BASOPHILS # BLD AUTO: 0.06 THOUSANDS/ÂΜL (ref 0–0.1)
BASOPHILS NFR BLD AUTO: 1 % (ref 0–1)
BILIRUB SERPL-MCNC: 0.71 MG/DL (ref 0.2–1)
BLD GP AB SCN SERPL QL: NEGATIVE
BUN SERPL-MCNC: 20 MG/DL (ref 5–25)
CALCIUM SERPL-MCNC: 9.2 MG/DL (ref 8.4–10.2)
CHLORIDE SERPL-SCNC: 106 MMOL/L (ref 96–108)
CO2 SERPL-SCNC: 27 MMOL/L (ref 21–32)
CREAT SERPL-MCNC: 0.82 MG/DL (ref 0.6–1.3)
DME PARACHUTE DELIVERY DATE ACTUAL: NORMAL
DME PARACHUTE DELIVERY DATE REQUESTED: NORMAL
DME PARACHUTE ITEM DESCRIPTION: NORMAL
DME PARACHUTE ORDER STATUS: NORMAL
DME PARACHUTE SUPPLIER NAME: NORMAL
DME PARACHUTE SUPPLIER PHONE: NORMAL
EOSINOPHIL # BLD AUTO: 0.35 THOUSAND/ÂΜL (ref 0–0.61)
EOSINOPHIL NFR BLD AUTO: 8 % (ref 0–6)
ERYTHROCYTE [DISTWIDTH] IN BLOOD BY AUTOMATED COUNT: 12.6 % (ref 11.6–15.1)
EST. AVERAGE GLUCOSE BLD GHB EST-MCNC: 114 MG/DL
GFR SERPL CREATININE-BSD FRML MDRD: 78 ML/MIN/1.73SQ M
GLUCOSE P FAST SERPL-MCNC: 98 MG/DL (ref 65–99)
HBA1C MFR BLD: 5.6 %
HCT VFR BLD AUTO: 45.3 % (ref 34.8–46.1)
HGB BLD-MCNC: 14.8 G/DL (ref 11.5–15.4)
IMM GRANULOCYTES # BLD AUTO: 0 THOUSAND/UL (ref 0–0.2)
IMM GRANULOCYTES NFR BLD AUTO: 0 % (ref 0–2)
INR PPP: 0.94 (ref 0.84–1.19)
LYMPHOCYTES # BLD AUTO: 1.5 THOUSANDS/ÂΜL (ref 0.6–4.47)
LYMPHOCYTES NFR BLD AUTO: 33 % (ref 14–44)
MCH RBC QN AUTO: 28.5 PG (ref 26.8–34.3)
MCHC RBC AUTO-ENTMCNC: 32.7 G/DL (ref 31.4–37.4)
MCV RBC AUTO: 87 FL (ref 82–98)
MONOCYTES # BLD AUTO: 0.35 THOUSAND/ÂΜL (ref 0.17–1.22)
MONOCYTES NFR BLD AUTO: 8 % (ref 4–12)
NEUTROPHILS # BLD AUTO: 2.32 THOUSANDS/ÂΜL (ref 1.85–7.62)
NEUTS SEG NFR BLD AUTO: 50 % (ref 43–75)
NRBC BLD AUTO-RTO: 0 /100 WBCS
P AXIS: 27 DEGREES
PLATELET # BLD AUTO: 196 THOUSANDS/UL (ref 149–390)
PMV BLD AUTO: 10.5 FL (ref 8.9–12.7)
POTASSIUM SERPL-SCNC: 4.1 MMOL/L (ref 3.5–5.3)
PR INTERVAL: 174 MS
PROT SERPL-MCNC: 7 G/DL (ref 6.4–8.4)
PROTHROMBIN TIME: 13.2 SECONDS (ref 11.6–14.5)
QRS AXIS: 56 DEGREES
QRSD INTERVAL: 88 MS
QT INTERVAL: 434 MS
QTC INTERVAL: 451 MS
RBC # BLD AUTO: 5.19 MILLION/UL (ref 3.81–5.12)
RH BLD: POSITIVE
SODIUM SERPL-SCNC: 138 MMOL/L (ref 135–147)
SPECIMEN EXPIRATION DATE: NORMAL
T WAVE AXIS: 63 DEGREES
VENTRICULAR RATE: 65 BPM
WBC # BLD AUTO: 4.58 THOUSAND/UL (ref 4.31–10.16)

## 2024-06-03 PROCEDURE — 85610 PROTHROMBIN TIME: CPT

## 2024-06-03 PROCEDURE — 85025 COMPLETE CBC W/AUTO DIFF WBC: CPT

## 2024-06-03 PROCEDURE — 36415 COLL VENOUS BLD VENIPUNCTURE: CPT

## 2024-06-03 PROCEDURE — 86900 BLOOD TYPING SEROLOGIC ABO: CPT | Performed by: STUDENT IN AN ORGANIZED HEALTH CARE EDUCATION/TRAINING PROGRAM

## 2024-06-03 PROCEDURE — 83036 HEMOGLOBIN GLYCOSYLATED A1C: CPT

## 2024-06-03 PROCEDURE — 93005 ELECTROCARDIOGRAM TRACING: CPT

## 2024-06-03 PROCEDURE — 80053 COMPREHEN METABOLIC PANEL: CPT

## 2024-06-03 PROCEDURE — 86901 BLOOD TYPING SEROLOGIC RH(D): CPT | Performed by: STUDENT IN AN ORGANIZED HEALTH CARE EDUCATION/TRAINING PROGRAM

## 2024-06-03 PROCEDURE — 86850 RBC ANTIBODY SCREEN: CPT | Performed by: STUDENT IN AN ORGANIZED HEALTH CARE EDUCATION/TRAINING PROGRAM

## 2024-06-03 PROCEDURE — 99214 OFFICE O/P EST MOD 30 MIN: CPT

## 2024-06-03 PROCEDURE — 93010 ELECTROCARDIOGRAM REPORT: CPT | Performed by: INTERNAL MEDICINE

## 2024-06-03 PROCEDURE — 85730 THROMBOPLASTIN TIME PARTIAL: CPT

## 2024-06-03 RX ORDER — FLUTICASONE PROPIONATE AND SALMETEROL 100; 50 UG/1; UG/1
1 POWDER RESPIRATORY (INHALATION) 2 TIMES DAILY
Qty: 180 BLISTER | Refills: 1 | Status: SHIPPED | OUTPATIENT
Start: 2024-06-03 | End: 2024-11-30

## 2024-06-03 RX ORDER — ALBUTEROL SULFATE 90 UG/1
2 AEROSOL, METERED RESPIRATORY (INHALATION) EVERY 6 HOURS PRN
Qty: 25.5 G | Refills: 3 | Status: SHIPPED | OUTPATIENT
Start: 2024-06-03

## 2024-06-03 RX ORDER — MONTELUKAST SODIUM 10 MG/1
10 TABLET ORAL
Qty: 90 TABLET | Refills: 3 | Status: SHIPPED | OUTPATIENT
Start: 2024-06-03

## 2024-06-03 NOTE — ASSESSMENT & PLAN NOTE
-Well-controlled over the past year with no exacerbations.  Using Advair 100/50 as needed, infrequently using albuterol  -ACT score 23/25 today  -Prior [place] allergy panel with allergens to dust mites, cats, dogs  -She will continue Advair 100/50 PRN as well as albuterol every 6 hours as needed for shortness of breath or wheezing  -Continue montelukast 10 mg at bedtime  -Avoid known allergens  -Prescription refills sent to pharmacy as requested

## 2024-06-03 NOTE — ASSESSMENT & PLAN NOTE
-Reviewed recent diagnostic sleep study with patient.  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  I think this is reasonable given she does not have history of hypertension, or A-fib  -Order placed for mandibular advancing device, list of area dentists provided  -She will follow-up in 6 months or sooner if needed

## 2024-06-03 NOTE — PROGRESS NOTES
Pulmonary Follow Up Note  Shelby Foster 58 y.o. female MRN: [id number]  6/3/2024    Assessment/Plan:    PAPI (obstructive sleep apnea)  -Reviewed recent diagnostic sleep study with patient.  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  I think this is reasonable given she does not have history of hypertension, or A-fib  -Order placed for mandibular advancing device, list of area dentists provided  -She will follow-up in 6 months or sooner if needed    Cough variant asthma  -Well-controlled over the past year with no exacerbations.  Using Advair 100/50 as needed, infrequently using albuterol  -ACT score 23/25 today  -Prior [facility] allergy panel with allergens to dust mites, cats, dogs  -She will continue Advair 100/50 PRN as well as albuterol every 6 hours as needed for shortness of breath or wheezing  -Continue montelukast 10 mg at bedtime  -Avoid known allergens  -Prescription refills sent to pharmacy as requested    Obesity (BMI 30-39.9)  -Encouraged weight loss. She has referral for weight management, appointment later this summer    Environmental and seasonal allergies  -Continue Singulair 10 mg at bedtime  -Avoid known allergens when able    Pre-operative clearance  -ARISCAT Score 3. Patient is at low risk for post-op pulmonary complications. Overall, there are no strict pulmonary contraindications to undergoing surgical operation.  Would not recommend proceeding with surgery if patient has current infection or exacerbation. Patient should maintain her inhaler therapy perioperatively. Recently diagnosed with mild PAPI. May need additional supplemental oxygen or PAP therapy intra/post-operatively.      Diagnoses and all orders for this visit:    Cough variant asthma  -     montelukast (SINGULAIR) 10 mg tablet; Take 1  tablet (10 mg total) by mouth daily at bedtime  -     Fluticasone-Salmeterol (Advair) 100-50 mcg/dose inhaler; Inhale 1 puff 2 (two) times a day Rinse mouth after use.  -     albuterol (Ventolin HFA) 90 mcg/act inhaler; Inhale 2 puffs every 6 (six) hours as needed for wheezing    Environmental and seasonal allergies    Obesity (BMI 30-39.9)    PAPI (obstructive sleep apnea)  -     [employer]    Pre-operative clearance        Return in about 6 months (around 12/3/2024).      History of Present Illness     Chief Complaint:   Chief Complaint   Patient presents with    Follow-up       Patient ID: Shelby is a 58 y.o. y.o. female has a past medical history of Abnormal Pap smear of cervix (2000's), Allergic rhinitis (2015), Anemia, Asthma, Fibroid (2000's), GERD (gastroesophageal reflux disease) (2018), Hypothyroidism, Pneumonia (2/2020), and Varicella.      6/3/2024  HPI: Shelby Foster is a 58 y.o. female never smoker with a PMH of asthma who is here today for a preop clearance visit.  She is undergoing left total hip arthroplasty on 7/1/2024.  Her last office visit with pulmonary was in July 2023.  At that time, her asthma was stable on Advair and Singulair.    Patient has had no exacerbations of her asthma requiring ED visits/hospitalization or need for prednisone/antibiotics this past year.  Denies any limitations of her physical activity due to her breathing.  Denies any nighttime symptoms.  Only using Advair during periods of flares such as with seasonal changes.  Rarely requires use of albuterol inhaler. She is feeling well today. She had a diagnostic sleep study earlier this year which showed mild PAPI. Is not excessively sleepy during the day.  Denies any adverse reactions/difficulty with general anesthesia previously.  Last surgical operation under general anesthesia was approximately 2 years ago for her hand.  No issues.      Review of Systems   Constitutional:  Negative for activity change,  chills, diaphoresis, fever and unexpected weight change.   HENT:  Negative for congestion, postnasal drip, rhinorrhea, sore throat, trouble swallowing and voice change.    Respiratory:  Negative for cough, chest tightness, shortness of breath and wheezing.    Cardiovascular:  Negative for chest pain, palpitations and leg swelling.   Allergic/Immunologic: Negative.        Historical Information   Past Medical History:   Diagnosis Date    Abnormal Pap smear of cervix 2000's    Allergic rhinitis 2015    Deviated septum, ployps    Anemia     Corrected with endometrial ablation    Asthma     Fibroid 2000's    GERD (gastroesophageal reflux disease) 2018    Hypothyroidism     Pneumonia 2/2020    Varicella      Past Surgical History:   Procedure Laterality Date    ANKLE SURGERY Bilateral     APPENDECTOMY      CHOLECYSTECTOMY      COLONOSCOPY      ENDOMETRIAL ABLATION  2005    HERNIA REPAIR  11/2021    MYOMECTOMY  2002,3,4    SINUS SURGERY  2015    TONSILLECTOMY      WISDOM TOOTH EXTRACTION       Family History   Problem Relation Age of Onset    Breast cancer Mother     Miscarriages / Stillbirths Mother     COPD Mother        Smoking history: She reports that she has never smoked. She has never used smokeless tobacco.    Occupational History:     Immunization History   Administered Date(s) Administered    COVID-19 PFIZER VACCINE 0.3 ML IM 03/11/2021, 04/01/2021, 10/13/2021    COVID-19 Pfizer Vac BIVALENT Rony-sucrose 12 Yr+ IM 10/13/2022    COVID-19 Pfizer vac (Rony-sucrose, gray cap) 12 yr+ IM 05/25/2022    COVID-19, unspecified 03/11/2021, 04/01/2021    INFLUENZA 10/16/2007, 11/09/2011, 09/04/2014, 09/29/2015, 09/14/2016, 09/10/2017, 08/26/2018, 10/16/2019, 09/03/2020, 10/12/2021, 10/15/2021, 10/13/2022, 09/10/2023    Influenza Injectable, MDCK, Preservative Free, Quadrivalent 10/16/2019    Influenza, injectable, quadrivalent, preservative free 0.5 mL 09/03/2020    Pneumococcal Conjugate Vaccine 20-valent (Pcv20), Polysace  03/18/2023    Tdap 01/18/2023    Zoster Vaccine Recombinant 08/28/2018       Meds/Allergies     Current Outpatient Medications:     albuterol (Ventolin HFA) 90 mcg/act inhaler, Inhale 2 puffs every 6 (six) hours as needed for wheezing, Disp: 25.5 g, Rfl: 3    ascorbic acid (VITAMIN C) 500 MG tablet, Take 1 tablet (500 mg total) by mouth 2 (two) times a day, Disp: 60 tablet, Rfl: 0    Aspirin-Acetaminophen-Caffeine (EXCEDRIN MIGRAINE PO), Take 1 tablet by mouth if needed, Disp: , Rfl:     clobetasol (TEMOVATE) 0.05 % cream, , Disp: , Rfl:     clotrimazole-betamethasone (LOTRISONE) 1-0.05 % cream, , Disp: , Rfl:     ferrous sulfate 324 (65 Fe) mg, Take 1 tablet (324 mg total) by mouth every other day, Disp: 15 tablet, Rfl: 0    fluticasone (FLONASE) 50 mcg/act nasal spray, , Disp: , Rfl:     Fluticasone-Salmeterol (Advair) 100-50 mcg/dose inhaler, Inhale 1 puff 2 (two) times a day Rinse mouth after use., Disp: 180 blister, Rfl: 1    folic acid (FOLVITE) 1 mg tablet, Take 1 tablet (1 mg total) by mouth daily, Disp: 30 tablet, Rfl: 0    levothyroxine (Synthroid) 175 mcg tablet, Take 1 tablet (175 mcg total) by mouth daily, Disp: 30 tablet, Rfl: 1    Melatonin 300 MCG TABS, melatonin 300 mcg tablet  PRN, Disp: , Rfl:     meloxicam (Mobic) 15 mg tablet, Take 1 tablet (15 mg total) by mouth daily as needed for moderate pain (pain), Disp: 60 tablet, Rfl: 1    methocarbamol (ROBAXIN) 500 mg tablet, take 1 tablet by mouth for muscle spasm IN THE EVENING if needed ( MAY CAUSE SEDATION. ), Disp: 20 tablet, Rfl: 6    metroNIDAZOLE (METROCREAM) 0.75 % cream, , Disp: , Rfl:     montelukast (SINGULAIR) 10 mg tablet, Take 1 tablet (10 mg total) by mouth daily at bedtime, Disp: 90 tablet, Rfl: 3    Multiple Vitamins-Minerals (multivitamin with minerals) tablet, Take 1 tablet by mouth daily, Disp: 30 tablet, Rfl: 0    ondansetron (ZOFRAN-ODT) 4 mg disintegrating tablet, Take 1 tablet (4 mg total) by mouth every 6 (six) hours as needed  for nausea or vomiting, Disp: 20 tablet, Rfl: 3    Sermorelin Acetate Diagnostic 15 MG SOLR, , Disp: , Rfl:     Sulfacetamide Sodium, Acne, 10 % LOTN, , Disp: , Rfl:     terconazole (TERAZOL 7) 0.4 % vaginal cream, Insert 1 applicator into the vagina daily at bedtime, Disp: 45 g, Rfl: 3    Vitamin D, Cholecalciferol, 50 MCG (2000 UT) CAPS, Take 1 capsule by mouth, Disp: , Rfl:     cefdinir (OMNICEF) 300 mg capsule, Take by mouth 2 (two) times a day, Disp: , Rfl:     levothyroxine (Synthroid) 175 mcg tablet, 1 tab daily (Patient not taking: Reported on 5/20/2024), Disp: 90 tablet, Rfl: 3    meloxicam (MOBIC) 15 mg tablet, Take 1 tablet (15 mg total) by mouth daily as needed for moderate pain (Patient not taking: Reported on 5/20/2024), Disp: 30 tablet, Rfl: 0    Allergies:   Allergies   Allergen Reactions    Fluconazole Swelling     Of lips      Sulfamethoxazole-Trimethoprim Swelling     Of lips    Erythromycin Rash     Breaks aout on white pimples      Penicillins Rash     Breaks out on white pimples       Tetracycline Rash     Breaks out on white pimples           Vitals:  Vitals:    06/03/24 0921 06/03/24 0922   BP: 129/72    BP Location: Left arm    Patient Position: Sitting    Cuff Size: Large    Pulse: 60    Resp: 18    Temp: 98.1 °F (36.7 °C)    SpO2: 98% 98%   Weight: 126 kg (278 lb)    Height: 6' (1.829 m)    Oxygen Therapy  SpO2: 98 %  Oxygen Therapy: None (Room air)  .  Wt Readings from Last 3 Encounters:   06/03/24 126 kg (278 lb)   05/20/24 127 kg (280 lb)   05/15/24 127 kg (279 lb)     Body mass index is 37.7 kg/m².    Physical Exam  Vitals and nursing note reviewed.   Constitutional:       General: She is not in acute distress.     Appearance: Normal appearance. She is well-developed.   Cardiovascular:      Rate and Rhythm: Normal rate and regular rhythm.      Heart sounds: Normal heart sounds, S1 normal and S2 normal. No murmur heard.  Pulmonary:      Effort: Pulmonary effort is normal.      Breath  "sounds: Normal breath sounds. No decreased breath sounds, wheezing, rhonchi or rales.   Musculoskeletal:         General: No swelling.      Right lower leg: No edema.      Left lower leg: No edema.   Neurological:      Mental Status: She is alert.   Psychiatric:         Mood and Affect: Mood and affect normal.         Behavior: Behavior normal. Behavior is cooperative.           Labs: I have personally reviewed pertinent lab results.  Lab Results   Component Value Date    WBC 4.58 06/03/2024    HGB 14.8 06/03/2024    HCT 45.3 06/03/2024    MCV 87 06/03/2024     06/03/2024     Lab Results   Component Value Date    CALCIUM 9.2 06/03/2024    K 4.1 06/03/2024    CO2 27 06/03/2024     06/03/2024    BUN 20 06/03/2024    CREATININE 0.82 06/03/2024     No results found for: \"IGE\"  Lab Results   Component Value Date    ALT 17 06/03/2024    AST 20 06/03/2024    ALKPHOS 77 06/03/2024     .    "

## 2024-06-04 ENCOUNTER — CONSULT (OUTPATIENT)
Dept: FAMILY MEDICINE CLINIC | Facility: CLINIC | Age: 59
End: 2024-06-04
Payer: COMMERCIAL

## 2024-06-04 VITALS
DIASTOLIC BLOOD PRESSURE: 80 MMHG | SYSTOLIC BLOOD PRESSURE: 124 MMHG | WEIGHT: 280 LBS | BODY MASS INDEX: 37.93 KG/M2 | HEIGHT: 72 IN | RESPIRATION RATE: 16 BRPM | TEMPERATURE: 97.5 F | HEART RATE: 79 BPM | OXYGEN SATURATION: 95 %

## 2024-06-04 DIAGNOSIS — M16.12 PRIMARY OSTEOARTHRITIS OF LEFT HIP: ICD-10-CM

## 2024-06-04 DIAGNOSIS — Z01.818 PREOPERATIVE CLEARANCE: Primary | ICD-10-CM

## 2024-06-04 PROCEDURE — 99214 OFFICE O/P EST MOD 30 MIN: CPT | Performed by: NURSE PRACTITIONER

## 2024-06-04 NOTE — PROGRESS NOTES
Presurgical Evaluation    Subjective:      Patient ID: Shelby Foster is a 58 y.o. female.    Chief Complaint   Patient presents with   • Pre-op Clearance       Patient is here for preop clearance for hip replacement        The following portions of the patient's history were reviewed and updated as appropriate: allergies, current medications, past family history, past medical history, past social history, past surgical history, and problem list.    Procedure date: 0701/2024    Surgeon:  Dr Lr  Planned procedure:   ARTHROPLASTY HIP TOTAL ANTERIOR, LEFT (Left: Hip)   Diagnosis for procedure:  Primary osteoarthritis of left hip     Prior anesthesia: No    CAD History: None   NOTE: Patient should see Cardiology if time available before surgery, and if appropriate.    Pulmonary History: PAPI (Sleep Apnea)    Renal history: None    Diabetes History:  None     Neurological History: None     On Immunosuppressant meds/biologics: No      Review of Systems   Constitutional: Negative.    HENT: Negative.     Eyes: Negative.    Respiratory: Negative.     Cardiovascular: Negative.    Gastrointestinal: Negative.    Endocrine: Negative.    Genitourinary: Negative.    Musculoskeletal:  Positive for arthralgias (Left hip pain).   Allergic/Immunologic: Negative.    Neurological: Negative.    Psychiatric/Behavioral: Negative.           Current Outpatient Medications   Medication Sig Dispense Refill   • albuterol (Ventolin HFA) 90 mcg/act inhaler Inhale 2 puffs every 6 (six) hours as needed for wheezing 25.5 g 3   • ascorbic acid (VITAMIN C) 500 MG tablet Take 1 tablet (500 mg total) by mouth 2 (two) times a day 60 tablet 0   • Aspirin-Acetaminophen-Caffeine (EXCEDRIN MIGRAINE PO) Take 1 tablet by mouth if needed     • clobetasol (TEMOVATE) 0.05 % cream      • clotrimazole-betamethasone (LOTRISONE) 1-0.05 % cream      • ferrous sulfate 324 (65 Fe) mg Take 1 tablet (324 mg total) by mouth every other day 15 tablet 0   •  fluticasone (FLONASE) 50 mcg/act nasal spray      • Fluticasone-Salmeterol (Advair) 100-50 mcg/dose inhaler Inhale 1 puff 2 (two) times a day Rinse mouth after use. 180 blister 1   • folic acid (FOLVITE) 1 mg tablet Take 1 tablet (1 mg total) by mouth daily 30 tablet 0   • levothyroxine (Synthroid) 175 mcg tablet Take 1 tablet (175 mcg total) by mouth daily 30 tablet 1   • Melatonin 300 MCG TABS melatonin 300 mcg tablet   PRN     • meloxicam (Mobic) 15 mg tablet Take 1 tablet (15 mg total) by mouth daily as needed for moderate pain (pain) 60 tablet 1   • methocarbamol (ROBAXIN) 500 mg tablet take 1 tablet by mouth for muscle spasm IN THE EVENING if needed ( MAY CAUSE SEDATION. ) 20 tablet 6   • metroNIDAZOLE (METROCREAM) 0.75 % cream      • montelukast (SINGULAIR) 10 mg tablet Take 1 tablet (10 mg total) by mouth daily at bedtime 90 tablet 3   • Multiple Vitamins-Minerals (multivitamin with minerals) tablet Take 1 tablet by mouth daily 30 tablet 0   • ondansetron (ZOFRAN-ODT) 4 mg disintegrating tablet Take 1 tablet (4 mg total) by mouth every 6 (six) hours as needed for nausea or vomiting 20 tablet 3   • Sermorelin Acetate Diagnostic 15 MG SOLR      • Sulfacetamide Sodium, Acne, 10 % LOTN      • terconazole (TERAZOL 7) 0.4 % vaginal cream Insert 1 applicator into the vagina daily at bedtime 45 g 3   • Vitamin D, Cholecalciferol, 50 MCG (2000 UT) CAPS Take 1 capsule by mouth     • cefdinir (OMNICEF) 300 mg capsule Take by mouth 2 (two) times a day     • levothyroxine (Synthroid) 175 mcg tablet 1 tab daily (Patient not taking: Reported on 5/20/2024) 90 tablet 3   • meloxicam (MOBIC) 15 mg tablet Take 1 tablet (15 mg total) by mouth daily as needed for moderate pain (Patient not taking: Reported on 5/20/2024) 30 tablet 0     No current facility-administered medications for this visit.       Allergies on file:   Fluconazole, Sulfamethoxazole-trimethoprim, Erythromycin, Penicillins, and Tetracycline    Patient Active  Problem List   Diagnosis   • Vitamin D deficiency   • Iron deficiency   • History of adenomatous polyp of colon   • PAPI (obstructive sleep apnea)   • Hypothyroidism   • Annual physical exam   • Class 2 severe obesity with serious comorbidity and body mass index (BMI) of 39.0 to 39.9 in adult (HCC)   • Prediabetes   • Primary osteoarthritis of left hip   • Obesity (BMI 30-39.9)   • Environmental and seasonal allergies   • Cough variant asthma   • Preoperative clearance        Past Medical History:   Diagnosis Date   • Abnormal Pap smear of cervix 2000's   • Allergic rhinitis 2015    Deviated septum, ployps   • Anemia     Corrected with endometrial ablation   • Asthma    • Fibroid 2000's   • GERD (gastroesophageal reflux disease) 2018   • Hypothyroidism    • Pneumonia 2/2020   • Varicella        Past Surgical History:   Procedure Laterality Date   • ANKLE SURGERY Bilateral    • APPENDECTOMY     • CHOLECYSTECTOMY     • COLONOSCOPY     • ENDOMETRIAL ABLATION  2005   • HERNIA REPAIR  11/2021   • MYOMECTOMY  2002,3,4   • SINUS SURGERY  2015   • TONSILLECTOMY     • WISDOM TOOTH EXTRACTION         Family History   Problem Relation Age of Onset   • Breast cancer Mother    • Miscarriages / Stillbirths Mother    • COPD Mother        Social History     Tobacco Use   • Smoking status: Never   • Smokeless tobacco: Never   Vaping Use   • Vaping status: Never Used   Substance Use Topics   • Alcohol use: Never   • Drug use: Never       Objective:    Vitals:    06/04/24 1557   BP: 124/80   Pulse: 79   Resp: 16   Temp: 97.5 °F (36.4 °C)   TempSrc: Temporal   SpO2: 95%   Weight: 127 kg (280 lb)   Height: 6' (1.829 m)        Physical Exam  Vitals and nursing note reviewed.   Constitutional:       Appearance: She is well-developed. She is obese.   HENT:      Head: Normocephalic and atraumatic.   Eyes:      Pupils: Pupils are equal, round, and reactive to light.   Cardiovascular:      Rate and Rhythm: Normal rate and regular rhythm.       Pulses: Normal pulses.      Heart sounds: Normal heart sounds.   Pulmonary:      Effort: Pulmonary effort is normal.   Abdominal:      General: Bowel sounds are normal.      Palpations: Abdomen is soft.   Musculoskeletal:         General: Tenderness present. Normal range of motion.      Cervical back: Normal range of motion.   Skin:     General: Skin is warm and dry.   Neurological:      General: No focal deficit present.      Mental Status: She is alert and oriented to person, place, and time.      Gait: Gait abnormal.   Psychiatric:         Mood and Affect: Mood normal.         Behavior: Behavior normal.         Thought Content: Thought content normal.         Judgment: Judgment normal.           Preop labs/testing available and reviewed: yes    eGFR   Date Value Ref Range Status   2024 78 ml/min/1.73sq m Final     WBC   Date Value Ref Range Status   2024 4.58 4.31 - 10.16 Thousand/uL Final     Hemoglobin   Date Value Ref Range Status   2024 14.8 11.5 - 15.4 g/dL Final     Hematocrit   Date Value Ref Range Status   2024 45.3 34.8 - 46.1 % Final     Platelets   Date Value Ref Range Status   2024 196 149 - 390 Thousands/uL Final     INR   Date Value Ref Range Status   2024 0.94 0.84 - 1.19 Final       EKG yes    Echo no    Stress test/cath no    PFT/Peyton no    Functional capacity: Walking , 4-5 MPH               4 Mets   Pick the highest level patient can comfortably perform   4 mets or greater for surgery    RCRI  High Risk surgery?         1 Point  CAD History:         1 Point   MI; Positive Stress Test; CP due to Mi;  Nitrate Usage to control Angina; Pathologic Q wave on EKG  CHF Active:         1 Point   Pulm Edema; Paroxysmal Nocturnal Dyspnea;  Bibasilar Rales (crackles);S3; CHF on CXR  Cerebrovascular Disease (TIA or CVA):     1 Point  DM on Insulin:        1 Point  Serum Creat >2.0 mg/dl:       1 Point          Total Points: 4     Scorin: Class I, Very Low Risk  (0.4%)     1: Class II, Low risk (0.9%)     2: Class III Moderate (6.6%)     3: Class IV High (>11%)      LYUDMILA Risk:  GFR:   eGFR   Date Value Ref Range Status   06/03/2024 78 ml/min/1.73sq m Final         For PCP:  If GFR>60, Hold ACE/ARB/Diuretic on the day of surgery, and NSAIDS 10 days before.    If GFR<45, Consider PRE and POST op Nephrology Consult.    If 46 <GFR> 59 : Has Patient had LYUDMILA in last 6 Months? no   If YES: Preop Nephrology consult   If No:  Post Op Nephrology consult.           Assessment/Plan:    Patient is medically optimized (cleared) for the planned procedure.    Further testing/evaluation is not required.    Postop concerns: no    Problem List Items Addressed This Visit        Musculoskeletal and Integument    Primary osteoarthritis of left hip       Surgery/Wound/Pain    Preoperative clearance - Primary     Patient has hip surgery scheduled on July 1.  Reviewed labs reviewed EKG within normal limits.  Advised to stop Mobic at least 1 week prior to surgery.  Patient is optimized for surgery.               Diagnoses and all orders for this visit:    Preoperative clearance    Primary osteoarthritis of left hip  -     Ambulatory referral to Family Practice          Pre-Surgery Instructions:   Medication Instructions   • albuterol (Ventolin HFA) 90 mcg/act inhaler per anesthesia guidelines    • ascorbic acid (VITAMIN C) 500 MG tablet per anesthesia guidelines    • Aspirin-Acetaminophen-Caffeine (EXCEDRIN MIGRAINE PO) per anesthesia guidelines    • clobetasol (TEMOVATE) 0.05 % cream per anesthesia guidelines    • clotrimazole-betamethasone (LOTRISONE) 1-0.05 % cream per anesthesia guidelines    • ferrous sulfate 324 (65 Fe) mg per anesthesia guidelines    • fluticasone (FLONASE) 50 mcg/act nasal spray per anesthesia guidelines    • Fluticasone-Salmeterol (Advair) 100-50 mcg/dose inhaler per anesthesia guidelines    • folic acid (FOLVITE) 1 mg tablet per anesthesia guidelines    • levothyroxine  "(Synthroid) 175 mcg tablet per anesthesia guidelines    • Melatonin 300 MCG TABS per anesthesia guidelines    • meloxicam (Mobic) 15 mg tablet Stop taking 1 week prior to surgery   • methocarbamol (ROBAXIN) 500 mg tablet per anesthesia guidelines    • metroNIDAZOLE (METROCREAM) 0.75 % cream per anesthesia guidelines    • montelukast (SINGULAIR) 10 mg tablet per anesthesia guidelines    • Multiple Vitamins-Minerals (multivitamin with minerals) tablet per anesthesia guidelines    • ondansetron (ZOFRAN-ODT) 4 mg disintegrating tablet per anesthesia guidelines    • Sermorelin Acetate Diagnostic 15 MG SOLR per anesthesia guidelines    • Sulfacetamide Sodium, Acne, 10 % LOTN per anesthesia guidelines    • terconazole (TERAZOL 7) 0.4 % vaginal cream per anesthesia guidelines    • Vitamin D, Cholecalciferol, 50 MCG (2000 UT) CAPS per anesthesia guidelines         NOTE: Please use the above to review important meds for your specialty, the remainder \"per anesthesia Guidelines.\"    NOTE: Please place an Inbasket message for \"SOC\" pool for complicated patients.    "

## 2024-06-04 NOTE — PATIENT INSTRUCTIONS
Stop meloxicam 5 days prior to surgery  Constipation is one of the most frequent postop complication.  May be caused by Anesthesia, lack of movement, pain medication.  Increase fluid intake, fiber intake.  Increase activity as tolerated as soon as possible.    Pneumonia is another postop complication.  If you are in pain you are not breathing deeply an expanding your lungs properly.  The fluid in your lungs become a nice breeding  ground for bacterial infection.  To help prevent this take deep breaths at least 10 every hour to help open up the lungs to prevent pneumonia.      Blood clot in your leg is another complication from surgery caused by lack of movement.  To help prevent this, increase activity as tolerated.  Wear compression stockings as indicated.  Use anticoagulation if prescribed by the surgeon

## 2024-06-04 NOTE — H&P (VIEW-ONLY)
Presurgical Evaluation    Subjective:      Patient ID: Shelby Foster is a 58 y.o. female.    Chief Complaint   Patient presents with   • Pre-op Clearance       Patient is here for preop clearance for hip replacement        The following portions of the patient's history were reviewed and updated as appropriate: allergies, current medications, past family history, past medical history, past social history, past surgical history, and problem list.    Procedure date: 0701/2024    Surgeon:  Dr Lr  Planned procedure:   ARTHROPLASTY HIP TOTAL ANTERIOR, LEFT (Left: Hip)   Diagnosis for procedure:  Primary osteoarthritis of left hip     Prior anesthesia: No    CAD History: None   NOTE: Patient should see Cardiology if time available before surgery, and if appropriate.    Pulmonary History: PAPI (Sleep Apnea)    Renal history: None    Diabetes History:  None     Neurological History: None     On Immunosuppressant meds/biologics: No      Review of Systems   Constitutional: Negative.    HENT: Negative.     Eyes: Negative.    Respiratory: Negative.     Cardiovascular: Negative.    Gastrointestinal: Negative.    Endocrine: Negative.    Genitourinary: Negative.    Musculoskeletal:  Positive for arthralgias (Left hip pain).   Allergic/Immunologic: Negative.    Neurological: Negative.    Psychiatric/Behavioral: Negative.           Current Outpatient Medications   Medication Sig Dispense Refill   • albuterol (Ventolin HFA) 90 mcg/act inhaler Inhale 2 puffs every 6 (six) hours as needed for wheezing 25.5 g 3   • ascorbic acid (VITAMIN C) 500 MG tablet Take 1 tablet (500 mg total) by mouth 2 (two) times a day 60 tablet 0   • Aspirin-Acetaminophen-Caffeine (EXCEDRIN MIGRAINE PO) Take 1 tablet by mouth if needed     • clobetasol (TEMOVATE) 0.05 % cream      • clotrimazole-betamethasone (LOTRISONE) 1-0.05 % cream      • ferrous sulfate 324 (65 Fe) mg Take 1 tablet (324 mg total) by mouth every other day 15 tablet 0   •  fluticasone (FLONASE) 50 mcg/act nasal spray      • Fluticasone-Salmeterol (Advair) 100-50 mcg/dose inhaler Inhale 1 puff 2 (two) times a day Rinse mouth after use. 180 blister 1   • folic acid (FOLVITE) 1 mg tablet Take 1 tablet (1 mg total) by mouth daily 30 tablet 0   • levothyroxine (Synthroid) 175 mcg tablet Take 1 tablet (175 mcg total) by mouth daily 30 tablet 1   • Melatonin 300 MCG TABS melatonin 300 mcg tablet   PRN     • meloxicam (Mobic) 15 mg tablet Take 1 tablet (15 mg total) by mouth daily as needed for moderate pain (pain) 60 tablet 1   • methocarbamol (ROBAXIN) 500 mg tablet take 1 tablet by mouth for muscle spasm IN THE EVENING if needed ( MAY CAUSE SEDATION. ) 20 tablet 6   • metroNIDAZOLE (METROCREAM) 0.75 % cream      • montelukast (SINGULAIR) 10 mg tablet Take 1 tablet (10 mg total) by mouth daily at bedtime 90 tablet 3   • Multiple Vitamins-Minerals (multivitamin with minerals) tablet Take 1 tablet by mouth daily 30 tablet 0   • ondansetron (ZOFRAN-ODT) 4 mg disintegrating tablet Take 1 tablet (4 mg total) by mouth every 6 (six) hours as needed for nausea or vomiting 20 tablet 3   • Sermorelin Acetate Diagnostic 15 MG SOLR      • Sulfacetamide Sodium, Acne, 10 % LOTN      • terconazole (TERAZOL 7) 0.4 % vaginal cream Insert 1 applicator into the vagina daily at bedtime 45 g 3   • Vitamin D, Cholecalciferol, 50 MCG (2000 UT) CAPS Take 1 capsule by mouth     • cefdinir (OMNICEF) 300 mg capsule Take by mouth 2 (two) times a day     • levothyroxine (Synthroid) 175 mcg tablet 1 tab daily (Patient not taking: Reported on 5/20/2024) 90 tablet 3   • meloxicam (MOBIC) 15 mg tablet Take 1 tablet (15 mg total) by mouth daily as needed for moderate pain (Patient not taking: Reported on 5/20/2024) 30 tablet 0     No current facility-administered medications for this visit.       Allergies on file:   Fluconazole, Sulfamethoxazole-trimethoprim, Erythromycin, Penicillins, and Tetracycline    Patient Active  Problem List   Diagnosis   • Vitamin D deficiency   • Iron deficiency   • History of adenomatous polyp of colon   • PAPI (obstructive sleep apnea)   • Hypothyroidism   • Annual physical exam   • Class 2 severe obesity with serious comorbidity and body mass index (BMI) of 39.0 to 39.9 in adult (HCC)   • Prediabetes   • Primary osteoarthritis of left hip   • Obesity (BMI 30-39.9)   • Environmental and seasonal allergies   • Cough variant asthma   • Preoperative clearance        Past Medical History:   Diagnosis Date   • Abnormal Pap smear of cervix 2000's   • Allergic rhinitis 2015    Deviated septum, ployps   • Anemia     Corrected with endometrial ablation   • Asthma    • Fibroid 2000's   • GERD (gastroesophageal reflux disease) 2018   • Hypothyroidism    • Pneumonia 2/2020   • Varicella        Past Surgical History:   Procedure Laterality Date   • ANKLE SURGERY Bilateral    • APPENDECTOMY     • CHOLECYSTECTOMY     • COLONOSCOPY     • ENDOMETRIAL ABLATION  2005   • HERNIA REPAIR  11/2021   • MYOMECTOMY  2002,3,4   • SINUS SURGERY  2015   • TONSILLECTOMY     • WISDOM TOOTH EXTRACTION         Family History   Problem Relation Age of Onset   • Breast cancer Mother    • Miscarriages / Stillbirths Mother    • COPD Mother        Social History     Tobacco Use   • Smoking status: Never   • Smokeless tobacco: Never   Vaping Use   • Vaping status: Never Used   Substance Use Topics   • Alcohol use: Never   • Drug use: Never       Objective:    Vitals:    06/04/24 1557   BP: 124/80   Pulse: 79   Resp: 16   Temp: 97.5 °F (36.4 °C)   TempSrc: Temporal   SpO2: 95%   Weight: 127 kg (280 lb)   Height: 6' (1.829 m)        Physical Exam  Vitals and nursing note reviewed.   Constitutional:       Appearance: She is well-developed. She is obese.   HENT:      Head: Normocephalic and atraumatic.   Eyes:      Pupils: Pupils are equal, round, and reactive to light.   Cardiovascular:      Rate and Rhythm: Normal rate and regular rhythm.       Pulses: Normal pulses.      Heart sounds: Normal heart sounds.   Pulmonary:      Effort: Pulmonary effort is normal.   Abdominal:      General: Bowel sounds are normal.      Palpations: Abdomen is soft.   Musculoskeletal:         General: Tenderness present. Normal range of motion.      Cervical back: Normal range of motion.   Skin:     General: Skin is warm and dry.   Neurological:      General: No focal deficit present.      Mental Status: She is alert and oriented to person, place, and time.      Gait: Gait abnormal.   Psychiatric:         Mood and Affect: Mood normal.         Behavior: Behavior normal.         Thought Content: Thought content normal.         Judgment: Judgment normal.           Preop labs/testing available and reviewed: yes    eGFR   Date Value Ref Range Status   2024 78 ml/min/1.73sq m Final     WBC   Date Value Ref Range Status   2024 4.58 4.31 - 10.16 Thousand/uL Final     Hemoglobin   Date Value Ref Range Status   2024 14.8 11.5 - 15.4 g/dL Final     Hematocrit   Date Value Ref Range Status   2024 45.3 34.8 - 46.1 % Final     Platelets   Date Value Ref Range Status   2024 196 149 - 390 Thousands/uL Final     INR   Date Value Ref Range Status   2024 0.94 0.84 - 1.19 Final       EKG yes    Echo no    Stress test/cath no    PFT/[place] no    Functional capacity: Walking , 4-5 MPH               4 Mets   Pick the highest level patient can comfortably perform   4 mets or greater for surgery    RCRI  High Risk surgery?         1 Point  CAD History:         1 Point   MI; Positive Stress Test; CP due to Mi;  Nitrate Usage to control Angina; Pathologic Q wave on EKG  CHF Active:         1 Point   Pulm Edema; Paroxysmal Nocturnal Dyspnea;  Bibasilar Rales (crackles);S3; CHF on CXR  Cerebrovascular Disease (TIA or CVA):     1 Point  DM on Insulin:        1 Point  Serum Creat >2.0 mg/dl:       1 Point          Total Points: 4     Scorin: Class I, Very Low Risk  (0.4%)     1: Class II, Low risk (0.9%)     2: Class III Moderate (6.6%)     3: Class IV High (>11%)      LYUDMILA Risk:  GFR:   eGFR   Date Value Ref Range Status   06/03/2024 78 ml/min/1.73sq m Final         For PCP:  If GFR>60, Hold ACE/ARB/Diuretic on the day of surgery, and NSAIDS 10 days before.    If GFR<45, Consider PRE and POST op Nephrology Consult.    If 46 <GFR> 59 : Has Patient had LYUDMILA in last 6 Months? no   If YES: Preop Nephrology consult   If No:  Post Op Nephrology consult.           Assessment/Plan:    Patient is medically optimized (cleared) for the planned procedure.    Further testing/evaluation is not required.    Postop concerns: no    Problem List Items Addressed This Visit        Musculoskeletal and Integument    Primary osteoarthritis of left hip       Surgery/Wound/Pain    Preoperative clearance - Primary     Patient has hip surgery scheduled on July 1.  Reviewed labs reviewed EKG within normal limits.  Advised to stop Mobic at least 1 week prior to surgery.  Patient is optimized for surgery.               Diagnoses and all orders for this visit:    Preoperative clearance    Primary osteoarthritis of left hip  -     Ambulatory referral to Family Practice          Pre-Surgery Instructions:   Medication Instructions   • albuterol (Ventolin HFA) 90 mcg/act inhaler per anesthesia guidelines    • ascorbic acid (VITAMIN C) 500 MG tablet per anesthesia guidelines    • Aspirin-Acetaminophen-Caffeine (EXCEDRIN MIGRAINE PO) per anesthesia guidelines    • clobetasol (TEMOVATE) 0.05 % cream per anesthesia guidelines    • clotrimazole-betamethasone (LOTRISONE) 1-0.05 % cream per anesthesia guidelines    • ferrous sulfate 324 (65 Fe) mg per anesthesia guidelines    • fluticasone (FLONASE) 50 mcg/act nasal spray per anesthesia guidelines    • Fluticasone-Salmeterol (Advair) 100-50 mcg/dose inhaler per anesthesia guidelines    • folic acid (FOLVITE) 1 mg tablet per anesthesia guidelines    • levothyroxine  "(Synthroid) 175 mcg tablet per anesthesia guidelines    • Melatonin 300 MCG TABS per anesthesia guidelines    • meloxicam (Mobic) 15 mg tablet Stop taking 1 week prior to surgery   • methocarbamol (ROBAXIN) 500 mg tablet per anesthesia guidelines    • metroNIDAZOLE (METROCREAM) 0.75 % cream per anesthesia guidelines    • montelukast (SINGULAIR) 10 mg tablet per anesthesia guidelines    • Multiple Vitamins-Minerals (multivitamin with minerals) tablet per anesthesia guidelines    • ondansetron (ZOFRAN-ODT) 4 mg disintegrating tablet per anesthesia guidelines    • Sermorelin Acetate Diagnostic 15 MG SOLR per anesthesia guidelines    • Sulfacetamide Sodium, Acne, 10 % LOTN per anesthesia guidelines    • terconazole (TERAZOL 7) 0.4 % vaginal cream per anesthesia guidelines    • Vitamin D, Cholecalciferol, 50 MCG (2000 UT) CAPS per anesthesia guidelines         NOTE: Please use the above to review important meds for your specialty, the remainder \"per anesthesia Guidelines.\"    NOTE: Please place an Inbasket message for \"SOC\" pool for complicated patients.    "

## 2024-06-05 PROBLEM — Z01.818 PREOPERATIVE CLEARANCE: Status: ACTIVE | Noted: 2024-06-05

## 2024-06-05 NOTE — ASSESSMENT & PLAN NOTE
Patient has hip surgery scheduled on July 1.  Reviewed labs reviewed EKG within normal limits.  Advised to stop Mobic at least 1 week prior to surgery.  Patient is optimized for surgery.

## 2024-06-07 ENCOUNTER — ANESTHESIA EVENT (OUTPATIENT)
Age: 59
End: 2024-06-07
Payer: COMMERCIAL

## 2024-06-07 RX ORDER — ECHINACEA PURPUREA EXTRACT 125 MG
1 TABLET ORAL
COMMUNITY

## 2024-06-07 RX ORDER — ACETAMINOPHEN 500 MG
500 TABLET ORAL EVERY 6 HOURS PRN
COMMUNITY

## 2024-06-07 NOTE — PRE-PROCEDURE INSTRUCTIONS
Pre-Surgery Instructions:   Medication Instructions    acetaminophen (TYLENOL) 500 mg tablet Uses PRN- OK to take day of surgery    albuterol (Ventolin HFA) 90 mcg/act inhaler Uses PRN- OK to take day of surgery    ascorbic acid (VITAMIN C) 500 MG tablet Hold day of surgery.    clobetasol (TEMOVATE) 0.05 % cream Hold day of surgery.    clotrimazole-betamethasone (LOTRISONE) 1-0.05 % cream Uses PRN- DO NOT take day of surgery    ferrous sulfate 324 (65 Fe) mg Hold day of surgery.    fluticasone (FLONASE) 50 mcg/act nasal spray Uses PRN- OK to take day of surgery    Fluticasone-Salmeterol (Advair) 100-50 mcg/dose inhaler Uses PRN- OK to take day of surgery    folic acid (FOLVITE) 1 mg tablet Hold day of surgery.    levothyroxine (Synthroid) 175 mcg tablet Take day of surgery.    Melatonin 300 MCG TABS Stop taking 7 days prior to surgery.    meloxicam (Mobic) 15 mg tablet Instructions provided by MD    methocarbamol (ROBAXIN) 500 mg tablet Uses PRN- DO NOT take day of surgerytakes HS as needed    metroNIDAZOLE (METROCREAM) 0.75 % cream Hold day of surgery.    montelukast (SINGULAIR) 10 mg tablet Take night before surgery    Multiple Vitamins-Minerals (multivitamin with minerals) tablet Hold day of surgery.    ondansetron (ZOFRAN-ODT) 4 mg disintegrating tablet Uses PRN- OK to take day of surgery    sodium chloride (OCEAN) 0.65 % nasal spray Take night before surgery    Sulfacetamide Sodium, Acne, 10 % LOTN Hold day of surgery.    Vitamin D, Cholecalciferol, 50 MCG (2000 UT) CAPS Stop taking 7 days prior to surgery.    Medication instructions for day surgery reviewed. Please use only a sip of water to take your instructed medications. Avoid all over the counter vitamins, supplements and NSAIDS for one week prior to surgery per anesthesia guidelines. Tylenol is ok to take as needed.     You will receive a call one business day prior to surgery with an arrival time and hospital directions. If your surgery is scheduled on  a Monday, the hospital will be calling you on the Friday prior to your surgery. If you have not heard from anyone by 8pm, please call the hospital supervisor through the hospital  at [phone]. ([place] [phone] or [place] [phone]).    Do not eat or drink anything after midnight the night before your surgery, including candy, mints, lifesavers, or chewing gum. Do not drink alcohol 24hrs before your surgery. Try not to smoke at least 24hrs before your surgery.       Follow the pre surgery showering instructions as listed in the “My Surgical Experience Booklet” or otherwise provided by your surgeon's office. Do not use a blade to shave the surgical area 1 week before surgery. It is okay to use a clean electric clippers up to 24 hours before surgery. Do not apply any lotions, creams, including makeup, cologne, deodorant, or perfumes after showering on the day of your surgery. Do not use dry shampoo, hair spray, hair gel, or any type of hair products.     No contact lenses, eye make-up, or artificial eyelashes. Remove nail polish, including gel polish, and any artificial, gel, or acrylic nails if possible. Remove all jewelry including rings and body piercing jewelry.     Wear causal clothing that is easy to take on and off. Consider your type of surgery.    Keep any valuables, jewelry, piercings at home. Please bring any specially ordered equipment (sling, braces) if indicated.    Arrange for a responsible person to drive you to and from the hospital on the day of your surgery. Please confirm the visitor policy for the day of your procedure when you receive your phone call with an arrival time.     Call the surgeon's office with any new illnesses, exposures, or additional questions prior to surgery.    Please reference your “My Surgical Experience Booklet” for additional information to prepare for your upcoming surgery.

## 2024-06-07 NOTE — PRE-PROCEDURE INSTRUCTIONS
Pre-Surgery Instructions:   Medication Instructions    acetaminophen (TYLENOL) 500 mg tablet Uses PRN- OK to take day of surgery    albuterol (Ventolin HFA) 90 mcg/act inhaler Uses PRN- OK to take day of surgery    ascorbic acid (VITAMIN C) 500 MG tablet Hold day of surgery.    clobetasol (TEMOVATE) 0.05 % cream Hold day of surgery.    clotrimazole-betamethasone (LOTRISONE) 1-0.05 % cream Uses PRN- DO NOT take day of surgery    ferrous sulfate 324 (65 Fe) mg Hold day of surgery.    fluticasone (FLONASE) 50 mcg/act nasal spray Uses PRN- OK to take day of surgery    folic acid (FOLVITE) 1 mg tablet Hold day of surgery.    levothyroxine (Synthroid) 175 mcg tablet Take day of surgery.    Melatonin 300 MCG TABS Stop taking 7 days prior to surgery.    meloxicam (Mobic) 15 mg tablet Instructions provided by MD    methocarbamol (ROBAXIN) 500 mg tablet Uses PRN- DO NOT take day of surgerytakes HS prn    metroNIDAZOLE (METROCREAM) 0.75 % cream Hold day of surgery.    montelukast (SINGULAIR) 10 mg tablet Take night before surgery    Multiple Vitamins-Minerals (multivitamin with minerals) tablet Hold day of surgery.    ondansetron (ZOFRAN-ODT) 4 mg disintegrating tablet Uses PRN- OK to take day of surgery    sodium chloride (OCEAN) 0.65 % nasal spray Take night before surgery    Sulfacetamide Sodium, Acne, 10 % LOTN Uses PRN- DO NOT take day of surgery    Vitamin D, Cholecalciferol, 50 MCG (2000 UT) CAPS Stop taking 7 days prior to surgery.    Medication instructions for day surgery reviewed. Please use only a sip of water to take your instructed medications. Avoid all over the counter vitamins, supplements and NSAIDS for one week prior to surgery per anesthesia guidelines. Tylenol is ok to take as needed.     You will receive a call one business day prior to surgery with an arrival time and hospital directions. If your surgery is scheduled on a Monday, the hospital will be calling you on the Friday prior to your surgery. If  you have not heard from anyone by 8pm, please call the hospital supervisor through the hospital  at [phone]. ([place] [phone] or [place] [phone]).    Do not eat or drink anything after midnight the night before your surgery, including candy, mints, lifesavers, or chewing gum. Do not drink alcohol 24hrs before your surgery. Try not to smoke at least 24hrs before your surgery.       Follow the pre surgery showering instructions as listed in the “My Surgical Experience Booklet” or otherwise provided by your surgeon's office. Do not use a blade to shave the surgical area 1 week before surgery. It is okay to use a clean electric clippers up to 24 hours before surgery. Do not apply any lotions, creams, including makeup, cologne, deodorant, or perfumes after showering on the day of your surgery. Do not use dry shampoo, hair spray, hair gel, or any type of hair products.     No contact lenses, eye make-up, or artificial eyelashes. Remove nail polish, including gel polish, and any artificial, gel, or acrylic nails if possible. Remove all jewelry including rings and body piercing jewelry.     Wear causal clothing that is easy to take on and off. Consider your type of surgery.    Keep any valuables, jewelry, piercings at home. Please bring any specially ordered equipment (sling, braces) if indicated.    Arrange for a responsible person to drive you to and from the hospital on the day of your surgery. Please confirm the visitor policy for the day of your procedure when you receive your phone call with an arrival time.     Call the surgeon's office with any new illnesses, exposures, or additional questions prior to surgery.    Please reference your “My Surgical Experience Booklet” for additional information to prepare for your upcoming surgery.

## 2024-06-12 DIAGNOSIS — M16.12 PRIMARY OSTEOARTHRITIS OF LEFT HIP: ICD-10-CM

## 2024-06-12 RX ORDER — FOLIC ACID 1 MG/1
1000 TABLET ORAL DAILY
Qty: 90 TABLET | Refills: 1 | Status: SHIPPED | OUTPATIENT
Start: 2024-06-12

## 2024-07-01 ENCOUNTER — APPOINTMENT (OUTPATIENT)
Age: 59
End: 2024-07-01
Payer: COMMERCIAL

## 2024-07-01 ENCOUNTER — HOSPITAL ENCOUNTER (OUTPATIENT)
Age: 59
Setting detail: OUTPATIENT SURGERY
Discharge: HOME | End: 2024-07-01
Attending: STUDENT IN AN ORGANIZED HEALTH CARE EDUCATION/TRAINING PROGRAM | Admitting: STUDENT IN AN ORGANIZED HEALTH CARE EDUCATION/TRAINING PROGRAM
Payer: COMMERCIAL

## 2024-07-01 ENCOUNTER — ANESTHESIA (OUTPATIENT)
Age: 59
End: 2024-07-01
Payer: COMMERCIAL

## 2024-07-01 VITALS
SYSTOLIC BLOOD PRESSURE: 109 MMHG | RESPIRATION RATE: 20 BRPM | DIASTOLIC BLOOD PRESSURE: 62 MMHG | BODY MASS INDEX: 36.98 KG/M2 | WEIGHT: 273 LBS | TEMPERATURE: 98.4 F | OXYGEN SATURATION: 95 % | HEIGHT: 72 IN | HEART RATE: 75 BPM

## 2024-07-01 DIAGNOSIS — Z96.642 AFTERCARE FOLLOWING LEFT HIP JOINT REPLACEMENT SURGERY: Primary | ICD-10-CM

## 2024-07-01 DIAGNOSIS — Z47.1 AFTERCARE FOLLOWING LEFT HIP JOINT REPLACEMENT SURGERY: Primary | ICD-10-CM

## 2024-07-01 PROCEDURE — 97167 OT EVAL HIGH COMPLEX 60 MIN: CPT

## 2024-07-01 PROCEDURE — 27130 TOTAL HIP ARTHROPLASTY: CPT | Performed by: STUDENT IN AN ORGANIZED HEALTH CARE EDUCATION/TRAINING PROGRAM

## 2024-07-01 PROCEDURE — C1776 JOINT DEVICE (IMPLANTABLE): HCPCS | Performed by: STUDENT IN AN ORGANIZED HEALTH CARE EDUCATION/TRAINING PROGRAM

## 2024-07-01 PROCEDURE — 73502 X-RAY EXAM HIP UNI 2-3 VIEWS: CPT

## 2024-07-01 PROCEDURE — 27130 TOTAL HIP ARTHROPLASTY: CPT | Performed by: PHYSICIAN ASSISTANT

## 2024-07-01 PROCEDURE — 97530 THERAPEUTIC ACTIVITIES: CPT | Performed by: PHYSICAL THERAPIST

## 2024-07-01 PROCEDURE — 97535 SELF CARE MNGMENT TRAINING: CPT

## 2024-07-01 PROCEDURE — 97163 PT EVAL HIGH COMPLEX 45 MIN: CPT | Performed by: PHYSICAL THERAPIST

## 2024-07-01 DEVICE — EMPHASYS POLYETHYLENE LINER AOX NEUTRAL 52MM 36MM
Type: IMPLANTABLE DEVICE | Site: HIP | Status: FUNCTIONAL
Brand: EMPHASYS

## 2024-07-01 DEVICE — EMPHASYS ACETABULAR SHELL THREE-HOLE 52MM CEMENTLESS
Type: IMPLANTABLE DEVICE | Site: HIP | Status: FUNCTIONAL
Brand: EMPHASYS

## 2024-07-01 DEVICE — BIOLOX DELTA CERAMIC FEMORAL HEAD +5.0 36MM DIA 12/14 TAPER
Type: IMPLANTABLE DEVICE | Site: HIP | Status: FUNCTIONAL
Brand: BIOLOX DELTA

## 2024-07-01 DEVICE — ACTIS DUOFIX HIP PROSTHESIS (FEMORAL STEM 12/14 TAPER CEMENTLESS SIZE 7 STD COLLAR)  CE
Type: IMPLANTABLE DEVICE | Site: HIP | Status: FUNCTIONAL
Brand: ACTIS

## 2024-07-01 RX ORDER — MEPERIDINE HYDROCHLORIDE 50 MG/ML
12.5 INJECTION INTRAMUSCULAR; INTRAVENOUS; SUBCUTANEOUS ONCE
Status: DISCONTINUED | OUTPATIENT
Start: 2024-07-01 | End: 2024-07-01 | Stop reason: HOSPADM

## 2024-07-01 RX ORDER — MAGNESIUM HYDROXIDE 1200 MG/15ML
LIQUID ORAL AS NEEDED
Status: DISCONTINUED | OUTPATIENT
Start: 2024-07-01 | End: 2024-07-01 | Stop reason: HOSPADM

## 2024-07-01 RX ORDER — PROMETHAZINE HYDROCHLORIDE 25 MG/ML
12.5 INJECTION, SOLUTION INTRAMUSCULAR; INTRAVENOUS ONCE AS NEEDED
Status: COMPLETED | OUTPATIENT
Start: 2024-07-01 | End: 2024-07-01

## 2024-07-01 RX ORDER — CHLORHEXIDINE GLUCONATE 40 MG/ML
SOLUTION TOPICAL DAILY PRN
Status: DISCONTINUED | OUTPATIENT
Start: 2024-07-01 | End: 2024-07-01 | Stop reason: HOSPADM

## 2024-07-01 RX ORDER — CHLORHEXIDINE GLUCONATE ORAL RINSE 1.2 MG/ML
15 SOLUTION DENTAL ONCE
Status: COMPLETED | OUTPATIENT
Start: 2024-07-01 | End: 2024-07-01

## 2024-07-01 RX ORDER — PROPOFOL 10 MG/ML
INJECTION, EMULSION INTRAVENOUS CONTINUOUS PRN
Status: DISCONTINUED | OUTPATIENT
Start: 2024-07-01 | End: 2024-07-01

## 2024-07-01 RX ORDER — CEFAZOLIN SODIUM 2 G/50ML
2000 SOLUTION INTRAVENOUS ONCE
Status: COMPLETED | OUTPATIENT
Start: 2024-07-01 | End: 2024-07-01

## 2024-07-01 RX ORDER — EPINEPHRINE 1 MG/ML
INJECTION, SOLUTION, CONCENTRATE INTRAVENOUS AS NEEDED
Status: DISCONTINUED | OUTPATIENT
Start: 2024-07-01 | End: 2024-07-01 | Stop reason: HOSPADM

## 2024-07-01 RX ORDER — VANCOMYCIN HYDROCHLORIDE 1 G/20ML
INJECTION, POWDER, LYOPHILIZED, FOR SOLUTION INTRAVENOUS AS NEEDED
Status: DISCONTINUED | OUTPATIENT
Start: 2024-07-01 | End: 2024-07-01 | Stop reason: HOSPADM

## 2024-07-01 RX ORDER — OXYCODONE HYDROCHLORIDE 5 MG/1
5 TABLET ORAL EVERY 6 HOURS PRN
Qty: 15 TABLET | Refills: 0 | Status: SHIPPED | OUTPATIENT
Start: 2024-07-01 | End: 2024-07-05 | Stop reason: SDUPTHER

## 2024-07-01 RX ORDER — MIDAZOLAM HYDROCHLORIDE 2 MG/2ML
INJECTION, SOLUTION INTRAMUSCULAR; INTRAVENOUS AS NEEDED
Status: DISCONTINUED | OUTPATIENT
Start: 2024-07-01 | End: 2024-07-01

## 2024-07-01 RX ORDER — FENTANYL CITRATE 50 UG/ML
INJECTION, SOLUTION INTRAMUSCULAR; INTRAVENOUS AS NEEDED
Status: DISCONTINUED | OUTPATIENT
Start: 2024-07-01 | End: 2024-07-01

## 2024-07-01 RX ORDER — ALBUTEROL SULFATE 2.5 MG/3ML
2.5 SOLUTION RESPIRATORY (INHALATION) ONCE AS NEEDED
Status: DISCONTINUED | OUTPATIENT
Start: 2024-07-01 | End: 2024-07-01 | Stop reason: HOSPADM

## 2024-07-01 RX ORDER — ROPIVACAINE HYDROCHLORIDE 5 MG/ML
INJECTION, SOLUTION EPIDURAL; INFILTRATION; PERINEURAL AS NEEDED
Status: DISCONTINUED | OUTPATIENT
Start: 2024-07-01 | End: 2024-07-01 | Stop reason: HOSPADM

## 2024-07-01 RX ORDER — KETOROLAC TROMETHAMINE 30 MG/ML
INJECTION, SOLUTION INTRAMUSCULAR; INTRAVENOUS AS NEEDED
Status: DISCONTINUED | OUTPATIENT
Start: 2024-07-01 | End: 2024-07-01 | Stop reason: HOSPADM

## 2024-07-01 RX ORDER — TRANEXAMIC ACID 10 MG/ML
1000 INJECTION, SOLUTION INTRAVENOUS ONCE
Status: COMPLETED | OUTPATIENT
Start: 2024-07-01 | End: 2024-07-01

## 2024-07-01 RX ORDER — EPHEDRINE SULFATE 50 MG/ML
INJECTION INTRAVENOUS AS NEEDED
Status: DISCONTINUED | OUTPATIENT
Start: 2024-07-01 | End: 2024-07-01

## 2024-07-01 RX ORDER — ACETAMINOPHEN 325 MG/1
975 TABLET ORAL ONCE
Status: COMPLETED | OUTPATIENT
Start: 2024-07-01 | End: 2024-07-01

## 2024-07-01 RX ORDER — DEXAMETHASONE SODIUM PHOSPHATE 10 MG/ML
INJECTION, SOLUTION INTRAMUSCULAR; INTRAVENOUS AS NEEDED
Status: DISCONTINUED | OUTPATIENT
Start: 2024-07-01 | End: 2024-07-01

## 2024-07-01 RX ORDER — ONDANSETRON 2 MG/ML
4 INJECTION INTRAMUSCULAR; INTRAVENOUS ONCE AS NEEDED
Status: COMPLETED | OUTPATIENT
Start: 2024-07-01 | End: 2024-07-01

## 2024-07-01 RX ORDER — FENTANYL CITRATE/PF 50 MCG/ML
25 SYRINGE (ML) INJECTION
Status: DISCONTINUED | OUTPATIENT
Start: 2024-07-01 | End: 2024-07-01 | Stop reason: HOSPADM

## 2024-07-01 RX ORDER — HYDROMORPHONE HCL/PF 1 MG/ML
0.5 SYRINGE (ML) INJECTION
Status: DISCONTINUED | OUTPATIENT
Start: 2024-07-01 | End: 2024-07-01 | Stop reason: HOSPADM

## 2024-07-01 RX ORDER — OXYCODONE HYDROCHLORIDE 5 MG/1
5 TABLET ORAL EVERY 4 HOURS PRN
Status: DISCONTINUED | OUTPATIENT
Start: 2024-07-01 | End: 2024-07-01 | Stop reason: HOSPADM

## 2024-07-01 RX ORDER — SODIUM CHLORIDE, SODIUM LACTATE, POTASSIUM CHLORIDE, CALCIUM CHLORIDE 600; 310; 30; 20 MG/100ML; MG/100ML; MG/100ML; MG/100ML
125 INJECTION, SOLUTION INTRAVENOUS CONTINUOUS
Status: DISCONTINUED | OUTPATIENT
Start: 2024-07-01 | End: 2024-07-01 | Stop reason: HOSPADM

## 2024-07-01 RX ORDER — SODIUM CHLORIDE, SODIUM LACTATE, POTASSIUM CHLORIDE, CALCIUM CHLORIDE 600; 310; 30; 20 MG/100ML; MG/100ML; MG/100ML; MG/100ML
125 INJECTION, SOLUTION INTRAVENOUS CONTINUOUS
Status: CANCELLED | OUTPATIENT
Start: 2024-07-01

## 2024-07-01 RX ORDER — SENNOSIDES 8.6 MG
650 CAPSULE ORAL EVERY 8 HOURS PRN
Qty: 60 TABLET | Refills: 0 | Status: SHIPPED | OUTPATIENT
Start: 2024-07-01

## 2024-07-01 RX ORDER — ONDANSETRON 2 MG/ML
INJECTION INTRAMUSCULAR; INTRAVENOUS AS NEEDED
Status: DISCONTINUED | OUTPATIENT
Start: 2024-07-01 | End: 2024-07-01

## 2024-07-01 RX ORDER — SODIUM CHLORIDE 9 MG/ML
INJECTION INTRAVENOUS AS NEEDED
Status: DISCONTINUED | OUTPATIENT
Start: 2024-07-01 | End: 2024-07-01 | Stop reason: HOSPADM

## 2024-07-01 RX ORDER — LABETALOL HYDROCHLORIDE 5 MG/ML
5 INJECTION, SOLUTION INTRAVENOUS
Status: DISCONTINUED | OUTPATIENT
Start: 2024-07-01 | End: 2024-07-01 | Stop reason: HOSPADM

## 2024-07-01 RX ADMIN — SODIUM CHLORIDE, SODIUM LACTATE, POTASSIUM CHLORIDE, AND CALCIUM CHLORIDE 125 ML/HR: .6; .31; .03; .02 INJECTION, SOLUTION INTRAVENOUS at 07:00

## 2024-07-01 RX ADMIN — DEXAMETHASONE SODIUM PHOSPHATE 10 MG: 10 INJECTION, SOLUTION INTRAMUSCULAR; INTRAVENOUS at 07:41

## 2024-07-01 RX ADMIN — EPHEDRINE SULFATE 10 MG: 50 INJECTION, SOLUTION INTRAVENOUS at 07:43

## 2024-07-01 RX ADMIN — HYDROMORPHONE HYDROCHLORIDE 0.5 MG: 1 INJECTION, SOLUTION INTRAMUSCULAR; INTRAVENOUS; SUBCUTANEOUS at 10:24

## 2024-07-01 RX ADMIN — TRANEXAMIC ACID 1000 MG: 10 INJECTION, SOLUTION INTRAVENOUS at 09:06

## 2024-07-01 RX ADMIN — FENTANYL CITRATE 50 MCG: 50 INJECTION INTRAMUSCULAR; INTRAVENOUS at 09:31

## 2024-07-01 RX ADMIN — MIDAZOLAM 2 MG: 1 INJECTION INTRAMUSCULAR; INTRAVENOUS at 07:30

## 2024-07-01 RX ADMIN — PHENYLEPHRINE HYDROCHLORIDE 50 MCG/MIN: 10 INJECTION INTRAVENOUS at 07:40

## 2024-07-01 RX ADMIN — MEPIVACAINE HYDROCHLORIDE 4 ML: 15 INJECTION, SOLUTION EPIDURAL; INFILTRATION at 07:38

## 2024-07-01 RX ADMIN — ONDANSETRON 4 MG: 2 INJECTION INTRAMUSCULAR; INTRAVENOUS at 07:37

## 2024-07-01 RX ADMIN — FENTANYL CITRATE 50 MCG: 50 INJECTION INTRAMUSCULAR; INTRAVENOUS at 09:36

## 2024-07-01 RX ADMIN — OXYCODONE 5 MG: 5 TABLET ORAL at 12:00

## 2024-07-01 RX ADMIN — FENTANYL CITRATE 25 MCG: 50 INJECTION INTRAMUSCULAR; INTRAVENOUS at 10:11

## 2024-07-01 RX ADMIN — SODIUM CHLORIDE, SODIUM LACTATE, POTASSIUM CHLORIDE, AND CALCIUM CHLORIDE 125 ML/HR: .6; .31; .03; .02 INJECTION, SOLUTION INTRAVENOUS at 09:57

## 2024-07-01 RX ADMIN — PROPOFOL 90 MCG/KG/MIN: 10 INJECTION, EMULSION INTRAVENOUS at 07:40

## 2024-07-01 RX ADMIN — HYDROMORPHONE HYDROCHLORIDE 0.5 MG: 1 INJECTION, SOLUTION INTRAMUSCULAR; INTRAVENOUS; SUBCUTANEOUS at 10:43

## 2024-07-01 RX ADMIN — ONDANSETRON 4 MG: 2 INJECTION INTRAMUSCULAR; INTRAVENOUS at 11:35

## 2024-07-01 RX ADMIN — ACETAMINOPHEN 325MG 975 MG: 325 TABLET ORAL at 06:33

## 2024-07-01 RX ADMIN — TRANEXAMIC ACID 1000 MG: 10 INJECTION, SOLUTION INTRAVENOUS at 07:40

## 2024-07-01 RX ADMIN — FENTANYL CITRATE 25 MCG: 50 INJECTION INTRAMUSCULAR; INTRAVENOUS at 09:58

## 2024-07-01 RX ADMIN — CEFAZOLIN SODIUM 1000 MG: 2 SOLUTION INTRAVENOUS at 07:40

## 2024-07-01 RX ADMIN — CEFAZOLIN SODIUM 2000 MG: 2 SOLUTION INTRAVENOUS at 07:30

## 2024-07-01 RX ADMIN — EPHEDRINE SULFATE 10 MG: 50 INJECTION, SOLUTION INTRAVENOUS at 07:40

## 2024-07-01 RX ADMIN — PROMETHAZINE HYDROCHLORIDE 12.5 MG: 25 INJECTION INTRAMUSCULAR; INTRAVENOUS at 12:44

## 2024-07-01 RX ADMIN — CHLORHEXIDINE GLUCONATE 15 ML: 1.2 RINSE ORAL at 06:33

## 2024-07-01 NOTE — PLAN OF CARE
Problem: PHYSICAL THERAPY ADULT  Goal: Performs mobility at highest level of function for planned discharge setting.  See evaluation for individualized goals.  Description: Treatment/Interventions: Functional transfer training, LE strengthening/ROM, Elevations, Therapeutic exercise, Endurance training, Patient/family training, Bed mobility, Gait training, Spoke to nursing, OT, Family  Equipment Recommended: Walker (pt owns)       See flowsheet documentation for full assessment, interventions and recommendations.  Note: Prognosis: Good  Problem List: Decreased strength, Decreased range of motion, Decreased endurance, Impaired balance, Decreased mobility, Pain, Orthopedic restrictions  Assessment: Pt is a 58 y.o. female who presented to [facility] on 7/1/2024 s/p L THAD, anterior approach done by Dr. Lr. Precautions include WBAT. Pt  has a past medical history of Abnormal Pap smear of cervix (2000's), Allergic rhinitis (2015), Anesthesia, Asthma, Disease of thyroid gland, Environmental and seasonal allergies, Fibroid (2000's), Food allergy, Heart murmur, History of anemia, Hypothyroidism.  Pt greeted at bedside for PT evaluation on 07/01/24. Pt referred to PT for functional mobility evaluation & D/C planning w/ orders of activity as tolerated. PTA, pt reports being I w/o AD and I w/ IADLs. Personal factors affecting pt at time of IE include: lives in 2 story house. Please find objective findings from PT assessment regarding body systems outlined above with impairments and limitations including weakness, decreased ROM, impaired balance, decreased endurance, gait deviations, pain, decreased activity tolerance, decreased functional mobility tolerance, fall risk, and orthopedic restrictions.  Please see flow sheet above for objective findings and level of assistance required for safe completion of functional tasks. Pt was able to perform sit<>stand with S and RW. Pt needed cues for RW safety and hand placement. Pt able  to ambulate 30'x1 and 90'x1 with S and RW. Vitals taken t/o session, please see flowsheet for objective data. Please see additional treatment session for continued functional mobility following evaluation.  Pt demonstrated dec endurance and tolerance to activity. Denies reports of dizziness or SOB t/o session. Patient was left  seated EOB  with call bell and all needs within reach. Pt was educated on fall precautions and reinforced w/ good understanding. Based on pt presentation and impaired function, pt would benefit from level III, (minimal resource intensity) at D/C. From PT/mobility standpoint, pt would benefit from OPPT to address deficits as defined above and maximize level of independence and return pt to PLOF. HEP given and reviewed with patient, no questions at this time. CM to follow. Nsg staff to continue to mobilized pt (OOB in chair for all meals & ambulate in room/unit) as tolerated to prevent further decline in function. Nsg notified. Co-eval performed with OT to complete this evaluation for the pts best interest given pts medical complexity and functional level.  Barriers to Discharge: None     Rehab Resource Intensity Level, PT: III (Minimum Resource Intensity)    See flowsheet documentation for full assessment.

## 2024-07-01 NOTE — ANESTHESIA PREPROCEDURE EVALUATION
Procedure:  ARTHROPLASTY HIP TOTAL ANTERIOR, LEFT, SAME DAY (Left: Hip)    Relevant Problems   ENDO   (+) Hypothyroidism      MUSCULOSKELETAL   (+) Primary osteoarthritis of left hip      PULMONARY   (+) Cough variant asthma   (+) PAPI (obstructive sleep apnea)        Physical Exam    Airway    Mallampati score: III  TM Distance: >3 FB  Neck ROM: full     Dental   No notable dental hx     Cardiovascular  Rhythm: regular, Rate: normal, Cardiovascular exam normal    Pulmonary  Pulmonary exam normal Breath sounds clear to auscultation    Other Findings  post-pubertal.      Anesthesia Plan  ASA Score- 3     Anesthesia Type- IV sedation with anesthesia with ASA Monitors.         Additional Monitors:     Airway Plan:     Comment: Patient seen and examined.  History reviewed.  Patient to be done under spinal anesthesia with sedation, and GA as back-up.  Plan and risks discussed with the patient.  Consent obtained..       Plan Factors-Exercise tolerance (METS): >4 METS.    Chart reviewed. EKG reviewed.  Existing labs reviewed. Patient summary reviewed.    Patient is not a current smoker.  Patient did not smoke on day of surgery.            Induction- intravenous.    Postoperative Plan- Plan for postoperative opioid use.         Informed Consent- Anesthetic plan and risks discussed with patient.  I personally reviewed this patient with the CRNA. Discussed and agreed on the Anesthesia Plan with the CRNA..

## 2024-07-01 NOTE — ANESTHESIA POSTPROCEDURE EVALUATION
Post-Op Assessment Note    CV Status:  Stable  Pain Score: 4    Pain management: adequate       Mental Status:  Alert and awake   Hydration Status:  Euvolemic   PONV Controlled:  Controlled   Airway Patency:  Patent     Post Op Vitals Reviewed: Yes    No anethesia notable event occurred.    Staff: Anesthesiologist, CRNA               BP   93/54   Temp   98.4   Pulse  64   Resp   18   SpO2   94% 5L FM

## 2024-07-01 NOTE — PHYSICAL THERAPY NOTE
"        PT EVALUATION and TREATMENT      Pt. Name: Shelby Foster  Pt. Age: 58 y.o.  MRN: [id number]  LENGTH OF STAY: 0    Patient Active Problem List   Diagnosis    Vitamin D deficiency    Iron deficiency    History of adenomatous polyp of colon    PAPI (obstructive sleep apnea)    Hypothyroidism    Annual physical exam    Class 2 severe obesity with serious comorbidity and body mass index (BMI) of 39.0 to 39.9 in adult (HCC)    Prediabetes    Primary osteoarthritis of left hip    Obesity (BMI 30-39.9)    Environmental and seasonal allergies    Cough variant asthma    Preoperative clearance       Admitting Diagnoses:   Primary osteoarthritis of left hip [M16.12]    Past Medical History:   Diagnosis Date    Abnormal Pap smear of cervix 2000's    Allergic rhinitis 2015    Deviated septum, ployps    Anesthesia     \"always causes Constipation and PONV\"\"    Asthma     Disease of thyroid gland     Environmental and seasonal allergies     Fibroid 2000's    Food allergy     Heart murmur     History of anemia     History of concussion 11/30/2022    sees neurologist yearly--\"due to tree falling on head\"    History of repair of hiatal hernia     History of rheumatic fever as a child     Hypothyroidism     Presence of dental bridge     left lower    Rosacea     Sleep apnea     mild    Varicella        Past Surgical History:   Procedure Laterality Date    ANKLE SURGERY Bilateral     APPENDECTOMY      CATARACT EXTRACTION Bilateral     CHOLECYSTECTOMY      COLONOSCOPY      DENTAL IMPLANT Right     lower    DILATION AND CURETTAGE OF UTERUS      ENDOMETRIAL ABLATION  2005    FINGER SURGERY      thumb    HERNIA REPAIR  11/2021    \"hiatal\"    MYOMECTOMY  2002,3,4    SINUS SURGERY  2015    TEAR DUCT SURGERY      TONSILLECTOMY      WISDOM TOOTH EXTRACTION         Imaging Studies:  XR hip/pelv 2-3 vws left if performed    (Results Pending)   XR hip/pelv 2-3 vws left if performed    (Results Pending)        07/01/24 1304   PT Last " Visit   PT Visit Date 07/01/24   Note Type   Note type Evaluation   Additional Comments pt greeted in supine at start of session.   Pain Assessment   Pain Assessment Tool 0-10   Pain Score 5   Pain Location/Orientation Orientation: Left;Location: Hip   Hospital Pain Intervention(s) Repositioned;Cold applied;Ambulation/increased activity;Elevated;Emotional support;Rest   Restrictions/Precautions   Weight Bearing Precautions Per Order Yes   LLE Weight Bearing Per Order WBAT   Other Precautions WBS;THR;Fall Risk;Pain   Home Living   Type of Home House   Home Layout Two level;Multi-level;Performs ADLs on one level;Bed/bath upstairs;1/2 bath on main level;Other (Comment)  (enters through basement with 2 platform steps + 8 KARIN with rails)   Bathroom Shower/Tub Walk-in shower   Bathroom Toilet Raised   Bathroom Equipment Shower chair;Grab bars around toilet;Built-in shower seat   Bathroom Accessibility Accessible   Home Equipment Cane;Walker  (has 2 rolling walkers, one for upstairs, one for downstairs)   Additional Comments Pt plans to stay upstairs with full walk-in shower and bedroom. 3+6 steps with rails to second floor. Pt has full bath on main level and recliner.   Prior Function   Level of [place] Independent with ADLs;Independent with functional mobility;Independent with IADLS   Lives With Spouse   Receives Help From Family   IADLs Independent with driving;Independent with meal prep;Independent with medication management   Falls in the last 6 months 0   Vocational Full time employment  ([employer])   Comments no AD at baseline   General   Family/Caregiver Present Yes   Cognition   Overall Cognitive Status WFL   Arousal/Participation Cooperative   Attention Attends with cues to redirect   Orientation Level Oriented X4   Following Commands Follows one step commands without difficulty   Comments pt motivated   RUE Assessment   RUE Assessment   (see OT note)   LUE Assessment   LUE Assessment   (see OT  note)   RLE Assessment   RLE Assessment WFL   LLE Assessment   LLE Assessment X  (did not assess hip due to post op. able to flex knee to 90 deg. and move ankle through normal ROM)   Light Touch   RLE Light Touch Grossly intact   LLE Light Touch Grossly intact   Bed Mobility   Supine to Sit 5  Supervision   Additional items HOB elevated;Increased time required;Verbal cues   Sit to Supine Unable to assess   Additional Comments Pt left seated EOB at end of session.  VITALS: EOB BP: 120/73. Stand BP: 109/62. denies symptoms   Transfers   Sit to Stand 5  Supervision   Additional items Increased time required;Verbal cues  (RW)   Stand to Sit 5  Supervision   Additional items Increased time required;Verbal cues  (RW)   Additional Comments cues for RW safety and gait pattern with RW. VITALS- BP in staxi: 132/74 (+) nausea RN notifed   Ambulation/Elevation   Gait pattern Improper Weight shift;Decreased L stance;Inconsistent bret;Short stride;Excessively slow;Antalgic   Gait Assistance 5  Supervision   Additional items Verbal cues   Assistive Device Rolling walker   Distance 30'x1, 90'x1   Stair Management Assistance 5  Supervision   Additional items Verbal cues;Increased time required   Stair Management Technique Two rails;Step to pattern;Foreward   Number of Stairs 8   Ambulation/Elevation Additional Comments cues for LE sequencing during the stairs   Balance   Static Sitting Good   Dynamic Sitting Fair +   Static Standing Fair   Dynamic Standing Fair -   Ambulatory Fair -   Endurance Deficit   Endurance Deficit Yes   Endurance Deficit Description nausea, pain   Activity Tolerance   Activity Tolerance Patient tolerated treatment well;Patient limited by pain;Other (Comment)  (nausea)   Medical Staff Made Aware [facility]IS[facility] Jenkins   Nurse Made Aware yes   Assessment   Prognosis Good   Problem List Decreased strength;Decreased range of motion;Decreased endurance;Impaired balance;Decreased mobility;Pain;Orthopedic  restrictions   Assessment Pt is a 58 y.o. female who presented to [facility] 7/[facility]/2024 s/p L THAD, anterior approach done by Dr. Lr. Precautions include WBAT. Pt  has a past medical history of Abnormal Pap smear of cervix (2000's), Allergic rhinitis (2015), Anesthesia, Asthma, Disease of thyroid gland, Environmental and seasonal allergies, Fibroid (2000's), Food allergy, Heart murmur, History of anemia, Hypothyroidism.  Pt greeted at bedside for PT evaluation on 07/01/24. Pt referred to PT for functional mobility evaluation & D/C planning w/ orders of activity as tolerated. PTA, pt reports being I w/o AD and I w/ IADLs. Personal factors affecting pt at time of IE include: lives in 2 story house. Please find objective findings from PT assessment regarding body systems outlined above with impairments and limitations including weakness, decreased ROM, impaired balance, decreased endurance, gait deviations, pain, decreased activity tolerance, decreased functional mobility tolerance, fall risk, and orthopedic restrictions.  Please see flow sheet above for objective findings and level of assistance required for safe completion of functional tasks. Pt was able to perform sit<>stand with S and RW. Pt needed cues for RW safety and hand placement. Pt able to ambulate 30'x1 and 90'x1 with S and RW. Vitals taken t/o session, please see flowsheet for objective data. Please see additional treatment session for continued functional mobility following evaluation.  Pt demonstrated dec endurance and tolerance to activity. Denies reports of dizziness or SOB t/o session, but did have an episode of emesis during session, RN aware. Patient was left  seated EOB  with call bell and all needs within reach. Pt was educated on fall precautions and reinforced w/ good understanding. Based on pt presentation and impaired function, pt would benefit from level III, (minimal resource intensity) at D/C. From PT/mobility standpoint, pt would  benefit from OPPT to address deficits as defined above and maximize level of independence and return pt to PLOF. HEP given and reviewed with patient, no questions at this time. CM to follow. Nsg staff to continue to mobilized pt (OOB in chair for all meals & ambulate in room/unit) as tolerated to prevent further decline in function. Nsg notified. Co-eval performed with OT to complete this evaluation for the pts best interest given pts medical complexity and functional level.   Barriers to Discharge None   Goals   Patient Goals to go home   STG Expiration Date 07/08/24   Short Term Goal #1 1).  Pt will perform bed mobility with Hugo demonstrating appropriate technique 100% of the time in order to improve function.2)  Perform all transfers with Hugo demonstrating safe and appropriate technique 100% of the time in order to improve ability to negotiate safely in home environment.3) Amb with least restrictive AD > 200'x1 with Hugo in order to demonstrate ability to negotiate in home environment.4)  Improve overall strength and balance 1/2 grade in order to optimize ability to perform functional tasks and reduce fall risk.5) Increase activity tolerance to 45 minutes in order to improve endurance to functional tasks.6)  Negotiate stairs using most appropriate technique and Hugo in order to be able to negotiate safely in home environment. 7) PT for ongoing patient and family/caregiver education, DME needs and d/c planning in order to promote highest level of function in least restrictive environment.   Plan   Treatment/Interventions Functional transfer training;LE strengthening/ROM;Elevations;Therapeutic exercise;Endurance training;Patient/family training;Bed mobility;Gait training;Spoke to nursing;OT;Family   PT Frequency Twice a day  (prn)   Discharge Recommendation   Rehab Resource Intensity Level, PT III (Minimum Resource Intensity)   Equipment Recommended Walker  (pt owns)   Additional Comments The patient's AM-PAC  Basic Mobility Inpatient Short Form Raw Score is 21. A Raw score of greater than 16 suggests the patient may benefit from discharge to home. Please also refer to the recommendation of the Physical Therapist for safe discharge planning.   AM-PAC Basic Mobility Inpatient   Turning in Flat Bed Without Bedrails 4   Lying on Back to Sitting on Edge of Flat Bed Without Bedrails 4   Moving Bed to Chair 3   Standing Up From Chair Using Arms 4   Walk in Room 3   Climb 3-5 Stairs With Railing 3   Basic Mobility Inpatient Raw Score 21   Basic Mobility Standardized Score 45.55   [facility] Highest Level Of Mobility   -HLM Goal 6: Walk 10 steps or more   -HLM Achieved 7: Walk 25 feet or more   Additional Treatment Session   Start Time 1245   End Time 1304   Treatment Assessment Pt was seen for additional functional mobility following evaluation. Pt was able to perform 8 steps following education of PT with BL rails and S. Pt needed cues for LE sequencing during stair training, but good carryover. Pt ambulated with RW and S back to her room and was left seated EOB with  in room. RN updated   Equipment Use steps, RW   Additional Treatment Day 1   End of Consult   Patient Position at End of Consult Seated edge of bed;All needs within reach   End of Consult Comments Pt stable, seated EOB with  in room. RN updated     Hx/personal factors: co-morbidities, pain, WB restrictions, and fall risk  Examination: dec mobility, dec balance, dec endurance, dec amb, risk for falls, pain, assessed body system, balance, endurance, amb, D/C disposition & fall risk, WB restrictions, impairements in locomotion, musculoskeletal, balance, endurance, posture, coordination  Clinical: unpredictable (ongoing medical status, risk for falls, POD #0, and pain mgt)  Complexity: high        Shanna Quintero, PT

## 2024-07-01 NOTE — PLAN OF CARE
Problem: OCCUPATIONAL THERAPY ADULT  Goal: Performs self-care activities at highest level of function for planned discharge setting.  See evaluation for individualized goals.  Description: Treatment Interventions: ADL retraining, Functional transfer training, Endurance training, Patient/family training, Equipment evaluation/education, Compensatory technique education, Continued evaluation, Energy conservation, Activityengagement          See flowsheet documentation for full assessment, interventions and recommendations.   Note: Limitation: Decreased ADL status, Decreased endurance, Decreased self-care trans, Decreased high-level ADLs  Prognosis: Good  Assessment: Pt is a 58 y.o. female seen for OT evaluation s/p adm to [facility] on 7/1/2024 w/ Primary osteoarthritis of left hip. Comorbidities affecting pt’s functional performance include a significant PMH of heart murmur, hx of anemia, hypothyroidism, PAIP, varicella. Pt with active OT orders and activity orders for Activity as tolerated. WBAT LLE. Pt lives with spouse in a multilevel house with 6+6 to enter from front or 2+11 to enter from basement. Pt has 1/2 bath and recliner on main level and walkin shower with grab bars upstairs. At baseline, pt was independent with all ADLs/IADLs. Pt completed supine to sit with supervision. Pt completed LB/UB dressing EOB with supervision and verbal cues for proper technique. Pt completed don of shorts with supervision seated, then standing with RW to pull over waist. Pt completed don of sneakers with modA from spouse. See additional treatment session focusing on fuctiona mobility, ADLs, patient education. Upon evaluation, pt currently requires supervision for UB ADLs, supervision for LB ADLs, supervision for toileting, supervision for bed mobility, supervision for functional mobility, and supervision for transfers 2* the following deficits impacting occupational performance: weakness, decreased strength , decreased  balance, decreased activity tolerance, increased pain, orthopedic restrictions, and nausea. These impairments, as well at pt’s personal factors of: KARIN home environment, steps within home environment, difficulty performing ADLs, difficulty performing IADLs, difficulty performing transfers/mobility, WBS, fall risk , and new use of AD for functional transfers/mobility limit pt’s ability to safely engage in all baseline areas of occupation. Based on the aforementioned OT evaluation, functional performance deficits, and assessments, pt has been identified as a high complexity evaluation. Pt to continue to benefit from continued acute OT services during hospital stay to address defined deficits and to maximize level of functional independence in the following Occupational Performance areas: grooming, bathing/shower, toilet hygiene, dressing, health maintenance, functional mobility, community mobility, clothing management, cleaning, household maintenance, and job performance/volunteering. From OT standpoint, recommend No post-acute rehabilitation needs upon D/C. OT will continue to follow pt 3-5x/wk.     Rehab Resource Intensity Level, OT: No post-acute rehabilitation needs

## 2024-07-01 NOTE — OCCUPATIONAL THERAPY NOTE
"    Occupational Therapy Evaluation and Treatment     Patient Name: Shelby Foster  Today's Date: 7/1/2024  Problem List  Active Problems:  There are no active Hospital Problems.    Past Medical History  Past Medical History:   Diagnosis Date    Abnormal Pap smear of cervix 2000's    Allergic rhinitis 2015    Deviated septum, ployps    Anesthesia     \"always causes Constipation and PONV\"\"    Asthma     Disease of thyroid gland     Environmental and seasonal allergies     Fibroid 2000's    Food allergy     Heart murmur     History of anemia     History of concussion 11/30/2022    sees neurologist yearly--\"due to tree falling on head\"    History of repair of hiatal hernia     History of rheumatic fever as a child     Hypothyroidism     Presence of dental bridge     left lower    Rosacea     Sleep apnea     mild    Varicella      Past Surgical History  Past Surgical History:   Procedure Laterality Date    ANKLE SURGERY Bilateral     APPENDECTOMY      CATARACT EXTRACTION Bilateral     CHOLECYSTECTOMY      COLONOSCOPY      DENTAL IMPLANT Right     lower    DILATION AND CURETTAGE OF UTERUS      ENDOMETRIAL ABLATION  2005    FINGER SURGERY      thumb    HERNIA REPAIR  11/2021    \"hiatal\"    MYOMECTOMY  2002,3,4    SINUS SURGERY  2015    TEAR DUCT SURGERY      TONSILLECTOMY      WISDOM TOOTH EXTRACTION           07/01/24 1225   OT Last Visit   OT Visit Date 07/01/24   Note Type   Note type Evaluation and Treatment   Additional Comments Pt greeted supine in bed, agreeable to OT evaluation   Pain Assessment   Pain Assessment Tool 0-10   Pain Score 5   Pain Location/Orientation Orientation: Left;Location: Hip   Hospital Pain Intervention(s) Ambulation/increased activity;Repositioned;Elevated;Rest;Emotional support   Restrictions/Precautions   Weight Bearing Precautions Per Order Yes   LLE Weight Bearing Per Order WBAT   Other Precautions WBS;Multiple lines;Fall Risk;Pain   Home Living   Type of Home House   Home Layout " "Multi-level;Stairs to enter with rails;Bed/bath upstairs;Performs ADLs on one level  (enters through basement with 2 platform steps + 8 KARIN with rails)   Bathroom Shower/Tub Walk-in shower   Bathroom Toilet Raised   Bathroom Equipment Shower chair;Grab bars around toilet;Built-in shower seat   Bathroom Accessibility Accessible   Home Equipment Walker;Cane;Other (Comment)  (has 2 rolling walkers, one for upstairs, one for downstairs)   Additional Comments Pt plans to stay upstairs with full walk-in shower and bedroom. 3+6 steps with rails to second floor. Pt has full bath on main level and recliner.   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   Intrinsic Gratification going on her computer/tablet   General   Family/Caregiver Present Yes   Subjective   Subjective \"I am ready to go home\"   ADL   Where Assessed Edge of bed   Eating Assistance 6  Modified independent   Grooming Assistance 5  Supervision/Setup   UB Bathing Assistance 5  Supervision/Setup   LB Bathing Assistance 5  Supervision/Setup   UB Dressing Assistance 5  Supervision/Setup   LB Dressing Assistance 5  Supervision/Setup   Toileting Assistance  5  Supervision/Setup   Bed Mobility   Supine to Sit 5  Supervision   Additional items HOB elevated;Increased time required;Verbal cues;LE management   Sit to Supine Unable to assess   Additional Comments verbal cues for safety, VITALS: EOB BP: 120/73. Stand BP: 109/62. denies symptoms   Transfers   Sit to " Stand 5  Supervision   Additional items Increased time required;Verbal cues  (RW)   Stand to Sit 5  Supervision   Additional items Increased time required;Verbal cues  (RW)   Toilet transfer 5  Supervision   Additional items Increased time required;Verbal cues;Standard toilet;Other  (RW and use of grab bars)   Additional Comments verbal cues for hand placement and safety, VITALS- BP in staxi: 132/74 (+) nausea RN notifed   Functional Mobility   Functional Mobility 5  Supervision   Additional Comments supervision with RW functional household distance   Additional items Rolling walker   Balance   Static Sitting Good   Dynamic Sitting Fair +   Static Standing Fair   Dynamic Standing Fair -   Ambulatory Fair -   Activity Tolerance   Activity Tolerance Patient tolerated treatment well;Patient limited by pain   Medical Staff Made Aware PT Ali, Pt seen for co-evaluation/treatment with skilled Physical Therapy due to clinically unstable presentation, medical complexity, fall risk, functional balance, limited activity tolerance which is a decline from PLOF and may impact overall safety.   Nurse Made Aware CISCO EWING Assessment   RUE Assessment WFL   LUE Assessment   LUE Assessment WFL   Hand Function   Gross Motor Coordination Functional   Fine Motor Coordination Functional   Cognition   Overall Cognitive Status WFL   Arousal/Participation Alert;Cooperative   Attention Within functional limits   Orientation Level Oriented X4   Memory Within functional limits   Following Commands Follows one step commands without difficulty   Comments Cooperative   Assessment   Limitation Decreased ADL status;Decreased endurance;Decreased self-care trans;Decreased high-level ADLs   Prognosis Good   Assessment Pt is a 58 y.o. female seen for OT evaluation s/p adm to [facility] on 7/1/2024 w/ Primary osteoarthritis of left hip. Comorbidities affecting pt’s functional performance include a significant PMH of heart murmur, hx  of anemia, hypothyroidism, PAPI, varicella. Pt with active OT orders and activity orders for Activity as tolerated. WBAT LLE. Pt lives with spouse in a multilevel house with 6+6 to enter from front or 2+11 to enter from basement. Pt has 1/2 bath and recliner on main level and walkin shower with grab bars upstairs. At baseline, pt was independent with all ADLs/IADLs. Pt completed supine to sit with supervision. Pt completed LB/UB dressing EOB with supervision and verbal cues for proper technique. Pt completed don of shorts with supervision seated, then standing with RW to pull over waist. Pt completed don of sneakers with modA from spouse. See additional treatment session focusing on fuctiona mobility, ADLs, patient education. Upon evaluation, pt currently requires supervision for UB ADLs, supervision for LB ADLs, supervision for toileting, supervision for bed mobility, supervision for functional mobility, and supervision for transfers 2* the following deficits impacting occupational performance: weakness, decreased strength , decreased balance, decreased activity tolerance, increased pain, orthopedic restrictions, and nausea. These impairments, as well at pt’s personal factors of: KARIN home environment, steps within home environment, difficulty performing ADLs, difficulty performing IADLs, difficulty performing transfers/mobility, WBS, fall risk , and new use of AD for functional transfers/mobility limit pt’s ability to safely engage in all baseline areas of occupation. Based on the aforementioned OT evaluation, functional performance deficits, and assessments, pt has been identified as a high complexity evaluation. Pt to continue to benefit from continued acute OT services during hospital stay to address defined deficits and to maximize level of functional independence in the following Occupational Performance areas: grooming, bathing/shower, toilet hygiene, dressing, health maintenance, functional mobility,  community mobility, clothing management, cleaning, household maintenance, and job performance/volunteering. From OT standpoint, recommend No post-acute rehabilitation needs upon D/C. OT will continue to follow pt 3-5x/wk.   Goals   Patient Goals to go home   STG Time Frame 3-5   Short Term Goal #1 Pt will improve activity tolerance to G for min 30 min treatment sessions for increase engagement in functional tasks   Short Term Goal #2 Pt will complete bed mobility at a mod I level w/ G balance/safety demonstrated to decrease caregiver assistance required   Short Term Goal  Pt will complete LB dressing/self care w/ mod I using adaptive device and DME as needed   LTG Time Frame 7-10   Long Term Goal #1 Pt will complete toileting w/ mod I w/ G hygiene/thoroughness using DME as needed   Long Term Goal #2 Pt will improve functional transfers to mod I on/off all surfaces using DME as needed w/ G balance/safety   Long Term Goal Pt will improve functional mobility during ADL/IADL/leisure tasks to mod I using DME as needed w/ G balance/safety   Plan   Treatment Interventions ADL retraining;Functional transfer training;Endurance training;Patient/family training;Equipment evaluation/education;Compensatory technique education;Continued evaluation;Energy conservation;Activityengagement   Goal Expiration Date 07/08/24   OT Treatment Day 0   OT Frequency 3-5x/wk   Discharge Recommendation   Rehab Resource Intensity Level, OT No post-acute rehabilitation needs   Additional Comments  The patient's raw score on the AM-PAC Daily Activity Inpatient Short Form is 21 . A raw score of greater than or equal to 19 suggests the patient may benefit from discharge to home. Please refer to the recommendation of the Occupational Therapist for safe discharge planning.   -PAC Daily Activity Inpatient   Lower Body Dressing 3   Bathing 3   Toileting 3   Upper Body Dressing 4   Grooming 4   Eating 4   Daily Activity Raw Score 21   Daily Activity  Standardized Score (Calc for Raw Score >=11) 44.27   AM-PAC Applied Cognition Inpatient   Following a Speech/Presentation 4   Understanding Ordinary Conversation 4   Taking Medications 4   Remembering Where Things Are Placed or Put Away 4   Remembering List of 4-5 Errands 4   Taking Care of Complicated Tasks 4   Applied Cognition Raw Score 24   Applied Cognition Standardized Score 62.21   Additional Treatment Session   Start Time 1250   End Time 1304   Treatment Assessment Pt seen for OT treatment session focusing on ADLs/IADLs, functional mobility, functional standing tolerance, functional transfers, patient education, continued evaluation. Pt alert and cooperative throughout session. Pt with good sitting balance and fair - dynamic standing balance. Pt tolerated treatment well with (+) nausea, RN notified. Pt completed functional mobiity from EOB to hallway with RW and supervision. Pt reporting feeling nauseous in hallway, seated in staxi. Pt emesis during session in staxi, RN notified and present. Pt reported she felt okay to continue evaluation. Pt completed functional mobility with supervision functional household distances. Pt completed toilet transfer with supervision on standard toilet with use of grab bars and RW for stability. Pt completed grooming tasks at sink to complete washing hands. Pt then completed functional mobility back to room with RW and supervision. Pt educated on LB bathing/dressing techniques, car transfers, and LE management for independence at home. Pt reports 5/10 pain and no dizziness. Pt's vitals include: stable.   Additional Treatment Day 1   End of Consult   Education Provided Yes;Family or social support of family present for education by provider   Patient Position at End of Consult Seated edge of bed;All needs within reach   Nurse Communication Nurse aware of consult   End of Consult Comments Pt seated EOB at end of session. Call bell and phone within reach. All needs met and pt  reports no further questions for OT at this time.   Regina Graves, OT

## 2024-07-01 NOTE — INTERVAL H&P NOTE
H&P reviewed. After examining the patient I find no changes in the patients condition since the H&P had been written.    Vitals:    07/01/24 0619   BP: 140/77   Pulse: 66   Resp: 16   Temp: 97.7 °F (36.5 °C)   SpO2: 96%

## 2024-07-01 NOTE — DISCHARGE INSTR - AVS FIRST PAGE
Stephen Lr MD  Adult Reconstruction Specialist   [facility]  Office Phone: [phone]    Discharge Instructions - Hip Replacement        What to Expect/Activity  You are weight bearing as tolerated to your operative leg unless otherwise instructed. Use your walker or crutches as needed. It is important to get up and walk for 10 minutes every hour, if possible.  Initial recovery therapy is focused on walking. If in your follow-up a referral to formal physical therapy is required, this will be provided based on your recovery.  You may sleep however you would like. Many patients feel more comfortable with a pillow between their legs and find it uncomfortable to lay on the operative side. If you do roll on that side at night you will not damage the replacement.  You may use a regular or elevated toilet seat, whichever is more comfortable.  We do not routinely require any specific precautions in terms of positioning of your leg and if so this will be taught to you by the physical therapists at the hospital. This means that you can dress as you are comfortable, including shoes and socks. You can bend down carefully to get items from the floor.   Swelling and discomfort causes pain. Elevate your surgical leg on 1-2 pillows placed behind your ankle/calf throughout the day, especially when you are laying down.  Please use ice packs for 20-30 minutes every 1-2 hours for 48-72 hours on the operative hip. Please make sure there is a barrier directly between your skin and your ice/ice pack.  Please use incentive spirometer 10 times per hour while awake     Dressing/Wound Care/Bathing  There is a waterproof surgical dressing over your incision that may stay in place until your follow up visit.   You may begin showering 5 days after surgery.   Please pat the dressing dry. If you notice the dressing appears saturated or is starting to come off, please completely remove and replace with a dry  dressing.   There may be dried blood around the incision. It is ok to continue showering after removing the dressing but do not scrub the incision. Pat incision dry.  Do not place any creams, ointments or gels on or around the incision.  No baths, swimming or submerging until cleared     Pain Management/Medications  You may resume your usual medications.  Please take the following medications:  Anti-coagulation (blood clot prevention) - Aspirin 81 mg, 1 tablet twice daily for 6 weeks  Pain medication:  Narcotic Medication: Oxycodone 5 mg, 1 tablet every 4-6 hours for severe pain  NSAID (Anti-inflammatory): Celebrex 200 mg, 1 tablet twice a day as needed for mild to moderate pain  Tylenol 1000mg up to three times daily as needed for mild to moderate pain. Do not exceed 3000mg daily   If you have questions or pain concerns, please contact the office. Pain medication cannot remove all post-operative pain.    Follow up/Call if:  The findings of your surgery will be explained to you and your family immediately after surgery. However, in the post-operative period, during recovery from anesthesia you may not fully remember or fully understand what was said. We will go over this again when you return for your post-op appointment.  Please contact Dr. Lr's office if you experience the following:  Excessive bleeding (bleeding through your dressing)  Fever greater than 101 degrees F after the first 2 days (a slight fever is normal the first few days after surgery)  Persistent nausea or vomiting  Decreased sensation or discoloration of the operative limb  Pain or swelling that is getting worse and not better with medication    Office Contact: [phone]

## 2024-07-01 NOTE — OP NOTE
OPERATIVE REPORT  PATIENT NAME: Shelby Foster  : 1965  MRN: [id number]  Pt Location:  [facility] OR ROOM 05    Surgery Date: 2024    Surgeons and Role:     * Stephen Lr MD - Primary     * Anum Arteaga PA-C - Assisting     Preop Diagnosis:  Primary osteoarthritis of left hip [M16.12]    Post-Op Diagnosis Codes:     * Primary osteoarthritis of left hip [M16.12]    Procedure(s):  Left - ARTHROPLASTY HIP TOTAL ANTERIOR, WITH NAVIGATION. LEFT. SAME DAY    Specimens:  * No specimens in log *    Estimated Blood Loss:   100mL    Drains:  * No LDAs found *    Anesthesia Type:   Spinal     Operative Indications:  Primary osteoarthritis of left hip [M16.12]  See operative note for complete list of medications    Operative Findings:  Full-thickness chondral wear to the femoral head and acetabulum.  Mild osteophytosis    Complications:   None    Hip Approach: Direct anterior    Procedure and Technique:  IMPLANTS:  1. DePuy Emphasys cup, 52 mm outer diameter.  2. DePuy Actis size 7 STD femoral stem.  3. DePuy neutral acetabular liner, inner diameter 36mm  4. Ceramic femoral head 36mm +5mm      DESCRIPTION OF OPERATION:   After adequate anesthesia was induced, the patient was placed on the [place] table in the supine position. Care was taken to pad all bony prominences including the boots and perineal post. Two 10/15 drapes were used to demarcate the surgical field. The left hip, thigh, and groin were then prepped and draped in the usual sterile fashion. Perpendicular lines on the skin were placed to aid skin closure. Ioban was placed on all exposed skin. A time out was performed verifying the correct patient, correct limb, and correct procedure and all attendees in the room were in agreement with proceeding.   An incision was made with a #10 scalpel beginning roughly 2 cm lateral and distal to the ASIS and extending for a length of roughly 10 cm directed toward the fibular head.  Subcutaneous fat was also  incised with a #10 scalpel to the level of the tensor fascia.  A new #10 scalpel was then used to incise the fascia of the TFL in line with the incision.  The TFL muscle belly was bluntly dissected from the TFL fascia developing a plane between the TFL and sartorius.  A large Gelpi retractor was placed and electrocautery was used to isolate the a sending branches of the lateral femoral circumflex artery.  Once these arterial branches had been coagulated, the deep fascia of the TFL compartment was then incised with electrocautery creating a plane between the gluteus medius and the rectus femoris.  Precapsular fat was then excised being sure to maintain hemostasis and electrocautery was used to perform an anterior capsulectomy.  An oscillating saw was then used to create a femoral neck osteotomy from the piriformis fossa to roughly 1 fingerbreadth superior to the lesser trochanter.  A corkscrew was inserted up the medullary canal of the femoral head and the femoral head was removed without trauma to the surrounding tissues.  Anterior and posterior acetabular retractors were then placed and a long handled knife equipped with a #10 scalpel blade was used to excise any remaining labrum.  The acetabulum was then sequentially reamed to a size which was appropriate and a final acetabular cup was impacted under fluoroscopic guidance.  The cup was found to have excellent stability and a final acetabular liner was secured into the cup.  Our attention then turned to the femur.  Remaining lateral capsule was excised being sure to preserve the abductors as well as the short external rotators of the hip.  Using a combination of retractors and bone hook, the proximal femur was delivered anteriorly allowing for appropriate visualization.  The lateral femoral neck was removed with a box cutting osteotome, the canal was sounded and sequential broaching began.  Once a broach of appropriate size was needed, a trial attached and the hip  was reduced.  An x-ray of the pelvis was obtained and navigation software was utilized to confirm implant positioning as well as limb length quality.  Finding that the components were in acceptable position and that limb length had been restored, the hip was once again dislocated and the trial femoral components removed.  A final femoral stem was impacted down the femoral shaft and the final femoral head was impacted onto a dry trunnion.  The hip was then reduced. Local anesthetic was then infiltrated to the hip capsule and pericapsular tissues.  The wound was thoroughly irrigated with dilute Betadine followed by sterile saline.  Vancomycin powder was then introduced into the hip joint and the fascia was closed with a combination of #1 Vicryl interrupted and a running #1 barbed suture.  Skin was closed with 2-0 Vicryl in interrupted fashion and the skin was closed with a 3-0 Monocryl and skin adhesive.  A sterile dressing was applied.  At this point, anesthesia was discontinued, the patient was transferred to a stretcher and transported to the PACU in stable condition.    No qualified resident was available to assist in the case.  Anum Tai PA-c, was necessary for safe positioning, prepping and draping and wound closure.  I was present for the entirety of the case.          SIGNATURE: Stephen Lr MD  DATE: July 1, 2024  TIME: 9:51 AM

## 2024-07-01 NOTE — ANESTHESIA PROCEDURE NOTES
Spinal Block    Patient location during procedure: OR  Start time: 7/1/2024 7:38 AM  Reason for block: procedure for pain and at surgeon's request  Staffing  Performed by: Brandon Boyer MD  Authorized by: Brandon Boyer MD    Preanesthetic Checklist  Completed: patient identified, IV checked, site marked, risks and benefits discussed, surgical consent, monitors and equipment checked, pre-op evaluation and timeout performed  Spinal Block  Patient position: sitting  Prep: ChloraPrep and site prepped and draped  Patient monitoring: frequent blood pressure checks, continuous pulse ox and heart rate  Approach: midline  Location: L3-4  Needle  Needle type: Pencan   Needle gauge: 24 G  Needle length: 4 in  Assessment  Sensory level: T4  Injection Assessment:  negative aspiration for heme, no paresthesia on injection and positive aspiration for clear CSF.  Post-procedure:  site cleaned  Additional Notes  Attempt by CRNA unsuccessful.  Placed without difficulty by Dr. Boyer

## 2024-07-02 ENCOUNTER — TELEPHONE (OUTPATIENT)
Dept: OBGYN CLINIC | Facility: HOSPITAL | Age: 59
End: 2024-07-02

## 2024-07-02 DIAGNOSIS — Z96.642 STATUS POST TOTAL REPLACEMENT OF LEFT HIP: Primary | ICD-10-CM

## 2024-07-02 RX ORDER — CELECOXIB 200 MG/1
200 CAPSULE ORAL 2 TIMES DAILY
Qty: 60 CAPSULE | Refills: 0 | Status: SHIPPED | OUTPATIENT
Start: 2024-07-02 | End: 2024-08-01

## 2024-07-02 NOTE — TELEPHONE ENCOUNTER
"Patient contacted for a postoperative follow up assessment. Patient reports doing  \"have some pain, but to be expected\". Patient states current pain level of a  7/10  when sitting and 10/10 when walking with RW.  Patient denies increase in swelling and dressing is clean, dry and intact. Patient notes a brown/red discoloration at the end of the dressing. We discussed that it may be betadine and patient may wipe clean outside of the dressing. Patient is icing the site regularly. Encouraged patient to ice ever 1-2 hours for 20-30 minutes while awake.     We reviewed patients AVS medication list. Patient is taking Tylenol 650mg every 8 hours (recommended up to 1,000mg Q8 to help with pain control), Oxycodone 5mg PRN, ASA 81mg BID.     Patient denies nausea, vomiting, abdominal pain, chest pain, shortness of breath, fever, dizziness and calf pain. Patient reports feeling of a \"weak\" bladder, we discussed s/s to monitor for and to call if it does not subside. Patient confirmed post-op appointment with surgeon on  07/18.    Patient had a coupe questions for the surgeon at this time:  Patient was not provided an Incentive Spirometer in the hospital and was asking if deep breathing exercises are sufficient  The provided paperwork with PT exercises, she was asking when she should start them.  She was previously prescribed Meloxicam and in her AVS it states she can continue. She wanted to verify with the surgeon that she can take marianne in addition to her discharge medication regimen.   Patient was asking or proper technique when ambulating with the walker. She asked if she should lead with her surgical or non surgical leg     Patient does not have any other questions or concerns at this time. Pt was encouraged to call with any questions, concerns or issues.    "

## 2024-07-02 NOTE — TELEPHONE ENCOUNTER
I will call the patient to relay all the information back to her.     Can you please verify the order for Celebrex? I do not see it listed in her medications.     Thank you!

## 2024-07-03 NOTE — TELEPHONE ENCOUNTER
Called patient to verify she understood VM message. Patient confirmed that she is taking celebrex and notes significant improvement in pain with this medication.    We also discussed having a BM- patient is starting a stool softener today and I recommended miralax if the stool softener is in affective. We discussed increasing water and fiber intake.     Encouraged patient to call with further questions

## 2024-07-04 DIAGNOSIS — Z96.642 AFTERCARE FOLLOWING LEFT HIP JOINT REPLACEMENT SURGERY: ICD-10-CM

## 2024-07-04 DIAGNOSIS — Z47.1 AFTERCARE FOLLOWING LEFT HIP JOINT REPLACEMENT SURGERY: ICD-10-CM

## 2024-07-05 ENCOUNTER — TELEPHONE (OUTPATIENT)
Age: 59
End: 2024-07-05

## 2024-07-05 DIAGNOSIS — Z96.642 AFTERCARE FOLLOWING LEFT HIP JOINT REPLACEMENT SURGERY: ICD-10-CM

## 2024-07-05 DIAGNOSIS — Z47.1 AFTERCARE FOLLOWING LEFT HIP JOINT REPLACEMENT SURGERY: ICD-10-CM

## 2024-07-05 RX ORDER — OXYCODONE HYDROCHLORIDE 5 MG/1
5 TABLET ORAL EVERY 6 HOURS PRN
Qty: 15 TABLET | Refills: 0 | Status: SHIPPED | OUTPATIENT
Start: 2024-07-05 | End: 2024-07-15

## 2024-07-05 NOTE — TELEPHONE ENCOUNTER
Caller: Patient    Doctor: Be    Reason for call: Patient is requesting a refill on Oxy. She would also like to to notify the nurse she did have a bowel movement since the surgery, but her bladder is very weak.      Call back#: [phone]      [id number] - [place] - [address]

## 2024-07-05 NOTE — TELEPHONE ENCOUNTER
"Spoke with patient again and confirmed she does not have symptoms of UTI at this time, just a \"weak bladder.\"  "

## 2024-07-08 RX ORDER — OXYCODONE HYDROCHLORIDE 5 MG/1
5 TABLET ORAL EVERY 6 HOURS PRN
Qty: 7 TABLET | Refills: 0 | Status: SHIPPED | OUTPATIENT
Start: 2024-07-08 | End: 2024-07-18

## 2024-07-08 NOTE — TELEPHONE ENCOUNTER
Call to this patient to make her aware the RX she requested was sent into her pharmacy she understood and will contact the pharmacy

## 2024-07-10 DIAGNOSIS — M16.12 PRIMARY OSTEOARTHRITIS OF LEFT HIP: Primary | ICD-10-CM

## 2024-07-10 RX ORDER — ASPIRIN 81 MG/1
81 TABLET, CHEWABLE ORAL 2 TIMES DAILY
Qty: 180 TABLET | Refills: 1 | Status: SHIPPED | OUTPATIENT
Start: 2024-07-10

## 2024-07-15 ENCOUNTER — DOCUMENTATION (OUTPATIENT)
Dept: PAIN MEDICINE | Facility: CLINIC | Age: 59
End: 2024-07-15

## 2024-07-18 ENCOUNTER — OFFICE VISIT (OUTPATIENT)
Dept: OBGYN CLINIC | Facility: CLINIC | Age: 59
End: 2024-07-18

## 2024-07-18 VITALS
DIASTOLIC BLOOD PRESSURE: 66 MMHG | WEIGHT: 271 LBS | HEART RATE: 89 BPM | BODY MASS INDEX: 36.7 KG/M2 | HEIGHT: 72 IN | SYSTOLIC BLOOD PRESSURE: 102 MMHG

## 2024-07-18 DIAGNOSIS — Z96.642 S/P TOTAL LEFT HIP ARTHROPLASTY: ICD-10-CM

## 2024-07-18 DIAGNOSIS — Z96.642 STATUS POST TOTAL REPLACEMENT OF LEFT HIP: Primary | ICD-10-CM

## 2024-07-18 PROBLEM — M16.12 PRIMARY OSTEOARTHRITIS OF LEFT HIP: Status: RESOLVED | Noted: 2024-05-09 | Resolved: 2024-07-18

## 2024-07-18 PROCEDURE — 99024 POSTOP FOLLOW-UP VISIT: CPT | Performed by: STUDENT IN AN ORGANIZED HEALTH CARE EDUCATION/TRAINING PROGRAM

## 2024-07-18 RX ORDER — CEPHALEXIN 500 MG/1
500 CAPSULE ORAL 2 TIMES DAILY
COMMUNITY
Start: 2024-07-06

## 2024-07-18 NOTE — PROGRESS NOTES
Stephen Lr MD  Adult Reconstruction Specialist   [facility]  Office Phone: [phone]          Date: 24  Shelby Foster   MRN# [id number]  : 1965      Chief Complaint: Post op Left  primary Total Hip Arthroplasty    Assessment and Plan:    S/P total left hip arthroplasty  Weight-bearing as tolerated. Wean off of assistive devices as tolerated   Begin Physical therapy-N/A  Continue Aspirin for blood clot prevention. This is taken for a total of 6 weeks and a refill is not necessary.   Utilize tylenol and celebrex for pain control as needed  May shower do not soak or submerge incision  Compression stocking (Thigh high, 20-30mm Hg) to be worn at all times while awake. This will assist with swelling control.  Beginning at week 4, scar massage may begin. Take a pea sized amount of lotion, any lotion you have around the house and massage into scar for 5 minutes each day. This will minimize appears, mobilize the skin to improve sensitivity and break apart scar tissue  Follow up in 4 weeks. New xrays will be obtained   Keep steri strips on for 1 week from today. Can take off 24  Allowed to showers. Let warm soapy warm run over the area. Avoid scrubbing until steri strips are removed.   Can return to work sooner. Please call clinic if you would wish to return to work sooner.   Allowed to walk without restrictions. Use pain as your guidance for distance and time  Allowed to sit with legs hanging. Use pain as your guidance.   Allowed to drive as long as there is not narcotic use  Can stop taking folic acid  Please wait 4 weeks for any dental work from surgical date.   Weight loss shots should be avoided for at least 4 weeks from the surgery. 24 weight loss appt shouldn't pose a problem.         Subjective:   Patient is 2 weeks s/p left primary total hip arthroplasty.    Date of procedure: 24  Doing well, no new problems  Pain progressively  "improving  Improved swelling  Ambulating with cane    Allergy:  Allergies   Allergen Reactions    Fluconazole Swelling     Of lips      Sulfamethoxazole-Trimethoprim Swelling     Of lips    Shellfish-Derived Products - Food Allergy Hives and Diarrhea    Eggs Or Egg-Derived Products - Food Allergy Diarrhea    Nuts - Food Allergy Diarrhea    Penicillins Other (See Comments)     As a child-can't recall reaction      Erythromycin Rash     Breaks aout on white pimples      Tetracycline Rash     Breaks out on white pimples       Medications:  all current active meds have been reviewed  Past Medical History:  Past Medical History:   Diagnosis Date    Abnormal Pap smear of cervix 2000's    Allergic rhinitis 2015    Deviated septum, ployps    Anesthesia     \"always causes Constipation and PONV\"\"    Asthma     Disease of thyroid gland     Environmental and seasonal allergies     Fibroid 2000's    Food allergy     Heart murmur     History of anemia     History of concussion 11/30/2022    sees neurologist yearly--\"due to tree falling on head\"    History of repair of hiatal hernia     History of rheumatic fever as a child     Hypothyroidism     Presence of dental bridge     left lower    Rosacea     Sleep apnea     mild    Varicella      Past Surgical History:  Past Surgical History:   Procedure Laterality Date    ANKLE SURGERY Bilateral     APPENDECTOMY      CATARACT EXTRACTION Bilateral     CHOLECYSTECTOMY      COLONOSCOPY      DENTAL IMPLANT Right     lower    DILATION AND CURETTAGE OF UTERUS      ENDOMETRIAL ABLATION  2005    FINGER SURGERY      thumb    HERNIA REPAIR  11/2021    \"hiatal\"    MYOMECTOMY  2002,3,4    [place] ARTHRP ACETBLR/PROX FEM PROSTC AGRFT/ALGRFT Left 7/1/2024    Procedure: ARTHROPLASTY HIP TOTAL ANTERIOR, LEFT, SAME DAY;  Surgeon: Stephen Lr MD;  [facility];  Service: Orthopedics    SINUS SURGERY  2015    TEAR DUCT SURGERY      TONSILLECTOMY      WISDOM TOOTH EXTRACTION       Family " History:  Family History   Problem Relation Age of Onset    Breast cancer Mother     Miscarriages / Stillbirths Mother     COPD Mother      Social History:  Social History     Substance and Sexual Activity   Alcohol Use Never     Social History     Substance and Sexual Activity   Drug Use Never     Social History     Tobacco Use   Smoking Status Never   Smokeless Tobacco Never             Review of Systems:  General- denies fever/chills  HEENT- denies hearing loss or sore throat  Eyes- denies eye pain or visual disturbances, denies red eyes  Respiratory- denies cough or SOB  Cardio- denies chest pain or palpitations  GI- denies abdominal pain  Endocrine- denies urinary frequency  Urinary- denies pain with urination  Musculoskeletal- Negative except noted above  Skin- denies rashes or wounds  Neurological- denies dizziness or headache  Psychiatric- denies anxiety or difficulty concentrating    Objective:   BP Readings from Last 1 Encounters:   07/18/24 102/66      Wt Readings from Last 1 Encounters:   07/18/24 123 kg (271 lb)      Pulse Readings from Last 1 Encounters:   07/18/24 89        BMI: Estimated body mass index is 36.75 kg/m² as calculated from the following:    Height as of this encounter: 6' (1.829 m).    Weight as of this encounter: 123 kg (271 lb).    Physical Exam  /66 (BP Location: Left arm, Patient Position: Lying right side, Cuff Size: Large)   Pulse 89   Ht 6' (1.829 m) Comment: verbal  Wt 123 kg (271 lb)   BMI 36.75 kg/m²   General/Constitutional: No apparent distress: well-nourished and well developed.  Eyes: normal ocular motion  Cardio: RRR, Normal S1S2, No m/r/g.   Lymphatic: No appreciable lymphadenopathy  Respiratory: Non-labored breathing, CTA b/l no w/c/r  Vascular: No edema, swelling or tenderness, except as noted in detailed exam. Extremities well perfused. No LE edema  Integumentary: No impressive skin lesions present, except as noted in detailed exam.  Neuro: No ataxia or  tremors noted  Psych: Normal mood and affect, oriented to person, place and time. Appropriate affect.  Musculoskeletal: Normal, except as noted in detailed exam and in HPI.    Gait and Station:   antalgic    left Hip Examination  Incision: clean, dry, and intact  Erythema: Absent  Ecchymosis: Absent  Wound edges: well approximated incision  Wound drainage: None: wound tissue dry  Hip ROM:    painless  Flexion: 90 degrees.   External rotation: 30 degrees.   Internal rotation: 15  deg.   Abduction: 20 degrees.   Motor: 5/5 EHL/FHL/TA/GS/QD/HS  Neurovascular exam is intact.   No evidence of calf tightness or posterior cords    Images:  No new imaging      Scribe Attestation      I,:  Travis Silverio am acting as a scribe while in the presence of the attending physician.:       I,:  Stephen Lr MD personally performed the services described in this documentation    as scribed in my presence.:               Stephen Lr MD  Adult Reconstruction Specialist   [facility]     Surgery: Left Total Hip Arthroplasty  Date of Surgery: 7/1/24      Scribe Attestation      I,:  Travis Silverio am acting as a scribe while in the presence of the attending physician.:       I,:  Stephen Lr MD personally performed the services described in this documentation    as scribed in my presence.:

## 2024-07-18 NOTE — ASSESSMENT & PLAN NOTE
Weight-bearing as tolerated. Wean off of assistive devices as tolerated   Begin Physical therapy-N/A  Continue Aspirin for blood clot prevention. This is taken for a total of 6 weeks and a refill is not necessary.   Utilize tylenol and celebrex for pain control as needed  May shower do not soak or submerge incision  Compression stocking (Thigh high, 20-30mm Hg) to be worn at all times while awake. This will assist with swelling control.  Beginning at week 4, scar massage may begin. Take a pea sized amount of lotion, any lotion you have around the house and massage into scar for 5 minutes each day. This will minimize appears, mobilize the skin to improve sensitivity and break apart scar tissue  Follow up in 4 weeks. New xrays will be obtained   Keep steri strips on for 1 week from today. Can take off 7/25/24  Allowed to showers. Let warm soapy warm run over the area. Avoid scrubbing until steri strips are removed.   Can return to work sooner. Please call clinic if you would wish to return to work sooner.   Allowed to walk without restrictions. Use pain as your guidance for distance and time  Allowed to sit with legs hanging. Use pain as your guidance.   Allowed to drive as long as there is not narcotic use  Can stop taking folic acid  Please wait 4 weeks for any dental work from surgical date.   Weight loss shots should be avoided for at least 4 weeks from the surgery. 9/4/24 weight loss appt shouldn't pose a problem.

## 2024-07-18 NOTE — PROGRESS NOTES
"      Date: 24  Shelby Foster   MRN# [id number]  : 1965      Chief Complaint: {Left/Right:49613} Hip Pain    Assessment and Plan:  The patient verbalized understanding of exam findings and treatment plan. We engaged in the shared decision-making process and treatment options were discussed at length with the patient. Surgical and conservative management discussed today along with risks and benefits. Patient was agreeable with the plan and all questions were answered to satisfaction.     No problem-specific Assessment & Plan notes found for this encounter.       Subjective:   Shelby Foster is a 58 y.o. female who is being seen in follow-up for {Left/Right:43628} hip pain. When we last saw she we recommended ***.  Pain has {changed:43756}. No other orthopedic complaints or concerns.       Prior treatment:  NSAIDs { :86593::\"Yes\"}    Physical Therapy { :00700::\"Yes\"}   Cortisone Injections { :57332::\"Yes\"}     Allergy:  Allergies   Allergen Reactions    Fluconazole Swelling     Of lips      Sulfamethoxazole-Trimethoprim Swelling     Of lips    Shellfish-Derived Products - Food Allergy Hives and Diarrhea    Eggs Or Egg-Derived Products - Food Allergy Diarrhea    Nuts - Food Allergy Diarrhea    Penicillins Other (See Comments)     As a child-can't recall reaction      Erythromycin Rash     Breaks aout on white pimples      Tetracycline Rash     Breaks out on white pimples       Medications:  All current active meds have been reviewed   Past Medical History:  Past Medical History:   Diagnosis Date    Abnormal Pap smear of cervix 's    Allergic rhinitis 2015    Deviated septum, ployps    Anesthesia     \"always causes Constipation and PONV\"\"    Asthma     Disease of thyroid gland     Environmental and seasonal allergies     Fibroid 's    Food allergy     Heart murmur     History of anemia     History of concussion 2022    sees neurologist yearly--\"due to tree falling on head\"    History of " "repair of hiatal hernia     History of rheumatic fever as a child     Hypothyroidism     Presence of dental bridge     left lower    Rosacea     Sleep apnea     mild    Varicella      Past Surgical History:  Past Surgical History:   Procedure Laterality Date    ANKLE SURGERY Bilateral     APPENDECTOMY      CATARACT EXTRACTION Bilateral     CHOLECYSTECTOMY      COLONOSCOPY      DENTAL IMPLANT Right     lower    DILATION AND CURETTAGE OF UTERUS      ENDOMETRIAL ABLATION  2005    FINGER SURGERY      thumb    HERNIA REPAIR  11/2021    \"hiatal\"    MYOMECTOMY  2002,3,4    [place] ARTHRP ACETBLR/PROX FEM PROSTC AGRFT/ALGRFT Left 7/1/2024    Procedure: ARTHROPLASTY HIP TOTAL ANTERIOR, LEFT, SAME DAY;  Surgeon: Stephen Lr MD;  [facility];  Service: Orthopedics    SINUS SURGERY  2015    TEAR DUCT SURGERY      TONSILLECTOMY      WISDOM TOOTH EXTRACTION       Family History:  Family History   Problem Relation Age of Onset    Breast cancer Mother     Miscarriages / Stillbirths Mother     COPD Mother      Social History:  Social History     Substance and Sexual Activity   Alcohol Use Never     Social History     Substance and Sexual Activity   Drug Use Never     Social History     Tobacco Use   Smoking Status Never   Smokeless Tobacco Never           Review of Systems:  General- denies fever/chills  HEENT- denies hearing loss or sore throat  Eyes- denies eye pain or visual disturbances, denies red eyes  Respiratory- denies cough or SOB  Cardio- denies chest pain or palpitations  GI- denies abdominal pain  Endocrine- denies urinary frequency  Urinary- denies pain with urination  Musculoskeletal- Negative except noted above  Skin- denies rashes or wounds  Neurological- denies dizziness or headache  Psychiatric- denies anxiety or difficulty concentrating    Objective:   BP Readings from Last 1 Encounters:   07/18/24 102/66      Wt Readings from Last 1 Encounters:   07/18/24 123 kg (271 lb)      Pulse Readings from Last " 1 Encounters:   24 89        BMI: Estimated body mass index is 36.75 kg/m² as calculated from the following:    Height as of this encounter: 6' (1.829 m).    Weight as of this encounter: 123 kg (271 lb).    Physical Exam  /66 (BP Location: Left arm, Patient Position: Lying right side, Cuff Size: Large)   Pulse 89   Ht 6' (1.829 m) Comment: verbal  Wt 123 kg (271 lb)   BMI 36.75 kg/m²   General/Constitutional: No apparent distress: well-nourished and well developed.  Eyes: normal ocular motion  Cardio: RRR, Normal S1S2, No m/r/g.   Lymphatic: No appreciable lymphadenopathy  Respiratory: Non-labored breathing, CTA b/l no w/c/r  Vascular: No edema, swelling or tenderness, except as noted in detailed exam. Extremities well perfused. No LE edema  Integumentary: No impressive skin lesions present, except as noted in detailed exam.  Neuro: No ataxia or tremors noted  Psych: Normal mood and affect, oriented to person, place and time. Appropriate affect.  Musculoskeletal: Normal, except as noted in detailed exam and in HPI.    Gait and Station:   {gait:524914}    {Left/Right:50074} Hip Exam    Inspection: {EXAM; ORTHO SKIN:05279}    Range of Motion: *** {WITH OR WITHOUT:89367} pain    {Pos/Ne} log roll   {Pos/Ne}Trendelenburg sign  {Pos/Ne} Stinchfield  {Pos/Ne} BRIONNA  {Pos/Ne} FADDIR    Motor: {5}/5 Q/HS/TA/GS/EHL/FHL    Vascular: Toes WWP with BCR    SILT DP/SP/Grayson/Saph/Tib      Images:  No new imaging***    I personally reviewed relevant images in the PACS system and my interpretation is as follows:    Xray of the {Left/Right:23937} *** were obtained, reviewed and interpreted independently which demonstrate ***.     MRI available for review of {Left/Right:69594} *** was available for review which demonstrates ***.           Scribe Attestation      I,:   am acting as a scribe while in the presence of the attending physician.:       I,:   personally performed the services  described in this documentation    as scribed in my presence.:               Stephen Lr MD  Adult Reconstruction Specialist   [facility]

## 2024-07-18 NOTE — LETTER
July 18, 2024     Patient: Shelby Foster  YOB: 1965  Date of Visit: 7/18/2024      To Whom it May Concern:    Shelby Foster is under my professional care. Shelby was seen in my office on 7/18/2024. Please allow Shelby to work from home for 6 weeks from the surgical date. Recommend that she starts working in the office 1 day per week after the 6 week lavern.     If you have any questions or concerns, please don't hesitate to call.         Sincerely,          Stephen Lr MD        CC: No Recipients

## 2024-07-25 DIAGNOSIS — Z96.642 STATUS POST TOTAL REPLACEMENT OF LEFT HIP: ICD-10-CM

## 2024-07-25 RX ORDER — CELECOXIB 200 MG/1
200 CAPSULE ORAL 2 TIMES DAILY
Qty: 60 CAPSULE | Refills: 0 | Status: SHIPPED | OUTPATIENT
Start: 2024-07-25

## 2024-08-09 ENCOUNTER — APPOINTMENT (OUTPATIENT)
Dept: RADIOLOGY | Age: 59
End: 2024-08-09
Payer: COMMERCIAL

## 2024-08-09 ENCOUNTER — OFFICE VISIT (OUTPATIENT)
Dept: OBGYN CLINIC | Facility: CLINIC | Age: 59
End: 2024-08-09

## 2024-08-09 VITALS
DIASTOLIC BLOOD PRESSURE: 96 MMHG | BODY MASS INDEX: 36.7 KG/M2 | WEIGHT: 271 LBS | HEIGHT: 72 IN | SYSTOLIC BLOOD PRESSURE: 137 MMHG | HEART RATE: 82 BPM

## 2024-08-09 DIAGNOSIS — Z96.642 STATUS POST TOTAL REPLACEMENT OF LEFT HIP: Primary | ICD-10-CM

## 2024-08-09 DIAGNOSIS — Z96.642 S/P TOTAL LEFT HIP ARTHROPLASTY: ICD-10-CM

## 2024-08-09 DIAGNOSIS — Z96.642 STATUS POST TOTAL REPLACEMENT OF LEFT HIP: ICD-10-CM

## 2024-08-09 PROCEDURE — 99024 POSTOP FOLLOW-UP VISIT: CPT | Performed by: STUDENT IN AN ORGANIZED HEALTH CARE EDUCATION/TRAINING PROGRAM

## 2024-08-09 PROCEDURE — 73502 X-RAY EXAM HIP UNI 2-3 VIEWS: CPT

## 2024-08-09 RX ORDER — NABUMETONE 750 MG/1
750 TABLET, FILM COATED ORAL 2 TIMES DAILY
Qty: 60 TABLET | Refills: 0 | Status: SHIPPED | OUTPATIENT
Start: 2024-08-09

## 2024-08-09 NOTE — ASSESSMENT & PLAN NOTE
Patient may stop the ASA at this time.  Recommend ASA and compression stocking when flying  Script for Relafen sent to pharmacy on file as the Celebex was bothering her bowels  May restart pelvic and lumbar exercise.  Patient may restart going to the gym   WBAT  Follow up 6 weeks

## 2024-08-09 NOTE — PROGRESS NOTES
"      Date: 24  Shelby Foster   MRN# [id number]  : 1965      Chief Complaint: Post op Left  primary Total Hip Arthroplasty    Assessment and Plan:    S/P total left hip arthroplasty  Patient may stop the ASA at this time.  Recommend ASA and compression stocking when flying  Script for Relafen sent to pharmacy on file as the Celebex was bothering her bowels  May restart pelvic and lumbar exercise.  Patient may restart going to the gym   WBAT  Follow up 6 weeks        Subjective:   Patient is  5 weeks  s/p left primary total hip arthroplasty. Patient reports she feels she started feeling better this week.     Date of procedure: 24  Doing well, no new problems  Pain progressively improving  Improved swelling  Ambulating with no assistive device    Allergy:  Allergies   Allergen Reactions    Fluconazole Swelling     Of lips      Sulfamethoxazole-Trimethoprim Swelling     Of lips    Shellfish-Derived Products - Food Allergy Hives and Diarrhea    Eggs Or Egg-Derived Products - Food Allergy Diarrhea    Nuts - Food Allergy Diarrhea    Penicillins Other (See Comments)     As a child-can't recall reaction      Erythromycin Rash     Breaks aout on white pimples      Tetracycline Rash     Breaks out on white pimples       Medications:  all current active meds have been reviewed  Past Medical History:  Past Medical History:   Diagnosis Date    Abnormal Pap smear of cervix 's    Allergic rhinitis 2015    Deviated septum, ployps    Anesthesia     \"always causes Constipation and PONV\"\"    Asthma     Disease of thyroid gland     Environmental and seasonal allergies     Fibroid     Food allergy     Heart murmur     History of anemia     History of concussion 2022    sees neurologist yearly--\"due to tree falling on head\"    History of repair of hiatal hernia     History of rheumatic fever as a child     Hypothyroidism     Presence of dental bridge     left lower    Rosacea     Sleep apnea     " "mild    Varicella      Past Surgical History:  Past Surgical History:   Procedure Laterality Date    ANKLE SURGERY Bilateral     APPENDECTOMY      CATARACT EXTRACTION Bilateral     CHOLECYSTECTOMY      COLONOSCOPY      DENTAL IMPLANT Right     lower    DILATION AND CURETTAGE OF UTERUS      ENDOMETRIAL ABLATION  2005    FINGER SURGERY      thumb    HERNIA REPAIR  11/2021    \"hiatal\"    MYOMECTOMY  2002,3,4    [place] ARTHRP ACETBLR/PROX FEM PROSTC AGRFT/ALGRFT Left 7/1/2024    Procedure: ARTHROPLASTY HIP TOTAL ANTERIOR, LEFT, SAME DAY;  Surgeon: Stephen Lr MD;  [facility];  Service: Orthopedics    SINUS SURGERY  2015    TEAR DUCT SURGERY      TONSILLECTOMY      WISDOM TOOTH EXTRACTION       Family History:  Family History   Problem Relation Age of Onset    Breast cancer Mother     Miscarriages / Stillbirths Mother     COPD Mother      Social History:  Social History     Substance and Sexual Activity   Alcohol Use Never     Social History     Substance and Sexual Activity   Drug Use Never     Social History     Tobacco Use   Smoking Status Never   Smokeless Tobacco Never             Review of Systems:  General- denies fever/chills  HEENT- denies hearing loss or sore throat  Eyes- denies eye pain or visual disturbances, denies red eyes  Respiratory- denies cough or SOB  Cardio- denies chest pain or palpitations  GI- denies abdominal pain  Endocrine- denies urinary frequency  Urinary- denies pain with urination  Musculoskeletal- Negative except noted above  Skin- denies rashes or wounds  Neurological- denies dizziness or headache  Psychiatric- denies anxiety or difficulty concentrating    Objective:   BP Readings from Last 1 Encounters:   08/09/24 137/96      Wt Readings from Last 1 Encounters:   08/09/24 123 kg (271 lb)      Pulse Readings from Last 1 Encounters:   08/09/24 82        BMI: Estimated body mass index is 36.75 kg/m² as calculated from the following:    Height as of this encounter: 6' (1.829 " m).    Weight as of this encounter: 123 kg (271 lb).    Physical Exam  /96   Pulse 82   Ht 6' (1.829 m)   Wt 123 kg (271 lb)   BMI 36.75 kg/m²   General/Constitutional: No apparent distress: well-nourished and well developed.  Eyes: normal ocular motion  Cardio: RRR, Normal S1S2, No m/r/g.   Lymphatic: No appreciable lymphadenopathy  Respiratory: Non-labored breathing, CTA b/l no w/c/r  Vascular: No edema, swelling or tenderness, except as noted in detailed exam. Extremities well perfused. No LE edema  Integumentary: No impressive skin lesions present, except as noted in detailed exam.  Neuro: No ataxia or tremors noted  Psych: Normal mood and affect, oriented to person, place and time. Appropriate affect.  Musculoskeletal: Normal, except as noted in detailed exam and in HPI.    Gait and Station:   normal    left Hip Examination  Incision: clean, dry, and intact and healed  Erythema: Absent  Ecchymosis: Absent  Wound edges: healed  Wound drainage: None: wound tissue dry  Hip ROM:    painless  Flexion: 90 degrees.   External rotation: 30 degrees.   Internal rotation: 15  deg.   Abduction: 20 degrees.   Motor: 5/5 EHL/FHL/TA/GS/QD/HS  Neurovascular exam is intact.   No evidence of calf tightness or posterior cords    Images:    I personally reviewed relevant images in the PACS system and my interpretation is as follows:  X-rays of the left Hip reveal a total hip arthroplasty in appropriate alignment without evidence of dislocation, migration, wear or loosening.      Scribe Attestation      I,:  Hanny Crawford MA am acting as a scribe while in the presence of the attending physician.:       I,:  Stephen Lr MD personally performed the services described in this documentation    as scribed in my presence.:               Stephen Lr MD  Adult Reconstruction Specialist   [facility]

## 2024-08-18 DIAGNOSIS — Z96.642 STATUS POST TOTAL REPLACEMENT OF LEFT HIP: ICD-10-CM

## 2024-08-19 RX ORDER — CELECOXIB 200 MG/1
200 CAPSULE ORAL 2 TIMES DAILY
Qty: 60 CAPSULE | Refills: 1 | Status: SHIPPED | OUTPATIENT
Start: 2024-08-19

## 2024-08-21 DIAGNOSIS — Z47.1 AFTERCARE FOLLOWING LEFT HIP JOINT REPLACEMENT SURGERY: Primary | ICD-10-CM

## 2024-08-21 DIAGNOSIS — Z96.642 AFTERCARE FOLLOWING LEFT HIP JOINT REPLACEMENT SURGERY: Primary | ICD-10-CM

## 2024-08-23 RX ORDER — ASPIRIN 81 MG/1
81 TABLET, COATED ORAL 2 TIMES DAILY
Qty: 84 TABLET | Refills: 1 | Status: SHIPPED | OUTPATIENT
Start: 2024-08-23

## 2024-08-29 ENCOUNTER — HOSPITAL ENCOUNTER (OUTPATIENT)
Dept: MAMMOGRAPHY | Facility: CLINIC | Age: 59
End: 2024-08-29
Payer: COMMERCIAL

## 2024-08-29 VITALS — BODY MASS INDEX: 36.7 KG/M2 | HEIGHT: 72 IN | WEIGHT: 271 LBS

## 2024-08-29 DIAGNOSIS — Z12.31 ENCOUNTER FOR SCREENING MAMMOGRAM FOR MALIGNANT NEOPLASM OF BREAST: ICD-10-CM

## 2024-08-29 PROCEDURE — 77063 BREAST TOMOSYNTHESIS BI: CPT

## 2024-08-29 PROCEDURE — 77067 SCR MAMMO BI INCL CAD: CPT

## 2024-09-04 ENCOUNTER — OFFICE VISIT (OUTPATIENT)
Dept: FAMILY MEDICINE CLINIC | Facility: CLINIC | Age: 59
End: 2024-09-04
Payer: COMMERCIAL

## 2024-09-04 VITALS
WEIGHT: 276.8 LBS | BODY MASS INDEX: 37.49 KG/M2 | SYSTOLIC BLOOD PRESSURE: 120 MMHG | DIASTOLIC BLOOD PRESSURE: 80 MMHG | OXYGEN SATURATION: 97 % | HEART RATE: 86 BPM | HEIGHT: 72 IN | TEMPERATURE: 97.6 F | RESPIRATION RATE: 16 BRPM

## 2024-09-04 DIAGNOSIS — E66.01 CLASS 2 SEVERE OBESITY DUE TO EXCESS CALORIES WITH SERIOUS COMORBIDITY AND BODY MASS INDEX (BMI) OF 37.0 TO 37.9 IN ADULT (HCC): Primary | ICD-10-CM

## 2024-09-04 PROBLEM — E66.812 CLASS 2 SEVERE OBESITY DUE TO EXCESS CALORIES WITH SERIOUS COMORBIDITY AND BODY MASS INDEX (BMI) OF 37.0 TO 37.9 IN ADULT (HCC): Status: ACTIVE | Noted: 2024-09-04

## 2024-09-04 PROBLEM — E66.812 CLASS 2 SEVERE OBESITY WITH SERIOUS COMORBIDITY AND BODY MASS INDEX (BMI) OF 39.0 TO 39.9 IN ADULT (HCC): Status: RESOLVED | Noted: 2024-03-13 | Resolved: 2024-09-04

## 2024-09-04 PROCEDURE — 99213 OFFICE O/P EST LOW 20 MIN: CPT | Performed by: NURSE PRACTITIONER

## 2024-09-04 NOTE — PATIENT INSTRUCTIONS
Start Wegovy weekly.  Maintain low calorie diet.  Increase exercise activity as tolerated.  Maintain good fluid hydration.

## 2024-09-04 NOTE — PROGRESS NOTES
Ambulatory Visit  Name: Shelby Foster      : 1965      MRN: [id number]  Encounter Provider: KEVIN Roper  Encounter Date: 2024   Encounter department: [facility] PRIMARY CARE    Assessment & Plan   1. Class 2 severe obesity due to excess calories with serious comorbidity and body mass index (BMI) of 37.0 to 37.9 in adult (HCC)  Assessment & Plan:  Unable to make appointment with weight management.  Discussed various options for losing weight.  Will start Wegovy.  Patient's BMI is 37.54.  Patient is prediabetic.  Discussed maintaining low calorie diet increase exercise activity as tolerated increase water hydration.  Patient was advised that needs to make routine follow-up for titration of dose and monitor of side effects and effectiveness of the medication.  Patient verbalized understanding  Orders:  -     Semaglutide-Weight Management (WEGOVY) 0.25 MG/0.5ML; Inject 0.5 mL (0.25 mg total) under the skin once a week for 4 doses       History of Present Illness     Patient is here to discuss weight loss options.          Review of Systems   Constitutional: Negative.    HENT: Negative.     Eyes: Negative.    Respiratory: Negative.     Cardiovascular: Negative.    Gastrointestinal: Negative.    Endocrine: Negative.    Genitourinary: Negative.    Musculoskeletal:  Positive for arthralgias (Had recent surgery).   Skin: Negative.    Allergic/Immunologic: Negative.    Neurological: Negative.    Psychiatric/Behavioral: Negative.         Objective     /80   Pulse 86   Temp 97.6 °F (36.4 °C) (Temporal)   Resp 16   Ht 6' (1.829 m)   Wt 126 kg (276 lb 12.8 oz)   SpO2 97%   BMI 37.54 kg/m²     Physical Exam  Constitutional:       General: She is not in acute distress.     Appearance: Normal appearance. She is obese. She is not ill-appearing.   HENT:      Head: Normocephalic and atraumatic.   Cardiovascular:      Rate and Rhythm: Normal rate and regular rhythm.      Pulses: Normal  pulses.      Heart sounds: Normal heart sounds.   Pulmonary:      Effort: Pulmonary effort is normal. No respiratory distress.      Breath sounds: Normal breath sounds.   Chest:      Chest wall: No tenderness.   Abdominal:      General: There is no distension.      Palpations: There is no mass.      Tenderness: There is no abdominal tenderness.   Musculoskeletal:         General: Normal range of motion.      Cervical back: Normal range of motion and neck supple.   Skin:     General: Skin is warm and dry.   Neurological:      General: No focal deficit present.      Mental Status: She is alert and oriented to person, place, and time.   Psychiatric:         Mood and Affect: Mood normal.         Behavior: Behavior normal.         Thought Content: Thought content normal.         Judgment: Judgment normal.       Administrative Statements

## 2024-09-04 NOTE — ASSESSMENT & PLAN NOTE
Unable to make appointment with weight management.  Discussed various options for losing weight.  Will start Wegovy.  Patient's BMI is 37.54.  Patient is prediabetic.  Discussed maintaining low calorie diet increase exercise activity as tolerated increase water hydration.  Patient was advised that needs to make routine follow-up for titration of dose and monitor of side effects and effectiveness of the medication.  Patient verbalized understanding

## 2024-09-10 ENCOUNTER — TELEPHONE (OUTPATIENT)
Age: 59
End: 2024-09-10

## 2024-09-10 NOTE — TELEPHONE ENCOUNTER
Caller: Patient     Doctor: Be     Reason for call: Patient is asking if she needs pre-dental abx, and if she needs to wait 3 months after THAD for any dental work. Surgery was 7/1/24  Please advise   She will need a note stating the requirements faxed to [facility] @ fax# [phone]    Call back#: [phone]

## 2024-09-11 NOTE — PROGRESS NOTES
Diagnoses and all orders for this visit:    Encounter for gynecological examination without abnormal finding    Encounter for screening mammogram for malignant neoplasm of breast  -     Mammo screening bilateral w 3d and cad; Future    Stress incontinence  -     Ambulatory referral to Physical Therapy; Future      May return to PT when desired,referral placed   Advised to call with any Post Menopausal bleeding.   Calcium/ Vit D dietary requirements discussed,   Weight bearing exercises minium of 150 mins/weekly advised.   Kegel exercises advised daily, see after visit summary for instructions and recommendations  Reviewed perineal hygiene and vaginal dryness post menopause  SBE and yearly mammography advised.  ASCCP guidelines reviewed. Will discontinue PAP's according to recommendations. Condoms encouraged with all sexual activity to prevent STI's.   Health maintenance encouraged with PMD, remain current with Colonoscopy, Dexa scan, and recommended vaccines.  Advised to call with any issues, all concerns & questions addressed.   See after visit summary for further information and recommendations to the above mentioned subjects which we may or may not have covered in detail during your visit     F/U annually or Biannual if Medicare       Health Maintenance:    Last PAP: 08/15/2022 Negative HPV Negative   Next PAP Due:2025    Last Mammogram:2024  Life time Tiffany Mendes % 15.63, Density A almost entirely fatty, Bi-Rads 1 Negative  Next Mammogram: Order given    Last Colonoscopy:2024 RTO in 5 years     Last DEXA: Not on file    Subjective    CC: Yearly Exam     Pleasant 58 y.o. , female   postmenopausal here for annual exam.   GYN hx includes:  Ablation, fibroids, lichen simplex chronicus, vulvar Derm,, atrophic vaginitis   Family Hx: Mother- Breast cancer (D)   Recent left hip replacement , Medically stable     Denies history of abnormal pap smears.  Denies abnormal Mammograms   Denies  "postmenopausal bleeding   Denies issues with vasomotor symptoms  Denies pelvic pain   Denies vaginal issues. No longer using Estrace and doing well without it   Reports symptoms of pelvic organ prolapse, urinary, or fecal incontinence.  Has stress incontinence which was getting better, she recently had hip surgery and feels like she may have slipped backwards in her progress with the incontinence but mentions things are slowly getting better now that she is 3 months post op , pt does want to go back to PT but not until the year end.   Is sexually active but not.at this time. Monogamous relationship.   Denies STI concerns. declines STD testing   Denies intimate partner violence    Works for a medical device company   Has a new puppy     Past Medical History:   Diagnosis Date    Abnormal Pap smear of cervix 2000's    Allergic rhinitis 2015    Deviated septum, ployps    Anesthesia     \"always causes Constipation and PONV\"\"    Asthma     Disease of thyroid gland     Environmental and seasonal allergies     Fibroid 2000's    Food allergy     Heart murmur     History of anemia     History of concussion 11/30/2022    sees neurologist yearly--\"due to tree falling on head\"    History of repair of hiatal hernia     History of rheumatic fever as a child     Hypothyroidism     Presence of dental bridge     left lower    Rosacea     Sleep apnea     mild    Varicella      Past Surgical History:   Procedure Laterality Date    ANKLE SURGERY Bilateral     APPENDECTOMY      CATARACT EXTRACTION Bilateral     CHOLECYSTECTOMY      COLONOSCOPY      DENTAL IMPLANT Right     lower    DILATION AND CURETTAGE OF UTERUS      ENDOMETRIAL ABLATION  2005    FINGER SURGERY      thumb    HERNIA REPAIR  11/2021    \"hiatal\"    MYOMECTOMY  2002,3,4    HI ARTHRP ACETBLR/PROX FEM PROSTC AGRFT/ALGRFT Left 7/1/2024    Procedure: ARTHROPLASTY HIP TOTAL ANTERIOR, LEFT, SAME DAY;  Surgeon: Stephen Lr MD;  Location: [facility];  Service: Orthopedics "    SINUS SURGERY  2015    TEAR DUCT SURGERY      TONSILLECTOMY      WISDOM TOOTH EXTRACTION        Family History   Problem Relation Age of Onset    Breast cancer Mother     Miscarriages / Stillbirths Mother     COPD Mother      Social History     Tobacco Use    Smoking status: Never    Smokeless tobacco: Never   Vaping Use    Vaping status: Never Used   Substance Use Topics    Alcohol use: Never    Drug use: Never       Current Outpatient Medications:     albuterol (Ventolin HFA) 90 mcg/act inhaler, Inhale 2 puffs every 6 (six) hours as needed for wheezing, Disp: 25.5 g, Rfl: 3    Aspirin-Acetaminophen-Caffeine (EXCEDRIN MIGRAINE PO), Take 1 tablet by mouth if needed, Disp: , Rfl:     clobetasol (TEMOVATE) 0.05 % cream, , Disp: , Rfl:     clotrimazole-betamethasone (LOTRISONE) 1-0.05 % cream, , Disp: , Rfl:     fluticasone (FLONASE) 50 mcg/act nasal spray, , Disp: , Rfl:     levothyroxine (Synthroid) 175 mcg tablet, 1 tab daily, Disp: 90 tablet, Rfl: 3    levothyroxine (Synthroid) 175 mcg tablet, Take 1 tablet (175 mcg total) by mouth daily, Disp: 30 tablet, Rfl: 1    Melatonin 300 MCG TABS, daily at bedtime as needed, Disp: , Rfl:     meloxicam (MOBIC) 15 mg tablet, Take 1 tablet (15 mg total) by mouth daily as needed for moderate pain, Disp: 30 tablet, Rfl: 0    methocarbamol (ROBAXIN) 500 mg tablet, take 1 tablet by mouth for muscle spasm IN THE EVENING if needed ( MAY CAUSE SEDATION. ), Disp: 20 tablet, Rfl: 6    metroNIDAZOLE (METROCREAM) 0.75 % cream, daily, Disp: , Rfl:     montelukast (SINGULAIR) 10 mg tablet, Take 1 tablet (10 mg total) by mouth daily at bedtime, Disp: 90 tablet, Rfl: 3    Semaglutide-Weight Management (WEGOVY) 0.25 MG/0.5ML, Inject 0.5 mL (0.25 mg total) under the skin once a week for 4 doses, Disp: 2 mL, Rfl: 0    Sermorelin Acetate Diagnostic 15 MG SOLR, , Disp: , Rfl:     sodium chloride (OCEAN) 0.65 % nasal spray, 1 spray into each nostril daily at bedtime, Disp: , Rfl:      Sulfacetamide Sodium, Acne, 10 % LOTN, daily, Disp: , Rfl:     terconazole (TERAZOL 7) 0.4 % vaginal cream, Insert 1 applicator into the vagina daily at bedtime, Disp: 45 g, Rfl: 3    Vitamin D, Cholecalciferol, 50 MCG ( UT) CAPS, Take 2 capsules by mouth daily, Disp: , Rfl:     ferrous sulfate 324 (65 Fe) mg, Take 1 tablet (324 mg total) by mouth every other day, Disp: 15 tablet, Rfl: 0    Fluticasone-Salmeterol (Advair) 100-50 mcg/dose inhaler, Inhale 1 puff 2 (two) times a day Rinse mouth after use. (Patient taking differently: Inhale 1 puff as needed Rinse mouth after use.), Disp: 180 blister, Rfl: 1    ondansetron (ZOFRAN-ODT) 4 mg disintegrating tablet, Take 1 tablet (4 mg total) by mouth every 6 (six) hours as needed for nausea or vomiting, Disp: 20 tablet, Rfl: 3  Patient Active Problem List    Diagnosis Date Noted    Class 2 severe obesity due to excess calories with serious comorbidity and body mass index (BMI) of 37.0 to 37.9 in adult (HCC) 2024    S/P total left hip arthroplasty 2024    Preoperative clearance 2024    Obesity (BMI 30-39.9) 2024    Environmental and seasonal allergies 2024    Cough variant asthma 2024    Prediabetes 2024    Hypothyroidism 2024    Annual physical exam 2024    PAPI (obstructive sleep apnea) 2024    Vitamin D deficiency 2016    Iron deficiency 2016    History of adenomatous polyp of colon 2012       Allergies   Allergen Reactions    Fluconazole Swelling     Of lips      Sulfamethoxazole-Trimethoprim Swelling     Of lips    Shellfish-Derived Products - Food Allergy Hives and Diarrhea    Eggs Or Egg-Derived Products - Food Allergy Diarrhea    Nuts - Food Allergy Diarrhea    Penicillins Other (See Comments)     As a child-can't recall reaction      Erythromycin Rash     Breaks aout on white pimples      Tetracycline Rash     Breaks out on white pimples         OB History    Para Term   AB Living   0 0 0 0 0 0   SAB IAB Ectopic Multiple Live Births   0 0 0 0 0       Vitals:    09/13/24 0957   BP: 134/80   BP Location: Left arm   Patient Position: Sitting   Cuff Size: Large   Weight: 124 kg (273 lb)   Height: 6' (1.829 m)     Body mass index is 37.03 kg/m².    Review of Systems     Constitutional: Negative for chills, fatigue, fever, headaches, visual disturbances, and unexpected weight change.   Respiratory: Negative for cough, & shortness of breath.  Cardiovascular: Negative for chest pain. .    Gastrointestinal: Negative for Abd pain, nausea & vomiting, constipation and diarrhea.   Genitourinary: Negative for difficulty urinating, dysuria, hematuria, , unusual vaginal bleeding or discharge.   Skin: Negative skin changes    Physical Exam     Constitutional: Alert & Oriented x3, well-developed and well-nourished. No distress.   HENT: Atraumatic, Normocephalic, Conjunctivae clear  Neck: Normal range of motion. Neck supple. No thyromegaly, mass, nodules or tenderness  Pulmonary: Effort normal.   Abdominal: Soft. No tenderness or masses  Musculoskeletal: Normal ROM  Skin: Warm & Dry  Psychological: Normal mood, thought content, behavior & judgement     Breasts:   Right: tissue soft without masses, tenderness, skin changes or nipple discharge. No areas of erythema or pain. No subclavicular, axillary, pectoral adenopathy  Left:  tissue soft without masses, tenderness, skin changes or nipple discharge. No areas of erythema or pain. No subclavicular, axillary, pectoral adenopathy    Pelvic exam was performed with patient supine, lithotomy position.     Exam is consistent with  atrophic changes. Vulvar derm is resolved at this time   Vulva/ Vestibule: Right: Negative rash, tenderness, lesion or injury                               Left: Negative rash, tenderness, lesion or injury   Urethral meatus: Negative for  tenderness, inflammation or discharge.   Uterus: not deviated, enlarged, fixed or tender.    Cervix: No CMT, no discharge or friability.   Right adnexa: no mass, no tenderness and no fullness.  Left adnexa: no mass, no tenderness and no fullness.   Vagina: Consistent with  atrophic changes. No erythema, tenderness, masses, or foreign body in the vagina. No signs of injury around the vagina. No unusual vaginal discharge   Perineum without lesions, signs of injury, erythema or swelling.  Inguinal Canal:        Right: No inguinal adenopathy or hernia present.        Left: No inguinal adenopathy or hernia present.

## 2024-09-12 ENCOUNTER — TELEPHONE (OUTPATIENT)
Age: 59
End: 2024-09-12

## 2024-09-12 NOTE — TELEPHONE ENCOUNTER
Reason for call:   [x] Prior Auth  [] Other:     Caller:  [x] Patient  [] Pharmacy  Name:   Address:   Callback Number:     Medication: Semaglutide (WEGOVY)    Dose/Frequency: 0.25 MG/0.5ML  Inject 0.5 mL (0.25 mg total) under the skin once a week for 4 doses    Quantity: 2 mL    Ordering Provider:   [x] PCP/Provider -   [] Speciality/Provider -     Has the patient tried other medications and failed? If failed, which medications did they fail?    [] No   [] Yes -     Is the patient's insurance updated in EPIC?   [x] Yes   [] No     Is a copy of the patient's insurance scanned in EPIC?   [x] Yes   [] No

## 2024-09-12 NOTE — TELEPHONE ENCOUNTER
Patient called stating she needs an appeal sent to [facility] for her Wegovy.  She said she spoke with them and she needs it to state:    Attn: Level one appeals    She is taking Wegovy out of medical necessity. It needs to include her BMI, current weight and that she has been counseled on diet exercise and hydration.      I will also send a prior authorization to her insurance company.    Please advise. Thank you.    Fax:  [phone]

## 2024-09-12 NOTE — TELEPHONE ENCOUNTER
Please forward to whomever does appeals. Her most recent note from 9/4 already states the above information. Alternative medication will be prescribed at next visit

## 2024-09-13 ENCOUNTER — ANNUAL EXAM (OUTPATIENT)
Dept: OBGYN CLINIC | Facility: CLINIC | Age: 59
End: 2024-09-13
Payer: COMMERCIAL

## 2024-09-13 VITALS
WEIGHT: 273 LBS | SYSTOLIC BLOOD PRESSURE: 134 MMHG | HEIGHT: 72 IN | BODY MASS INDEX: 36.98 KG/M2 | DIASTOLIC BLOOD PRESSURE: 80 MMHG

## 2024-09-13 DIAGNOSIS — Z12.31 ENCOUNTER FOR SCREENING MAMMOGRAM FOR MALIGNANT NEOPLASM OF BREAST: ICD-10-CM

## 2024-09-13 DIAGNOSIS — N39.3 STRESS INCONTINENCE: ICD-10-CM

## 2024-09-13 DIAGNOSIS — Z01.419 ENCOUNTER FOR GYNECOLOGICAL EXAMINATION WITHOUT ABNORMAL FINDING: Primary | ICD-10-CM

## 2024-09-13 PROCEDURE — S0612 ANNUAL GYNECOLOGICAL EXAMINA: HCPCS | Performed by: OBSTETRICS & GYNECOLOGY

## 2024-09-13 NOTE — PATIENT INSTRUCTIONS
Patient Education     Lowering Your Risk of Breast Cancer   About this topic   Breast cancer is a serious illness. Breast cancer is when abnormal cells grow and divide more quickly in your breast. These cells form a growth or tumor. The abnormal cells may enter nearby tissue and spread to other parts of the body. It is the type of cancer most often seen in women. Men can have breast cancer, but it is a rare condition.  General   Some things in your life may increase your risk of breast cancer. You may not be able to change some of these. Others you can control.  You are more likely to get breast cancer if you:  Have a mother, sister, or daughter who has had breast cancer  Have used hormones for menopause for more than 5 years  Have had radiation therapy to the breast or chest in the past  Are overweight or do not exercise  Had your first menstrual period before you were 9 years old  Went through menopause after age 53  Have never been pregnant or had your first child after age 33  Have had breast cancer before  Drink alcohol in any form  Have dense breasts  Are older in age  There is no certain way to prevent breast cancer. There are things you can do to lower your chances of having breast cancer.  Keep a healthy weight. Lose weight if you are overweight. Being overweight raises your chances of having breast cancer.  Eat a healthy diet to maintain a healthy weight, such as more fruits, vegetables, and lean cuts of meat. Decrease the amount of saturated fat in your diet.  Exercise. Being active helps you keep a healthy weight.  Limit your alcohol intake or do not drink alcohol. The more alcohol you drink, the higher your risk.  Do not smoke cigarettes. Smoking can increase your risk of many types of cancer.  Breastfeed your baby. This may help protect you. The longer you breastfeed, the more protection you have.  Talk with your doctor about:  Limiting or stopping hormone therapy.  Taking certain drugs to prevent  breast cancer. For women at high risk of having breast cancer, there are a few drugs that may lower your risk.  Surgery to prevent you from having breast cancer if you are very high risk.  When do I need to call the doctor?   Changes in your breasts  A lump or area in your breast that feels different  Discharge from your nipple  Skin on your breast is dimpled or indented  You have questions or concerns about your breasts  Helpful tips   Talk to your doctor about the best kind of breast cancer screening for you.  If you want to do self breast exams, have your doctor show you the right way to do them.  Tell your doctor of any abnormal finding.  Last Reviewed Date   2021-10-04  Consumer Information Use and Disclaimer   This generalized information is a limited summary of diagnosis, treatment, and/or medication information. It is not meant to be comprehensive and should be used as a tool to help the user understand and/or assess potential diagnostic and treatment options. It does NOT include all information about conditions, treatments, medications, side effects, or risks that may apply to a specific patient. It is not intended to be medical advice or a substitute for the medical advice, diagnosis, or treatment of a health care provider based on the health care provider's examination and assessment of a patient’s specific and unique circumstances. Patients must speak with a health care provider for complete information about their health, medical questions, and treatment options, including any risks or benefits regarding use of medications. This information does not endorse any treatments or medications as safe, effective, or approved for treating a specific patient. UpTo[employer] Inc. and its affiliates disclaim any warranty or liability relating to this information or the use thereof. The use of this information is governed by the Terms of Use, available at  https://www.woltersPrepmaticuwer.com/en/know/clinical-effectiveness-terms   Copyright   Copyright © 2024 UpToDate, Inc. and its affiliates and/or licensors. All rights reserved.       Perineal Hygiene      Your vaginal naturally takes care of its self, it is a self washing system, the less you mess the healthier it will be     No soaps or feminine wash to the vulva, these products can cause dermitis, bacterial infections and other vulvar problems.   Use only water to cleanse, or water with Dove or Dove Sensitive Skin Bar soap if necessary.    Avoid the use of washcloths, exfoliating renato's, netted scrubbers, loofa's, use your hands only to cleanse the vulvar tissues    No scented lotions or products are advised in or near your vulva.    Use coconut oil in its solid form for moisture if needed.  No douching this may cause imbalance in your vaginal PH and further issues.    If you wear panty liners, you may apply a thin coating of Vaseline, A&D ointment or coconut oil to the vulvar tissues as a skin barrier     Cotton underware, loose fitting clothing  Only perfume-free, dye-free laundry detergent, use a second rinse cycle   Avoid fabric softeners/dryer sheets.       Your partner should avoid the same products as well.       Over the counter probiotic to restore vaginal richard may be helpful as well, take daily.       You may also look into Boric Acid vaginal suppositories to restore vaginal PH balance for up to 2 weeks as directed on the box. You may not use these if you are pregnant      For vaginal dryness:      You may use:     Coconut oil in solid form, not liquid (organic, pure, unscented) as needed for moisture or lubrication. ( Do not use if allergic)       Replens moisture restore external comfort gel daily ( use as directed on the box)        Replens long lasting vaginal moisturizer  ( use as directed on the box)     May try [employer] vulvar moisturizer ( found on Amazon )    May try Revaree vaginal inserts        For  Vaginal Lubrication:          You may use:     Coconut oil in solid form, not liquid (organic, pure, unscented) as a lubricant or another scent-free lubricant (Astroglide, Uberlube) if needed.  Do not use coconut oil or silicone if using a condom as this may break down the integrity of the condom and cause an unplanned pregnancy              Do not use coconut oil if allergic               Replens silky smooth lubricant, premium silicone based lubricant for intercourse. ( use as directed, a small amount will provide an enhanced natural feeling)     Any premium over the counter vaginal lubricant water or silicone based. Silicone based will have more staying power.        For Vulvar irritation/itching:        You may use Hydrocortisone 1 % over the counter to external vulvar tissues 2 x daily for 5-7 days to help with irriation and itch relief.  Patient Education     Pelvic Floor Exercises   About this topic   The pelvic floor consists of muscles and strong bands of tissues which support all of the organs in your pelvis. Some of these organs are the bladder and the small and large bowel as well as the womb and the prostate. If the muscles and tissues get weak, your organs may drop. This can lead to other problems. Your urine may leak when you laugh, sneeze, or cough. You may not be able to drain the bladder fully. You may have less problems if you do exercises to strengthen your pelvic floor and abdominal muscles.  Kegel exercises help make the muscles in the pelvic floor stronger. Anyone can do them. It is also important to make your abdominal muscles stronger. In order for these exercises to work, you must be consistent when doing them.  General   Before starting with a program, ask your doctor if you are healthy enough to do these exercises. Your doctor may have you work with a  or physical therapist to make a safe exercise program to meet your needs.  Strengthening Exercises   Kegel exercises keep your  pelvic muscles firm and strong. You can do these in many different positions. Start by lying down with your knees bent and feet on the bed. Squeeze the pelvic muscles as if you are trying to stop the flow of urine. Hold these muscles for a count of 3, and then slowly relax them for a count of 3. Try to work up to squeezing for a count of 10 and slowly relaxing for a count of 10. Increase the muscle squeeze until you get to 10. When relaxing, slowly relax to a count of 10.  Breathe out when you are squeezing and breathe in when you are relaxing. Your goal is to try to do 10 Kegels 3 or more times each day. Take time to rest between sets. Be sure to only contract your pelvic floor muscles, not your buttocks, thighs, or abdominal muscles.  Pelvic floor contractions ? There are a few ways to feel the pelvic muscles contract.  When you are passing urine, try suddenly stopping your flow of urine. Do not do this on a regular basis, but only to feel what the contraction feels like. Doing this while passing urine can lead to other problems.  Put a finger into the vagina or rectum. Contract the muscles around your finger as if you were trying to stop the flow of urine or stop the passing of gas.  Place two chairs without arms about 2 inches (5 cm) apart. Sit so you have one butt cheek on each chair. Now, try laura your pelvic floor muscles. This will help you keep from using other muscles.  Pelvic tilts ? Lie on your back with your knees bent and feet flat on the floor. Tighten your stomach muscles and press your lower back down to the floor. Try doing Kegels with this exercise when your back is flattened. Hold 3 to 5 seconds. Relax.  Straight leg raises lying down ? Lie on your back with one leg straight. Bend your other knee so the foot is flat on the bed. Keeping your leg straight, lift the leg up to the level of your other knee. Lower it back down. Repeat with the other leg.  Hip lifts ? Lie on your back with your  knees bent and feet flat on the floor. Tighten your stomach muscles and lift your buttocks off the floor. Try doing Kegels when up in this position. Hold 3 to 5 seconds. Relax.  Abdominal bracing ? Do this exercise in different positions: Lying down, sitting, and standing. Tighten your stomach muscles. While keeping the stomach muscles tight, tighten your pelvic floor muscles. Now, forcefully laugh or cough to see if you were able to prevent urine from leaking.  Abdominal crunches ? Lie on your back with both knees bent. Keep your feet flat on the floor. Place your hands in one of these positions. Try starting with the first position since it is the easiest. As you get better, use the other positions to make it harder.  Crunches with arms at sides.  Crunches with arms across chest.  Crunches with arms behind head. Be careful not to interlock your fingers behind your neck or head while doing crunches. This may add tension to your neck and cause strain.  Look at the ceiling. Tighten your belly muscles and lift your shoulders and upper back off the floor. Breathe out while you are doing this. Lower your shoulders to the floor. Breathe in while you are doing this. Relax your belly muscles all the way before starting another crunch.             What will the results be?   Less leakage of urine when you cough, sneeze, laugh, or run  Fewer strong urges to pass urine  Fewer trips to the bathroom each day  Less risk of organs, such as the uterus or bladder, dropping into the vagina  Faster recovery after childbirth or prostate surgery  Stronger core muscles  Increased sensitivity during sex  Helpful tips   You can also try doing a different kind of Kegels. Do 5 quick, strong pelvic floor contractions. Sometimes, if you have an urge to pass urine but are not near a bathroom, you can do this kind of Kegel to calm the urge.  Stay active and work out to keep your muscles strong and flexible.  Keep a healthy weight to avoid  putting too much stress on your spine. Eat a healthy diet to keep your muscles healthy.  Be sure you do not hold your breath when exercising. This can raise your blood pressure. If you tend to hold your breath, try counting out loud when exercising. If any exercise bothers you, stop right away.  Try walking or cycling at an easy pace for a few minutes to warm up your muscles. Do this again after exercising.  Doing exercises before a meal may be a good way to get into a routine. A good time to do these exercises is each time you are stopped at a stop light while driving.  Exercise may be slightly uncomfortable, but you should not have sharp pains. If you do get sharp pains, stop what you are doing. If the sharp pains continue, call your doctor.  Last Reviewed Date   2021-03-31  Consumer Information Use and Disclaimer   This generalized information is a limited summary of diagnosis, treatment, and/or medication information. It is not meant to be comprehensive and should be used as a tool to help the user understand and/or assess potential diagnostic and treatment options. It does NOT include all information about conditions, treatments, medications, side effects, or risks that may apply to a specific patient. It is not intended to be medical advice or a substitute for the medical advice, diagnosis, or treatment of a health care provider based on the health care provider's examination and assessment of a patient’s specific and unique circumstances. Patients must speak with a health care provider for complete information about their health, medical questions, and treatment options, including any risks or benefits regarding use of medications. This information does not endorse any treatments or medications as safe, effective, or approved for treating a specific patient. UpToDate, Inc. and its affiliates disclaim any warranty or liability relating to this information or the use thereof. The use of this information is  "governed by the Terms of Use, available at https://www.[employer] Nile Entertainmentuwer.com/en/know/clinical-effectiveness-terms   Copyright   Copyright © 2024 UpToDate, Inc. and its affiliates and/or licensors. All rights reserved.  Patient Education     Menopause   The Basics   Written by the doctors and editors at [facility]   What is menopause? -- Menopause is the time in life when monthly periods naturally stop. At this time, the ovaries stop releasing eggs and stop making the hormones estrogen and progesterone.  Menopause usually occurs between the ages of 45 and 55. The average age is 51.  Menopause does not happen all at once. It is a \"transition\" that takes years. It can cause symptoms that are difficult for a lot of people. But there are treatments that can help.  How do I know if I am going through menopause? -- You might wonder about menopause when your periods start to change. If you are going through menopause, you might:   Have periods more or less often than usual (for example, every 5 to 6 weeks instead of every 4)   Have shorter periods than before   Skip 1 or more periods   Have symptoms like hot flashes  If you have had a hysterectomy (surgery to remove your uterus), but you still have your ovaries, it might be tough to tell when you are going through menopause. Still, you can have menopause symptoms even if you no longer have a uterus.  If your ovaries were removed before the usual age of menopause, you had what doctors call \"surgical menopause.\" That just means that you went through it early, because your ovaries were removed.  What are the symptoms of menopause? -- Some people go through menopause without symptoms. But most have 1 or more of these symptoms:   Hot flashes - Hot flashes feel like a wave of heat that starts in your chest and face and then moves through your body. Hot flashes usually start happening before you stop having periods.   Night sweats - When hot flashes happen during sleep, they are called " "\"night sweats.\" They can make it hard to get a good night's sleep.   Sleep problems - During the transition to menopause, some people have trouble falling or staying asleep. This can happen even if night sweats are not a problem.   Vaginal dryness - Menopause can cause the vagina and tissues near the vagina to become dry and thin. This usually starts a few years after menopause. It can be uncomfortable or make sex painful.   Depression - During the transition to menopause, many people start having symptoms of depression or anxiety. This is more likely if you have had depression before. Depression symptoms include:   Sadness   Losing interest in doing things   Sleeping too much or too little   Trouble concentrating or remembering things - This might be caused by lack of sleep that often happens at menopause, or by the lack of estrogen. Some experts suspect that estrogen is important for good brain function.   Headaches - If you get migraine headaches related to your period, these might get worse during menopause.   Joint pain - Some people have joint aches or pains during and after menopause.  Many of these symptoms get better after menopause. But some people continue to have hot flashes, even for years afterwards.  Should I see a doctor or nurse? -- It depends. If your periods start changing and you are 45 or older, you do not need to see your doctor for this reason alone. But you should tell them if you have symptoms that bother you.  For example, see your doctor if you cannot sleep because of night sweats, if it is hard to work because of your hot flashes, or if you feel sad or down and don't seem to enjoy things anymore. If your regular doctor cannot recommend treatments that can help, you might consider looking for a doctor who is a specialist in menopause or women's health. They might have more information about ways to relieve symptoms.  You should also see a doctor or nurse if you:   Have your period more " "often than every 3 weeks   Have very heavy bleeding during your period   Have bleeding or \"spotting\" between periods   Have been through menopause (have gone 12 months without a period) and start bleeding again, even if it's just a spot of blood  Is there a test for menopause? -- There is a test that can help tell if you are going through menopause. But doctors usually use this only in people who are too young to be in menopause or who have special circumstances.  Can I still get pregnant? -- As long as you are still having periods, even if they do not happen often, it is possible to get pregnant. If you have sex and do not want to get pregnant, use some form of birth control.  If you have not had a period for a full year, it is probably safe to say you have been through menopause and can no longer get pregnant.  How are the symptoms of menopause treated? -- There are treatments that can help relieve symptoms.  Treatments for hot flashes include:   Hormone therapy - The hormone estrogen is the most effective treatment for menopause symptoms. Most people need to take estrogen with another hormone, called progesterone. People who have had a hysterectomy (surgery to remove the uterus) can take estrogen by itself. Experts think that these hormones are effective and safe for most people.  If you want to take hormones, ask your doctor or nurse if it is an option for you. You should not take hormones if you have had breast cancer, a heart attack, a stroke, or a blood clot.   Antidepressants - Some types of antidepressants can ease hot flashes and depression. Even if you do not have depression, these medicines can still help with hot flashes. They are often used by people who have had breast cancer and cannot take estrogen.   Anti-seizure medicine - One of the medicines used to prevent seizures seems to help some people with hot flashes, even if they do not have seizures.  Treatments for vaginal dryness include:   Vaginal " "estrogen - Vaginal estrogen is any form of estrogen that goes directly into the vagina. It comes in creams, tablets, or a flexible ring. Vaginal estrogen comes in small doses that don't increase the levels of estrogen in other parts of the body very much.   Other medicines - In most cases, doctors recommend vaginal estrogen. That's because there is more evidence that it helps with vaginal dryness compared with other medicines. But if you do not want to use vaginal estrogen, your doctor can suggest other options. For example:   You might choose to use a vaginal moisturizer several times a week. Vaginal moisturizers (sample brand names: Replens, K-Y SILK-E) do not contain hormones. They help keep the vagina moist all of the time. You can also add a lubricant (sample brand names: Astroglide, K-Y Jelly) when you have sex.   In some cases, doctors might suggest another medicine. For example, there is a tablet that you insert into the vagina once a day. There is also a pill that might help some people who cannot use vaginal products.  Some doctors are not used to prescribing hormone therapy for menopause. If you feel that you are not getting the help you need, you might choose to talk to a different doctor who is an expert in menopause treatment. You can ask your doctor or nurse to refer you to someone.  Can I do anything on my own to reduce the symptoms of menopause? -- Yes. There are some steps you can try (table 1). But ask your doctor before you take any \"natural remedies,\" especially if you have a history of breast cancer. Things like herbs and supplements are not proven to help, and in some cases, could harm you.  What can I do to protect my bones? -- You can:   Take calcium and vitamin D supplements.   Be active (physical activity helps keep bones strong).   Ask your doctor when you should start having bone density tests.  If needed, your doctor can prescribe medicines to help keep your bones strong.  All topics " are updated as new evidence becomes available and our peer review process is complete.  This topic retrieved from [employer] on: Feb 26, 2024.  Topic 08722 Version 11.0  Release: 32.2.4 - C32.56  © 2024 UpToDate, Inc. and/or its affiliates. All rights reserved.  table 1: Ways to cope with menopause symptoms  Symptom  What you can do    Hot flashes and night sweats Dress in layers so you can take off clothes if you get hot.    Keep your home at a comfortable temperature. Avoid hot drinks, such as coffee and tea.    Put a cold, wet washcloth against your neck during hot flashes.    Quit smoking, if you smoke. (Smoking makes hot flashes worse.) If you are having trouble quitting, your doctor or nurse can help.   Vaginal dryness Use a vaginal moisturizer a few times a week (sample brand names: Replens, K-Y SILK-E).    Use a lubricant before sex (sample brand names: Astroglide, K-Y Jelly).   Sleep problems Try to go to sleep and get up at the same time every day, even when you don't sleep well.    Avoid caffeine in the afternoon, and limit alcohol.   Depression Try to stay active. Exercise can help your mood.    Seek support from other people going through menopause.   Graphic 26908 Version 4.0  Consumer Information Use and Disclaimer   Disclaimer: This generalized information is a limited summary of diagnosis, treatment, and/or medication information. It is not meant to be comprehensive and should be used as a tool to help the user understand and/or assess potential diagnostic and treatment options. It does NOT include all information about conditions, treatments, medications, side effects, or risks that may apply to a specific patient. It is not intended to be medical advice or a substitute for the medical advice, diagnosis, or treatment of a health care provider based on the health care provider's examination and assessment of a patient's specific and unique circumstances. Patients must speak with a health care provider  for complete information about their health, medical questions, and treatment options, including any risks or benefits regarding use of medications. This information does not endorse any treatments or medications as safe, effective, or approved for treating a specific patient. UpToDate, Inc. and its affiliates disclaim any warranty or liability relating to this information or the use thereof.The use of this information is governed by the Terms of Use, available at https://www.[employer] Blackuwer.com/en/know/clinical-effectiveness-terms. 2024© UpToDate, Inc. and its affiliates and/or licensors. All rights reserved.  Copyright   © 2024 UpToDate, Inc. and/or its affiliates. All rights reserved.  Patient Education     Urinary Incontinence, Adult ED   General Information   You came to the Emergency Department (ED) for urinary incontinence. This is the medical name for when you lose control of your bladder or leak urine. You may leak urine when you cough, sneeze, or laugh. You may have a very strong urge to go to the bathroom and not be able to make it in time. In some cases, you may not be able to empty your bladder all the way because of a large prostate. With help, many people can reduce the amount of leaking they have.  What care is needed at home?   Call your regular doctor to let them know you were in the ED. Make a follow-up appointment if you were told to.  Talk with your regular doctor if any of the medicines you take could be causing your problems.  Wear a pad to soak up any urine and keep it from irritating your skin.  Cut down on any foods or drinks that make your symptoms worse. Some people find that alcohol, caffeine, or spicy or acidic foods irritate the bladder.  If you are overweight, try to lose weight to help decrease the pressure on your bladder. Talk to your doctor or nurse about healthy ways to lose weight.  If you have diabetes, try to keep your blood sugar well controlled. If you take diabetes medicines,  be sure to take them as you were told to.  If you take medicines that make you urinate more, talk with your doctor or nurse about when to take those medicines so you can be near a bathroom when you need to urinate.  Ask your regular doctor about bladder training and exercises to strengthen your pelvic floor muscles. These things can help reduce urine leak.  When do I need to get emergency help?   Return to the ED if:   You start to have very bad pain in your back, shoulder, or belly.  When do I need to call the doctor?   You have a fever of 100.4°F (38°C) or higher or chills.  Your urine is cloudy, smells bad, or is bloody.  You have new or worsening symptoms.  Last Reviewed Date   2021-06-02  Consumer Information Use and Disclaimer   This generalized information is a limited summary of diagnosis, treatment, and/or medication information. It is not meant to be comprehensive and should be used as a tool to help the user understand and/or assess potential diagnostic and treatment options. It does NOT include all information about conditions, treatments, medications, side effects, or risks that may apply to a specific patient. It is not intended to be medical advice or a substitute for the medical advice, diagnosis, or treatment of a health care provider based on the health care provider's examination and assessment of a patient’s specific and unique circumstances. Patients must speak with a health care provider for complete information about their health, medical questions, and treatment options, including any risks or benefits regarding use of medications. This information does not endorse any treatments or medications as safe, effective, or approved for treating a specific patient. UpToDate, Inc. and its affiliates disclaim any warranty or liability relating to this information or the use thereof. The use of this information is governed by the Terms of Use, available at  https://www.[employer] Technologyuwer.com/en/know/clinical-effectiveness-terms   Copyright   Copyright © 2024 UpToDate, Inc. and its affiliates and/or licensors. All rights reserved.

## 2024-09-16 ENCOUNTER — TELEPHONE (OUTPATIENT)
Age: 59
End: 2024-09-16

## 2024-09-16 NOTE — TELEPHONE ENCOUNTER
Phone call from patient calling to transfer care from [place] to [facility] - unable to assist patient due to provider not populating in EPIC. Please contact patient to schedule appt.

## 2024-09-19 NOTE — TELEPHONE ENCOUNTER
Patient's insurance is requesting a letter of medical necessity.  This must come from the provider.  Will forward this message to the office clinical department.

## 2024-09-19 NOTE — TELEPHONE ENCOUNTER
PA for WEGOVY 0.25 MG/0.5ML  NOT REQUIRED     Reason (screenshot if applicable)          Patient advised by          [] MyChart Message  [] Phone call   []LMOM  []L/M to call office as no active Communication consent on file  []Unable to leave detailed message as VM not approved on Communication consent       Pharmacy advised by    []Fax  []Phone call

## 2024-09-19 NOTE — TELEPHONE ENCOUNTER
PA for WEGOVY 0.25 MG/0.5ML SUBMITTED     via    [x]CMM-KEY: [id number]  []Surescripts-Case ID #   []Faxed to plan   []Other website   []Phone call Case ID #     Office notes sent, clinical questions answered. Awaiting determination    Turnaround time for your insurance to make a decision on your Prior Authorization can take 7-21 business days.

## 2024-09-20 ENCOUNTER — OFFICE VISIT (OUTPATIENT)
Dept: OBGYN CLINIC | Facility: CLINIC | Age: 59
End: 2024-09-20

## 2024-09-20 VITALS
DIASTOLIC BLOOD PRESSURE: 85 MMHG | WEIGHT: 273 LBS | BODY MASS INDEX: 36.98 KG/M2 | SYSTOLIC BLOOD PRESSURE: 131 MMHG | HEIGHT: 72 IN | HEART RATE: 80 BPM

## 2024-09-20 DIAGNOSIS — Z96.642 S/P TOTAL LEFT HIP ARTHROPLASTY: Primary | ICD-10-CM

## 2024-09-20 DIAGNOSIS — Z96.642 STATUS POST TOTAL REPLACEMENT OF LEFT HIP: ICD-10-CM

## 2024-09-20 PROCEDURE — 99024 POSTOP FOLLOW-UP VISIT: CPT | Performed by: STUDENT IN AN ORGANIZED HEALTH CARE EDUCATION/TRAINING PROGRAM

## 2024-09-20 NOTE — ASSESSMENT & PLAN NOTE
Patient is 3 months s/p left total hip arthroplasty  - WBAT LLE   - Tylenol and Celebrex for pain control prn  - Continue PT/HEP as directed  - May shower and soak/submerge incision  - No antibiotic dental prophylaxis is necessary  - No restrictions from our standpoint. Let pain be a guide  - RTC in 9 months. left hip xrays needed

## 2024-09-20 NOTE — PROGRESS NOTES
"      Date: 24  Shelby Foster   MRN# [id number]  : 1965      Chief Complaint: Post op Left primary Total Hip Arthroplasty    Assessment and Plan:    S/P total left hip arthroplasty  Patient is 3 months s/p left total hip arthroplasty  - WBAT LLE   - Tylenol and Celebrex for pain control prn  - Continue PT/HEP as directed  - May shower and soak/submerge incision  - No antibiotic dental prophylaxis is necessary  - No restrictions from our standpoint. Let pain be a guide  - RTC in 9 months. left hip xrays needed       Subjective:   Patient is  3 months  s/p left primary total hip arthroplasty.     Date of procedure: 24  Doing well, no new problems  Pain progressively improving  Improved swelling  Ambulating with no assistive device    Allergy:  Allergies   Allergen Reactions    Fluconazole Swelling     Of lips      Sulfamethoxazole-Trimethoprim Swelling     Of lips    Shellfish-Derived Products - Food Allergy Hives and Diarrhea    Eggs Or Egg-Derived Products - Food Allergy Diarrhea    Nuts - Food Allergy Diarrhea    Penicillins Other (See Comments)     As a child-can't recall reaction      Erythromycin Rash     Breaks aout on white pimples      Tetracycline Rash     Breaks out on white pimples       Medications:  all current active meds have been reviewed  Past Medical History:  Past Medical History:   Diagnosis Date    Abnormal Pap smear of cervix 's    Allergic rhinitis 2015    Deviated septum, ployps    Anesthesia     \"always causes Constipation and PONV\"\"    Asthma     Disease of thyroid gland     Environmental and seasonal allergies     Fibroid     Food allergy     Heart murmur     History of anemia     History of concussion 2022    sees neurologist yearly--\"due to tree falling on head\"    History of repair of hiatal hernia     History of rheumatic fever as a child     Hypothyroidism     Presence of dental bridge     left lower    Rosacea     Sleep apnea     mild    " "Varicella      Past Surgical History:  Past Surgical History:   Procedure Laterality Date    ANKLE SURGERY Bilateral     APPENDECTOMY      CATARACT EXTRACTION Bilateral     CHOLECYSTECTOMY      COLONOSCOPY      DENTAL IMPLANT Right     lower    DILATION AND CURETTAGE OF UTERUS      ENDOMETRIAL ABLATION  2005    FINGER SURGERY      thumb    HERNIA REPAIR  11/2021    \"hiatal\"    MYOMECTOMY  2002,3,4    NH ARTHRP ACETBLR/PROX FEM PROSTC AGRFT/ALGRFT Left 7/1/2024    Procedure: ARTHROPLASTY HIP TOTAL ANTERIOR, LEFT, SAME DAY;  Surgeon: Stephen Lr MD;  Location: [facility];  Service: Orthopedics    SINUS SURGERY  2015    TEAR DUCT SURGERY      TONSILLECTOMY      WISDOM TOOTH EXTRACTION       Family History:  Family History   Problem Relation Age of Onset    Breast cancer Mother     Miscarriages / Stillbirths Mother     COPD Mother      Social History:  Social History     Substance and Sexual Activity   Alcohol Use Never     Social History     Substance and Sexual Activity   Drug Use Never     Social History     Tobacco Use   Smoking Status Never   Smokeless Tobacco Never             Review of Systems:  General- denies fever/chills  HEENT- denies hearing loss or sore throat  Eyes- denies eye pain or visual disturbances, denies red eyes  Respiratory- denies cough or SOB  Cardio- denies chest pain or palpitations  GI- denies abdominal pain  Endocrine- denies urinary frequency  Urinary- denies pain with urination  Musculoskeletal- Negative except noted above  Skin- denies rashes or wounds  Neurological- denies dizziness or headache  Psychiatric- denies anxiety or difficulty concentrating    Objective:   BP Readings from Last 1 Encounters:   09/20/24 131/85      Wt Readings from Last 1 Encounters:   09/20/24 124 kg (273 lb)      Pulse Readings from Last 1 Encounters:   09/20/24 80        BMI: Estimated body mass index is 37.03 kg/m² as calculated from the following:    Height as of this encounter: 6' (1.829 m).    " Weight as of this encounter: 124 kg (273 lb).    Physical Exam  /85   Pulse 80   Ht 6' (1.829 m)   Wt 124 kg (273 lb)   BMI 37.03 kg/m²   General/Constitutional: No apparent distress: well-nourished and well developed.  Eyes: normal ocular motion  Cardio: RRR, Normal S1S2, No m/r/g.   Lymphatic: No appreciable lymphadenopathy  Respiratory: Non-labored breathing, CTA b/l no w/c/r  Vascular: No edema, swelling or tenderness, except as noted in detailed exam. Extremities well perfused. No LE edema  Integumentary: No impressive skin lesions present, except as noted in detailed exam.  Neuro: No ataxia or tremors noted  Psych: Normal mood and affect, oriented to person, place and time. Appropriate affect.  Musculoskeletal: Normal, except as noted in detailed exam and in HPI.    Gait and Station:   normal    left Hip Examination  Incision: clean, dry, and intact and healed  Erythema: Absent  Ecchymosis: Absent  Wound edges: healed  Wound drainage: None: wound tissue dry  Hip ROM:    painless  Flexion: >90 degrees.   External rotation: 40 degrees.   Internal rotation: 25  deg.   Abduction: 20 degrees.   Motor: 5/5 EHL/FHL/TA/GS/QD/HS  Neurovascular exam is intact.   No evidence of calf tightness or posterior cords    Images:  No new imaging      Scribe Attestation      I,:   am acting as a scribe while in the presence of the attending physician.:       I,:   personally performed the services described in this documentation    as scribed in my presence.:               Stephen Lr MD  Adult Reconstruction Specialist   [facility]

## 2024-09-24 ENCOUNTER — TELEPHONE (OUTPATIENT)
Age: 59
End: 2024-09-24

## 2024-09-24 NOTE — TELEPHONE ENCOUNTER
Patient says that her insurance will not cover WEGOVY. Advised her to see if Dr can send RX for MOUNJARO instead. Please advise.     Shelby: [phone]

## 2024-10-04 ENCOUNTER — EVALUATION (OUTPATIENT)
Dept: PHYSICAL THERAPY | Facility: CLINIC | Age: 59
End: 2024-10-04
Payer: COMMERCIAL

## 2024-10-04 DIAGNOSIS — N39.3 STRESS INCONTINENCE: ICD-10-CM

## 2024-10-04 DIAGNOSIS — N39.41 URGENCY INCONTINENCE: Primary | ICD-10-CM

## 2024-10-04 PROCEDURE — 97161 PT EVAL LOW COMPLEX 20 MIN: CPT

## 2024-10-04 PROCEDURE — 97530 THERAPEUTIC ACTIVITIES: CPT

## 2024-10-04 NOTE — PROGRESS NOTES
PT Evaluation     Today's date: 10/4/2024  Patient name: Shelby Foster  : 1965  MRN: [id number]  Referring provider: Hanny Mesnah  Dx:   Encounter Diagnosis     ICD-10-CM    1. Urgency incontinence  N39.41       2. Stress incontinence  N39.3 Ambulatory referral to Physical Therapy          Start Time: 0900  Stop Time: 0945  Total time in clinic (min): 45 minutes    Assessment  Impairments: lacks appropriate home exercise program  Other impairment: UUI    Assessment details: Shelby Foster is a 59 y.o. female who presents with concerns of urinary incontinence. Examination reveals L sided overactivity and decreased strength. She has increased UUI and L sided hip pain. This contributes to her decreased bladder control.        The plan of care was discussed and included education regarding pelvic floor anatomy, explanation of exam technique, explanation of exam findings and discussion of treatment plan as well as the importance of patient compliance and adherence to physical therapy visits. Patient would benefit from skilled physical therapy services  to address deficits and ultimately meet goal of independent self management of condition.     Patient provided written and verbal consent for pelvic floor muscle exam:           Goals  STGs to be met in 4 weeks:  * Patient will be compliant with introductory HEP as prescribed.  * Presents with improved understanding of bladder irritants in diet and makes concerted effort to reduce them by at least 50-75%.     LTGs to be met by discharge:  * Patient reports that she is able to successfully suppress an urge to empty her bladder for 20' without leakage.   * Presents with improved fluid intake to avoid concentrated and irritating urine by discharge.  * Presents with improved nocturia to 2 consistently toiletings/night for more thorough sleep.   * Patient implements urge suppression strategies throughout the day and reports that her average voiding  interval is 2+ hours upon discharge.   * Patient will be compliant with comprehensive home exercise program for self management of condition.         Plan  Patient would benefit from: skilled physical therapy    Planned therapy interventions: IASTM, joint mobilization, manual therapy, neuromuscular re-education, therapeutic activities, therapeutic exercise and home exercise program    Frequency: 1x week  Duration in weeks: 12  Plan of Care beginning date: 10/4/2024  Plan of Care expiration date: 12/27/2024  Treatment plan discussed with: patient  Plan details: Pt will decrease in frequency to 1x every other week or every two week, after the first 4 visits, depending on pt progress. She is OOT for extended periods and increased POC duration reflex that.   POC to include brain-bladder training.           PT Pelvic Floor Subjective:   History of Present Illness:   Previous MITESH issues- resolved w. Pelvic floor therapy. She had L THAD anterior approach and all her issues have come back.   Is abstinent- unknown if dyspareunia is an issue. L THAD 3 months prior- 7/2025.   Bladder Function:     Voiding Difficulties positive for: urgency and frequent urination       Voiding Difficulties comments:     Voiding frequency: every 1-2 hours and every 3-4 hours    Urinary leakage: urine leakage    Urinary leakage aggravated by: walking to the bathroom and key-in-the-door syndromeCoughing: urgency.    Nocturia (episodes per night): 2 (goes to bed around 7:30)    Painful urination: No      Fluid Intake Type:  Diet soda and water    Intake (ounces): Water: 100, Daily: 8Weekly: 8  Incontinence Management:     Pads/Diaper Use:  Day    Patient has attempted at least 4 weeks of independent pelvic floor strengthening exercises without a resolution of symptoms  Bowel Function:     Bowel Function comments:  Uses a probiotic.     Bowel frequency: daily  Sexual Function:     Sexually Active:  Non-contributoryHas abstained from sex in the past  due to pain and still has not attempted intercourse. :  Pain:     No pain reported by patient.  Treatments:     Previous treatment:  Physical therapy      Objective       Abdominal Assessment:        Visual Inspection of Perineum:   Excursion of perineal body in cephalad direction with contraction of pelvic floor muscles (PFM): good  Excursion of perineal body in caudal direction with relaxation of pelvic floor muscles (PFM): fair   Involuntary contraction with coughing: yes  Involuntary relaxation with bearing down: yes  Cotton swab test: non-tender    Pelvic Organ Prolapse   no pelvic organ prolapse        Pelvic Floor Muscle Exam:     Muscle Contraction: well isolated   Breathing pattern with contraction: within normal limits   Pelvic floor muscle relaxation is complete.         PERFECT Score   Power right: 5/5   Power left: 4/5   Endurance (seconds to max): 10    Fast flicks (in 10 seconds): 5   Perfect Score: Tone: slightly overactive on L side.     TTP: L LA, OI, Piriformis.       pelvic floor exam consent given by patient    Pelvic exam completed: vaginally     Graphical documentation:     Has abstained from sex in the past due to pain and still has not attempted intercourse.              Precautions:   Patient Active Problem List   Diagnosis    Vitamin D deficiency    Iron deficiency    History of adenomatous polyp of colon    PAPI (obstructive sleep apnea)    Hypothyroidism    Annual physical exam    Prediabetes    Obesity (BMI 30-39.9)    Environmental and seasonal allergies    Cough variant asthma    Preoperative clearance    S/P total left hip arthroplasty    Class 2 severe obesity due to excess calories with serious comorbidity and body mass index (BMI) of 37.0 to 37.9 in adult (HCC)         PRO EVAL RE-EVAL DISCHARGE   PFDI      EDWIN-18 25      VPQ      CPSI-NIH      PGQ          POC Expires Auth Status Start Date Exp Date PT Visit Limit Unit limit DA provider   12/27  10/4 9/2025   bomn 3          Date  of Service 10/3           Visits Used 1           Visits Remaining                        Manuals                                                            Neuro Re-Ed            Seated IR  2x12 YTB                                                                                   Ther Ex                                                                                    Ther Activity            Education Anatomy and POC   Urge supression                                                                       Modalities

## 2024-10-14 ENCOUNTER — OFFICE VISIT (OUTPATIENT)
Dept: FAMILY MEDICINE CLINIC | Facility: CLINIC | Age: 59
End: 2024-10-14
Payer: COMMERCIAL

## 2024-10-14 VITALS
HEIGHT: 72 IN | WEIGHT: 274.38 LBS | DIASTOLIC BLOOD PRESSURE: 82 MMHG | SYSTOLIC BLOOD PRESSURE: 130 MMHG | TEMPERATURE: 98.1 F | HEART RATE: 93 BPM | OXYGEN SATURATION: 93 % | BODY MASS INDEX: 37.16 KG/M2

## 2024-10-14 DIAGNOSIS — E66.812 CLASS 2 SEVERE OBESITY DUE TO EXCESS CALORIES WITH SERIOUS COMORBIDITY AND BODY MASS INDEX (BMI) OF 37.0 TO 37.9 IN ADULT (HCC): Primary | ICD-10-CM

## 2024-10-14 DIAGNOSIS — E66.01 CLASS 2 SEVERE OBESITY DUE TO EXCESS CALORIES WITH SERIOUS COMORBIDITY AND BODY MASS INDEX (BMI) OF 37.0 TO 37.9 IN ADULT (HCC): Primary | ICD-10-CM

## 2024-10-14 PROCEDURE — 99213 OFFICE O/P EST LOW 20 MIN: CPT | Performed by: STUDENT IN AN ORGANIZED HEALTH CARE EDUCATION/TRAINING PROGRAM

## 2024-10-14 RX ORDER — PHENTERMINE HYDROCHLORIDE 37.5 MG/1
37.5 TABLET ORAL
Status: CANCELLED | OUTPATIENT
Start: 2024-10-14

## 2024-10-14 NOTE — PROGRESS NOTES
Ambulatory Visit  Name: Shelby Foster      : 1965      MRN: [id number]  Encounter Provider: Ree Vasquez DO  Encounter Date: 10/14/2024   Encounter department: [facility]    Assessment & Plan  Class 2 severe obesity due to excess calories with serious comorbidity and body mass index (BMI) of 37.0 to 37.9 in adult (HCC)  Will trial compound semaglutide starting at 0.25mg q weekly.  Script sent to [employer], patient will cash pay          Orders:    Semaglutide-Weight Management (WEGOVY) 0.25 MG/0.5ML; Inject 0.5 mL (0.25 mg total) under the skin once a week       History of Present Illness       Patient presents today for weight check  States that she never received wegovy due to no insurance coverage  Patient does report that she has continued to eat healthy, cooks all meals at home, portion control and eats low carb.  Has also been walking a lot, power walking 1.5miles each day          Review of Systems   Constitutional:  Negative for chills and fever.   HENT:  Negative for congestion and rhinorrhea.    Respiratory:  Negative for cough and shortness of breath.    Cardiovascular:  Negative for chest pain and palpitations.   Gastrointestinal:  Negative for abdominal pain, constipation and diarrhea.   Musculoskeletal:         Left hip pain   Neurological:  Negative for dizziness and headaches.             Objective     /82 (BP Location: Left arm, Patient Position: Sitting, Cuff Size: Large)   Pulse 93   Temp 98.1 °F (36.7 °C) (Temporal)   Ht 6' (1.829 m)   Wt 124 kg (274 lb 6 oz)   SpO2 93%   BMI 37.21 kg/m²     Physical Exam  Vitals reviewed.   Constitutional:       Appearance: She is obese.   HENT:      Head: Normocephalic and atraumatic.      Mouth/Throat:      Mouth: Mucous membranes are moist.      Pharynx: No oropharyngeal exudate or posterior oropharyngeal erythema.   Cardiovascular:      Rate and Rhythm: Normal rate.      Heart sounds: Normal  heart sounds.   Pulmonary:      Effort: Pulmonary effort is normal.      Breath sounds: Normal breath sounds.   Neurological:      General: No focal deficit present.      Mental Status: She is alert and oriented to person, place, and time.   Psychiatric:         Mood and Affect: Mood normal.         Behavior: Behavior normal.

## 2024-10-14 NOTE — ASSESSMENT & PLAN NOTE
Will trial compound semaglutide starting at 0.25mg q weekly.  Script sent to [employer], patient will cash pay          Orders:    Semaglutide-Weight Management (WEGOVY) 0.25 MG/0.5ML; Inject 0.5 mL (0.25 mg total) under the skin once a week

## 2024-10-17 ENCOUNTER — TELEPHONE (OUTPATIENT)
Age: 59
End: 2024-10-17

## 2024-10-17 ENCOUNTER — OFFICE VISIT (OUTPATIENT)
Dept: PHYSICAL THERAPY | Facility: CLINIC | Age: 59
End: 2024-10-17
Payer: COMMERCIAL

## 2024-10-17 DIAGNOSIS — N39.3 STRESS INCONTINENCE: Primary | ICD-10-CM

## 2024-10-17 DIAGNOSIS — N39.41 URGENCY INCONTINENCE: ICD-10-CM

## 2024-10-17 PROCEDURE — 97530 THERAPEUTIC ACTIVITIES: CPT

## 2024-10-17 PROCEDURE — 97110 THERAPEUTIC EXERCISES: CPT

## 2024-10-17 PROCEDURE — 97112 NEUROMUSCULAR REEDUCATION: CPT

## 2024-10-17 NOTE — PROGRESS NOTES
Daily Note     Today's date: 10/17/2024  Patient name: Shelby Foster  : 1965  MRN: [id number]  Referring provider: Hanny Mensah*  Dx:   Encounter Diagnosis     ICD-10-CM    1. Stress incontinence  N39.3       2. Urgency incontinence  N39.41           Start Time: 1715  Stop Time: 1800  Total time in clinic (min): 45 minutes    Subjective: 10/17- feels things have been getting better and she is getting stronger. She only had one accident.     PT Pelvic Floor Subjective:   History of Present Illness:   Previous MITESH issues- resolved w. Pelvic floor therapy. She had L THAD anterior approach and all her issues have come back.   Is abstinent- unknown if dyspareunia is an issue. L THAD 3 months prior- 2025.     Objective: See treatment diary below    PERFECT Score   Power right: 5/5   Power left: 4/5   Endurance (seconds to max): 10    Fast flicks (in 10 seconds): 5   Perfect Score: Tone: slightly overactive on L side.     TTP: L LA, OI, Piriformis.      Assessment: Pt shows increased coordination between TvA and pelvic floor w/ core activation. She continues to show core weakness against IAP in functional movement but has made improvements. She demonstrates decreased urgency and better bladder control per subjective report.  Tolerated treatment well. Patient demonstrated fatigue post treatment and would benefit from continued PT      Plan: Continue per plan of care.      Precautions:   Patient Active Problem List   Diagnosis    Vitamin D deficiency    Iron deficiency    History of adenomatous polyp of colon    PAPI (obstructive sleep apnea)    Hypothyroidism    Annual physical exam    Prediabetes    Obesity (BMI 30-39.9)    Environmental and seasonal allergies    Cough variant asthma    Preoperative clearance    S/P total left hip arthroplasty    Class 2 severe obesity due to excess calories with serious comorbidity and body mass index (BMI) of 37.0 to 37.9 in adult (HCC)         PRO EVAL RE-EVAL  DISCHARGE   PFDI      EDWIN-18 25      VPQ      CPSI-NIH      PGQ          POC Expires Auth Status Start Date Exp Date PT Visit Limit Unit limit DA provider   12/27  10/4 9/2025   bomn 3      Access Code [id number]     Date of Service 10/3 10/17          Visits Used 1 2          Visits Remaining                        Manuals                                                            Neuro Re-Ed            Seated IR  2x12 YTB 3x12   YTB          Elevators   4'                                                                      Ther Ex            Staggered bridges   3x8 ea   kegel          deadbugs  3x8 ea          Bear plank            [facility] press                                    Ther Activity            Education Anatomy and POC   Urge supression AL          bike            STS                                                Modalities

## 2024-10-17 NOTE — TELEPHONE ENCOUNTER
"Patient states she received word from [employer] that Wegovy script does not say \"Okay to Compound\" so they cannot fill. She is requesting it be resent with \"Okay to Compound\" so it can be filled tomorrow. Patient requests call with update.   "

## 2024-10-21 DIAGNOSIS — E66.812 CLASS 2 SEVERE OBESITY DUE TO EXCESS CALORIES WITH SERIOUS COMORBIDITY AND BODY MASS INDEX (BMI) OF 37.0 TO 37.9 IN ADULT (HCC): ICD-10-CM

## 2024-10-21 DIAGNOSIS — E66.01 CLASS 2 SEVERE OBESITY DUE TO EXCESS CALORIES WITH SERIOUS COMORBIDITY AND BODY MASS INDEX (BMI) OF 37.0 TO 37.9 IN ADULT (HCC): ICD-10-CM

## 2024-10-24 ENCOUNTER — OFFICE VISIT (OUTPATIENT)
Dept: PHYSICAL THERAPY | Facility: CLINIC | Age: 59
End: 2024-10-24
Payer: COMMERCIAL

## 2024-10-24 DIAGNOSIS — N39.41 URGENCY INCONTINENCE: ICD-10-CM

## 2024-10-24 DIAGNOSIS — N39.3 STRESS INCONTINENCE: Primary | ICD-10-CM

## 2024-10-24 PROCEDURE — 97112 NEUROMUSCULAR REEDUCATION: CPT

## 2024-10-24 PROCEDURE — 97530 THERAPEUTIC ACTIVITIES: CPT

## 2024-10-24 PROCEDURE — 97110 THERAPEUTIC EXERCISES: CPT

## 2024-10-24 NOTE — PROGRESS NOTES
Daily Note     Today's date: 10/24/2024  Patient name: Shelby Foster  : 1965  MRN: [id number]  Referring provider: Hanny Mensah*  Dx:   Encounter Diagnosis     ICD-10-CM    1. Stress incontinence  N39.3       2. Urgency incontinence  N39.41             Start Time: 1540  Stop Time: 1625  Total time in clinic (min): 45 minutes    Subjective: 10/24- Felt really sore like she could barely move for 48 hours. She states her urgency/leakage has increased slightly but that the same thing happened the last time she did PT and it came down after so she is not worried.     10/17- feels things have been getting better and she is getting stronger. She only had one accident.     PT Pelvic Floor Subjective:   History of Present Illness:   Previous MITESH issues- resolved w. Pelvic floor therapy. She had L THAD anterior approach and all her issues have come back.   Is abstinent- unknown if dyspareunia is an issue. L THAD 3 months prior- 2025.     Objective: See treatment diary below    PERFECT Score   Power right: 5/5   Power left: 4/5   Endurance (seconds to max): 10    Fast flicks (in 10 seconds): 5   Perfect Score: Tone: slightly overactive on L side.     TTP: L LA, OI, Piriformis.      Assessment: Pt responded well to low load TE w/ decreased muscle tension. She had increased tone throughout her pelvis that decreased w. Diaphragmatic breathing and active body scanning. HEP dosage was reviewed and ot was advised to not increase her frequency due to risk of causing over straining. HEP reviewed.  Tolerated treatment well. Patient demonstrated fatigue post treatment and would benefit from continued PT      Plan: Continue per plan of care.      Precautions:   Patient Active Problem List   Diagnosis    Vitamin D deficiency    Iron deficiency    History of adenomatous polyp of colon    PAPI (obstructive sleep apnea)    Hypothyroidism    Annual physical exam    Prediabetes    Obesity (BMI 30-39.9)    Environmental  and seasonal allergies    Cough variant asthma    Preoperative clearance    S/P total left hip arthroplasty    Class 2 severe obesity due to excess calories with serious comorbidity and body mass index (BMI) of 37.0 to 37.9 in adult (HCC)         PRO EVAL RE-EVAL DISCHARGE   PFDI      EDWIN-18 25      VPQ      CPSI-NIH      PGQ          POC Expires Auth Status Start Date Exp Date PT Visit Limit Unit limit DA provider   12/27  10/4 9/2025   bomn 3      Access Code [email or web address]     Date of Service 10/3 10/17 10/22         Visits Used 1 2 3         Visits Remaining                        Manuals                                                            Neuro Re-Ed            Seated IR  2x12 YTB 3x12   YTB 3x12 YTB         Elevators   4' 3'         Diaphragmatic breathing    3'                                                         Ther Ex            Staggered bridges   3x8 ea   kegel          deadbugs  3x8 ea          Bear plank            Paloff press   3'         LE stretch   5'                     Ther Activity            Education Anatomy and POC   Urge supression AL          bike   8'         STS                                                Modalities

## 2024-11-25 ENCOUNTER — OFFICE VISIT (OUTPATIENT)
Age: 59
End: 2024-11-25
Payer: COMMERCIAL

## 2024-11-25 VITALS
WEIGHT: 267 LBS | DIASTOLIC BLOOD PRESSURE: 78 MMHG | BODY MASS INDEX: 36.16 KG/M2 | TEMPERATURE: 98.1 F | RESPIRATION RATE: 16 BRPM | HEIGHT: 72 IN | OXYGEN SATURATION: 94 % | SYSTOLIC BLOOD PRESSURE: 129 MMHG | HEART RATE: 75 BPM

## 2024-11-25 DIAGNOSIS — G47.33 OSA (OBSTRUCTIVE SLEEP APNEA): ICD-10-CM

## 2024-11-25 DIAGNOSIS — J45.991 COUGH VARIANT ASTHMA: Primary | ICD-10-CM

## 2024-11-25 DIAGNOSIS — J30.89 ENVIRONMENTAL AND SEASONAL ALLERGIES: ICD-10-CM

## 2024-11-25 DIAGNOSIS — E66.9 OBESITY (BMI 30-39.9): ICD-10-CM

## 2024-11-25 PROCEDURE — 99214 OFFICE O/P EST MOD 30 MIN: CPT

## 2024-11-25 RX ORDER — FLUTICASONE PROPIONATE AND SALMETEROL 100; 50 UG/1; UG/1
1 POWDER RESPIRATORY (INHALATION) 2 TIMES DAILY
Qty: 180 BLISTER | Refills: 3 | Status: SHIPPED | OUTPATIENT
Start: 2024-11-25 | End: 2025-11-20

## 2024-11-25 RX ORDER — FLUTICASONE PROPIONATE 50 MCG
1 SPRAY, SUSPENSION (ML) NASAL DAILY
Qty: 18.2 ML | Refills: 3 | Status: SHIPPED | OUTPATIENT
Start: 2024-11-25

## 2024-11-25 NOTE — PROGRESS NOTES
Pulmonary Follow Up Note  Shelby Foster 59 y.o. female MRN: [id number]  11/26/2024    Assessment/Plan:    PAPI (obstructive sleep apnea)  -Prior diagnostic sleep study in January 2024 with AHI 11.6.  She previously elected to pursue mandibular advancing device and weight loss.  Has lost 10 pounds, but did not schedule appointment with dentist yet to discuss mandibular device.  Lifts of dentists provided again to the patient  -Encouraged continued weight loss efforts    Cough variant asthma  -No issues.  Remains well-controlled with no exacerbations this past year.  She is only using her low dose Advair inhaler once daily instead of twice.  Not requiring use of rescue inhaler  -Discussed with patient, we can trial her on Airsupra which is an as needed combination ICS/Lynette inhaler to replace Advair.  She is agreeable to try.  Sample provided and instructed on use.  Printed prescription for her to take to her pharmacy to see if it is covered.  If she does not prefer the Airsupra/is not covered by insurance, she will return to using her Advair 100/50 mcg w/ albuterol PRN  -Continue montelukast 10 mg nightly  -UTD vaccinations  -Follow-up in 1 year or sooner if needed      Obesity (BMI 30-39.9)  -Down 10 pounds since her last office visit.  On Wegovy  -Encouraged continued weight loss efforts    Environmental and seasonal allergies  -Continue montelukast 10 mg at bedtime, Flonase nasal spray.  Avoid known allergens when able    Diagnoses and all orders for this visit:    Cough variant asthma  -     Fluticasone-Salmeterol (Advair) 100-50 mcg/dose inhaler; Inhale 1 puff 2 (two) times a day Rinse mouth after use.  -     Albuterol-Budesonide 90-80 MCG/ACT AERO; Inhale 2 Inhalations every 6 (six) hours as needed (shortness of breath or wheezing)    Environmental and seasonal allergies  -     fluticasone (FLONASE) 50 mcg/act nasal spray; 1 spray into each nostril daily    Obesity (BMI 30-39.9)    PAPI (obstructive sleep  apnea)          Return in about 1 year (around 11/25/2025).      History of Present Illness     Chief Complaint:   Chief Complaint   Patient presents with    Follow-up       Patient ID: Shelby is a 59 y.o. y.o. female has a past medical history of Abnormal Pap smear of cervix (2000's), Allergic rhinitis (2015), Anesthesia, Asthma, Disease of thyroid gland, Environmental and seasonal allergies, Fibroid (2000's), Food allergy, Heart murmur, History of anemia, History of concussion (11/30/2022), History of repair of hiatal hernia, History of rheumatic fever as a child, Hypothyroidism, Presence of dental bridge, Rosacea, Sleep apnea, and Varicella.      11/25/2024  HPI: Shelby Foster is a 59 y.o. female never smoker with a PMH of asthma who is here today for a follow-up visit.  She was previously seen in the office in June for preop clearance for a left total hip arthroplasty.  This was performed in July.  Surgery went well, ambulating better.  She has been maintained on low-dose Advair and Singulair for her asthma.  She has had no recent exacerbations since her last office visit.  Denies any shortness of breath, cough, mucus, or wheezing.  Only using Advair once daily.  Not requiring use of albuterol inhaler.  She is losing weight.  Taking Wegovy, down about 10 pounds since her last office visit.  Has not scheduled appointment with dentist yet for mandibular advancing device, has been busy with her hip surgery and subsequent appointments.  She stills plans on pursuing this.    Review of Systems   Constitutional:  Negative for activity change, chills, diaphoresis, fever and unexpected weight change.   HENT:  Negative for congestion, postnasal drip, rhinorrhea, sore throat, trouble swallowing and voice change.    Respiratory:  Negative for cough, chest tightness, shortness of breath and wheezing.    Cardiovascular:  Negative for chest pain, palpitations and leg swelling.   Allergic/Immunologic: Negative.   "      Historical Information   Past Medical History:   Diagnosis Date    Abnormal Pap smear of cervix 2000's    Allergic rhinitis 2015    Deviated septum, ployps    Anesthesia     \"always causes Constipation and PONV\"\"    Asthma     Disease of thyroid gland     Environmental and seasonal allergies     Fibroid 2000's    Food allergy     Heart murmur     History of anemia     History of concussion 11/30/2022    sees neurologist yearly--\"due to tree falling on head\"    History of repair of hiatal hernia     History of rheumatic fever as a child     Hypothyroidism     Presence of dental bridge     left lower    Rosacea     Sleep apnea     mild    Varicella      Past Surgical History:   Procedure Laterality Date    ANKLE SURGERY Bilateral     APPENDECTOMY      CATARACT EXTRACTION Bilateral     CHOLECYSTECTOMY      COLONOSCOPY      DENTAL IMPLANT Right     lower    DILATION AND CURETTAGE OF UTERUS      ENDOMETRIAL ABLATION  2005    FINGER SURGERY      thumb    HERNIA REPAIR  11/2021    \"hiatal\"    MYOMECTOMY  2002,3,4    TN ARTHRP ACETBLR/PROX FEM PROSTC AGRFT/ALGRFT Left 7/1/2024    Procedure: ARTHROPLASTY HIP TOTAL ANTERIOR, LEFT, SAME DAY;  Surgeon: Stephen Lr MD;  Location:  MAIN OR;  Service: Orthopedics    SINUS SURGERY  2015    TEAR DUCT SURGERY      TONSILLECTOMY      WISDOM TOOTH EXTRACTION       Family History   Problem Relation Age of Onset    Breast cancer Mother     Miscarriages / Stillbirths Mother     COPD Mother        Smoking history: She reports that she has never smoked. She has never been exposed to tobacco smoke. She has never used smokeless tobacco.    Occupational History:     Immunization History   Administered Date(s) Administered    COVID-19 PFIZER VACCINE 0.3 ML IM 03/11/2021, 04/01/2021, 10/13/2021    COVID-19 Pfizer Vac BIVALENT Artem-sucrose 12 Yr+ IM 10/13/2022    COVID-19 Pfizer mRNA vacc PF artem-sucrose 12 yr and older (Comirnaty) 09/21/2024    COVID-19 Pfizer vac (Artem-sucrose, " gray cap) 12 yr+ IM 05/25/2022    COVID-19, unspecified 03/11/2021, 04/01/2021    INFLUENZA 10/16/2007, 11/09/2011, 09/04/2014, 09/29/2015, 09/14/2016, 09/10/2017, 08/26/2018, 10/16/2019, 09/03/2020, 10/12/2021, 10/15/2021, 10/13/2022, 09/10/2023    Influenza Injectable, MDCK, Preservative Free, Quadrivalent 10/16/2019    Influenza, injectable, quadrivalent, preservative free 0.5 mL 09/03/2020    Influenza, seasonal, injectable 09/05/2013    Influenza, seasonal, injectable, preservative free 09/21/2024    Pneumococcal Conjugate Vaccine 20-valent (Pcv20), Polysace 03/18/2023    Tdap 01/18/2023    Zoster Vaccine Recombinant 08/28/2018       Meds/Allergies     Current Outpatient Medications:     albuterol (Ventolin HFA) 90 mcg/act inhaler, Inhale 2 puffs every 6 (six) hours as needed for wheezing, Disp: 25.5 g, Rfl: 3    Albuterol-Budesonide 90-80 MCG/ACT AERO, Inhale 2 Inhalations every 6 (six) hours as needed (shortness of breath or wheezing), Disp: 5.9 g, Rfl: 5    Aspirin-Acetaminophen-Caffeine (EXCEDRIN MIGRAINE PO), Take 1 tablet by mouth if needed, Disp: , Rfl:     clobetasol (TEMOVATE) 0.05 % cream, , Disp: , Rfl:     clotrimazole-betamethasone (LOTRISONE) 1-0.05 % cream, , Disp: , Rfl:     fluticasone (FLONASE) 50 mcg/act nasal spray, 1 spray into each nostril daily, Disp: 18.2 mL, Rfl: 3    Fluticasone-Salmeterol (Advair) 100-50 mcg/dose inhaler, Inhale 1 puff 2 (two) times a day Rinse mouth after use., Disp: 180 blister, Rfl: 3    levothyroxine (Synthroid) 175 mcg tablet, 1 tab daily, Disp: 90 tablet, Rfl: 3    levothyroxine (Synthroid) 175 mcg tablet, Take 1 tablet (175 mcg total) by mouth daily, Disp: 30 tablet, Rfl: 1    Melatonin 300 MCG TABS, daily at bedtime as needed, Disp: , Rfl:     meloxicam (MOBIC) 15 mg tablet, Take 1 tablet (15 mg total) by mouth daily as needed for moderate pain, Disp: 30 tablet, Rfl: 0    methocarbamol (ROBAXIN) 500 mg tablet, take 1 tablet by mouth for muscle spasm IN THE  EVENING if needed ( MAY CAUSE SEDATION. ), Disp: 20 tablet, Rfl: 6    metroNIDAZOLE (METROCREAM) 0.75 % cream, daily, Disp: , Rfl:     montelukast (SINGULAIR) 10 mg tablet, Take 1 tablet (10 mg total) by mouth daily at bedtime, Disp: 90 tablet, Rfl: 3    ondansetron (ZOFRAN-ODT) 4 mg disintegrating tablet, Take 1 tablet (4 mg total) by mouth every 6 (six) hours as needed for nausea or vomiting, Disp: 20 tablet, Rfl: 3    Semaglutide-Weight Management (WEGOVY) 0.25 MG/0.5ML, Inject 0.5 mL (0.25 mg total) under the skin once a week, Disp: 2 mL, Rfl: 1    sodium chloride (OCEAN) 0.65 % nasal spray, 1 spray into each nostril daily at bedtime, Disp: , Rfl:     Sulfacetamide Sodium, Acne, 10 % LOTN, daily, Disp: , Rfl:     terconazole (TERAZOL 7) 0.4 % vaginal cream, Insert 1 applicator into the vagina daily at bedtime, Disp: 45 g, Rfl: 3    Vitamin D, Cholecalciferol, 50 MCG (2000 UT) CAPS, Take 2 capsules by mouth daily, Disp: , Rfl:     ferrous sulfate 324 (65 Fe) mg, Take 1 tablet (324 mg total) by mouth every other day, Disp: 15 tablet, Rfl: 0    Allergies:   Allergies   Allergen Reactions    Fluconazole Swelling     Of lips      Sulfamethoxazole-Trimethoprim Swelling     Of lips    Shellfish-Derived Products - Food Allergy Hives and Diarrhea    Eggs Or Egg-Derived Products - Food Allergy Diarrhea    Nuts - Food Allergy Diarrhea    Penicillins Other (See Comments)     As a child-can't recall reaction      Erythromycin Rash     Breaks aout on white pimples      Tetracycline Rash     Breaks out on white pimples           Vitals:  Vitals:    11/25/24 1529 11/25/24 1531   BP: 129/78    BP Location: Left arm    Patient Position: Sitting    Cuff Size: Large    Pulse: 75    Resp: 16    Temp: 98.1 °F (36.7 °C)    TempSrc: Oral    SpO2: 94% 94%   Weight: 121 kg (267 lb)    Height: 6' (1.829 m)      Oxygen Therapy  SpO2: 94 %  Oxygen Therapy: None (Room air)  .  Wt Readings from Last 3 Encounters:   11/25/24 121 kg (267 lb)  "  10/14/24 124 kg (274 lb 6 oz)   09/20/24 124 kg (273 lb)     Body mass index is 36.21 kg/m².    Physical Exam  Vitals and nursing note reviewed.   Constitutional:       General: She is not in acute distress.     Appearance: Normal appearance. She is well-developed. She is obese.   Cardiovascular:      Rate and Rhythm: Normal rate and regular rhythm.      Heart sounds: Normal heart sounds, S1 normal and S2 normal. No murmur heard.  Pulmonary:      Effort: Pulmonary effort is normal.      Breath sounds: Normal breath sounds. No decreased breath sounds, wheezing, rhonchi or rales.   Musculoskeletal:         General: No swelling.      Right lower leg: No edema.      Left lower leg: No edema.   Neurological:      Mental Status: She is alert.   Psychiatric:         Mood and Affect: Mood and affect normal.         Behavior: Behavior normal. Behavior is cooperative.           Labs: I have personally reviewed pertinent lab results.  Lab Results   Component Value Date    WBC 4.58 06/03/2024    HGB 14.8 06/03/2024    HCT 45.3 06/03/2024    MCV 87 06/03/2024     06/03/2024     Lab Results   Component Value Date    CALCIUM 9.2 06/03/2024    K 4.1 06/03/2024    CO2 27 06/03/2024     06/03/2024    BUN 20 06/03/2024    CREATININE 0.82 06/03/2024     No results found for: \"IGE\"  Lab Results   Component Value Date    ALT 17 06/03/2024    AST 20 06/03/2024    ALKPHOS 77 06/03/2024     .    "

## 2024-11-26 NOTE — ASSESSMENT & PLAN NOTE
-Down 10 pounds since her last office visit.  On Wegovy  -Encouraged continued weight loss efforts

## 2024-11-26 NOTE — ASSESSMENT & PLAN NOTE
-Prior diagnostic sleep study in January 2024 with AHI 11.6.  She previously elected to pursue mandibular advancing device and weight loss.  Has lost 10 pounds, but did not schedule appointment with dentist yet to discuss mandibular device.  Lifts of dentists provided again to the patient  -Encouraged continued weight loss efforts

## 2024-11-26 NOTE — ASSESSMENT & PLAN NOTE
-No issues.  Remains well-controlled with no exacerbations this past year.  She is only using her low dose Advair inhaler once daily instead of twice.  Not requiring use of rescue inhaler  -Discussed with patient, we can trial her on Airsupra which is an as needed combination ICS/Lynette inhaler to replace Advair.  She is agreeable to try.  Sample provided and instructed on use.  Printed prescription for her to take to her pharmacy to see if it is covered.  If she does not prefer the Airsupra/is not covered by insurance, she will return to using her Advair 100/50 mcg w/ albuterol PRN  -Continue montelukast 10 mg nightly  -UTD vaccinations  -Follow-up in 1 year or sooner if needed

## 2024-12-06 ENCOUNTER — OFFICE VISIT (OUTPATIENT)
Age: 59
End: 2024-12-06
Payer: COMMERCIAL

## 2024-12-06 ENCOUNTER — OFFICE VISIT (OUTPATIENT)
Dept: PHYSICAL THERAPY | Facility: CLINIC | Age: 59
End: 2024-12-06
Payer: COMMERCIAL

## 2024-12-06 ENCOUNTER — APPOINTMENT (OUTPATIENT)
Age: 59
End: 2024-12-06
Payer: COMMERCIAL

## 2024-12-06 VITALS
DIASTOLIC BLOOD PRESSURE: 70 MMHG | SYSTOLIC BLOOD PRESSURE: 110 MMHG | OXYGEN SATURATION: 96 % | BODY MASS INDEX: 36.16 KG/M2 | HEIGHT: 72 IN | TEMPERATURE: 97.8 F | WEIGHT: 267 LBS | HEART RATE: 92 BPM

## 2024-12-06 DIAGNOSIS — N39.3 STRESS INCONTINENCE: ICD-10-CM

## 2024-12-06 DIAGNOSIS — N39.41 URGENCY INCONTINENCE: Primary | ICD-10-CM

## 2024-12-06 DIAGNOSIS — E03.9 ACQUIRED HYPOTHYROIDISM: Primary | ICD-10-CM

## 2024-12-06 DIAGNOSIS — E66.812 CLASS 2 SEVERE OBESITY DUE TO EXCESS CALORIES WITH SERIOUS COMORBIDITY AND BODY MASS INDEX (BMI) OF 36.0 TO 36.9 IN ADULT (HCC): ICD-10-CM

## 2024-12-06 DIAGNOSIS — E66.01 CLASS 2 SEVERE OBESITY DUE TO EXCESS CALORIES WITH SERIOUS COMORBIDITY AND BODY MASS INDEX (BMI) OF 37.0 TO 37.9 IN ADULT (HCC): ICD-10-CM

## 2024-12-06 DIAGNOSIS — R73.03 PREDIABETES: ICD-10-CM

## 2024-12-06 DIAGNOSIS — E66.812 CLASS 2 SEVERE OBESITY DUE TO EXCESS CALORIES WITH SERIOUS COMORBIDITY AND BODY MASS INDEX (BMI) OF 37.0 TO 37.9 IN ADULT (HCC): ICD-10-CM

## 2024-12-06 DIAGNOSIS — E66.01 CLASS 2 SEVERE OBESITY DUE TO EXCESS CALORIES WITH SERIOUS COMORBIDITY AND BODY MASS INDEX (BMI) OF 36.0 TO 36.9 IN ADULT (HCC): ICD-10-CM

## 2024-12-06 DIAGNOSIS — E55.9 VITAMIN D DEFICIENCY: ICD-10-CM

## 2024-12-06 LAB
25(OH)D3 SERPL-MCNC: 40.4 NG/ML (ref 30–100)
ALBUMIN SERPL BCG-MCNC: 4.3 G/DL (ref 3.5–5)
ALP SERPL-CCNC: 87 U/L (ref 34–104)
ALT SERPL W P-5'-P-CCNC: 17 U/L (ref 7–52)
ANION GAP SERPL CALCULATED.3IONS-SCNC: 6 MMOL/L (ref 4–13)
AST SERPL W P-5'-P-CCNC: 23 U/L (ref 13–39)
BILIRUB SERPL-MCNC: 0.51 MG/DL (ref 0.2–1)
BUN SERPL-MCNC: 18 MG/DL (ref 5–25)
CALCIUM SERPL-MCNC: 9.1 MG/DL (ref 8.4–10.2)
CHLORIDE SERPL-SCNC: 104 MMOL/L (ref 96–108)
CO2 SERPL-SCNC: 29 MMOL/L (ref 21–32)
CREAT SERPL-MCNC: 0.83 MG/DL (ref 0.6–1.3)
GFR SERPL CREATININE-BSD FRML MDRD: 76 ML/MIN/1.73SQ M
GLUCOSE SERPL-MCNC: 93 MG/DL (ref 65–140)
POTASSIUM SERPL-SCNC: 4.1 MMOL/L (ref 3.5–5.3)
PROT SERPL-MCNC: 7 G/DL (ref 6.4–8.4)
SODIUM SERPL-SCNC: 139 MMOL/L (ref 135–147)
T4 FREE SERPL-MCNC: 1.7 NG/DL (ref 0.61–1.12)
TSH SERPL DL<=0.05 MIU/L-ACNC: 0.52 UIU/ML (ref 0.45–4.5)

## 2024-12-06 PROCEDURE — 80053 COMPREHEN METABOLIC PANEL: CPT

## 2024-12-06 PROCEDURE — 36415 COLL VENOUS BLD VENIPUNCTURE: CPT

## 2024-12-06 PROCEDURE — 97530 THERAPEUTIC ACTIVITIES: CPT

## 2024-12-06 PROCEDURE — 82306 VITAMIN D 25 HYDROXY: CPT

## 2024-12-06 PROCEDURE — 84439 ASSAY OF FREE THYROXINE: CPT

## 2024-12-06 PROCEDURE — 99214 OFFICE O/P EST MOD 30 MIN: CPT

## 2024-12-06 PROCEDURE — 83036 HEMOGLOBIN GLYCOSYLATED A1C: CPT

## 2024-12-06 PROCEDURE — 84443 ASSAY THYROID STIM HORMONE: CPT

## 2024-12-06 NOTE — ASSESSMENT & PLAN NOTE
She has lost approximately 10 pounds since her previous visit.  She has been taking compounded semaglutide prescribed by her primary care provider.  Reports she feels great denies side effects of semaglutide at this time.

## 2024-12-06 NOTE — ASSESSMENT & PLAN NOTE
Currently supplementing with vitamin D3.  Previously level was 30.8.  Will check vitamin D level with next of labs.  Orders:    Vitamin D 25 hydroxy

## 2024-12-06 NOTE — TELEPHONE ENCOUNTER
Patient called asking for a refill of Wegovy, however she believes at this point it the dose should be increased. She is doing very well on it. Lost approx 15 lbs    [facility]    Please notify patient

## 2024-12-06 NOTE — ASSESSMENT & PLAN NOTE
Overdue for thyroid function test.  Presents clinically euthyroid.    Reports she takes levothyroxine 175 mcg daily consistently.  She has been taking it with align probiotic and vitamin D3 supplementation.  Advised to separate from these medications.    Await receipt of thyroid function test prior to making dose adjustments.  Orders:    T4, free    TSH, 3rd generation    T4, free; Future    TSH, 3rd generation; Future

## 2024-12-06 NOTE — PROGRESS NOTES
Name: Shelby Foster      : 1965      MRN: [id number]  Encounter Provider: KEVIN Khan  Encounter Date: 2024   Encounter department: [facility]  :  Assessment & Plan  Acquired hypothyroidism  Overdue for thyroid function test.  Presents clinically euthyroid.    Reports she takes levothyroxine 175 mcg daily consistently.  She has been taking it with align probiotic and vitamin D3 supplementation.  Advised to separate from these medications.    Await receipt of thyroid function test prior to making dose adjustments.  Orders:    T4, free    TSH, 3rd generation    T4, free; Future    TSH, 3rd generation; Future    Vitamin D deficiency  Currently supplementing with vitamin D3.  Previously level was 30.8.  Will check vitamin D level with next of labs.  Orders:    Vitamin D 25 hydroxy    Class 2 severe obesity due to excess calories with serious comorbidity and body mass index (BMI) of 36.0 to 36.9 in adult (HCC)  She has lost approximately 10 pounds since her previous visit.  She has been taking compounded semaglutide prescribed by her primary care provider.  Reports she feels great denies side effects of semaglutide at this time.       Prediabetes  Overdue for A1c.  Will send for same today.  Continue semaglutide for improved glycemic control as well as weight loss effect.  Orders:    Hemoglobin A1C    Comprehensive metabolic panel        History of Present Illness   HPI  Shelby Foster is a 59 y.o. female who presents to the office today for follow-up of primary hypothyroidism.  She was last seen in the office 2024 as a new consultation by Dr. Inman at which time she was taking Synthroid 175 mcg and reporting fatigue.  She had inquired about T4/ T3 combination therapy however it was recommended she follow-up with sleep medicine due to untreated sleep apnea. She has not completed her labs prior to today's visit.  She reports she  feels significantly better since her previous appointment.    She has been on semaglutide 0.5 mg and lost approximately 10 lbs since her previous visit.    No history of external radiation to head/neck/chest.  No recent iodine loading in form of medication, biotin or kelp supplements, or radiological diagnostic studies.      Family history of thyroid disease includes: None  Family history of thyroid cancer includes: None    Current Medication Regimen:   Levothyroxine 175 mcg daily      History obtained from: patient    Review of Systems   Constitutional:  Positive for fatigue. Negative for chills and fever.   HENT:  Negative for congestion, sore throat, trouble swallowing and voice change.    Eyes:  Negative for pain and visual disturbance.   Respiratory:  Negative for cough and shortness of breath.    Cardiovascular:  Negative for chest pain and palpitations.   Gastrointestinal:  Negative for constipation and diarrhea.   Endocrine: Negative for cold intolerance, heat intolerance, polydipsia and polyphagia.   Genitourinary:  Negative for dysuria.   Musculoskeletal:  Positive for myalgias. Negative for arthralgias and back pain.   Skin:  Negative for wound.   Neurological:  Negative for headaches.   Psychiatric/Behavioral:  Negative for sleep disturbance. The patient is not nervous/anxious.      Current Outpatient Medications on File Prior to Visit   Medication Sig Dispense Refill    albuterol (Ventolin HFA) 90 mcg/act inhaler Inhale 2 puffs every 6 (six) hours as needed for wheezing 25.5 g 3    Albuterol-Budesonide 90-80 MCG/ACT AERO Inhale 2 Inhalations every 6 (six) hours as needed (shortness of breath or wheezing) 5.9 g 5    Aspirin-Acetaminophen-Caffeine (EXCEDRIN MIGRAINE PO) Take 1 tablet by mouth if needed      clobetasol (TEMOVATE) 0.05 % cream       clotrimazole-betamethasone (LOTRISONE) 1-0.05 % cream       ferrous sulfate 324 (65 Fe) mg Take 1 tablet (324 mg total) by mouth every other day 15 tablet 0     fluticasone (FLONASE) 50 mcg/act nasal spray 1 spray into each nostril daily 18.2 mL 3    Fluticasone-Salmeterol (Advair) 100-50 mcg/dose inhaler Inhale 1 puff 2 (two) times a day Rinse mouth after use. 180 blister 3    levothyroxine (Synthroid) 175 mcg tablet Take 1 tablet (175 mcg total) by mouth daily 30 tablet 1    Melatonin 300 MCG TABS daily at bedtime as needed      meloxicam (MOBIC) 15 mg tablet Take 1 tablet (15 mg total) by mouth daily as needed for moderate pain 30 tablet 0    methocarbamol (ROBAXIN) 500 mg tablet take 1 tablet by mouth for muscle spasm IN THE EVENING if needed ( MAY CAUSE SEDATION. ) 20 tablet 6    metroNIDAZOLE (METROCREAM) 0.75 % cream daily      montelukast (SINGULAIR) 10 mg tablet Take 1 tablet (10 mg total) by mouth daily at bedtime 90 tablet 3    ondansetron (ZOFRAN-ODT) 4 mg disintegrating tablet Take 1 tablet (4 mg total) by mouth every 6 (six) hours as needed for nausea or vomiting 20 tablet 3    Probiotic Product (ALIGN PO) Take by mouth      Semaglutide-Weight Management (WEGOVY) 0.25 MG/0.5ML Inject 0.5 mL (0.25 mg total) under the skin once a week 2 mL 1    sodium chloride (OCEAN) 0.65 % nasal spray 1 spray into each nostril daily at bedtime      Sulfacetamide Sodium, Acne, 10 % LOTN daily      terconazole (TERAZOL 7) 0.4 % vaginal cream Insert 1 applicator into the vagina daily at bedtime 45 g 3    Vitamin D, Cholecalciferol, 50 MCG (2000 UT) CAPS Take 2 capsules by mouth daily      [DISCONTINUED] levothyroxine (Synthroid) 175 mcg tablet 1 tab daily 90 tablet 3     No current facility-administered medications on file prior to visit.      Social History     Tobacco Use    Smoking status: Never     Passive exposure: Never    Smokeless tobacco: Never   Vaping Use    Vaping status: Never Used   Substance and Sexual Activity    Alcohol use: Never    Drug use: Never    Sexual activity: Yes     Partners: Male     Birth control/protection: Post-menopausal     Comment: defer         Objective   /70 (BP Location: Right arm, Patient Position: Sitting, Cuff Size: Standard)   Pulse 92   Temp 97.8 °F (36.6 °C)   Ht 6' (1.829 m)   Wt 121 kg (267 lb)   SpO2 96%   BMI 36.21 kg/m²      Physical Exam  Vitals reviewed.   Constitutional:       General: She is not in acute distress.     Appearance: Normal appearance. She is well-groomed. She is obese.   HENT:      Head: Normocephalic and atraumatic.      Mouth/Throat:      Mouth: Mucous membranes are moist.   Eyes:      Conjunctiva/sclera: Conjunctivae normal.   Cardiovascular:      Rate and Rhythm: Normal rate.   Pulmonary:      Effort: Pulmonary effort is normal. No respiratory distress.   Abdominal:      Palpations: Abdomen is soft.      Tenderness: There is no abdominal tenderness.   Musculoskeletal:         General: No swelling.      Cervical back: Normal range of motion.   Skin:     General: Skin is warm and dry.      Capillary Refill: Capillary refill takes less than 2 seconds.   Neurological:      General: No focal deficit present.      Mental Status: She is alert and oriented to person, place, and time.   Psychiatric:         Mood and Affect: Mood normal.         Behavior: Behavior normal. Behavior is cooperative.         Thought Content: Thought content normal.         Administrative Statements   I have spent a total time of 35 minutes in caring for this patient on the day of the visit/encounter including Diagnostic results, Prognosis, Risks and benefits of tx options, Instructions for management, Patient and family education, Importance of tx compliance, Risk factor reductions, Impressions, Counseling / Coordination of care, Documenting in the medical record, Reviewing / ordering tests, medicine, procedures  , and Obtaining or reviewing history  .

## 2024-12-06 NOTE — ASSESSMENT & PLAN NOTE
Overdue for A1c.  Will send for same today.  Continue semaglutide for improved glycemic control as well as weight loss effect.  Orders:    Hemoglobin A1C    Comprehensive metabolic panel

## 2024-12-06 NOTE — PROGRESS NOTES
Daily Note and D/C     Today's date: 2024  Patient name: Shelby Foster  : 1965  MRN: [id number]  Referring provider: Hanny Mensah  Dx:   Encounter Diagnosis     ICD-10-CM    1. Urgency incontinence  N39.41       2. Stress incontinence  N39.3               Start Time: 0945  Stop Time: 1038  Total time in clinic (min): 53 minutes    Subjective:   - Pt reports resolution of symptoms except when drinking at work dinners resulting in increased urgency. No pain reported.     Goals  STGs to be met in 4 weeks:  * Patient will be compliant with introductory HEP as prescribed.- MET  * Presents with improved understanding of bladder irritants in diet and makes concerted effort to reduce them by at least 50-75%. MET    LTGs to be met by discharge:  * Patient reports that she is able to successfully suppress an urge to empty her bladder for 20' without leakage. MET  * Presents with improved fluid intake to avoid concentrated and irritating urine by discharge. MET  * Presents with improved nocturia to 2 consistently toiletings/night for more thorough sleep. MET  * Patient implements urge suppression strategies throughout the day and reports that her average voiding interval is 2+ hours upon discharge. MET  * Patient will be compliant with comprehensive home exercise program for self management of condition.MET         Plan  Patient would benefit from: skilled physical therapy    Planned therapy interventions: IASTM, joint mobilization, manual therapy, neuromuscular re-education, therapeutic activities, therapeutic exercise and home exercise program    Frequency: 1x week  Duration in weeks: 12  Plan of Care beginning date: 10/4/2024  Plan of Care expiration date: 2024  Treatment plan discussed with: patient  Plan details: D/C 24.     PT Pelvic Floor Subjective:   History of Present Illness:   Previous MITESH issues- resolved w. Pelvic floor therapy. She had L THAD anterior approach and all  her issues have come back.   Is abstinent- unknown if dyspareunia is an issue. L THAD 3 months prior- 7/2025.     Objective: See treatment diary below        PERFECT Score   Power right: 5/5   Power left: 4/5   Endurance (seconds to max): 10    Fast flicks (in 10 seconds): 5   Perfect Score: Tone: slightly overactive on L side.     TTP: L LA, OI, Piriformis.      Assessment: Pt shows normal control over bladder functions under both MITESH and UUI. She reports increased urgency during nights drinking at work dinners and was re-educated on bladder irritants and norms when consuming more siuretics. Strategies to mitagte these special circumstance were discussed w. Good understanding reported from pt. Increased lumbopelvic stability training and functional transitions w/ kegel were introduced w/ appropriate form from pt. She has met all goals and based on today's session is cleared for D/C.  HEP  revised and reviewed.        Plan: D/C      Precautions:   Patient Active Problem List   Diagnosis    Vitamin D deficiency    Iron deficiency    History of adenomatous polyp of colon    PAPI (obstructive sleep apnea)    Hypothyroidism    Annual physical exam    Prediabetes    Obesity (BMI 30-39.9)    Environmental and seasonal allergies    Cough variant asthma    Preoperative clearance    S/P total left hip arthroplasty    Class 2 severe obesity due to excess calories with serious comorbidity and body mass index (BMI) of 37.0 to 37.9 in adult (HCC)         PRO EVAL RE-EVAL DISCHARGE   PFDI      EDWIN-18 25      VPQ      CPSI-NIH      PGQ          POC Expires Auth Status Start Date Exp Date PT Visit Limit Unit limit DA provider   12/27  10/4 9/2025   bomn 3      Access Code [id number]     Date of Service 10/3 10/17 10/22 12/6        Visits Used 1 2 3         Visits Remaining                        Manuals                                                            Neuro Re-Ed            Seated IR  2x12 YTB 3x12   YTB 3x12 YTB          Elevators   4' 3'         Diaphragmatic breathing    3'                                                         Ther Ex            Staggered bridges   3x8 ea   kegel  2x10 ea         deadbugs  3x8 ea          Paloff press   3' 2x10 ea 12#        LE stretch   5'         Wall clam    2x10 RTB        Ther Activity            Education Anatomy and POC   Urge supression AL  AL HEP and D/C         bike   8' 6'        STS    2x10                                             Modalities

## 2024-12-07 LAB
EST. AVERAGE GLUCOSE BLD GHB EST-MCNC: 120 MG/DL
HBA1C MFR BLD: 5.8 %

## 2024-12-08 ENCOUNTER — RESULTS FOLLOW-UP (OUTPATIENT)
Dept: ENDOCRINOLOGY | Facility: CLINIC | Age: 59
End: 2024-12-08

## 2024-12-08 ENCOUNTER — APPOINTMENT (OUTPATIENT)
Dept: RADIOLOGY | Facility: CLINIC | Age: 59
End: 2024-12-08
Payer: COMMERCIAL

## 2024-12-08 ENCOUNTER — OFFICE VISIT (OUTPATIENT)
Dept: URGENT CARE | Facility: CLINIC | Age: 59
End: 2024-12-08
Payer: COMMERCIAL

## 2024-12-08 VITALS
DIASTOLIC BLOOD PRESSURE: 75 MMHG | BODY MASS INDEX: 36.21 KG/M2 | RESPIRATION RATE: 18 BRPM | OXYGEN SATURATION: 96 % | HEART RATE: 78 BPM | TEMPERATURE: 96.3 F | WEIGHT: 267 LBS | SYSTOLIC BLOOD PRESSURE: 123 MMHG

## 2024-12-08 DIAGNOSIS — E03.9 ACQUIRED HYPOTHYROIDISM: ICD-10-CM

## 2024-12-08 DIAGNOSIS — H44.002 EYE INFECTION, LEFT: ICD-10-CM

## 2024-12-08 DIAGNOSIS — S90.111A CONTUSION OF RIGHT GREAT TOE WITHOUT DAMAGE TO NAIL, INITIAL ENCOUNTER: ICD-10-CM

## 2024-12-08 DIAGNOSIS — M79.674 GREAT TOE PAIN, RIGHT: ICD-10-CM

## 2024-12-08 DIAGNOSIS — H57.12 LEFT EYE PAIN: ICD-10-CM

## 2024-12-08 DIAGNOSIS — S92.424A CLOSED NONDISPLACED FRACTURE OF DISTAL PHALANX OF RIGHT GREAT TOE, INITIAL ENCOUNTER: Primary | ICD-10-CM

## 2024-12-08 PROCEDURE — G0382 LEV 3 HOSP TYPE B ED VISIT: HCPCS | Performed by: PHYSICIAN ASSISTANT

## 2024-12-08 PROCEDURE — 73660 X-RAY EXAM OF TOE(S): CPT

## 2024-12-08 RX ORDER — TETRACAINE HYDROCHLORIDE 5 MG/ML
1 SOLUTION OPHTHALMIC ONCE
Status: COMPLETED | OUTPATIENT
Start: 2024-12-08 | End: 2024-12-08

## 2024-12-08 RX ORDER — LEVOFLOXACIN 500 MG/1
500 TABLET, FILM COATED ORAL EVERY 24 HOURS
Qty: 7 TABLET | Refills: 0 | Status: SHIPPED | OUTPATIENT
Start: 2024-12-08 | End: 2024-12-15

## 2024-12-08 RX ADMIN — TETRACAINE HYDROCHLORIDE 1 DROP: 5 SOLUTION OPHTHALMIC at 08:42

## 2024-12-08 NOTE — PATIENT INSTRUCTIONS
X-ray of the right great toe shows distal phalanx fracture.    Recommended orthopedic postop shoe.  Recommended referral to podiatry for further evaluation.  Advised to elevate the foot and use ice as needed for swelling.  Use over-the-counter pain medication as needed for pain.    Recommended oral antibiotics for eye infection.    Follow up with PCP in 3-5 days.  Proceed to  ER if symptoms worsen.    If tests are performed, our office will contact you with results only if changes need to made to the care plan discussed with you at the visit. You can review your full results on [facility].

## 2024-12-08 NOTE — PROGRESS NOTES
[facility]        NAME: Shelby Foster is a 59 y.o. female  : 1965    MRN: [id number]  DATE: 2024  TIME: 9:09 AM    Assessment and Plan   Closed nondisplaced fracture of distal phalanx of right great toe, initial encounter [S92.424A]  1. Closed nondisplaced fracture of distal phalanx of right great toe, initial encounter  Ambulatory Referral to Podiatry      2. Great toe pain, right  XR toe right great min 2 view      3. Contusion of right great toe without damage to nail, initial encounter  XR toe right great min 2 view      4. Left eye pain  tetracaine 0.5 % ophthalmic solution 1 drop    fluorescein sodium sterile 1 mg ophthalmic strip 1 strip      5. Eye infection, left  levofloxacin (LEVAQUIN) 500 mg tablet            Patient Instructions     Patient Instructions   X-ray of the right great toe shows distal phalanx fracture.    Recommended orthopedic postop shoe.  Recommended referral to podiatry for further evaluation.  Advised to elevate the foot and use ice as needed for swelling.  Use over-the-counter pain medication as needed for pain.    Recommended oral antibiotics for eye infection.    Follow up with PCP in 3-5 days.  Proceed to  ER if symptoms worsen.    If tests are performed, our office will contact you with results only if changes need to made to the care plan discussed with you at the visit. [facility]        Chief Complaint     Chief Complaint   Patient presents with    Pain     Pain to left eye after pupy cratched just under the eye happened Friday. States eye gets blurry at times then there is drainage. She has implanted lenses s/p cataract surgery .  Right great toe pain after lift chair dropped on the Right great toe happened yesterday at 1 pm.         History of Present Illness       Patient presents for evaluation of left eye pain.  She states that a puppy scratched her under the eye on Friday.  Sometimes.   Vision gets a little bit blurry and there is some drainage.  She has a history of cataract surgery in 2021.  She is also presenting today for pain in her right big toe.  She states that she dropped a chair on it around 1 PM yesterday.  She is having pain in the toe and it hurts to put pressure on it.  She is able to walk but she has some pain with walking.        Review of Systems   Review of Systems   Eyes:  Positive for pain, discharge, redness and visual disturbance.   Musculoskeletal:  Positive for arthralgias and myalgias.   All other systems reviewed and are negative.        Current Medications       Current Outpatient Medications:     albuterol (Ventolin HFA) 90 mcg/act inhaler, Inhale 2 puffs every 6 (six) hours as needed for wheezing, Disp: 25.5 g, Rfl: 3    Albuterol-Budesonide 90-80 MCG/ACT AERO, Inhale 2 Inhalations every 6 (six) hours as needed (shortness of breath or wheezing), Disp: 5.9 g, Rfl: 5    Aspirin-Acetaminophen-Caffeine (EXCEDRIN MIGRAINE PO), Take 1 tablet by mouth if needed, Disp: , Rfl:     clobetasol (TEMOVATE) 0.05 % cream, , Disp: , Rfl:     clotrimazole-betamethasone (LOTRISONE) 1-0.05 % cream, , Disp: , Rfl:     fluticasone (FLONASE) 50 mcg/act nasal spray, 1 spray into each nostril daily, Disp: 18.2 mL, Rfl: 3    Fluticasone-Salmeterol (Advair) 100-50 mcg/dose inhaler, Inhale 1 puff 2 (two) times a day Rinse mouth after use., Disp: 180 blister, Rfl: 3    levofloxacin (LEVAQUIN) 500 mg tablet, Take 1 tablet (500 mg total) by mouth every 24 hours for 7 days, Disp: 7 tablet, Rfl: 0    levothyroxine (Synthroid) 175 mcg tablet, Take 1 tablet (175 mcg total) by mouth daily, Disp: 30 tablet, Rfl: 1    Melatonin 300 MCG TABS, daily at bedtime as needed, Disp: , Rfl:     meloxicam (MOBIC) 15 mg tablet, Take 1 tablet (15 mg total) by mouth daily as needed for moderate pain, Disp: 30 tablet, Rfl: 0    methocarbamol (ROBAXIN) 500 mg tablet, take 1 tablet by mouth for muscle spasm IN THE EVENING  if needed ( MAY CAUSE SEDATION. ), Disp: 20 tablet, Rfl: 6    metroNIDAZOLE (METROCREAM) 0.75 % cream, daily, Disp: , Rfl:     montelukast (SINGULAIR) 10 mg tablet, Take 1 tablet (10 mg total) by mouth daily at bedtime, Disp: 90 tablet, Rfl: 3    Probiotic Product (ALIGN PO), Take by mouth, Disp: , Rfl:     Semaglutide-Weight Management (WEGOVY) 0.25 MG/0.5ML, Inject 0.5 mL (0.25 mg total) under the skin once a week, Disp: 2 mL, Rfl: 1    sodium chloride (OCEAN) 0.65 % nasal spray, 1 spray into each nostril daily at bedtime, Disp: , Rfl:     Sulfacetamide Sodium, Acne, 10 % LOTN, daily, Disp: , Rfl:     terconazole (TERAZOL 7) 0.4 % vaginal cream, Insert 1 applicator into the vagina daily at bedtime, Disp: 45 g, Rfl: 3    Vitamin D, Cholecalciferol, 50 MCG (2000 UT) CAPS, Take 2 capsules by mouth daily, Disp: , Rfl:     ferrous sulfate 324 (65 Fe) mg, Take 1 tablet (324 mg total) by mouth every other day, Disp: 15 tablet, Rfl: 0    ondansetron (ZOFRAN-ODT) 4 mg disintegrating tablet, Take 1 tablet (4 mg total) by mouth every 6 (six) hours as needed for nausea or vomiting (Patient not taking: Reported on 12/8/2024), Disp: 20 tablet, Rfl: 3  No current facility-administered medications for this visit.    Current Allergies     Allergies as of 12/08/2024 - Reviewed 12/08/2024   Allergen Reaction Noted    Fluconazole Swelling 04/21/2018    Sulfamethoxazole-trimethoprim Swelling 04/21/2018    Shellfish-derived products - food allergy Hives and Diarrhea 06/07/2024    Eggs or egg-derived products - food allergy Diarrhea 06/07/2024    Nuts - food allergy Diarrhea 06/07/2024    Penicillins Other (See Comments) 04/21/2018    Erythromycin Rash 04/21/2018    Tetracycline Rash 04/21/2018            The following portions of the patient's history were reviewed and updated as appropriate: allergies, current medications, past family history, past medical history, past social history, past surgical history and problem list.     Past  "Medical History:   Diagnosis Date    Abnormal Pap smear of cervix 2000's    Allergic rhinitis 2015    Deviated septum, ployps    Anesthesia     \"always causes Constipation and PONV\"\"    Asthma     Disease of thyroid gland     Environmental and seasonal allergies     Fibroid 2000's    Food allergy     Heart murmur     History of anemia     History of concussion 11/30/2022    sees neurologist yearly--\"due to tree falling on head\"    History of repair of hiatal hernia     History of rheumatic fever as a child     Hypothyroidism     Presence of dental bridge     left lower    Rosacea     Sleep apnea     mild    Varicella        Past Surgical History:   Procedure Laterality Date    ANKLE SURGERY Bilateral     APPENDECTOMY      CATARACT EXTRACTION Bilateral     CHOLECYSTECTOMY      COLONOSCOPY      DENTAL IMPLANT Right     lower    DILATION AND CURETTAGE OF UTERUS      ENDOMETRIAL ABLATION  2005    FINGER SURGERY      thumb    HERNIA REPAIR  11/2021    \"hiatal\"    MYOMECTOMY  2002,3,4    MD ARTHRP ACETBLR/PROX FEM PROSTC AGRFT/ALGRFT Left 7/1/2024    Procedure: ARTHROPLASTY HIP TOTAL ANTERIOR, LEFT, SAME DAY;  Surgeon: Stephen Lr MD;  Location: [facility];  Service: Orthopedics    SINUS SURGERY  2015    TEAR DUCT SURGERY      TONSILLECTOMY      WISDOM TOOTH EXTRACTION         Family History   Problem Relation Age of Onset    Breast cancer Mother     Miscarriages / Stillbirths Mother     COPD Mother          Medications have been verified.        Objective   /75 (Patient Position: Sitting, Cuff Size: Large)   Pulse 78   Temp (!) 96.3 °F (35.7 °C)   Resp 18   Wt 121 kg (267 lb)   SpO2 96%   BMI 36.21 kg/m²        Physical Exam     Physical Exam  Vitals and nursing note reviewed.   Constitutional:       Appearance: Normal appearance.   Eyes:      General: Lids are normal. Lids are everted, no foreign bodies appreciated.         Left eye: No foreign body or discharge.      Extraocular Movements: " Extraocular movements intact.      Right eye: Normal extraocular motion and no nystagmus.      Left eye: Normal extraocular motion and no nystagmus.      Conjunctiva/sclera:      Left eye: Left conjunctiva is not injected.      Comments: Swelling and erythema along the lower lash line extending towards the cheek.   Musculoskeletal:      Comments: Right great toe swollen and ecchymotic.  Tenderness over the distal phalanx.  No tenderness extending into the metatarsal.  Distal neurovascular status is intact.   Neurological:      General: No focal deficit present.      Mental Status: She is alert and oriented to person, place, and time.   Psychiatric:         Mood and Affect: Mood normal.         Behavior: Behavior normal.     Orthopedic injury treatment    Date/Time: 12/8/2024 10:30 AM    Performed by: Cuca Mccarthy PA-C  Authorized by: Cuca Mccarthy PA-C    Patient Location:  [facility] Protocol:  Consent: Verbal consent obtained.  Consent given by: patient    Injury location:  Toe  Location details:  Right great toe  Injury type:  Fracture  Neurovascular status: Neurovascularly intact    Distal perfusion: normal    Neurological function: normal    Range of motion: reduced    Immobilization:  Brace  Splint type: Postop shoe, off-the-shelf DME.  Neurovascular status: Neurovascularly intact    Distal perfusion: normal    Neurological function: normal    Range of motion: unchanged    Patient tolerance:  Patient tolerated the procedure well with no immediate complications      Risks and benefits discussed at length. Pt provides verbal consent to procedure.   Tetracaine drop applied to left eye.   Once adequate anesthesia is achieved, fluorescein strip wet with second tetracaine drop is applied to lower eyelid of the same eye.   Pt instructed to blink and eye appears orange in color, consistent with successful staining.   Eye is viewed under backlight lamp with no obvious foreign bodies, or corneal  abrasions noted.   Pt tolerated procedure well with no immediate complications.

## 2024-12-09 ENCOUNTER — TELEPHONE (OUTPATIENT)
Age: 59
End: 2024-12-09

## 2024-12-09 DIAGNOSIS — E66.812 CLASS 2 SEVERE OBESITY DUE TO EXCESS CALORIES WITH SERIOUS COMORBIDITY AND BODY MASS INDEX (BMI) OF 37.0 TO 37.9 IN ADULT (HCC): Primary | ICD-10-CM

## 2024-12-09 DIAGNOSIS — E66.01 CLASS 2 SEVERE OBESITY DUE TO EXCESS CALORIES WITH SERIOUS COMORBIDITY AND BODY MASS INDEX (BMI) OF 37.0 TO 37.9 IN ADULT (HCC): Primary | ICD-10-CM

## 2024-12-09 RX ORDER — SEMAGLUTIDE 1 MG/.5ML
INJECTION, SOLUTION SUBCUTANEOUS
Qty: 2 ML | Refills: 0 | Status: SHIPPED | OUTPATIENT
Start: 2024-12-09

## 2024-12-09 RX ORDER — LEVOTHYROXINE SODIUM 175 UG/1
TABLET ORAL
Qty: 90 TABLET | Refills: 1 | Status: SHIPPED | OUTPATIENT
Start: 2024-12-09

## 2024-12-09 NOTE — TELEPHONE ENCOUNTER
Scheduled appointment for 12/31 at 10:00 am at our [place] office. Informed patient to keep resting, icing and elevating her foot. Gave patient address, appointment information and our phone number if needed.

## 2024-12-09 NOTE — TELEPHONE ENCOUNTER
PA for Wegovy 1 MG/0.5ML SUBMITTED to     via    [x]CMM-KEY: [id number]  []Surescripts-Case ID #   []Availity-Auth ID # NDC #   []Faxed to plan   []Other website   []Phone call Case ID #     [x]PA sent as URGENT    All office notes, labs and other pertaining documents and studies sent. Clinical questions answered. Awaiting determination from insurance company.     Turnaround time for your insurance to make a decision on your Prior Authorization can take 7-21 business days.

## 2024-12-09 NOTE — TELEPHONE ENCOUNTER
Hello,    Please advise if a forced appointment can be accommodated for the patient:    Call back #: [phone]    Insurance: [employer]     Reason for appointment: First distal phalanx fracture as described.     Requested doctor and/or location: [place]       Thank you.

## 2024-12-09 NOTE — RESULT ENCOUNTER NOTE
TSH okay-Free T4 is high -did she take levothyroxine in the morning before the lab test?  Vitamin D okay-continue supplementations  Hemoglobin A1c 5.8 is in prediabetes range-focus on dietary and lifestyle modifications weight loss

## 2024-12-13 NOTE — TELEPHONE ENCOUNTER
PA for Wegovy 1 MG/0.5ML  DENIED    Reason:(Screenshot if applicable)        Message sent to office clinical pool Yes    Denial letter scanned into Media No Not available at time of processing    Appeal started No (Provider will need to decide if appeal is warranted and send clinical documentation to Prior Authorization Team for initiation.)    **Please follow up with your patient regarding denial and next steps**

## 2024-12-16 ENCOUNTER — OFFICE VISIT (OUTPATIENT)
Age: 59
End: 2024-12-16
Payer: COMMERCIAL

## 2024-12-16 VITALS
WEIGHT: 267 LBS | BODY MASS INDEX: 36.16 KG/M2 | DIASTOLIC BLOOD PRESSURE: 86 MMHG | HEART RATE: 76 BPM | SYSTOLIC BLOOD PRESSURE: 132 MMHG | HEIGHT: 72 IN

## 2024-12-16 DIAGNOSIS — S92.424A CLOSED NONDISPLACED FRACTURE OF DISTAL PHALANX OF RIGHT GREAT TOE, INITIAL ENCOUNTER: ICD-10-CM

## 2024-12-16 PROCEDURE — 99203 OFFICE O/P NEW LOW 30 MIN: CPT

## 2024-12-16 NOTE — PROGRESS NOTES
Name: Shelby Foster      : 1965      MRN: [id number]  Encounter Provider: Maco Laughlin DPM  Encounter Date: 2024   [facility]  :  Assessment & Plan  Closed nondisplaced fracture of distal phalanx of right great toe, initial encounter    Orders:  •  Ambulatory Referral to Podiatry      Foot Fracture Treatment Plan:     Rest, ice, and elevation  Apply a cloth covered ice pack to heel four times per day 15-30 minutes.     My personal interpretation today of the x-rays that were taken show minimally displaced 1st phalanx fracture, with intra articular component.    Patient's x-rays show fracture that can be treated conservatively.  I discussed with the patient risks and benefits of treating this fracture conservatively.    Patient should wear device that was dispensed today for protection of the area.    Weightbearing status as tolerated in surgical shoe    Plan on rechecking x-rays in 4 weeks.      I personally discussed with patient at the visit today:     The diagnosis of this encounter  2.   Possible etiologies of the diagnosis  3.   Treatment options including advantages and disadvantages of treatment with potential risks and complications associated with these treatments  4.   Prevention strategies and ways to decrease pain     I answered all of the patients questions.     History of Present Illness   HPI  Shelby Foster is a 59 y.o. female who presents right great toe fracture. Patient reports toe injury after dropping lift chair on toe. She reports pain has slowly been improving since injury. Has been WBAT in surgical shoe. No other concerns today.     DOI 2024    History obtained from: patient    Review of Systems  Current Outpatient Medications on File Prior to Visit   Medication Sig Dispense Refill   • albuterol (Ventolin HFA) 90 mcg/act inhaler Inhale 2 puffs every 6 (six) hours as needed for wheezing 25.5 g 3   •  Albuterol-Budesonide 90-80 MCG/ACT AERO Inhale 2 Inhalations every 6 (six) hours as needed (shortness of breath or wheezing) 5.9 g 5   • Aspirin-Acetaminophen-Caffeine (EXCEDRIN MIGRAINE PO) Take 1 tablet by mouth if needed     • clobetasol (TEMOVATE) 0.05 % cream      • clotrimazole-betamethasone (LOTRISONE) 1-0.05 % cream      • fluticasone (FLONASE) 50 mcg/act nasal spray 1 spray into each nostril daily 18.2 mL 3   • Fluticasone-Salmeterol (Advair) 100-50 mcg/dose inhaler Inhale 1 puff 2 (two) times a day Rinse mouth after use. 180 blister 3   • levothyroxine (Synthroid) 175 mcg tablet Take 1 tablet by mouth daily Monday-Saturday and 1/2 tablet on  90 tablet 1   • Melatonin 300 MCG TABS daily at bedtime as needed     • meloxicam (MOBIC) 15 mg tablet Take 1 tablet (15 mg total) by mouth daily as needed for moderate pain 30 tablet 0   • methocarbamol (ROBAXIN) 500 mg tablet take 1 tablet by mouth for muscle spasm IN THE EVENING if needed ( MAY CAUSE SEDATION. ) 20 tablet 6   • metroNIDAZOLE (METROCREAM) 0.75 % cream daily     • montelukast (SINGULAIR) 10 mg tablet Take 1 tablet (10 mg total) by mouth daily at bedtime 90 tablet 3   • ondansetron (ZOFRAN-ODT) 4 mg disintegrating tablet Take 1 tablet (4 mg total) by mouth every 6 (six) hours as needed for nausea or vomiting 20 tablet 3   • Probiotic Product (ALIGN PO) Take by mouth     • Semaglutide-Weight Management (Wegovy) 1 MG/0.5ML Inject 1 mg under the skin weekly 2 mL 0   • sodium chloride (OCEAN) 0.65 % nasal spray 1 spray into each nostril daily at bedtime     • Sulfacetamide Sodium, Acne, 10 % LOTN daily     • terconazole (TERAZOL 7) 0.4 % vaginal cream Insert 1 applicator into the vagina daily at bedtime 45 g 3   • Vitamin D, Cholecalciferol, 50 MCG (2000 UT) CAPS Take 2 capsules by mouth daily     • ferrous sulfate 324 (65 Fe) mg Take 1 tablet (324 mg total) by mouth every other day 15 tablet 0   • [] levofloxacin (LEVAQUIN) 500 mg tablet Take  1 tablet (500 mg total) by mouth every 24 hours for 7 days 7 tablet 0     No current facility-administered medications on file prior to visit.         Objective   /86 (Patient Position: Sitting)   Pulse 76   Ht 6' (1.829 m)   Wt 121 kg (267 lb)   BMI 36.21 kg/m²      Physical Exam    Vascular: Intact pedal pulses bilateral DP and PT.  Neurological: Gross protective sensation intact bilateral  Musculoskeletal: Muscle strength bilateral intact with dorsiflexion, inversion, eversion and plantarflexion.  Tenderness on palpation noted at the level of the right great toe.  There is no tenderness on palpation of the Lisfranc ligament.  No tenderness with palpation at the level of the ankle medially or laterally.  No tenderness with tib-fib squeeze.  No other areas of significant discomfort or tenderness on examination.  Dermatological: No open lesions or ulcerations noted bilateral.

## 2024-12-20 ENCOUNTER — APPOINTMENT (OUTPATIENT)
Dept: PHYSICAL THERAPY | Facility: CLINIC | Age: 59
End: 2024-12-20
Payer: COMMERCIAL

## 2024-12-26 ENCOUNTER — OFFICE VISIT (OUTPATIENT)
Dept: URGENT CARE | Facility: CLINIC | Age: 59
End: 2024-12-26
Payer: COMMERCIAL

## 2024-12-26 DIAGNOSIS — S92.424D CLOSED NONDISPLACED FRACTURE OF DISTAL PHALANX OF RIGHT GREAT TOE WITH ROUTINE HEALING, SUBSEQUENT ENCOUNTER: Primary | ICD-10-CM

## 2024-12-26 PROCEDURE — G0381 LEV 2 HOSP TYPE B ED VISIT: HCPCS | Performed by: PHYSICIAN ASSISTANT

## 2024-12-26 NOTE — PROGRESS NOTES
[facility]        NAME: Shelby Foster is a 59 y.o. female  : 1965    MRN: [id number]  DATE: 2024  TIME: 10:42 AM      Assessment and Plan     Closed nondisplaced fracture of distal phalanx of right great toe with routine healing, subsequent encounter [S92.424D]  1. Closed nondisplaced fracture of distal phalanx of right great toe with routine healing, subsequent encounter          Note:   Gave patient another surgical shoe.  The 1 she currently has was ripping at the back  Patient refused all vitals and most of the physical exam, but told patient that we needed to see her in order to give her an order for the shoe    Patient Instructions   There are no Patient Instructions on file for this visit.     Follow up with primary care provider.   Go to ER if symptoms worsen.    Chief Complaint     Chief Complaint   Patient presents with    Foot Pain     Patient wants a new boot, refuses all other services.          History of Present Illness     Patient presents to get another surgical shoe for a big toe fracture on the right foot.  She was seen here about 2 weeks ago and received a surgical shoe which the podiatrist told her to continue using for fracture stabilization.  She states that issue started ripping at the back and is coming undone.        Review of Systems     Review of Systems   Constitutional:  Negative for chills, fatigue and fever.   HENT:  Negative for congestion, ear pain, postnasal drip, rhinorrhea, sinus pressure, sinus pain and sore throat.    Eyes:  Negative for pain and visual disturbance.   Respiratory:  Negative for cough, chest tightness and shortness of breath.    Cardiovascular:  Negative for chest pain and palpitations.   Gastrointestinal:  Negative for abdominal pain, diarrhea, nausea and vomiting.   Genitourinary:  Negative for dysuria and hematuria.   Musculoskeletal:  Positive for arthralgias. Negative for back pain and myalgias.   Skin:  Negative for  rash.   Neurological:  Negative for dizziness, seizures, syncope, numbness and headaches.   All other systems reviewed and are negative.        Current Medications       Current Outpatient Medications:     albuterol (Ventolin HFA) 90 mcg/act inhaler, Inhale 2 puffs every 6 (six) hours as needed for wheezing, Disp: 25.5 g, Rfl: 3    Albuterol-Budesonide 90-80 MCG/ACT AERO, Inhale 2 Inhalations every 6 (six) hours as needed (shortness of breath or wheezing), Disp: 5.9 g, Rfl: 5    Aspirin-Acetaminophen-Caffeine (EXCEDRIN MIGRAINE PO), Take 1 tablet by mouth if needed, Disp: , Rfl:     clobetasol (TEMOVATE) 0.05 % cream, , Disp: , Rfl:     clotrimazole-betamethasone (LOTRISONE) 1-0.05 % cream, , Disp: , Rfl:     ferrous sulfate 324 (65 Fe) mg, Take 1 tablet (324 mg total) by mouth every other day, Disp: 15 tablet, Rfl: 0    fluticasone (FLONASE) 50 mcg/act nasal spray, 1 spray into each nostril daily, Disp: 18.2 mL, Rfl: 3    Fluticasone-Salmeterol (Advair) 100-50 mcg/dose inhaler, Inhale 1 puff 2 (two) times a day Rinse mouth after use., Disp: 180 blister, Rfl: 3    levothyroxine (Synthroid) 175 mcg tablet, Take 1 tablet by mouth daily Monday-Saturday and 1/2 tablet on Sunday, Disp: 90 tablet, Rfl: 1    Melatonin 300 MCG TABS, daily at bedtime as needed, Disp: , Rfl:     meloxicam (MOBIC) 15 mg tablet, Take 1 tablet (15 mg total) by mouth daily as needed for moderate pain, Disp: 30 tablet, Rfl: 0    methocarbamol (ROBAXIN) 500 mg tablet, take 1 tablet by mouth for muscle spasm IN THE EVENING if needed ( MAY CAUSE SEDATION. ), Disp: 20 tablet, Rfl: 6    metroNIDAZOLE (METROCREAM) 0.75 % cream, daily, Disp: , Rfl:     montelukast (SINGULAIR) 10 mg tablet, Take 1 tablet (10 mg total) by mouth daily at bedtime, Disp: 90 tablet, Rfl: 3    ondansetron (ZOFRAN-ODT) 4 mg disintegrating tablet, Take 1 tablet (4 mg total) by mouth every 6 (six) hours as needed for nausea or vomiting, Disp: 20 tablet, Rfl: 3    Probiotic Product  "(ALIGN PO), Take by mouth, Disp: , Rfl:     Semaglutide-Weight Management (Wegovy) 1 MG/0.5ML, Inject 1 mg under the skin weekly, Disp: 2 mL, Rfl: 0    sodium chloride (OCEAN) 0.65 % nasal spray, 1 spray into each nostril daily at bedtime, Disp: , Rfl:     Sulfacetamide Sodium, Acne, 10 % LOTN, daily, Disp: , Rfl:     terconazole (TERAZOL 7) 0.4 % vaginal cream, Insert 1 applicator into the vagina daily at bedtime, Disp: 45 g, Rfl: 3    Vitamin D, Cholecalciferol, 50 MCG (2000 UT) CAPS, Take 2 capsules by mouth daily, Disp: , Rfl:     Current Allergies     Allergies as of 12/26/2024 - Reviewed 12/16/2024   Allergen Reaction Noted    Fluconazole Swelling 04/21/2018    Sulfamethoxazole-trimethoprim Swelling 04/21/2018    Shellfish-derived products - food allergy Hives and Diarrhea 06/07/2024    Eggs or egg-derived products - food allergy Diarrhea 06/07/2024    Nuts - food allergy Diarrhea 06/07/2024    Penicillins Other (See Comments) 04/21/2018    Erythromycin Rash 04/21/2018    Tetracycline Rash 04/21/2018              The following portions of the patient's history were reviewed and updated as appropriate: allergies, current medications, past family history, past medical history, past social history, past surgical history, and problem list.     Past Medical History:   Diagnosis Date    Abnormal Pap smear of cervix 2000's    Allergic rhinitis 2015    Deviated septum, ployps    Anesthesia     \"always causes Constipation and PONV\"\"    Asthma     Disease of thyroid gland     Environmental and seasonal allergies     Fibroid 2000's    Food allergy     Heart murmur     History of anemia     History of concussion 11/30/2022    sees neurologist yearly--\"due to tree falling on head\"    History of repair of hiatal hernia     History of rheumatic fever as a child     Hypothyroidism     Presence of dental bridge     left lower    Rosacea     Sleep apnea     mild    Varicella        Past Surgical History:   Procedure Laterality " "Date    ANKLE SURGERY Bilateral     APPENDECTOMY      CATARACT EXTRACTION Bilateral     CHOLECYSTECTOMY      COLONOSCOPY      DENTAL IMPLANT Right     lower    DILATION AND CURETTAGE OF UTERUS      ENDOMETRIAL ABLATION  2005    FINGER SURGERY      thumb    HERNIA REPAIR  11/2021    \"hiatal\"    MYOMECTOMY  2002,3,4    OH ARTHRP ACETBLR/PROX FEM PROSTC AGRFT/ALGRFT Left 7/1/2024    Procedure: ARTHROPLASTY HIP TOTAL ANTERIOR, LEFT, SAME DAY;  Surgeon: Stephen Lr MD;  Location: [facility];  Service: Orthopedics    SINUS SURGERY  2015    TEAR DUCT SURGERY      TONSILLECTOMY      WISDOM TOOTH EXTRACTION         Family History   Problem Relation Age of Onset    Breast cancer Mother     Miscarriages / Stillbirths Mother     COPD Mother          Medications have been verified.        Objective     There were no vitals taken for this visit.  No LMP recorded. Patient is postmenopausal.         Physical Exam     Physical Exam  Vitals and nursing note reviewed.   Constitutional:       Appearance: Normal appearance. She is obese.   HENT:      Head: Normocephalic.   Cardiovascular:      Rate and Rhythm: Normal rate.   Pulmonary:      Effort: Pulmonary effort is normal.   Skin:     General: Skin is warm and dry.   Neurological:      General: No focal deficit present.      Mental Status: She is alert and oriented to person, place, and time.   Psychiatric:         Mood and Affect: Mood normal.         Behavior: Behavior normal.       "

## 2025-01-03 ENCOUNTER — TELEPHONE (OUTPATIENT)
Age: 60
End: 2025-01-03

## 2025-01-03 NOTE — TELEPHONE ENCOUNTER
Nahed from Dr. Saji Chan's dental office called requesting to have patients sleep study from 1/10/24 faxed over to their office  Fax: [phone]  Attention : Nahed

## 2025-01-09 ENCOUNTER — OFFICE VISIT (OUTPATIENT)
Age: 60
End: 2025-01-09
Payer: COMMERCIAL

## 2025-01-09 VITALS — HEIGHT: 72 IN | WEIGHT: 267 LBS | BODY MASS INDEX: 36.16 KG/M2

## 2025-01-09 DIAGNOSIS — S92.424A CLOSED NONDISPLACED FRACTURE OF DISTAL PHALANX OF RIGHT GREAT TOE, INITIAL ENCOUNTER: Primary | ICD-10-CM

## 2025-01-09 PROCEDURE — 99213 OFFICE O/P EST LOW 20 MIN: CPT

## 2025-01-09 NOTE — PROGRESS NOTES
Name: Shelby Foster      : 1965      MRN: [id number]  Encounter Provider: Maco Laughlin DPM  Encounter Date: 2025   Encounter department: [facility]  :  Assessment & Plan  Closed nondisplaced fracture of distal phalanx of right great toe, initial encounter    Orders:    XR toe right great min 2 view; Future      Foot Fracture Treatment Plan:     Rest, ice, and elevation  Apply a cloth covered ice pack to heel four times per day 15-30 minutes.     My personal interpretation today of the x-rays that were taken show evidence of healing of minimally displaced 1st phalanx fracture, with intra articular component.    Patient's x-rays show fracture that can be treated conservatively.  I discussed with the patient risks and benefits of treating this fracture conservatively.    Transition to supportive sneaker as tolerated.      I personally discussed with patient at the visit today:     The diagnosis of this encounter  2.   Possible etiologies of the diagnosis  3.   Treatment options including advantages and disadvantages of treatment with potential risks and complications associated with these treatments  4.   Prevention strategies and ways to decrease pain     I answered all of the patients questions.     History of Present Illness   HPI  Shelby Foster is a 59 y.o. female who presents for f/u of right great toe fracture. She reports pain has slowly been improving since injury, minimal pain today. Has been WBAT in surgical shoe. No other concerns today.     DOI 2024    History obtained from: patient    Review of Systems  Current Outpatient Medications on File Prior to Visit   Medication Sig Dispense Refill    albuterol (Ventolin HFA) 90 mcg/act inhaler Inhale 2 puffs every 6 (six) hours as needed for wheezing 25.5 g 3    Albuterol-Budesonide 90-80 MCG/ACT AERO Inhale 2 Inhalations every 6 (six) hours as needed (shortness of breath or wheezing) 5.9 g 5     Aspirin-Acetaminophen-Caffeine (EXCEDRIN MIGRAINE PO) Take 1 tablet by mouth if needed      clobetasol (TEMOVATE) 0.05 % cream       clotrimazole-betamethasone (LOTRISONE) 1-0.05 % cream       fluticasone (FLONASE) 50 mcg/act nasal spray 1 spray into each nostril daily 18.2 mL 3    Fluticasone-Salmeterol (Advair) 100-50 mcg/dose inhaler Inhale 1 puff 2 (two) times a day Rinse mouth after use. 180 blister 3    levothyroxine (Synthroid) 175 mcg tablet Take 1 tablet by mouth daily Monday-Saturday and 1/2 tablet on Sunday 90 tablet 1    Melatonin 300 MCG TABS daily at bedtime as needed      meloxicam (MOBIC) 15 mg tablet Take 1 tablet (15 mg total) by mouth daily as needed for moderate pain 30 tablet 0    methocarbamol (ROBAXIN) 500 mg tablet take 1 tablet by mouth for muscle spasm IN THE EVENING if needed ( MAY CAUSE SEDATION. ) 20 tablet 6    metroNIDAZOLE (METROCREAM) 0.75 % cream daily      montelukast (SINGULAIR) 10 mg tablet Take 1 tablet (10 mg total) by mouth daily at bedtime 90 tablet 3    ondansetron (ZOFRAN-ODT) 4 mg disintegrating tablet Take 1 tablet (4 mg total) by mouth every 6 (six) hours as needed for nausea or vomiting 20 tablet 3    Probiotic Product (ALIGN PO) Take by mouth      Semaglutide-Weight Management (Wegovy) 1 MG/0.5ML Inject 1 mg under the skin weekly 2 mL 0    sodium chloride (OCEAN) 0.65 % nasal spray 1 spray into each nostril daily at bedtime      Sulfacetamide Sodium, Acne, 10 % LOTN daily      terconazole (TERAZOL 7) 0.4 % vaginal cream Insert 1 applicator into the vagina daily at bedtime 45 g 3    Vitamin D, Cholecalciferol, 50 MCG (2000 UT) CAPS Take 2 capsules by mouth daily      ferrous sulfate 324 (65 Fe) mg Take 1 tablet (324 mg total) by mouth every other day 15 tablet 0     No current facility-administered medications on file prior to visit.         Objective   Ht 6' (1.829 m)   Wt 121 kg (267 lb)   BMI 36.21 kg/m²      Physical Exam    Vascular: Intact pedal pulses bilateral  DP and PT.  Neurological: Gross protective sensation intact bilateral  Musculoskeletal: Muscle strength bilateral intact with dorsiflexion, inversion, eversion and plantarflexion. No tenderness on palpation noted at the level of the right great toe.  There is no tenderness on palpation of the Lisfranc ligament.  No tenderness with palpation at the level of the ankle medially or laterally.  No tenderness with tib-fib squeeze.  No other areas of significant discomfort or tenderness on examination.  Dermatological: No open lesions or ulcerations noted bilateral.

## 2025-01-14 ENCOUNTER — OFFICE VISIT (OUTPATIENT)
Dept: FAMILY MEDICINE CLINIC | Facility: CLINIC | Age: 60
End: 2025-01-14
Payer: COMMERCIAL

## 2025-01-14 VITALS
TEMPERATURE: 98 F | WEIGHT: 255 LBS | RESPIRATION RATE: 16 BRPM | BODY MASS INDEX: 34.54 KG/M2 | SYSTOLIC BLOOD PRESSURE: 118 MMHG | HEIGHT: 72 IN | DIASTOLIC BLOOD PRESSURE: 70 MMHG | OXYGEN SATURATION: 99 % | HEART RATE: 79 BPM

## 2025-01-14 DIAGNOSIS — R10.9 FLANK PAIN: ICD-10-CM

## 2025-01-14 DIAGNOSIS — E66.811 CLASS 1 OBESITY DUE TO EXCESS CALORIES WITH SERIOUS COMORBIDITY AND BODY MASS INDEX (BMI) OF 34.0 TO 34.9 IN ADULT: ICD-10-CM

## 2025-01-14 DIAGNOSIS — Z13.220 LIPID SCREENING: ICD-10-CM

## 2025-01-14 DIAGNOSIS — E66.09 CLASS 1 OBESITY DUE TO EXCESS CALORIES WITH SERIOUS COMORBIDITY AND BODY MASS INDEX (BMI) OF 34.0 TO 34.9 IN ADULT: ICD-10-CM

## 2025-01-14 DIAGNOSIS — Z00.00 ANNUAL PHYSICAL EXAM: Primary | ICD-10-CM

## 2025-01-14 PROCEDURE — 99396 PREV VISIT EST AGE 40-64: CPT | Performed by: STUDENT IN AN ORGANIZED HEALTH CARE EDUCATION/TRAINING PROGRAM

## 2025-01-14 PROCEDURE — 99214 OFFICE O/P EST MOD 30 MIN: CPT | Performed by: STUDENT IN AN ORGANIZED HEALTH CARE EDUCATION/TRAINING PROGRAM

## 2025-01-14 RX ORDER — SEMAGLUTIDE 1.7 MG/.75ML
INJECTION, SOLUTION SUBCUTANEOUS
Qty: 3 ML | Refills: 1 | Status: SHIPPED | OUTPATIENT
Start: 2025-01-14

## 2025-01-14 NOTE — PROGRESS NOTES
Name: Shelby Foster      : 1965      MRN: [id number]  Encounter Provider: Ree Vasquez DO  Encounter Date: 2025   Encounter department: [facility] PRIMARY CARE  :  Assessment & Plan           History of Present Illness {?Quick Links Encounters * My Last Note * Last Note in Specialty * Snapshot * Since Last Visit * History :61932}    HPI  Review of Systems    Objective {?Quick Links Trend Vitals * Enter New Vitals * Results Review * Timeline (Adult) * Labs * Imaging * Cardiology * Procedures * Lung Cancer Screening * Surgical eConsent :86338}  Wt 116 kg (255 lb)   BMI 34.58 kg/m²      Physical Exam  {Administrative / Billing Section (Optional):74993}

## 2025-01-14 NOTE — PATIENT INSTRUCTIONS
"Patient Education     Routine physical for adults   The Basics   Written by the doctors and editors at [facility]   What is a physical? -- A physical is a routine visit, or \"check-up,\" with your doctor. You might also hear it called a \"wellness visit\" or \"preventive visit.\"  During each visit, the doctor will:   Ask about your physical and mental health   Ask about your habits, behaviors, and lifestyle   Do an exam   Give you vaccines if needed   Talk to you about any medicines you take   Give advice about your health   Answer your questions  Getting regular check-ups is an important part of taking care of your health. It can help your doctor find and treat any problems you have. But it's also important for preventing health problems.  A routine physical is different from a \"sick visit.\" A sick visit is when you see a doctor because of a health concern or problem. Since physicals are scheduled ahead of time, you can think about what you want to ask the doctor.  How often should I get a physical? -- It depends on your age and health. In general, for people age 21 years and older:   If you are younger than 50 years, you might be able to get a physical every 3 years.   If you are 50 years or older, your doctor might recommend a physical every year.  If you have an ongoing health condition, like diabetes or high blood pressure, your doctor will probably want to see you more often.  What happens during a physical? -- In general, each visit will include:   Physical exam - The doctor or nurse will check your height, weight, heart rate, and blood pressure. They will also look at your eyes and ears. They will ask about how you are feeling and whether you have any symptoms that bother you.   Medicines - It's a good idea to bring a list of all the medicines you take to each doctor visit. Your doctor will talk to you about your medicines and answer any questions. Tell them if you are having any side effects that bother you. You " "should also tell them if you are having trouble paying for any of your medicines.   Habits and behaviors - This includes:   Your diet   Your exercise habits   Whether you smoke, drink alcohol, or use drugs   Whether you are sexually active   Whether you feel safe at home  Your doctor will talk to you about things you can do to improve your health and lower your risk of health problems. They will also offer help and support. For example, if you want to quit smoking, they can give you advice and might prescribe medicines. If you want to improve your diet or get more physical activity, they can help you with this, too.   Lab tests, if needed - The tests you get will depend on your age and situation. For example, your doctor might want to check your:   Cholesterol   Blood sugar   Iron level   Vaccines - The recommended vaccines will depend on your age, health, and what vaccines you already had. Vaccines are very important because they can prevent certain serious or deadly infections.   Discussion of screening - \"Screening\" means checking for diseases or other health problems before they cause symptoms. Your doctor can recommend screening based on your age, risk, and preferences. This might include tests to check for:   Cancer, such as breast, prostate, cervical, ovarian, colorectal, prostate, lung, or skin cancer   Sexually transmitted infections, such as chlamydia and gonorrhea   Mental health conditions like depression and anxiety  Your doctor will talk to you about the different types of screening tests. They can help you decide which screenings to have. They can also explain what the results might mean.   Answering questions - The physical is a good time to ask the doctor or nurse questions about your health. If needed, they can refer you to other doctors or specialists, too.  Adults older than 65 years often need other care, too. As you get older, your doctor will talk to you about:   How to prevent falling at " home   Hearing or vision tests   Memory testing   How to take your medicines safely   Making sure that you have the help and support you need at home  All topics are updated as new evidence becomes available and our peer review process is complete.  This topic retrieved from [employer] on: May 02, 2024.  Topic 811432 Version 1.0  Release: 32.4.3 - C32.122  © 2024 UpToDate, Inc. and/or its affiliates. All rights reserved.  Consumer Information Use and Disclaimer   Disclaimer: This generalized information is a limited summary of diagnosis, treatment, and/or medication information. It is not meant to be comprehensive and should be used as a tool to help the user understand and/or assess potential diagnostic and treatment options. It does NOT include all information about conditions, treatments, medications, side effects, or risks that may apply to a specific patient. It is not intended to be medical advice or a substitute for the medical advice, diagnosis, or treatment of a health care provider based on the health care provider's examination and assessment of a patient's specific and unique circumstances. Patients must speak with a health care provider for complete information about their health, medical questions, and treatment options, including any risks or benefits regarding use of medications. This information does not endorse any treatments or medications as safe, effective, or approved for treating a specific patient. UpToDate, Inc. and its affiliates disclaim any warranty or liability relating to this information or the use thereof.The use of this information is governed by the Terms of Use, available at https://www.woltersCytonicsuwer.com/en/know/clinical-effectiveness-terms. 2024© UpToDate, Inc. and its affiliates and/or licensors. All rights reserved.  Copyright   © 2024 UpToDate, Inc. and/or its affiliates. All rights reserved.

## 2025-01-14 NOTE — ASSESSMENT & PLAN NOTE
Annual physical exam completed, routine blood work has been ordered and I will follow-up pending results.  Patient is up-to-date on all preventative screenings including mammogram, colon cancer screening and cervical cancer screening.

## 2025-01-14 NOTE — ASSESSMENT & PLAN NOTE
Patient continues to lose weight, is down a total of 19 pounds since starting on semaglutide.  At this time given that patient does not have any adverse effects of medication we will increase dose to 1.7 mg q. weekly.  Encourage adequate hydration, discussed side effects and given return precautions.  Orders:  •  Semaglutide-Weight Management (Wegovy) 1.7 MG/0.75ML; Inject 1.7 mg under the skin weekly

## 2025-01-14 NOTE — PROGRESS NOTES
Adult Annual Physical  Name: Shelby Foster      : 1965      MRN: [id number]  Encounter Provider: Ree Vasquez DO  Encounter Date: 2025   Encounter department: [facility]    Assessment & Plan  Annual physical exam  Annual physical exam completed, routine blood work has been ordered and I will follow-up pending results.  Patient is up-to-date on all preventative screenings including mammogram, colon cancer screening and cervical cancer screening.       Lipid screening    Orders:  •  Lipid panel; Future    Class 1 obesity due to excess calories with serious comorbidity and body mass index (BMI) of 34.0 to 34.9 in adult  Patient continues to lose weight, is down a total of 19 pounds since starting on semaglutide.  At this time given that patient does not have any adverse effects of medication we will increase dose to 1.7 mg q. weekly.  Encourage adequate hydration, discussed side effects and given return precautions.  Orders:  •  Semaglutide-Weight Management (Wegovy) 1.7 MG/0.75ML; Inject 1.7 mg under the skin weekly    Flank pain  Patient reports vague right sided flank pain, denies any nausea, vomiting, urinary symptoms.  At this time we will check UA to evaluate for blood and microscopy.  Orders:  •  UA w Reflex to Microscopic w Reflex to Culture; Future      Immunizations and preventive care screenings were discussed with patient today. Appropriate education was printed on patient's after visit summary.    Counseling:  Alcohol/drug use: discussed moderation in alcohol intake, the recommendations for healthy alcohol use, and avoidance of illicit drug use.  Dental Health: discussed importance of regular tooth brushing, flossing, and dental visits.  Exercise: the importance of regular exercise/physical activity was discussed. Recommend exercise 3-5 times per week for at least 30 minutes.          History of Present Illness     Adult Annual Physical:  Patient  presents for annual physical.     Diet and Physical Activity:  - Diet/Nutrition: well balanced diet.  - Exercise: no formal exercise.    Depression Screening:  - PHQ-2 Score: 0    General Health:  - Sleep: sleeps well.  - Hearing: normal hearing right ear.  - Vision: most recent eye exam < 1 year ago. Previous Cataract surgery  - Dental: regular dental visits.    /GYN Health:  - Follows with GYN: yes.   - Menopause: postmenopausal.     Review of Systems   Constitutional:  Negative for chills and fever.   HENT:  Negative for congestion.    Respiratory:  Negative for cough and shortness of breath.    Cardiovascular:  Negative for chest pain and palpitations.   Gastrointestinal:  Negative for abdominal pain, constipation, diarrhea, nausea and vomiting.   Neurological:  Negative for dizziness and headaches.         Objective   /70 (BP Location: Left arm, Patient Position: Sitting, Cuff Size: Standard)   Pulse 79   Temp 98 °F (36.7 °C) (Tympanic)   Resp 16   Ht 6' (1.829 m)   Wt 116 kg (255 lb)   SpO2 99%   BMI 34.58 kg/m²     Physical Exam  Vitals reviewed.   Constitutional:       Appearance: She is obese.   HENT:      Head: Normocephalic and atraumatic.      Mouth/Throat:      Mouth: Mucous membranes are moist.      Pharynx: No oropharyngeal exudate or posterior oropharyngeal erythema.   Eyes:      Extraocular Movements: Extraocular movements intact.   Cardiovascular:      Rate and Rhythm: Normal rate and regular rhythm.      Heart sounds: Normal heart sounds.   Pulmonary:      Effort: Pulmonary effort is normal.      Breath sounds: Normal breath sounds.   Neurological:      General: No focal deficit present.      Mental Status: She is alert and oriented to person, place, and time.   Psychiatric:         Mood and Affect: Mood normal.         Behavior: Behavior normal.

## 2025-01-14 NOTE — ASSESSMENT & PLAN NOTE
Patient reports vague right sided flank pain, denies any nausea, vomiting, urinary symptoms.  At this time we will check UA to evaluate for blood and microscopy.  Orders:  •  UA w Reflex to Microscopic w Reflex to Culture; Future

## 2025-01-21 ENCOUNTER — TELEPHONE (OUTPATIENT)
Age: 60
End: 2025-01-21

## 2025-01-21 DIAGNOSIS — D22.9 ATYPICAL NEVI: Primary | ICD-10-CM

## 2025-01-21 NOTE — TELEPHONE ENCOUNTER
Patient called in requesting a referral to dermatology due to a mole on her lip that seems like it is getting bigger. Please advise.  
No

## 2025-01-23 DIAGNOSIS — L71.9 ROSACEA: Primary | ICD-10-CM

## 2025-01-23 RX ORDER — SULFACETAMIDE SODIUM 100 MG/ML
LOTION TOPICAL DAILY
Qty: 118 ML | Refills: 2 | Status: SHIPPED | OUTPATIENT
Start: 2025-01-23

## 2025-02-03 ENCOUNTER — HOSPITAL ENCOUNTER (EMERGENCY)
Facility: HOSPITAL | Age: 60
Discharge: HOME | End: 2025-02-03
Attending: EMERGENCY MEDICINE
Payer: COMMERCIAL

## 2025-02-03 VITALS
OXYGEN SATURATION: 97 % | HEART RATE: 95 BPM | DIASTOLIC BLOOD PRESSURE: 55 MMHG | SYSTOLIC BLOOD PRESSURE: 113 MMHG | RESPIRATION RATE: 20 BRPM | TEMPERATURE: 97.8 F

## 2025-02-03 DIAGNOSIS — T78.40XA ACUTE ALLERGIC REACTION, INITIAL ENCOUNTER: ICD-10-CM

## 2025-02-03 DIAGNOSIS — L50.9 URTICARIA: Primary | ICD-10-CM

## 2025-02-03 PROCEDURE — 99284 EMERGENCY DEPT VISIT MOD MDM: CPT | Performed by: EMERGENCY MEDICINE

## 2025-02-03 PROCEDURE — 99283 EMERGENCY DEPT VISIT LOW MDM: CPT

## 2025-02-03 PROCEDURE — 96372 THER/PROPH/DIAG INJ SC/IM: CPT

## 2025-02-03 RX ORDER — PREDNISONE 20 MG/1
60 TABLET ORAL ONCE
Status: COMPLETED | OUTPATIENT
Start: 2025-02-03 | End: 2025-02-03

## 2025-02-03 RX ORDER — TRIAMCINOLONE ACETONIDE 5 MG/G
CREAM TOPICAL 2 TIMES DAILY
Status: DISCONTINUED | OUTPATIENT
Start: 2025-02-03 | End: 2025-02-04 | Stop reason: HOSPADM

## 2025-02-03 RX ORDER — TRIAMCINOLONE ACETONIDE 5 MG/G
OINTMENT TOPICAL 2 TIMES DAILY
Qty: 15 G | Refills: 0 | Status: SHIPPED | OUTPATIENT
Start: 2025-02-03

## 2025-02-03 RX ORDER — DIPHENHYDRAMINE HYDROCHLORIDE 50 MG/ML
50 INJECTION INTRAMUSCULAR; INTRAVENOUS ONCE
Status: COMPLETED | OUTPATIENT
Start: 2025-02-03 | End: 2025-02-03

## 2025-02-03 RX ORDER — PREDNISONE 20 MG/1
60 TABLET ORAL DAILY
Qty: 9 TABLET | Refills: 0 | Status: SHIPPED | OUTPATIENT
Start: 2025-02-04 | End: 2025-02-07

## 2025-02-03 RX ADMIN — TRIAMCINOLONE ACETONIDE: 5 CREAM TOPICAL at 22:11

## 2025-02-03 RX ADMIN — PREDNISONE 60 MG: 20 TABLET ORAL at 21:58

## 2025-02-03 RX ADMIN — DIPHENHYDRAMINE HYDROCHLORIDE 50 MG: 50 INJECTION, SOLUTION INTRAMUSCULAR; INTRAVENOUS at 21:58

## 2025-02-04 ENCOUNTER — VBI (OUTPATIENT)
Dept: FAMILY MEDICINE CLINIC | Facility: CLINIC | Age: 60
End: 2025-02-04

## 2025-02-04 NOTE — ED PROVIDER NOTES
Time reflects when diagnosis was documented in both MDM as applicable and the Disposition within this note       Time User Action Codes Description Comment    2/3/2025  9:50 PM Erne, Gonzalez Add [T78.40XA] Acute allergic reaction, initial encounter     2/3/2025  9:50 PM Erne, Gonzalez Add [L50.9] Urticaria     2/3/2025  9:50 PM Erne, Gonzalez Modify [T78.40XA] Acute allergic reaction, initial encounter     2/3/2025  9:50 PM Erne, Gonzalez Modify [L50.9] Urticaria           ED Disposition       ED Disposition   Discharge    Condition   Stable    Date/Time   Mon Feb 3, 2025  9:50 PM    Comment   Shelby Foster discharge to home/self care.                   Assessment & Plan       Medical Decision Making  59-year-old female history of asthma presenting with concern for allergic reaction.  Rash consistent with urticaria.  Concerning for allergic reaction.  Plan for IM Benadryl and oral steroids plus topical steroid cream.  Prescription sent to pharmacy. Discussed results and recommendations. Advised follow up PCP. Medication recommendations. Given instructions and return precautions. Patient/family at bedside acknowledged understanding of all written and verbal instructions and return precautions. Discharged.     Risk  Prescription drug management.             Medications   predniSONE tablet 60 mg (has no administration in time range)   diphenhydrAMINE (BENADRYL) injection 50 mg (has no administration in time range)   triamcinolone (KENALOG) 0.5 % cream (has no administration in time range)       ED Risk Strat Scores                                              History of Present Illness       Chief Complaint   Patient presents with    Allergic Reaction     Pt states allergic reaction perhaps to eggs, felt nauseous one episode of vomiting, noticed hives all over body around 8pm. Doesn't feel throat tightness        Past Medical History:   Diagnosis Date    Abnormal Pap smear of cervix 2000's    Allergic rhinitis 2015    Deviated  "septum, ployps    Anesthesia     \"always causes Constipation and PONV\"\"    Asthma     Disease of thyroid gland     Environmental and seasonal allergies     Fibroid 2000's    Food allergy     Heart murmur     History of anemia     History of concussion 11/30/2022    sees neurologist yearly--\"due to tree falling on head\"    History of repair of hiatal hernia     History of rheumatic fever as a child     Hypothyroidism     Presence of dental bridge     left lower    Rosacea     Sleep apnea     mild    Varicella       Past Surgical History:   Procedure Laterality Date    ANKLE SURGERY Bilateral     APPENDECTOMY      CATARACT EXTRACTION Bilateral     CHOLECYSTECTOMY      COLONOSCOPY      DENTAL IMPLANT Right     lower    DILATION AND CURETTAGE OF UTERUS      ENDOMETRIAL ABLATION  2005    FINGER SURGERY      thumb    HERNIA REPAIR  11/2021    \"hiatal\"    MYOMECTOMY  2002,3,4    IA ARTHRP ACETBLR/PROX FEM PROSTC AGRFT/ALGRFT Left 7/1/2024    Procedure: ARTHROPLASTY HIP TOTAL ANTERIOR, LEFT, SAME DAY;  Surgeon: Stephen Lr MD;  Location: [facility];  Service: Orthopedics    SINUS SURGERY  2015    TEAR DUCT SURGERY      TONSILLECTOMY      WISDOM TOOTH EXTRACTION        Family History   Problem Relation Age of Onset    Breast cancer Mother     Miscarriages / Stillbirths Mother     COPD Mother       Social History     Tobacco Use    Smoking status: Never     Passive exposure: Never    Smokeless tobacco: Never   Vaping Use    Vaping status: Never Used   Substance Use Topics    Alcohol use: Never    Drug use: Never      E-Cigarette/Vaping    E-Cigarette Use Never User       E-Cigarette/Vaping Substances    Nicotine No     THC No     CBD No     Flavoring No     Other No     Unknown No       I have reviewed and agree with the history as documented.     59-year-old female history of asthma presenting with concern for allergic reaction.  Patient reports eating eggs this morning and feeling unwell shortly thereafter.  Reports " "she does not normally eat eggs.  Reports having 1 episode of vomiting and then later developing a generalized raised red itchy rash.  Denies any shortness of breath.  Denies any facial swelling or discomfort.  Denies any throat swelling.  Denies any chest pain.  Denies any other complaints.  Chart reviewed.    Past Medical History:  2000's: Abnormal Pap smear of cervix  2015: Allergic rhinitis      Comment:  Deviated septum, ployps  No date: Anesthesia      Comment:  \"always causes Constipation and PONV\"\"  No date: Asthma  No date: Disease of thyroid gland  No date: Environmental and seasonal allergies  2000's: Fibroid  No date: Food allergy  No date: Heart murmur  No date: History of anemia  11/30/2022: History of concussion      Comment:  sees neurologist yearly--\"due to tree falling on head\"  No date: History of repair of hiatal hernia  No date: History of rheumatic fever as a child  No date: Hypothyroidism  No date: Presence of dental bridge      Comment:  left lower  No date: Rosacea  No date: Sleep apnea      Comment:  mild  No date: Varicella  Family History: non-contributory  Social History          Review of Systems   Constitutional:  Negative for appetite change, chills, diaphoresis, fever and unexpected weight change.   HENT:  Negative for congestion and rhinorrhea.    Eyes:  Negative for photophobia and visual disturbance.   Respiratory:  Negative for cough, chest tightness and shortness of breath.    Cardiovascular:  Negative for chest pain, palpitations and leg swelling.   Gastrointestinal:  Positive for nausea and vomiting. Negative for abdominal distention, abdominal pain, blood in stool, constipation and diarrhea.   Genitourinary:  Negative for dysuria and hematuria.   Musculoskeletal:  Negative for back pain, joint swelling, neck pain and neck stiffness.   Skin:  Positive for rash. Negative for color change, pallor and wound.   Neurological:  Negative for dizziness, syncope, weakness, " light-headedness and headaches.   Psychiatric/Behavioral:  Negative for agitation.    All other systems reviewed and are negative.          Objective       ED Triage Vitals [02/03/25 2132]   Temperature Pulse Blood Pressure Respirations SpO2 Patient Position - Orthostatic VS   97.8 °F (36.6 °C) 95 113/55 20 97 % Sitting      Temp Source Heart Rate Source BP Location FiO2 (%) Pain Score    Temporal Monitor Left arm -- --      Vitals      Date and Time Temp Pulse SpO2 Resp BP Pain Score FACES Pain Rating User   02/03/25 2132 97.8 °F (36.6 °C) 95 97 % 20 113/55 -- -- KW            Physical Exam  Vitals and nursing note reviewed.   Constitutional:       General: She is not in acute distress.     Appearance: Normal appearance. She is well-developed. She is not ill-appearing, toxic-appearing or diaphoretic.   HENT:      Head: Normocephalic and atraumatic.      Nose: Nose normal. No congestion or rhinorrhea.      Mouth/Throat:      Mouth: Mucous membranes are moist.      Pharynx: Oropharynx is clear. No oropharyngeal exudate or posterior oropharyngeal erythema.   Eyes:      General: No scleral icterus.        Right eye: No discharge.         Left eye: No discharge.      Extraocular Movements: Extraocular movements intact.      Conjunctiva/sclera: Conjunctivae normal.      Pupils: Pupils are equal, round, and reactive to light.   Neck:      Vascular: No JVD.      Trachea: No tracheal deviation.      Comments: Supple. Normal range of motion.   Cardiovascular:      Rate and Rhythm: Normal rate and regular rhythm.      Heart sounds: Normal heart sounds. No murmur heard.     No friction rub. No gallop.      Comments: Normal rate and regular rhythm  Pulmonary:      Effort: Pulmonary effort is normal. No respiratory distress.      Breath sounds: Normal breath sounds. No stridor. No wheezing or rales.      Comments: Clear to auscultation bilaterally  Chest:      Chest wall: No tenderness.   Abdominal:      General: Bowel sounds  are normal. There is no distension.      Palpations: Abdomen is soft.      Tenderness: There is no abdominal tenderness. There is no right CVA tenderness, left CVA tenderness, guarding or rebound.      Comments: Soft, nontender, nondistended.  Normal bowel sounds throughout   Musculoskeletal:         General: No swelling, tenderness, deformity or signs of injury. Normal range of motion.      Cervical back: Normal range of motion and neck supple. No rigidity. No muscular tenderness.      Right lower leg: No edema.      Left lower leg: No edema.   Lymphadenopathy:      Cervical: No cervical adenopathy.   Skin:     General: Skin is warm and dry.      Coloration: Skin is not pale.      Findings: Erythema and rash present.      Comments: Raised maculopapular rash   Neurological:      General: No focal deficit present.      Mental Status: She is alert. Mental status is at baseline.      Sensory: No sensory deficit.      Motor: No weakness or abnormal muscle tone.      Coordination: Coordination normal.      Gait: Gait normal.      Comments: Alert.  Strength and sensation grossly intact.  Ambulatory without difficulty at baseline.    Psychiatric:         Behavior: Behavior normal.         Thought Content: Thought content normal.         Results Reviewed       None            No orders to display       Procedures    ED Medication and Procedure Management   Prior to Admission Medications   Prescriptions Last Dose Informant Patient Reported? Taking?   Albuterol-Budesonide 90-80 MCG/ACT AERO   No No   Sig: Inhale 2 Inhalations every 6 (six) hours as needed (shortness of breath or wheezing)   Aspirin-Acetaminophen-Caffeine (EXCEDRIN MIGRAINE PO)  Self Yes No   Sig: Take 1 tablet by mouth if needed   Fluticasone-Salmeterol (Advair) 100-50 mcg/dose inhaler   No No   Sig: Inhale 1 puff 2 (two) times a day Rinse mouth after use.   Melatonin 300 MCG TABS  Self Yes No   Sig: daily at bedtime as needed   Probiotic Product (ALIGN PO)    Yes No   Sig: Take by mouth   Semaglutide-Weight Management (Wegovy) 1.7 MG/0.75ML   No No   Sig: Inject 1.7 mg under the skin weekly   Sulfacetamide Sodium, Acne, 10 % LOTN   No No   Sig: Apply topically daily   Vitamin D, Cholecalciferol, 50 MCG ( [place]) CAPS  Self Yes No   Sig: Take 2 capsules by mouth daily   albuterol (Ventolin HFA) 90 mcg/act inhaler  Self No No   Sig: Inhale 2 puffs every 6 (six) hours as needed for wheezing   clobetasol (TEMOVATE) 0.05 % cream  Self Yes No   clotrimazole-betamethasone (LOTRISONE) 1-0.05 % cream  Self Yes No   ferrous sulfate 324 (65 Fe) mg  Self No No   Sig: Take 1 tablet (324 mg total) by mouth every other day   fluticasone (FLONASE) 50 mcg/act nasal spray   No No   Si spray into each nostril daily   levothyroxine (Synthroid) 175 mcg tablet   No No   Sig: Take 1 tablet by mouth daily Monday-Saturday and 1/2 tablet on    meloxicam (MOBIC) 15 mg tablet  Self No No   Sig: Take 1 tablet (15 mg total) by mouth daily as needed for moderate pain   methocarbamol (ROBAXIN) 500 mg tablet  Self No No   Sig: take 1 tablet by mouth for muscle spasm IN THE EVENING if needed ( MAY CAUSE SEDATION. )   Patient not taking: Reported on 2025   metroNIDAZOLE (METROCREAM) 0.75 % cream   No No   Sig: Apply topically daily   montelukast (SINGULAIR) 10 mg tablet  Self No No   Sig: Take 1 tablet (10 mg total) by mouth daily at bedtime   ondansetron (ZOFRAN-ODT) 4 mg disintegrating tablet  Self No No   Sig: Take 1 tablet (4 mg total) by mouth every 6 (six) hours as needed for nausea or vomiting   sodium chloride (OCEAN) 0.65 % nasal spray  Self Yes No   Si spray into each nostril daily at bedtime   terconazole (TERAZOL 7) 0.4 % vaginal cream  Self No No   Sig: Insert 1 applicator into the vagina daily at bedtime      Facility-Administered Medications: None     Patient's Medications   Discharge Prescriptions    PREDNISONE 20 MG TABLET    Take 3 tablets (60 mg total) by mouth daily  for 3 days Do not start before February 4, 2025.       Start Date: 2/4/2025  End Date: 2/7/2025       Order Dose: 60 mg       Quantity: 9 tablet    Refills: 0     No discharge procedures on file.  ED SEPSIS DOCUMENTATION   Time reflects when diagnosis was documented in both MDM as applicable and the Disposition within this note       Time User Action Codes Description Comment    2/3/2025  9:50 PM ErnGonzalez cerna Add [T78.40XA] Acute allergic reaction, initial encounter     2/3/2025  9:50 PM Gonzalez Tran Add [L50.9] Urticaria     2/3/2025  9:50 PM Gonzalez Tran Modify [T78.40XA] Acute allergic reaction, initial encounter     2/3/2025  9:50 PM Gonzalez Tran Modify [L50.9] Urticaria                  Gonzalez Tran MD  02/03/25 [id number]

## 2025-02-04 NOTE — TELEPHONE ENCOUNTER
02/04/25 9:18 AM    Patient contacted post ED visit, [facility] department spoke with patient/caregiver and outreach was successful.    Thank you.  Eileen Bailey MA  [facility]

## 2025-02-05 ENCOUNTER — TELEPHONE (OUTPATIENT)
Age: 60
End: 2025-02-05

## 2025-02-05 NOTE — TELEPHONE ENCOUNTER
Patient was seen in the ED on 2/3 and will need a ED follow up. Offered to schedule one but patient did not want to schedule one yet due to her being on steroids and needing labs done. Patient wants to know how many days after her last dose of steroids should she go get her labs done? Patient wants to make a follow up once she gets updated blood work done first. Last day of steroid is tomorrow 2/6. Please advise.

## 2025-02-05 NOTE — TELEPHONE ENCOUNTER
Patient informed and requested to schedule an appointment to review Er visit and lab results.  Scheduled for 2/25/25

## 2025-02-07 ENCOUNTER — HOSPITAL ENCOUNTER (EMERGENCY)
Facility: HOSPITAL | Age: 60
Discharge: HOME | End: 2025-02-07
Attending: EMERGENCY MEDICINE
Payer: COMMERCIAL

## 2025-02-07 VITALS
DIASTOLIC BLOOD PRESSURE: 60 MMHG | OXYGEN SATURATION: 99 % | HEART RATE: 77 BPM | RESPIRATION RATE: 20 BRPM | TEMPERATURE: 98.3 F | SYSTOLIC BLOOD PRESSURE: 129 MMHG

## 2025-02-07 DIAGNOSIS — L50.9 URTICARIA: ICD-10-CM

## 2025-02-07 DIAGNOSIS — T78.40XA ALLERGIC REACTION, INITIAL ENCOUNTER: Primary | ICD-10-CM

## 2025-02-07 LAB
ANION GAP SERPL CALCULATED.3IONS-SCNC: 10 MMOL/L (ref 4–13)
ATRIAL RATE: 88 BPM
BASOPHILS # BLD AUTO: 0.04 THOUSANDS/ΜL (ref 0–0.1)
BASOPHILS NFR BLD AUTO: 0 % (ref 0–1)
BUN SERPL-MCNC: 17 MG/DL (ref 5–25)
CALCIUM SERPL-MCNC: 9 MG/DL (ref 8.4–10.2)
CARDIAC TROPONIN I PNL SERPL HS: 3 NG/L (ref ?–50)
CHLORIDE SERPL-SCNC: 104 MMOL/L (ref 96–108)
CO2 SERPL-SCNC: 26 MMOL/L (ref 21–32)
CREAT SERPL-MCNC: 0.93 MG/DL (ref 0.6–1.3)
EOSINOPHIL # BLD AUTO: 0.16 THOUSAND/ΜL (ref 0–0.61)
EOSINOPHIL NFR BLD AUTO: 1 % (ref 0–6)
ERYTHROCYTE [DISTWIDTH] IN BLOOD BY AUTOMATED COUNT: 14.5 % (ref 11.6–15.1)
GFR SERPL CREATININE-BSD FRML MDRD: 66 ML/MIN/1.73SQ M
GLUCOSE SERPL-MCNC: 130 MG/DL (ref 65–140)
HCT VFR BLD AUTO: 44.5 % (ref 34.8–46.1)
HGB BLD-MCNC: 14.5 G/DL (ref 11.5–15.4)
IMM GRANULOCYTES # BLD AUTO: 0.06 THOUSAND/UL (ref 0–0.2)
IMM GRANULOCYTES NFR BLD AUTO: 0 % (ref 0–2)
LYMPHOCYTES # BLD AUTO: 2.93 THOUSANDS/ΜL (ref 0.6–4.47)
LYMPHOCYTES NFR BLD AUTO: 18 % (ref 14–44)
MCH RBC QN AUTO: 27.7 PG (ref 26.8–34.3)
MCHC RBC AUTO-ENTMCNC: 32.6 G/DL (ref 31.4–37.4)
MCV RBC AUTO: 85 FL (ref 82–98)
MONOCYTES # BLD AUTO: 1.23 THOUSAND/ΜL (ref 0.17–1.22)
MONOCYTES NFR BLD AUTO: 8 % (ref 4–12)
NEUTROPHILS # BLD AUTO: 11.65 THOUSANDS/ΜL (ref 1.85–7.62)
NEUTS SEG NFR BLD AUTO: 73 % (ref 43–75)
NRBC BLD AUTO-RTO: 0 /100 WBCS
P AXIS: 54 DEGREES
PLATELET # BLD AUTO: 365 THOUSANDS/UL (ref 149–390)
PMV BLD AUTO: 10.5 FL (ref 8.9–12.7)
POTASSIUM SERPL-SCNC: 3.1 MMOL/L (ref 3.5–5.3)
PR INTERVAL: 162 MS
QRS AXIS: 80 DEGREES
QRSD INTERVAL: 84 MS
QT INTERVAL: 370 MS
QTC INTERVAL: 447 MS
RBC # BLD AUTO: 5.24 MILLION/UL (ref 3.81–5.12)
SODIUM SERPL-SCNC: 140 MMOL/L (ref 135–147)
T WAVE AXIS: 64 DEGREES
VENTRICULAR RATE: 88 BPM
WBC # BLD AUTO: 16.07 THOUSAND/UL (ref 4.31–10.16)

## 2025-02-07 PROCEDURE — 93005 ELECTROCARDIOGRAM TRACING: CPT

## 2025-02-07 PROCEDURE — 99283 EMERGENCY DEPT VISIT LOW MDM: CPT

## 2025-02-07 PROCEDURE — 96375 TX/PRO/DX INJ NEW DRUG ADDON: CPT

## 2025-02-07 PROCEDURE — 93010 ELECTROCARDIOGRAM REPORT: CPT | Performed by: INTERNAL MEDICINE

## 2025-02-07 PROCEDURE — 84484 ASSAY OF TROPONIN QUANT: CPT | Performed by: EMERGENCY MEDICINE

## 2025-02-07 PROCEDURE — 36415 COLL VENOUS BLD VENIPUNCTURE: CPT | Performed by: EMERGENCY MEDICINE

## 2025-02-07 PROCEDURE — 96374 THER/PROPH/DIAG INJ IV PUSH: CPT

## 2025-02-07 PROCEDURE — 80048 BASIC METABOLIC PNL TOTAL CA: CPT | Performed by: EMERGENCY MEDICINE

## 2025-02-07 PROCEDURE — 99284 EMERGENCY DEPT VISIT MOD MDM: CPT | Performed by: EMERGENCY MEDICINE

## 2025-02-07 PROCEDURE — 85025 COMPLETE CBC W/AUTO DIFF WBC: CPT | Performed by: EMERGENCY MEDICINE

## 2025-02-07 RX ORDER — PREDNISONE 20 MG/1
40 TABLET ORAL DAILY
Qty: 10 TABLET | Refills: 0 | Status: SHIPPED | OUTPATIENT
Start: 2025-02-07 | End: 2025-02-12

## 2025-02-07 RX ORDER — DIPHENHYDRAMINE HCL 25 MG
25 TABLET ORAL EVERY 6 HOURS
Qty: 20 TABLET | Refills: 0 | Status: SHIPPED | OUTPATIENT
Start: 2025-02-07

## 2025-02-07 RX ORDER — METHYLPREDNISOLONE SODIUM SUCCINATE 125 MG/2ML
125 INJECTION, POWDER, LYOPHILIZED, FOR SOLUTION INTRAMUSCULAR; INTRAVENOUS ONCE
Status: COMPLETED | OUTPATIENT
Start: 2025-02-07 | End: 2025-02-07

## 2025-02-07 RX ORDER — FAMOTIDINE 10 MG/ML
20 INJECTION, SOLUTION INTRAVENOUS ONCE
Status: COMPLETED | OUTPATIENT
Start: 2025-02-07 | End: 2025-02-07

## 2025-02-07 RX ORDER — FAMOTIDINE 20 MG/1
20 TABLET, FILM COATED ORAL 2 TIMES DAILY
Qty: 10 TABLET | Refills: 0 | Status: SHIPPED | OUTPATIENT
Start: 2025-02-07 | End: 2025-02-12

## 2025-02-07 RX ORDER — DIPHENHYDRAMINE HYDROCHLORIDE 50 MG/ML
25 INJECTION INTRAMUSCULAR; INTRAVENOUS ONCE
Status: COMPLETED | OUTPATIENT
Start: 2025-02-07 | End: 2025-02-07

## 2025-02-07 RX ADMIN — FAMOTIDINE 20 MG: 10 INJECTION, SOLUTION INTRAVENOUS at 13:16

## 2025-02-07 RX ADMIN — DIPHENHYDRAMINE HYDROCHLORIDE 25 MG: 50 INJECTION, SOLUTION INTRAMUSCULAR; INTRAVENOUS at 13:16

## 2025-02-07 RX ADMIN — METHYLPREDNISOLONE SODIUM SUCCINATE 125 MG: 125 INJECTION, POWDER, FOR SOLUTION INTRAMUSCULAR; INTRAVENOUS at 13:16

## 2025-02-07 NOTE — ED PROVIDER NOTES
Time reflects when diagnosis was documented in both MDM as applicable and the Disposition within this note       Time User Action Codes Description Comment    2/7/2025  2:52 PM Edison Moreno [T78.40XA] Allergic reaction, initial encounter     2/7/2025  2:52 PM Edison Moreno [L50.9] Urticaria           ED Disposition       ED Disposition   Discharge    Condition   Stable    Date/Time   Fri Feb 7, 2025  2:52 PM    Comment   Shelby Foster discharge to home/self care.                   Assessment & Plan       Medical Decision Making  Amount and/or Complexity of Data Reviewed  Labs: ordered.    Risk  OTC drugs.  Prescription drug management.      Medical decision making 59-year-old female history of a recent food allergy eating an egg and developing nausea and vomiting diarrhea and urticaria.  She reports improvement and then today the rash has returned and she felt somewhat short of breath.  Patient improved markedly here with steroids with Benadryl and with Pepcid.  Discussed outpatient treatment with steroids and antihistamines.  Discussed indications to return.  Discussed follow-up.       Medications   methylPREDNISolone sodium succinate (Solu-MEDROL) injection 125 mg (125 mg Intravenous Given 2/7/25 1316)   diphenhydrAMINE (BENADRYL) injection 25 mg (25 mg Intravenous Given 2/7/25 1316)   Famotidine (PF) (PEPCID) injection 20 mg (20 mg Intravenous Given 2/7/25 1316)       ED Risk Strat Scores            Because of the sense of shortness of breath some laboratory testing was done her electrolytes were within normal limits other than a potassium of 3.1 I discussed with the patient, white count was elevated 16,000 the patient is on steroids hemoglobin is 14 no sign of anemia.  Cardiac troponin was negative no sign of cardiac ischemia.  Patient reports marked improvement of her symptomatology with IV steroids IV antihistamines.  Her facial rash was completely resolved when I reevaluated her.  There was no  "more shortness of breath there was resolving urticaria on her thorax.                                  History of Present Illness       Chief Complaint   Patient presents with    Rash     Pt seen here on Monday for same, placed on steroids; now reports SOB as well. Itching has increased.        Past Medical History:   Diagnosis Date    Abnormal Pap smear of cervix 2000's    Allergic rhinitis 2015    Deviated septum, ployps    Anesthesia     \"always causes Constipation and PONV\"\"    Asthma     Disease of thyroid gland     Environmental and seasonal allergies     Fibroid 2000's    Food allergy     Heart murmur     History of anemia     History of concussion 11/30/2022    sees neurologist yearly--\"due to tree falling on head\"    History of repair of hiatal hernia     History of rheumatic fever as a child     Hypothyroidism     Presence of dental bridge     left lower    Rosacea     Sleep apnea     mild    Varicella       Past Surgical History:   Procedure Laterality Date    ANKLE SURGERY Bilateral     APPENDECTOMY      CATARACT EXTRACTION Bilateral     CHOLECYSTECTOMY      COLONOSCOPY      DENTAL IMPLANT Right     lower    DILATION AND CURETTAGE OF UTERUS      ENDOMETRIAL ABLATION  2005    FINGER SURGERY      thumb    HERNIA REPAIR  11/2021    \"hiatal\"    MYOMECTOMY  2002,3,4    OH ARTHRP ACETBLR/PROX FEM PROSTC AGRFT/ALGRFT Left 7/1/2024    Procedure: ARTHROPLASTY HIP TOTAL ANTERIOR, LEFT, SAME DAY;  Surgeon: Stephen Lr MD;  Location: [facility];  Service: Orthopedics    SINUS SURGERY  2015    TEAR DUCT SURGERY      TONSILLECTOMY      WISDOM TOOTH EXTRACTION        Family History   Problem Relation Age of Onset    Breast cancer Mother     Miscarriages / Stillbirths Mother     COPD Mother       Social History     Tobacco Use    Smoking status: Never     Passive exposure: Never    Smokeless tobacco: Never   Vaping Use    Vaping status: Never Used   Substance Use Topics    Alcohol use: Never    Drug use: Never    "   E-Cigarette/Vaping    E-Cigarette Use Never User       E-Cigarette/Vaping Substances    Nicotine No     THC No     CBD No     Flavoring No     Other No     Unknown No       I have reviewed and agree with the history as documented.     HPI patient is a 59-year-old female she reports that she ate an egg and apparently has some issues with an egg allergy on Monday.  Apparently developed severe vomiting diarrhea and a rash at that time.  Patient was seen here in the emergency department for urticaria treated with steroids antihistamines.  She reports some improvement but then today reports severe return of the rash.  Patient reports diffuse rash all over her body which is raised red and itchy.  She presents with significant urticaria.  She reports a sense of feeling like she has some difficulty breathing with the episode.  She denies any acute shortness of breath.  She is in no airway distress.  She denies any vomiting or diarrhea that seems to have resolved.  Past medical history of asthma, food allergies, anemia  Family history noncontributory  Social history non-smoker no history of drug abuse    Review of Systems   Constitutional:  Negative for fever.   HENT:  Negative for congestion.    Eyes:  Negative for pain and redness.   Respiratory:  Positive for shortness of breath. Negative for cough.    Cardiovascular:  Negative for chest pain.   Gastrointestinal:  Negative for abdominal pain and vomiting.   Skin:  Positive for rash.           Objective       ED Triage Vitals [02/07/25 1245]   Temperature Pulse Blood Pressure Respirations SpO2 Patient Position - Orthostatic VS   98.3 °F (36.8 °C) (!) 108 112/70 20 96 % Sitting      Temp Source Heart Rate Source BP Location FiO2 (%) Pain Score    Oral Monitor Left arm -- --      Vitals      Date and Time Temp Pulse SpO2 Resp BP Pain Score FACES Pain Rating User   02/07/25 1400 -- 77 99 % 20 129/60 -- -- FH   02/07/25 1245 98.3 °F (36.8 °C) 108 96 % 20 112/70 -- -- LF             Physical Exam  Vitals and nursing note reviewed.   Constitutional:       Appearance: She is well-developed.   HENT:      Head: Normocephalic.      Right Ear: External ear normal.      Left Ear: External ear normal.      Nose: Nose normal.      Mouth/Throat:      Mouth: Mucous membranes are moist.      Pharynx: Oropharynx is clear.      Comments: There is no intraoral swelling.  Eyes:      General: Lids are normal.      Extraocular Movements: Extraocular movements intact.      Pupils: Pupils are equal, round, and reactive to light.   Cardiovascular:      Rate and Rhythm: Normal rate and regular rhythm.      Pulses: Normal pulses.      Heart sounds: Normal heart sounds.   Pulmonary:      Effort: Pulmonary effort is normal. No respiratory distress.      Breath sounds: Normal breath sounds. No wheezing.   Abdominal:      Tenderness: There is no abdominal tenderness.   Musculoskeletal:         General: No deformity. Normal range of motion.      Cervical back: Normal range of motion and neck supple.   Skin:     General: Skin is warm and dry.      Findings: Rash present.      Comments: There is significant diffuse urticaria all over the patient's body her face and head are essentially red and swollen.  She has no significant airway involvement but has diffuse rash all over her body chest extremities her entire back.  The rash is raised and easily blanches with pressure.   Neurological:      Mental Status: She is alert and oriented to person, place, and time.   Psychiatric:         Mood and Affect: Mood normal.         Results Reviewed       Procedure Component Value Units Date/Time    HS Troponin 0hr (reflex protocol) [[id number]]  (Normal) Collected: 02/07/25 1315    Lab Status: Final result Specimen: Blood from Arm, Right Updated: 02/07/25 1341     hs TnI 0hr 3 ng/L     Basic metabolic panel [[id number]]  (Abnormal) Collected: 02/07/25 1315    Lab Status: Final result Specimen: Blood from Arm, Right Updated: 02/07/25  1333     Sodium 140 mmol/L      Potassium 3.1 mmol/L      Chloride 104 mmol/L      CO2 26 mmol/L      ANION GAP 10 mmol/L      BUN 17 mg/dL      Creatinine 0.93 mg/dL      Glucose 130 mg/dL      Calcium 9.0 mg/dL      eGFR 66 ml/min/1.73sq m     Narrative:      [employer] Kid[employer] guidelines for Chronic Kidney Disease (CKD):     Stage 1 with normal or high GFR (GFR > 90 mL/min/1.73 square meters)    Stage 2 Mild CKD (GFR = 60-89 mL/min/1.73 square meters)    Stage 3A Moderate CKD (GFR = 45-59 mL/min/1.73 square meters)    Stage 3B Moderate CKD (GFR = 30-44 mL/min/1.73 square meters)    Stage 4 Severe CKD (GFR = 15-29 mL/min/1.73 square meters)    Stage 5 End Stage CKD (GFR <15 mL/min/1.73 square meters)  Note: GFR calculation is accurate only with a steady state creatinine    CBC and differential [[id number]]  (Abnormal) Collected: 02/07/25 1315    Lab Status: Final result Specimen: Blood from Arm, Right Updated: 02/07/25 1320     WBC 16.07 Thousand/uL      RBC 5.24 Million/uL      Hemoglobin 14.5 g/dL      Hematocrit 44.5 %      MCV 85 fL      MCH 27.7 pg      MCHC 32.6 g/dL      RDW 14.5 %      MPV 10.5 fL      Platelets 365 Thousands/uL      nRBC 0 /100 WBCs      Segmented % 73 %      Immature Grans % 0 %      Lymphocytes % 18 %      Monocytes % 8 %      Eosinophils Relative 1 %      Basophils Relative 0 %      Absolute Neutrophils 11.65 Thousands/µL      Absolute Immature Grans 0.06 Thousand/uL      Absolute Lymphocytes 2.93 Thousands/µL      Absolute Monocytes 1.23 Thousand/µL      Eosinophils Absolute 0.16 Thousand/µL      Basophils Absolute 0.04 Thousands/µL             No orders to display       Procedures    ED Medication and Procedure Management   Prior to Admission Medications   Prescriptions Last Dose Informant Patient Reported? Taking?   Albuterol-Budesonide 90-80 MCG/ACT AERO   No No   Sig: Inhale 2 Inhalations every 6 (six) hours as needed (shortness of breath or wheezing)    Aspirin-Acetaminophen-Caffeine (EXCEDRIN MIGRAINE PO)  Self Yes No   Sig: Take 1 tablet by mouth if needed   Fluticasone-Salmeterol (Advair) 100-50 mcg/dose inhaler   No No   Sig: Inhale 1 puff 2 (two) times a day Rinse mouth after use.   Melatonin 300 MCG TABS  Self Yes No   Sig: daily at bedtime as needed   Probiotic Product (ALIGN PO)   Yes No   Sig: Take by mouth   Semaglutide-Weight Management (Wegovy) 1.7 MG/0.75ML   No No   Sig: Inject 1.7 mg under the skin weekly   Sulfacetamide Sodium, Acne, 10 % LOTN   No No   Sig: Apply topically daily   Vitamin D, Cholecalciferol, 50 MCG (2000 UT) CAPS  Self Yes No   Sig: Take 2 capsules by mouth daily   albuterol (Ventolin HFA) 90 mcg/act inhaler  Self No No   Sig: Inhale 2 puffs every 6 (six) hours as needed for wheezing   clobetasol (TEMOVATE) 0.05 % cream  Self Yes No   clotrimazole-betamethasone (LOTRISONE) 1-0.05 % cream  Self Yes No   ferrous sulfate 324 (65 Fe) mg  Self No No   Sig: Take 1 tablet (324 mg total) by mouth every other day   fluticasone (FLONASE) 50 mcg/act nasal spray   No No   Si spray into each nostril daily   levothyroxine (Synthroid) 175 mcg tablet   No No   Sig: Take 1 tablet by mouth daily Monday-Saturday and 1/2 tablet on    meloxicam (MOBIC) 15 mg tablet  Self No No   Sig: Take 1 tablet (15 mg total) by mouth daily as needed for moderate pain   methocarbamol (ROBAXIN) 500 mg tablet  Self No No   Sig: take 1 tablet by mouth for muscle spasm IN THE EVENING if needed ( MAY CAUSE SEDATION. )   Patient not taking: Reported on 2025   metroNIDAZOLE (METROCREAM) 0.75 % cream   No No   Sig: Apply topically daily   montelukast (SINGULAIR) 10 mg tablet  Self No No   Sig: Take 1 tablet (10 mg total) by mouth daily at bedtime   ondansetron (ZOFRAN-ODT) 4 mg disintegrating tablet  Self No No   Sig: Take 1 tablet (4 mg total) by mouth every 6 (six) hours as needed for nausea or vomiting   predniSONE 20 mg tablet   No No   Sig: Take 3  tablets (60 mg total) by mouth daily for 3 days Do not start before 2025.   sodium chloride (OCEAN) 0.65 % nasal spray  Self Yes No   Si spray into each nostril daily at bedtime   terconazole (TERAZOL 7) 0.4 % vaginal cream  Self No No   Sig: Insert 1 applicator into the vagina daily at bedtime   triamcinolone (KENALOG) 0.5 % ointment   No No   Sig: Apply topically 2 (two) times a day      Facility-Administered Medications: None     Discharge Medication List as of 2025  2:55 PM        START taking these medications    Details   diphenhydrAMINE (BENADRYL) 25 mg tablet Take 1 tablet (25 mg total) by mouth every 6 (six) hours, Starting 2025, Normal      famotidine (PEPCID) 20 mg tablet Take 1 tablet (20 mg total) by mouth 2 (two) times a day for 5 days, Starting 2025, Until 2025, Normal      !! predniSONE 20 mg tablet Take 2 tablets (40 mg total) by mouth daily for 5 days, Starting 2025, Until 2025, Normal       !! - Potential duplicate medications found. Please discuss with provider.        CONTINUE these medications which have NOT CHANGED    Details   albuterol (Ventolin HFA) 90 mcg/act inhaler Inhale 2 puffs every 6 (six) hours as needed for wheezing, Starting Mon 6/3/2024, Normal      Albuterol-Budesonide 90-80 MCG/ACT AERO Inhale 2 Inhalations every 6 (six) hours as needed (shortness of breath or wheezing), Starting 2024, Print      Aspirin-Acetaminophen-Caffeine (EXCEDRIN MIGRAINE PO) Take 1 tablet by mouth if needed, Historical Med      clobetasol (TEMOVATE) 0.05 % cream Starting Wed 10/26/2022, Historical Med      clotrimazole-betamethasone (LOTRISONE) 1-0.05 % cream Starting 2023, Historical Med      ferrous sulfate 324 (65 Fe) mg Take 1 tablet (324 mg total) by mouth every other day, Starting 2024, Until 2024, Normal      fluticasone (FLONASE) 50 mcg/act nasal spray 1 spray into each nostril daily, Starting Mon  11/25/2024, Normal      Fluticasone-Salmeterol (Advair) 100-50 mcg/dose inhaler Inhale 1 puff 2 (two) times a day Rinse mouth after use., Starting Mon 11/25/2024, Until Thu 11/20/2025, Normal      levothyroxine (Synthroid) 175 mcg tablet Take 1 tablet by mouth daily Monday-Saturday and 1/2 tablet on Sunday, Fill Later      Melatonin 300 MCG TABS daily at bedtime as needed, Historical Med      meloxicam (MOBIC) 15 mg tablet Take 1 tablet (15 mg total) by mouth daily as needed for moderate pain, Starting Tue 3/5/2024, Normal      methocarbamol (ROBAXIN) 500 mg tablet take 1 tablet by mouth for muscle spasm IN THE EVENING if needed ( MAY CAUSE SEDATION. ), Normal      metroNIDAZOLE (METROCREAM) 0.75 % cream Apply topically daily, Starting Thu 1/23/2025, Normal      montelukast (SINGULAIR) 10 mg tablet Take 1 tablet (10 mg total) by mouth daily at bedtime, Starting Mon 6/3/2024, Normal      ondansetron (ZOFRAN-ODT) 4 mg disintegrating tablet Take 1 tablet (4 mg total) by mouth every 6 (six) hours as needed for nausea or vomiting, Starting Fri 1/6/2023, Normal      !! predniSONE 20 mg tablet Take 3 tablets (60 mg total) by mouth daily for 3 days Do not start before February 4, 2025., Starting Tue 2/4/2025, Until Fri 2/7/2025, Normal      Probiotic Product (ALIGN PO) Take by mouth, Historical Med      Semaglutide-Weight Management (Wegovy) 1.7 MG/0.75ML Inject 1.7 mg under the skin weekly, Normal      sodium chloride (OCEAN) 0.65 % nasal spray 1 spray into each nostril daily at bedtime, Historical Med      Sulfacetamide Sodium, Acne, 10 % LOTN Apply topically daily, Starting Thu 1/23/2025, Normal      terconazole (TERAZOL 7) 0.4 % vaginal cream Insert 1 applicator into the vagina daily at bedtime, Starting Fri 8/18/2023, Normal      triamcinolone (KENALOG) 0.5 % ointment Apply topically 2 (two) times a day, Starting Mon 2/3/2025, Normal      Vitamin D, Cholecalciferol, 50 MCG (2000 UT) CAPS Take 2 capsules by mouth daily,  Historical Med       !! - Potential duplicate medications found. Please discuss with provider.        No discharge procedures on file.  ED SEPSIS DOCUMENTATION   Time reflects when diagnosis was documented in both MDM as applicable and the Disposition within this note       Time User Action Codes Description Comment    2/7/2025  2:52 PM Edison Moreno Add [T78.40XA] Allergic reaction, initial encounter     2/7/2025  2:52 PM Edison Moreno [L50.9] Urticaria                  Edison Moreno MD  02/08/25 0920

## 2025-02-07 NOTE — DISCHARGE INSTRUCTIONS
Prednisone daily for the next 5 days  Benadryl every 6 hours if needed for itching  Pepcid twice daily to reduce rash  Follow-up with your provider or allergist or return worsening symptoms

## 2025-02-09 ENCOUNTER — HOSPITAL ENCOUNTER (EMERGENCY)
Facility: HOSPITAL | Age: 60
Discharge: HOME | End: 2025-02-09
Payer: COMMERCIAL

## 2025-02-09 VITALS
RESPIRATION RATE: 18 BRPM | DIASTOLIC BLOOD PRESSURE: 72 MMHG | SYSTOLIC BLOOD PRESSURE: 159 MMHG | HEIGHT: 72 IN | TEMPERATURE: 98.5 F | OXYGEN SATURATION: 97 % | HEART RATE: 102 BPM | BODY MASS INDEX: 33.18 KG/M2 | WEIGHT: 244.93 LBS

## 2025-02-09 DIAGNOSIS — R03.0 ELEVATED BLOOD PRESSURE READING: ICD-10-CM

## 2025-02-09 DIAGNOSIS — L50.9 URTICARIA: Primary | ICD-10-CM

## 2025-02-09 PROCEDURE — 99283 EMERGENCY DEPT VISIT LOW MDM: CPT

## 2025-02-09 PROCEDURE — 96374 THER/PROPH/DIAG INJ IV PUSH: CPT

## 2025-02-09 PROCEDURE — 99284 EMERGENCY DEPT VISIT MOD MDM: CPT

## 2025-02-09 PROCEDURE — 96375 TX/PRO/DX INJ NEW DRUG ADDON: CPT

## 2025-02-09 RX ORDER — METHYLPREDNISOLONE SODIUM SUCCINATE 125 MG/2ML
125 INJECTION, POWDER, LYOPHILIZED, FOR SOLUTION INTRAMUSCULAR; INTRAVENOUS ONCE
Status: COMPLETED | OUTPATIENT
Start: 2025-02-09 | End: 2025-02-09

## 2025-02-09 RX ORDER — CETIRIZINE HYDROCHLORIDE 10 MG/1
20 TABLET ORAL 2 TIMES DAILY
Qty: 28 TABLET | Refills: 0 | Status: SHIPPED | OUTPATIENT
Start: 2025-02-09 | End: 2025-02-13 | Stop reason: SDUPTHER

## 2025-02-09 RX ORDER — DIPHENHYDRAMINE HYDROCHLORIDE 50 MG/ML
25 INJECTION INTRAMUSCULAR; INTRAVENOUS ONCE
Status: COMPLETED | OUTPATIENT
Start: 2025-02-09 | End: 2025-02-09

## 2025-02-09 RX ORDER — PREDNISONE 10 MG/1
TABLET ORAL
Qty: 43 TABLET | Refills: 0 | Status: SHIPPED | OUTPATIENT
Start: 2025-02-10 | End: 2025-02-09

## 2025-02-09 RX ORDER — FAMOTIDINE 10 MG/ML
20 INJECTION, SOLUTION INTRAVENOUS ONCE
Status: COMPLETED | OUTPATIENT
Start: 2025-02-09 | End: 2025-02-09

## 2025-02-09 RX ORDER — PREDNISONE 10 MG/1
TABLET ORAL
Qty: 43 TABLET | Refills: 0 | Status: SHIPPED | OUTPATIENT
Start: 2025-02-10 | End: 2025-02-24

## 2025-02-09 RX ADMIN — METHYLPREDNISOLONE SODIUM SUCCINATE 125 MG: 125 INJECTION, POWDER, FOR SOLUTION INTRAMUSCULAR; INTRAVENOUS at 15:26

## 2025-02-09 RX ADMIN — FAMOTIDINE 20 MG: 10 INJECTION, SOLUTION INTRAVENOUS at 15:26

## 2025-02-09 RX ADMIN — DIPHENHYDRAMINE HYDROCHLORIDE 25 MG: 50 INJECTION, SOLUTION INTRAMUSCULAR; INTRAVENOUS at 15:26

## 2025-02-09 NOTE — DISCHARGE INSTRUCTIONS
Return immediately to the ER for new or worsening symptoms including worsening rash, abdominal pain, nausea, vomiting, difficulty breathing.  Follow-up with your primary care physician and dermatology.

## 2025-02-09 NOTE — ED PROVIDER NOTES
Time reflects when diagnosis was documented in both MDM as applicable and the Disposition within this note       Time User Action Codes Description Comment    2/9/2025  3:53 PM Bienvenido Recinos Add [L50.9] Urticaria     2/9/2025  3:53 PM Bienvenido Recinos Add [R03.0] Elevated blood pressure reading           ED Disposition       ED Disposition   Discharge    Condition   Stable    Date/Time   Sun Feb 9, 2025  3:53 PM    Comment   Shelby Foster discharge to home/self care.                   Assessment & Plan       Medical Decision Making  59y F p/w diffuse urticarial rash    Differential includes allergic reaction, spontaneous urticaria    D/w derm in real time as this is third visit for the same, recommend 20 mg zyrtec BID, prednisone taper    Plan symptomatic treatment in ED    Patient presents without signs or symptoms of anaphylaxis.  Otherwise well-appearing.  After IV cocktail of Benadryl, Pepcid, steroids patient notes marked improvement.  After discussion with dermatology will recommend Zyrtec 20 mg twice daily.  She can begin this tonight and prescription was sent in for her.  Additionally will place patient on prednisone taper beginning tomorrow.  This was all relayed to patient.  Referral was placed for dermatology, Derm on-call reports they will call patient for scheduling.  Office address given to patient with called information.  Return precautions given to patient, patient verbalized understanding.  Patient discharged in good condition.    Risk  OTC drugs.  Prescription drug management.        ED Course as of 02/09/25 1642   Sun Feb 09, 2025   1517 Discussed case with dermatology as this is third visit.  Will end up placing patient on Zyrtec twice daily and prednisone taper.  In the meantime we will pursue symptomatic relief prior to d/c       Medications   methylPREDNISolone sodium succinate (Solu-MEDROL) injection 125 mg (125 mg Intravenous Given 2/9/25 1526)   diphenhydrAMINE (BENADRYL)  "injection 25 mg (25 mg Intravenous Given 2/9/25 1526)   Famotidine (PF) (PEPCID) injection 20 mg (20 mg Intravenous Given 2/9/25 1526)       ED Risk Strat Scores                          SBIRT 20yo+      Flowsheet Row Most Recent Value   Initial Alcohol Screen: US AUDIT-C     1. How often do you have a drink containing alcohol? 0 Filed at: 02/09/2025 0134   2. How many drinks containing alcohol do you have on a typical day you are drinking?  0 Filed at: 02/09/2025 0035   3b. FEMALE Any Age, or MALE 65+: How often do you have 4 or more drinks on one occassion? 0 Filed at: 02/09/2025 5905   Audit-C Score 0 Filed at: 02/09/2025 0995   SHAWN: How many times in the past year have you...    Used an illegal drug or used a prescription medication for non-medical reasons? Never Filed at: 02/09/2025 5786                            History of Present Illness       Chief Complaint   Patient presents with    Rash     Pt c/o \"generalized itchy red rash on body x 1hr after having a BM. States this visit being the third visit this week for the same sx.\"       Past Medical History:   Diagnosis Date    Abnormal Pap smear of cervix 2000's    Allergic rhinitis 2015    Deviated septum, ployps    Anesthesia     \"always causes Constipation and PONV\"\"    Asthma     Disease of thyroid gland     Environmental and seasonal allergies     Fibroid 2000's    Food allergy     Heart murmur     History of anemia     History of concussion 11/30/2022    sees neurologist yearly--\"due to tree falling on head\"    History of repair of hiatal hernia     History of rheumatic fever as a child     Hypothyroidism     Presence of dental bridge     left lower    Rosacea     Sleep apnea     mild    Varicella       Past Surgical History:   Procedure Laterality Date    ANKLE SURGERY Bilateral     APPENDECTOMY      CATARACT EXTRACTION Bilateral     CHOLECYSTECTOMY      COLONOSCOPY      DENTAL IMPLANT Right     lower    DILATION AND CURETTAGE OF UTERUS      " "ENDOMETRIAL ABLATION  2005    FINGER SURGERY      thumb    HERNIA REPAIR  11/2021    \"hiatal\"    MYOMECTOMY  2002,3,4    OH ARTHRP ACETBLR/PROX FEM PROSTC AGRFT/ALGRFT Left 7/1/2024    Procedure: ARTHROPLASTY HIP TOTAL ANTERIOR, LEFT, SAME DAY;  Surgeon: Stephen Lr MD;  Location:  MAIN OR;  Service: Orthopedics    SINUS SURGERY  2015    TEAR DUCT SURGERY      TONSILLECTOMY      WISDOM TOOTH EXTRACTION        Family History   Problem Relation Age of Onset    Breast cancer Mother     Miscarriages / Stillbirths Mother     COPD Mother       Social History     Tobacco Use    Smoking status: Never     Passive exposure: Never    Smokeless tobacco: Never   Vaping Use    Vaping status: Never Used   Substance Use Topics    Alcohol use: Never    Drug use: Never      E-Cigarette/Vaping    E-Cigarette Use Never User       E-Cigarette/Vaping Substances    Nicotine No     THC No     CBD No     Flavoring No     Other No     Unknown No       I have reviewed and agree with the history as documented.     Patient is a 59-year-old female with a past medical history significant for asthma, environmental and seasonal allergies, anemia, hypothyroidism presenting to the emergency department for evaluation of urticarial rash.  Patient states that she was seen here on Monday.  She reports she ate an egg in the morning.  In the evening she had a bowel movement and subsequently developed hives all over her body.  She presented to the emergency department at that time received prednisone, Benadryl, steroid cream.  She took the prednisone Monday, Tuesday, Wednesday, Thursday.  On Friday she no longer took the prednisone went to have a bowel movement and subsequently developed hives again.  She was seen in the emergency department with a CBC demonstrating leukocytosis.  She received methylprednisolone, Benadryl, Pepcid with improvement in her rash.  She took her prednisone yesterday and today.  This afternoon she had a bowel movement, " developed extensive itching.        Review of Systems   Constitutional:  Negative for chills and fever.   HENT:  Negative for ear pain and sore throat.    Eyes:  Negative for pain and visual disturbance.   Respiratory:  Negative for cough and shortness of breath.    Cardiovascular:  Negative for chest pain and palpitations.   Gastrointestinal:  Negative for abdominal pain and vomiting.   Genitourinary:  Negative for dysuria and hematuria.   Musculoskeletal:  Negative for arthralgias and back pain.   Skin:  Positive for rash. Negative for color change.   Neurological:  Negative for seizures and syncope.   All other systems reviewed and are negative.          Objective       ED Triage Vitals [02/09/25 1335]   Temperature Pulse Blood Pressure Respirations SpO2 Patient Position - Orthostatic VS   98.5 °F (36.9 °C) 102 159/72 18 97 % Sitting      Temp Source Heart Rate Source BP Location FiO2 (%) Pain Score    Temporal Monitor Left arm -- --      Vitals      Date and Time Temp Pulse SpO2 Resp BP Pain Score FACES Pain Rating User   02/09/25 1335 98.5 °F (36.9 °C) 102 97 % 18 159/72 -- -- MS            Physical Exam  Vitals and nursing note reviewed.   Constitutional:       General: She is not in acute distress.     Appearance: Normal appearance. She is not ill-appearing, toxic-appearing or diaphoretic.      Comments: Well-appearing female sitting in no acute distress   HENT:      Head: Normocephalic and atraumatic.      Mouth/Throat:      Mouth: Mucous membranes are moist.      Pharynx: Oropharynx is clear.      Comments: No intraoral swelling.  No intraoral lesions.  Eyes:      General: No scleral icterus.        Right eye: No discharge.         Left eye: No discharge.      Extraocular Movements: Extraocular movements intact.      Conjunctiva/sclera: Conjunctivae normal.   Cardiovascular:      Rate and Rhythm: Normal rate.      Pulses: Normal pulses.      Heart sounds: Normal heart sounds. No murmur heard.     No  friction rub. No gallop.      Comments: 2+ radial pulse.  Pulmonary:      Effort: Pulmonary effort is normal. No respiratory distress.      Breath sounds: Normal breath sounds. No stridor. No wheezing, rhonchi or rales.      Comments: Lungs clear to auscultation, no wheezing, rales, rhonchi  Abdominal:      General: Abdomen is flat. Bowel sounds are normal. There is no distension.      Palpations: Abdomen is soft.      Tenderness: There is no abdominal tenderness. There is no guarding or rebound.      Comments: Abdomen soft, no rigidity, guarding, rebound   Musculoskeletal:         General: No swelling. Normal range of motion.      Cervical back: Normal range of motion. No rigidity.      Right lower leg: No edema.      Left lower leg: No edema.   Skin:     General: Skin is warm and dry.      Capillary Refill: Capillary refill takes less than 2 seconds.      Coloration: Skin is not jaundiced.      Findings: Rash present. No bruising or lesion.      Comments: Urticarial rash on extremities, trunk   Neurological:      General: No focal deficit present.      Mental Status: She is alert and oriented to person, place, and time. Mental status is at baseline.   Psychiatric:         Mood and Affect: Mood normal.         Behavior: Behavior normal.         Thought Content: Thought content normal.         Judgment: Judgment normal.                       Results Reviewed       None            No orders to display       Procedures    ED Medication and Procedure Management   Prior to Admission Medications   Prescriptions Last Dose Informant Patient Reported? Taking?   Albuterol-Budesonide 90-80 MCG/ACT AERO   No No   Sig: Inhale 2 Inhalations every 6 (six) hours as needed (shortness of breath or wheezing)   Aspirin-Acetaminophen-Caffeine (EXCEDRIN MIGRAINE PO)  Self Yes No   Sig: Take 1 tablet by mouth if needed   Fluticasone-Salmeterol (Advair) 100-50 mcg/dose inhaler   No No   Sig: Inhale 1 puff 2 (two) times a day Rinse mouth  after use.   Melatonin 300 MCG TABS  Self Yes No   Sig: daily at bedtime as needed   Probiotic Product (ALIGN PO)   Yes No   Sig: Take by mouth   Semaglutide-Weight Management (Wegovy) 1.7 MG/0.75ML   No No   Sig: Inject 1.7 mg under the skin weekly   Sulfacetamide Sodium, Acne, 10 % LOTN   No No   Sig: Apply topically daily   Vitamin D, Cholecalciferol, 50 MCG (2000 UT) CAPS  Self Yes No   Sig: Take 2 capsules by mouth daily   albuterol (Ventolin HFA) 90 mcg/act inhaler  Self No No   Sig: Inhale 2 puffs every 6 (six) hours as needed for wheezing   clobetasol (TEMOVATE) 0.05 % cream  Self Yes No   clotrimazole-betamethasone (LOTRISONE) 1-0.05 % cream  Self Yes No   diphenhydrAMINE (BENADRYL) 25 mg tablet   No No   Sig: Take 1 tablet (25 mg total) by mouth every 6 (six) hours   famotidine (PEPCID) 20 mg tablet   No No   Sig: Take 1 tablet (20 mg total) by mouth 2 (two) times a day for 5 days   ferrous sulfate 324 (65 Fe) mg  Self No No   Sig: Take 1 tablet (324 mg total) by mouth every other day   fluticasone (FLONASE) 50 mcg/act nasal spray   No No   Si spray into each nostril daily   levothyroxine (Synthroid) 175 mcg tablet   No No   Sig: Take 1 tablet by mouth daily Monday-Saturday and 1/2 tablet on    meloxicam (MOBIC) 15 mg tablet  Self No No   Sig: Take 1 tablet (15 mg total) by mouth daily as needed for moderate pain   methocarbamol (ROBAXIN) 500 mg tablet  Self No No   Sig: take 1 tablet by mouth for muscle spasm IN THE EVENING if needed ( MAY CAUSE SEDATION. )   Patient not taking: Reported on 2025   metroNIDAZOLE (METROCREAM) 0.75 % cream   No No   Sig: Apply topically daily   montelukast (SINGULAIR) 10 mg tablet  Self No No   Sig: Take 1 tablet (10 mg total) by mouth daily at bedtime   ondansetron (ZOFRAN-ODT) 4 mg disintegrating tablet  Self No No   Sig: Take 1 tablet (4 mg total) by mouth every 6 (six) hours as needed for nausea or vomiting   predniSONE 20 mg tablet   No No   Sig: Take 2  tablets (40 mg total) by mouth daily for 5 days   sodium chloride (OCEAN) 0.65 % nasal spray  Self Yes No   Si spray into each nostril daily at bedtime   terconazole (TERAZOL 7) 0.4 % vaginal cream  Self No No   Sig: Insert 1 applicator into the vagina daily at bedtime   triamcinolone (KENALOG) 0.5 % ointment   No No   Sig: Apply topically 2 (two) times a day      Facility-Administered Medications: None     Discharge Medication List as of 2025  4:08 PM        START taking these medications    Details   cetirizine (ZyrTEC) 10 mg tablet Take 2 tablets (20 mg total) by mouth 2 (two) times a day for 7 days, Starting Sun 2025, Until Sun 2025, Normal           CONTINUE these medications which have CHANGED    Details   !! predniSONE 10 mg tablet Multiple Dosages:Starting Mon 2/10/2025, Until Tue 2025 at 2359, THEN Starting Wed 2025, Until Thu 2025 at 2359, THEN Starting Fri 2025, Until Sat 2/15/2025 at 2359, THEN Starting Sun 2025, Until Mon 2025 at 2359, THEN  Starting Tue 2025, Until Wed 2025 at 2359, THEN Starting Thu 2025, Until Fri 2025 at 2359, THEN Starting Sat 2025, Until Sun 2025 at 2359Take 6 tablets (60 mg total) by mouth daily for 2 days, THEN 5 tablets (50 mg total)  daily for 2 days, THEN 4 tablets (40 mg total) daily for 2 days, THEN 3 tablets (30 mg total) daily for 2 days, THEN 2 tablets (20 mg total) daily for 2 days, THEN 1 tablet (10 mg total) daily for 2 days, THEN 0.5 tablets (5 mg total) daily for 2 days.  50 mg PO x2 days, decrease by 10 mg every two days until complete.. Do not start before February 10, 2025., Normal       !! - Potential duplicate medications found. Please discuss with provider.        CONTINUE these medications which have NOT CHANGED    Details   albuterol (Ventolin HFA) 90 mcg/act inhaler Inhale 2 puffs every 6 (six) hours as needed for wheezing, Starting Mon 6/3/2024, Normal      Albuterol-Budesonide  90-80 MCG/ACT AERO Inhale 2 Inhalations every 6 (six) hours as needed (shortness of breath or wheezing), Starting Mon 11/25/2024, Print      Aspirin-Acetaminophen-Caffeine (EXCEDRIN MIGRAINE PO) Take 1 tablet by mouth if needed, Historical Med      clobetasol (TEMOVATE) 0.05 % cream Starting Wed 10/26/2022, Historical Med      clotrimazole-betamethasone (LOTRISONE) 1-0.05 % cream Starting Wed 6/7/2023, Historical Med      diphenhydrAMINE (BENADRYL) 25 mg tablet Take 1 tablet (25 mg total) by mouth every 6 (six) hours, Starting Fri 2/7/2025, Normal      famotidine (PEPCID) 20 mg tablet Take 1 tablet (20 mg total) by mouth 2 (two) times a day for 5 days, Starting Fri 2/7/2025, Until Wed 2/12/2025, Normal      ferrous sulfate 324 (65 Fe) mg Take 1 tablet (324 mg total) by mouth every other day, Starting Mon 5/20/2024, Until Fri 12/6/2024, Normal      fluticasone (FLONASE) 50 mcg/act nasal spray 1 spray into each nostril daily, Starting Mon 11/25/2024, Normal      Fluticasone-Salmeterol (Advair) 100-50 mcg/dose inhaler Inhale 1 puff 2 (two) times a day Rinse mouth after use., Starting Mon 11/25/2024, Until Thu 11/20/2025, Normal      levothyroxine (Synthroid) 175 mcg tablet Take 1 tablet by mouth daily Monday-Saturday and 1/2 tablet on Sunday, Fill Later      Melatonin 300 MCG TABS daily at bedtime as needed, Historical Med      meloxicam (MOBIC) 15 mg tablet Take 1 tablet (15 mg total) by mouth daily as needed for moderate pain, Starting Tue 3/5/2024, Normal      methocarbamol (ROBAXIN) 500 mg tablet take 1 tablet by mouth for muscle spasm IN THE EVENING if needed ( MAY CAUSE SEDATION. ), Normal      metroNIDAZOLE (METROCREAM) 0.75 % cream Apply topically daily, Starting Thu 1/23/2025, Normal      montelukast (SINGULAIR) 10 mg tablet Take 1 tablet (10 mg total) by mouth daily at bedtime, Starting Mon 6/3/2024, Normal      ondansetron (ZOFRAN-ODT) 4 mg disintegrating tablet Take 1 tablet (4 mg total) by mouth every 6  (six) hours as needed for nausea or vomiting, Starting Fri 1/6/2023, Normal      !! predniSONE 20 mg tablet Take 2 tablets (40 mg total) by mouth daily for 5 days, Starting Fri 2/7/2025, Until Wed 2/12/2025, Normal      Probiotic Product (ALIGN PO) Take by mouth, Historical Med      Semaglutide-Weight Management (Wegovy) 1.7 MG/0.75ML Inject 1.7 mg under the skin weekly, Normal      sodium chloride (OCEAN) 0.65 % nasal spray 1 spray into each nostril daily at bedtime, Historical Med      Sulfacetamide Sodium, Acne, 10 % LOTN Apply topically daily, Starting Thu 1/23/2025, Normal      terconazole (TERAZOL 7) 0.4 % vaginal cream Insert 1 applicator into the vagina daily at bedtime, Starting Fri 8/18/2023, Normal      triamcinolone (KENALOG) 0.5 % ointment Apply topically 2 (two) times a day, Starting Mon 2/3/2025, Normal      Vitamin D, Cholecalciferol, 50 MCG (2000 UT) CAPS Take 2 capsules by mouth daily, Historical Med       !! - Potential duplicate medications found. Please discuss with provider.          ED SEPSIS DOCUMENTATION   Time reflects when diagnosis was documented in both MDM as applicable and the Disposition within this note       Time User Action Codes Description Comment    2/9/2025  3:53 PM Bienvenido Recinos Add [L50.9] Urticaria     2/9/2025  3:53 PM Bienvenido Recinos Add [R03.0] Elevated blood pressure reading                  Bienvenido Recinos DO  02/09/25 5692

## 2025-02-10 ENCOUNTER — VBI (OUTPATIENT)
Dept: FAMILY MEDICINE CLINIC | Facility: CLINIC | Age: 60
End: 2025-02-10

## 2025-02-10 NOTE — TELEPHONE ENCOUNTER
02/10/25 10:26 AM    Patient contacted post ED visit, [facility] department spoke with patient/caregiver and outreach was successful.    Thank you.  Kamryn Lay MA  [facility]

## 2025-02-13 ENCOUNTER — OFFICE VISIT (OUTPATIENT)
Dept: DERMATOLOGY | Facility: CLINIC | Age: 60
End: 2025-02-13
Payer: COMMERCIAL

## 2025-02-13 VITALS — WEIGHT: 244.71 LBS | TEMPERATURE: 98.3 F | HEIGHT: 72 IN | BODY MASS INDEX: 33.15 KG/M2

## 2025-02-13 DIAGNOSIS — D18.01 CHERRY ANGIOMA: ICD-10-CM

## 2025-02-13 DIAGNOSIS — L50.9 URTICARIA: Primary | ICD-10-CM

## 2025-02-13 PROCEDURE — 99203 OFFICE O/P NEW LOW 30 MIN: CPT | Performed by: REGISTERED NURSE

## 2025-02-13 RX ORDER — CETIRIZINE HYDROCHLORIDE 10 MG/1
TABLET ORAL
Qty: 60 TABLET | Refills: 0 | Status: SHIPPED | OUTPATIENT
Start: 2025-02-13

## 2025-02-13 NOTE — PROGRESS NOTES
" [facility] Dermatology Clinic Note     Patient Name: Shelby Foster  Encounter Date: 2/13/2025     Have you been cared for by a [facility] Dermatologist in the last 3 years and, if so, which description applies to you?    NO.   I am considered a \"new\" patient and must complete all patient intake questions. I am FEMALE/of child-bearing potential.    REVIEW OF SYSTEMS:  Have you recently had or currently have any of the following? Recent fever or chills? No  Any non-healing wound? No  Are you pregnant or planning to become pregnant? No  Are you currently or planning to be nursing or breast feeding? No   PAST MEDICAL HISTORY:  Have you personally ever had or currently have any of the following?  If \"YES,\" then please provide more detail. Skin cancer (such as Melanoma, Basal Cell Carcinoma, Squamous Cell Carcinoma?  No  Tuberculosis, HIV/AIDS, Hepatitis B or C: No  Radiation Treatment No   HISTORY OF IMMUNOSUPPRESSION:   Do you have a history of any of the following:  Systemic Immunosuppression such as Diabetes, Biologic or Immunotherapy, Chemotherapy, Organ Transplantation, Bone Marrow Transplantation or Prednsione?  No    Answering \"YES\" requires the addition of the dotphrase \"IMMUNOSUPPRESSED\" as the first diagnosis of the patient's visit.   FAMILY HISTORY:  Any \"first degree relatives\" (parent, brother, sister, or child) with the following?    Skin Cancer, Pancreatic or Other Cancer? No   PATIENT EXPERIENCE:    Do you want the Dermatologist to perform a COMPLETE skin exam today including a clinical examination under the \"bra and underwear\" areas?  NO  If necessary, do we have your permission to call and leave a detailed message on your Preferred Phone number that includes your specific medical information?  Yes      Allergies   Allergen Reactions    Fluconazole Swelling     Of lips      Sulfamethoxazole-Trimethoprim Swelling     Of lips    Shellfish-Derived Products - Food Allergy Hives and Diarrhea    Eggs Or " Egg-Derived Products - Food Allergy Diarrhea    Nuts - Food Allergy Diarrhea    Penicillins Other (See Comments)     As a child-can't recall reaction      Erythromycin Rash     Breaks aout on white pimples      Tetracycline Rash     Breaks out on white pimples        Current Outpatient Medications:     albuterol (Ventolin HFA) 90 mcg/act inhaler, Inhale 2 puffs every 6 (six) hours as needed for wheezing, Disp: 25.5 g, Rfl: 3    Albuterol-Budesonide 90-80 MCG/ACT AERO, Inhale 2 Inhalations every 6 (six) hours as needed (shortness of breath or wheezing), Disp: 5.9 g, Rfl: 5    Aspirin-Acetaminophen-Caffeine (EXCEDRIN MIGRAINE PO), Take 1 tablet by mouth if needed, Disp: , Rfl:     cetirizine (ZyrTEC) 10 mg tablet, Take 2 tablets (20 mg total) by mouth 2 (two) times a day for 7 days, Disp: 28 tablet, Rfl: 0    clobetasol (TEMOVATE) 0.05 % cream, , Disp: , Rfl:     clotrimazole-betamethasone (LOTRISONE) 1-0.05 % cream, , Disp: , Rfl:     diphenhydrAMINE (BENADRYL) 25 mg tablet, Take 1 tablet (25 mg total) by mouth every 6 (six) hours, Disp: 20 tablet, Rfl: 0    famotidine (PEPCID) 20 mg tablet, Take 1 tablet (20 mg total) by mouth 2 (two) times a day for 5 days, Disp: 10 tablet, Rfl: 0    ferrous sulfate 324 (65 Fe) mg, Take 1 tablet (324 mg total) by mouth every other day, Disp: 15 tablet, Rfl: 0    fluticasone (FLONASE) 50 mcg/act nasal spray, 1 spray into each nostril daily, Disp: 18.2 mL, Rfl: 3    Fluticasone-Salmeterol (Advair) 100-50 mcg/dose inhaler, Inhale 1 puff 2 (two) times a day Rinse mouth after use., Disp: 180 blister, Rfl: 3    levothyroxine (Synthroid) 175 mcg tablet, Take 1 tablet by mouth daily Monday-Saturday and 1/2 tablet on Sunday, Disp: 90 tablet, Rfl: 1    Melatonin 300 MCG TABS, daily at bedtime as needed, Disp: , Rfl:     meloxicam (MOBIC) 15 mg tablet, Take 1 tablet (15 mg total) by mouth daily as needed for moderate pain, Disp: 30 tablet, Rfl: 0    methocarbamol (ROBAXIN) 500 mg tablet, take  "1 tablet by mouth for muscle spasm IN THE EVENING if needed ( MAY CAUSE SEDATION. ) (Patient not taking: Reported on 1/14/2025), Disp: 20 tablet, Rfl: 6    metroNIDAZOLE (METROCREAM) 0.75 % cream, Apply topically daily, Disp: 45 g, Rfl: 2    montelukast (SINGULAIR) 10 mg tablet, Take 1 tablet (10 mg total) by mouth daily at bedtime, Disp: 90 tablet, Rfl: 3    ondansetron (ZOFRAN-ODT) 4 mg disintegrating tablet, Take 1 tablet (4 mg total) by mouth every 6 (six) hours as needed for nausea or vomiting, Disp: 20 tablet, Rfl: 3    predniSONE 10 mg tablet, Take 6 tablets (60 mg total) by mouth daily for 2 days, THEN 5 tablets (50 mg total) daily for 2 days, THEN 4 tablets (40 mg total) daily for 2 days, THEN 3 tablets (30 mg total) daily for 2 days, THEN 2 tablets (20 mg total) daily for 2 days, THEN 1 tablet (10 mg total) daily for 2 days, THEN 0.5 tablets (5 mg total) daily for 2 days. 50 mg PO x2 days, decrease by 10 mg every two days until complete.. Do not start before February 10, 2025., Disp: 43 tablet, Rfl: 0    Probiotic Product (ALIGN PO), Take by mouth, Disp: , Rfl:     Semaglutide-Weight Management (Wegovy) 1.7 MG/0.75ML, Inject 1.7 mg under the skin weekly, Disp: 3 mL, Rfl: 1    sodium chloride (OCEAN) 0.65 % nasal spray, 1 spray into each nostril daily at bedtime, Disp: , Rfl:     Sulfacetamide Sodium, Acne, 10 % LOTN, Apply topically daily, Disp: 118 mL, Rfl: 2    terconazole (TERAZOL 7) 0.4 % vaginal cream, Insert 1 applicator into the vagina daily at bedtime, Disp: 45 g, Rfl: 3    triamcinolone (KENALOG) 0.5 % ointment, Apply topically 2 (two) times a day, Disp: 15 g, Rfl: 0    Vitamin D, Cholecalciferol, 50 MCG (2000 UT) CAPS, Take 2 capsules by mouth daily, Disp: , Rfl:           Whom besides the patient is providing clinical information about today's encounter?   NO ADDITIONAL HISTORIAN (patient alone provided history)    Physical Exam and Assessment/Plan by Diagnosis:    URTICARIA (\"ACUTE\") - " Improving    Physical Exam:  Anatomic Location Affected:  Diffuse   Morphological Description:  not present on exam  Pertinent Positives:  Pertinent Negatives:    Additional History of Present Condition:  Patient is here today for hospital follow up of a rash. Patient presented to ED 3 times for similar rash. She notes that she is allergic to eggs and at an egg sandwich the day her symptoms started. She reports that on the evening after eating eggs, she experienced significant gastrointestinal distress, whole body sweating, chills, nausea and dizziness. She also developed pruritic wheals throughout her body. On third presentation to ED, dermatology was asked to provide input. Recommended prednisone taper and twice daily zyrtec. Patient reports that rash has improved. She had first bowel movement yesterday and reports she again had sweating and chills with some erythematous patches, but no wheals or pruritus.      Assessment and Plan:  Based on a thorough discussion of this condition and the management approach to it (including a comprehensive discussion of the known risks, side effects and potential benefits of treatment), the patient (family) agrees to implement the following specific plan:  Suspect rash secondary to egg exposure and gastrointestinal symptoms also related  Continue prednisone taper  Continue Zyrtec twice a day for the next month, then taper to once a day  Advised to reach out to us for further evaluation should rash recur   Referral to Allergist placed    CHERRY ANGIOMAS  - Relevant exam:  are red papules  - Exam and clinical history consistent with cherry angiomas  - Educated that these are benign    Hannah Duncan MD  Dermatology, PGY-2  Scribe Attestation      I,:  Satish Pierre am acting as a scribe while in the presence of the attending physician.:       I,:  Joseph Lynch MD personally performed the services described in this documentation    as scribed in my presence.:

## 2025-02-13 NOTE — PATIENT INSTRUCTIONS
"URTICARIA (\"ACUTE\")      Assessment and Plan:  Based on a thorough discussion of this condition and the management approach to it (including a comprehensive discussion of the known risks, side effects and potential benefits of treatment), the patient (family) agrees to implement the following specific plan:  Continue prednisone taper in the morning   Continue Zyrtec twice a day for the next month, then taper down to once a day  Referral to Allergist      What is urticaria?  Urticaria is characterised by weals (hives) or angioedema (swellings, in 10%) or both (in 40%). There are several types of urticaria. The name urticaria is derived from the common [place] stinging nettle 'Urtica dioica'.    A weal (or wheal) is a superficial skin-coloured or pale skin swelling, usually surrounded by erythema (redness) that lasts anything from a few minutes to 24 hours. Usually very itchy, it may have a burning sensation.    Angioedema is deeper swelling within the skin or mucous membranes and can be skin-coloured or red. It resolves within 72 hours. Angioedema may be itchy or painful but is often asymptomatic.    What is acute urticaria?  Acute urticaria is urticaria, with or without angioedema, that is present for less than 6 weeks. It is often gone within hours to days.    Who gets acute urticaria?  One in five children or adults has an episode of acute urticaria during their lifetime. It is more common in atopic individuals. It affects all races and both sexes.    What are the clinical features of acute urticaria?  Urticarial weals can be a few millimeters or several centimeters in diameter, colored white or red, with or without a red flare. Each weal may last a few minutes or several hours and may change shape. Weals may be round, or form rings, a map-like pattern, targetoid lesions, or giant patches.    Acute urticaria can affect any site of the body and tends to be distributed widely. Angioedema is more often localised. It " commonly affects the face (especially eyelids and perioral sites), hands, feet and genitalia. It may involve tongue, uvula, soft palate, larynx.    Serum sickness due to blood transfusion and serum sickness-like reactions due to certain drugs cause acute urticaria leaving bruises, fever, swollen lymph glands, joint pain and swelling.    What causes acute urticaria?  Weals are due to release of chemical mediators from tissue mast cells and circulating basophils. These chemical mediators include histamine, platelet-activating factor and cytokines. The mediators activate sensory nerves and cause dilation of blood vessels and leakage of fluid into surrounding tissues. Bradykinin release causes angioedema.    Several hypotheses have been proposed to explain urticaria. The immune, arachidonic acid and coagulation systems are involved, and genetic mutations are under investigation.  Serum sickness and serum sickness-like reactions are due to immune complex deposition in affected tissues.    Acute urticaria can be induced by the following factors but the cause is not always identified.  Acute viral infection -- an upper respiratory infection, viral hepatitis, infectious mononucleosis, mycoplasma   Acute bacterial infection -- a dental abscess, sinusitis   Food allergy (IgE mediated) -- usually milk, egg, peanut, shellfish   Drug allergy (IgE mediated) -- often an antibiotic   Drug pseudoallergy -- aspirin, nonselective nonsteroidal anti-inflammatory drugs, opiates, radiocontrast media; these cause urticaria without immune activation   Vaccination   Bee or wasp stings     Widespread reaction following localized contact urticaria -- rubber latex  Severe allergic urticaria may lead to anaphylactic shock (bronchospasm, collapse).  A single episode or recurrent episodes of angioedema without urticaria can be due to an angiotensin-converting enzyme (ACE) inhibitor drug.    How is acute urticaria diagnosed?  Acute urticaria is  diagnosed in people with a short history of weals that last less than 24 hours, with or without angioedema. A thorough physical examination should be undertaken to look for underlying causes.    Skin prick tests and radioallergosorbent tests (RAST) or CAP fluoroimmunoassay may be requested if a drug or food allergy is suspected in acute urticaria.    Biopsy of urticaria can be non-specific and difficult to interpret. The pathology shows oedema in the dermis and dilated blood vessels, with a variable mixed inflammatory infiltrate. Vessel-wall damage indicates urticarial vasculitis.    What is the treatment for acute urticaria?  The main treatment for acute urticaria in adults and in children is with an oral second-generation antihistamine chosen from the list below. If the standard dose (eg 10 mg for cetirizine) is not effective, the dose can be increased fourfold (eg 40 mg cetirizine daily). They are best taken continuously rather than on demand. They are stopped when the acute urticaria has settled down. There is not thought to be any benefit from adding a second antihistamine.  Cetirizine   Loratadine   Fexofenadine   Desloratadine   Levocetirizine   Rupatadine   Bilastine  Terfenadine and astemizole should not be used as they are cardiotoxic in combination with ketoconazole or erythromycin. They are no longer available in [place].    Although systemic treatment is best avoided during pregnancy and breastfeeding, there have been no reports that second-generation antihistamines cause birth defects. If treatment is required, loratadine and cetirizine are currently preferred.  Conventional first-generation antihistamines such as promethazine or chlorpheniramine are no longer recommended for urticaria.    Avoidance of trigger factors  In addition to antihistamines, the cause of urticaria should be eliminated if known (eg drug or food allergy). Avoidance of relevant type 1 (IgE-mediated) allergens clears urticaria  within 48 hours.  In addition to antihistamines, the triggers for urticaria should be avoided where possible. For example:  Avoid aspirin, opiates and nonsteroidal anti-inflammatory drugs (paracetamol is generally safe).   Avoid known allergies that have been confirmed by positive specific IgE/skin prick tests if these have clinical relevance for urticaria.   Cool the affected area with a fan, cold flannel, ice pack or soothing moisturising lotion.    Treatment of refractory acute urticaria  If non-sedating antihistamines are not effective, a 4 to 5-day course of oral prednisone (prednisolone) may be warranted in severe acute urticaria, particularly if there is angioedema. Systemic steroids do not speed up the resolution of symptoms.  Intramuscular injection of adrenaline (epinephrine) is reserved for life-threatening anaphylaxis or swelling of the throat.      CHERRY ANGIOMAS  - Relevant exam:  are red papules  - Exam and clinical history consistent with cherry angiomas  - Educated that these are benign

## 2025-03-06 ENCOUNTER — TELEMEDICINE (OUTPATIENT)
Dept: NEUROLOGY | Facility: CLINIC | Age: 60
End: 2025-03-06
Payer: COMMERCIAL

## 2025-03-06 DIAGNOSIS — G47.33 OSA (OBSTRUCTIVE SLEEP APNEA): ICD-10-CM

## 2025-03-06 DIAGNOSIS — G43.009 MIGRAINE WITHOUT AURA AND WITHOUT STATUS MIGRAINOSUS, NOT INTRACTABLE: Primary | ICD-10-CM

## 2025-03-06 DIAGNOSIS — R11.0 NAUSEA: ICD-10-CM

## 2025-03-06 DIAGNOSIS — E61.1 IRON DEFICIENCY: ICD-10-CM

## 2025-03-06 PROCEDURE — 99215 OFFICE O/P EST HI 40 MIN: CPT | Performed by: PSYCHIATRY & NEUROLOGY

## 2025-03-06 RX ORDER — ONDANSETRON 4 MG/1
4 TABLET, ORALLY DISINTEGRATING ORAL EVERY 6 HOURS PRN
Qty: 20 TABLET | Refills: 3 | Status: SHIPPED | OUTPATIENT
Start: 2025-03-06

## 2025-03-06 NOTE — PATIENT INSTRUCTIONS
"  *If you have ferritin below 75/80 I recommend considering iron repletion because low ferritin can contribute to lots of symptoms including headaches migraines dizziness or anything related to sleep or sleep depth and I typically see the sleep doctors wanting it above 75/80 rather than the normal levels in the system and I see them recommend typically Vitron C - which is a chelated form of iron and with Vit C and helps with absorption and can take daily or every other day if you have constipation which is the biggest side effect.     Follow-up with your pulmonary doctor Dr. Villafana who also read the sleep study in July as already sleep study scheduled.  Personally, I would recommend using CPAP and treating this anytime you sleep.  We also discussed the hypoxia which concerns me, but she will be the expert for any further recommendations regarding that as well.    \"Breath\" - by Satish Romero     Headache/migraine treatment:   Rescue medications (for immediate treatment of a headache):   It is ok to take ibuprofen, acetaminophen or naproxen (Advil, Tylenol,  Aleve, Excedrin) if they help your headaches you should limit these to No more than 3 times a week to avoid medication overuse/rebound headaches.       -     ondansetron (ZOFRAN-ODT) 4 mg disintegrating tablet; Take 1 tablet (4 mg total) by mouth every 6 (six) hours as needed for nausea or vomiting      Prescription preventive medications for headaches/migraines   (to take every day to help prevent headaches - not to take at the time of headache):  [x] we have options if needed       *Typically these types of medications take time until you see the benefit, although some may see improvement in days, often it may take weeks, especially if the medication is being titrated up to a beneficial level. Please contact us if there are any concerns or questions regarding the medication.     Lifestyle Recommendations:  [x] SLEEP - Maintain a regular sleep schedule: Adults " need at least 7-8 hours of uninterrupted a night. Maintain good sleep hygiene:  Going to bed and waking up at consistent times, avoiding excessive daytime naps, avoiding caffeinated beverages in the evening, avoid excessive stimulation in the evening and generally using bed primarily for sleeping.  One hour before bedtime would recommend turning lights down lower, decreasing your activity (may read quietly, listen to music at a low volume). When you get into bed, should eliminate all technology (no texting, emailing, playing with your phone, iPad or tablet in bed).  [x] HYDRATION - Maintain good hydration.  Drink  2L of fluid a day (4 typical small water bottles)  [x] DIET - Maintain good nutrition. In particular don't skip meals and try and eat healthy balanced meals regularly.  [x] TRIGGERS - Look for other triggers and avoid them: Limit caffeine to 1-2 cups a day or less. Avoid dietary triggers that you have noticed bring on your headaches (this could include aged cheese, peanuts, MSG, aspartame and nitrates).  [x] EXERCISE - physical exercise as we all know is good for you in many ways, and not only is good for your heart, but also is beneficial for your mental health, cognitive health and  chronic pain/headaches. I would encourage at the least 5 days of physical exercise weekly for at least 30 minutes.     Education and Follow-up  [x] Please call with any questions or concerns. Of course if any new concerning symptoms go to the emergency department.  [x] Follow up 1 year, sooner if needed
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
## 2025-03-06 NOTE — PROGRESS NOTES
Review of Systems   Constitutional:  Negative for appetite change and fever.   HENT: Negative.  Negative for hearing loss, tinnitus, trouble swallowing and voice change.    Eyes: Negative.  Negative for photophobia and pain.   Respiratory: Negative.  Negative for shortness of breath.    Cardiovascular: Negative.  Negative for palpitations.   Gastrointestinal:  Positive for nausea. Negative for vomiting.   Endocrine: Negative.  Negative for cold intolerance.   Genitourinary: Negative.  Negative for dysuria, frequency and urgency.   Musculoskeletal: Negative.  Negative for myalgias and neck pain.   Skin: Negative.  Negative for rash.   Neurological:  Positive for headaches (1 a month). Negative for dizziness, tremors, seizures, syncope, facial asymmetry, speech difficulty, weakness, light-headedness and numbness.   Hematological: Negative.  Does not bruise/bleed easily.   Psychiatric/Behavioral: Negative.  Negative for confusion, hallucinations and sleep disturbance.    All other systems reviewed and are negative.

## 2025-03-10 DIAGNOSIS — E66.811 CLASS 1 OBESITY DUE TO EXCESS CALORIES WITH SERIOUS COMORBIDITY AND BODY MASS INDEX (BMI) OF 34.0 TO 34.9 IN ADULT: ICD-10-CM

## 2025-03-10 DIAGNOSIS — E66.09 CLASS 1 OBESITY DUE TO EXCESS CALORIES WITH SERIOUS COMORBIDITY AND BODY MASS INDEX (BMI) OF 34.0 TO 34.9 IN ADULT: ICD-10-CM

## 2025-03-10 NOTE — TELEPHONE ENCOUNTER
Reason for call:   [x] Refill   [] Prior Auth  [] Other:     Office:   [x] PCP/Provider - [facility]  [] Specialty/Provider -     Medication:   Semaglutide-Weight Management (Wegovy) 1.7 MG/0.75ML        Dose/Frequency: Sig: Inject 1.7 mg under the skin weekly      Quantity: 3mL    Pharmacy: [facility] [address] [place] [address]  Phone: [phone]  Fax: [phone]        Local Pharmacy   Does the patient have enough for 3 days?   [x] Yes   [] No - Send as HP to POD    Mail Away Pharmacy   Does the patient have enough for 10 days?   [] Yes   [] No - Send as HP to POD

## 2025-03-11 RX ORDER — SEMAGLUTIDE 1.7 MG/.75ML
INJECTION, SOLUTION SUBCUTANEOUS
Qty: 3 ML | Refills: 0 | Status: SHIPPED | OUTPATIENT
Start: 2025-03-11

## 2025-03-12 ENCOUNTER — APPOINTMENT (OUTPATIENT)
Dept: LAB | Facility: CLINIC | Age: 60
End: 2025-03-12
Payer: COMMERCIAL

## 2025-03-12 DIAGNOSIS — R10.9 FLANK PAIN: ICD-10-CM

## 2025-03-12 DIAGNOSIS — E03.9 ACQUIRED HYPOTHYROIDISM: ICD-10-CM

## 2025-03-12 DIAGNOSIS — E61.1 IRON DEFICIENCY: ICD-10-CM

## 2025-03-12 DIAGNOSIS — Z13.220 LIPID SCREENING: ICD-10-CM

## 2025-03-12 LAB
CHOLEST SERPL-MCNC: 177 MG/DL (ref ?–200)
FERRITIN SERPL-MCNC: 14 NG/ML (ref 11–307)
HDLC SERPL-MCNC: 44 MG/DL
IRON SATN MFR SERPL: 16 % (ref 15–50)
IRON SERPL-MCNC: 71 UG/DL (ref 50–212)
LDLC SERPL CALC-MCNC: 110 MG/DL (ref 0–100)
NONHDLC SERPL-MCNC: 133 MG/DL
T4 FREE SERPL-MCNC: 1.17 NG/DL (ref 0.61–1.12)
TIBC SERPL-MCNC: 431.2 UG/DL (ref 250–450)
TRANSFERRIN SERPL-MCNC: 308 MG/DL (ref 203–362)
TRIGL SERPL-MCNC: 113 MG/DL (ref ?–150)
TSH SERPL DL<=0.05 MIU/L-ACNC: 0.53 UIU/ML (ref 0.45–4.5)
UIBC SERPL-MCNC: 360 UG/DL (ref 155–355)

## 2025-03-12 PROCEDURE — 80061 LIPID PANEL: CPT

## 2025-03-12 PROCEDURE — 36415 COLL VENOUS BLD VENIPUNCTURE: CPT

## 2025-03-12 PROCEDURE — 83550 IRON BINDING TEST: CPT

## 2025-03-12 PROCEDURE — 84439 ASSAY OF FREE THYROXINE: CPT

## 2025-03-12 PROCEDURE — 84443 ASSAY THYROID STIM HORMONE: CPT

## 2025-03-12 PROCEDURE — 81001 URINALYSIS AUTO W/SCOPE: CPT

## 2025-03-12 PROCEDURE — 82728 ASSAY OF FERRITIN: CPT

## 2025-03-12 PROCEDURE — 83540 ASSAY OF IRON: CPT

## 2025-03-13 ENCOUNTER — RESULTS FOLLOW-UP (OUTPATIENT)
Dept: FAMILY MEDICINE CLINIC | Facility: CLINIC | Age: 60
End: 2025-03-13

## 2025-03-13 LAB
AMORPH URATE CRY URNS QL MICRO: ABNORMAL
BACTERIA UR QL AUTO: ABNORMAL /HPF
BILIRUB UR QL STRIP: NEGATIVE
CLARITY UR: ABNORMAL
COLOR UR: ABNORMAL
GLUCOSE UR STRIP-MCNC: NEGATIVE MG/DL
HGB UR QL STRIP.AUTO: NEGATIVE
KETONES UR STRIP-MCNC: NEGATIVE MG/DL
LEUKOCYTE ESTERASE UR QL STRIP: ABNORMAL
NITRITE UR QL STRIP: NEGATIVE
NON-SQ EPI CELLS URNS QL MICRO: ABNORMAL /HPF
PH UR STRIP.AUTO: 6 [PH]
PROT UR STRIP-MCNC: ABNORMAL MG/DL
RBC #/AREA URNS AUTO: ABNORMAL /HPF
SP GR UR STRIP.AUTO: 1.03 (ref 1–1.03)
UROBILINOGEN UR STRIP-ACNC: <2 MG/DL
WBC #/AREA URNS AUTO: ABNORMAL /HPF

## 2025-03-24 ENCOUNTER — TELEPHONE (OUTPATIENT)
Age: 60
End: 2025-03-24

## 2025-03-24 ENCOUNTER — OFFICE VISIT (OUTPATIENT)
Dept: FAMILY MEDICINE CLINIC | Facility: CLINIC | Age: 60
End: 2025-03-24
Payer: COMMERCIAL

## 2025-03-24 VITALS
WEIGHT: 244.25 LBS | HEART RATE: 87 BPM | BODY MASS INDEX: 33.08 KG/M2 | HEIGHT: 72 IN | TEMPERATURE: 98.3 F | SYSTOLIC BLOOD PRESSURE: 104 MMHG | OXYGEN SATURATION: 96 % | DIASTOLIC BLOOD PRESSURE: 76 MMHG

## 2025-03-24 DIAGNOSIS — R30.0 DYSURIA: Primary | ICD-10-CM

## 2025-03-24 DIAGNOSIS — R39.9 UTI SYMPTOMS: ICD-10-CM

## 2025-03-24 DIAGNOSIS — J45.991 COUGH VARIANT ASTHMA: ICD-10-CM

## 2025-03-24 LAB
BACTERIA UR QL AUTO: ABNORMAL /HPF
BILIRUB UR QL STRIP: NEGATIVE
CLARITY UR: CLEAR
COLOR UR: ABNORMAL
GLUCOSE UR STRIP-MCNC: NEGATIVE MG/DL
HGB UR QL STRIP.AUTO: NEGATIVE
KETONES UR STRIP-MCNC: NEGATIVE MG/DL
LEUKOCYTE ESTERASE UR QL STRIP: ABNORMAL
NITRITE UR QL STRIP: POSITIVE
NON-SQ EPI CELLS URNS QL MICRO: ABNORMAL /HPF
PH UR STRIP.AUTO: 5.5 [PH]
PROT UR STRIP-MCNC: NEGATIVE MG/DL
RBC #/AREA URNS AUTO: ABNORMAL /HPF
SL AMB  POCT GLUCOSE, UA: ABNORMAL
SL AMB LEUKOCYTE ESTERASE,UA: ABNORMAL
SL AMB POCT BILIRUBIN,UA: ABNORMAL
SL AMB POCT BLOOD,UA: ABNORMAL
SL AMB POCT CLARITY,UA: CLEAR
SL AMB POCT COLOR,UA: YELLOW
SL AMB POCT KETONES,UA: ABNORMAL
SL AMB POCT NITRITE,UA: POSITIVE
SL AMB POCT PH,UA: 6
SL AMB POCT SPECIFIC GRAVITY,UA: <=1.005
SL AMB POCT URINE PROTEIN: ABNORMAL
SL AMB POCT UROBILINOGEN: 0.2
SP GR UR STRIP.AUTO: 1.01 (ref 1–1.03)
UROBILINOGEN UR STRIP-ACNC: <2 MG/DL
WBC #/AREA URNS AUTO: ABNORMAL /HPF
WBC CLUMPS # UR AUTO: PRESENT /UL

## 2025-03-24 PROCEDURE — 81001 URINALYSIS AUTO W/SCOPE: CPT | Performed by: STUDENT IN AN ORGANIZED HEALTH CARE EDUCATION/TRAINING PROGRAM

## 2025-03-24 PROCEDURE — 87077 CULTURE AEROBIC IDENTIFY: CPT | Performed by: STUDENT IN AN ORGANIZED HEALTH CARE EDUCATION/TRAINING PROGRAM

## 2025-03-24 PROCEDURE — 99214 OFFICE O/P EST MOD 30 MIN: CPT | Performed by: STUDENT IN AN ORGANIZED HEALTH CARE EDUCATION/TRAINING PROGRAM

## 2025-03-24 PROCEDURE — 87086 URINE CULTURE/COLONY COUNT: CPT | Performed by: STUDENT IN AN ORGANIZED HEALTH CARE EDUCATION/TRAINING PROGRAM

## 2025-03-24 PROCEDURE — 87186 SC STD MICRODIL/AGAR DIL: CPT | Performed by: STUDENT IN AN ORGANIZED HEALTH CARE EDUCATION/TRAINING PROGRAM

## 2025-03-24 PROCEDURE — 81002 URINALYSIS NONAUTO W/O SCOPE: CPT | Performed by: STUDENT IN AN ORGANIZED HEALTH CARE EDUCATION/TRAINING PROGRAM

## 2025-03-24 RX ORDER — CIPROFLOXACIN 500 MG/1
500 TABLET, FILM COATED ORAL EVERY 12 HOURS SCHEDULED
Qty: 10 TABLET | Refills: 0 | Status: SHIPPED | OUTPATIENT
Start: 2025-03-24 | End: 2025-03-29

## 2025-03-24 RX ORDER — NITROFURANTOIN 25; 75 MG/1; MG/1
100 CAPSULE ORAL 2 TIMES DAILY
Qty: 10 CAPSULE | Refills: 0 | Status: SHIPPED | OUTPATIENT
Start: 2025-03-24 | End: 2025-03-29

## 2025-03-24 NOTE — TELEPHONE ENCOUNTER
Patient called in stated that she did receive her lab results on 3/13. Patient apologizes she did not return a call. Patient stated she had to quickly leave and pack for the airport. Patient just returned home from a work trip 3/23.     Patient states she does believe she has a UTI. Patient expressed a lot of burning when urinating. Patient is inquiring if a medication can be sent to the pharmacy.     [id number]    Patient also stated she is unable to make an apt until later this week if need. She is also requesting another urine sample to be ordered. Patient stated she did not shower before the other UA was taken.     Please return call with recommendations.    Please advise  Thank you

## 2025-03-24 NOTE — ASSESSMENT & PLAN NOTE
Point-of-care UA positive for leukocytes and nitrite in trace blood.  Will send off for urine culture and urine with microscopy.  Will also order UA with reflex to microscopy and culture to be completed 14 days after completion of antibiotic.  Will start Macrobid twice daily x 5 days and will follow-up pending culture sensitivity.  Orders:  •  POCT urine dip  •  Urine culture; Future

## 2025-03-24 NOTE — PROGRESS NOTES
Name: Shelby Foster      : 1965      MRN: [id number]  Encounter Provider: Ree Vasquez DO  Encounter Date: 3/24/2025   Encounter department: [facility] PRIMARY CARE  :  Assessment & Plan  Dysuria  Point-of-care UA positive for leukocytes and nitrite in trace blood.  Will send off for urine culture and urine with microscopy.  Will also order UA with reflex to microscopy and culture to be completed 14 days after completion of antibiotic.  Will start Macrobid twice daily x 5 days and will follow-up pending culture sensitivity.  Orders:  •  POCT urine dip  •  Urine culture; Future    UTI symptoms    Orders:  •  nitrofurantoin (MACROBID) 100 mg capsule; Take 1 capsule (100 mg total) by mouth 2 (two) times a day for 5 days  •  UA w Reflex to Microscopic w Reflex to Culture; Future  •  Urine culture; Future  •  Urinalysis with microscopic; Future  •  ciprofloxacin (CIPRO) 500 mg tablet; Take 1 tablet (500 mg total) by mouth every 12 (twelve) hours for 5 days    Cough variant asthma  Currently stable on albuterol as needed and Advair.              History of Present Illness       Difficulty Urinating   This is a new problem. The current episode started in the past 7 days. The problem occurs every urination. The problem has been unchanged. The quality of the pain is described as burning. The pain is moderate. There has been no fever. She is Not sexually active. There is No history of pyelonephritis. Associated symptoms include urgency. Pertinent negatives include no chills, discharge, frequency, hematuria or vomiting. She has tried nothing for the symptoms.     Review of Systems   Constitutional:  Negative for chills.   HENT:  Negative for congestion and rhinorrhea.    Cardiovascular:  Negative for chest pain and palpitations.   Gastrointestinal:  Negative for abdominal pain, constipation, diarrhea and vomiting.   Genitourinary:  Positive for dysuria and urgency. Negative for frequency  and hematuria.   Neurological:  Negative for dizziness and headaches.       Objective   /76 (BP Location: Left arm, Patient Position: Sitting, Cuff Size: Large)   Pulse 87   Temp 98.3 °F (36.8 °C) (Temporal)   Ht 6' (1.829 m)   Wt 111 kg (244 lb 4 oz)   SpO2 96%   BMI 33.13 kg/m²      Physical Exam  Vitals reviewed.   Constitutional:       Appearance: Normal appearance.   HENT:      Head: Normocephalic and atraumatic.      Mouth/Throat:      Mouth: Mucous membranes are moist.      Pharynx: No oropharyngeal exudate or posterior oropharyngeal erythema.   Eyes:      Extraocular Movements: Extraocular movements intact.   Cardiovascular:      Rate and Rhythm: Normal rate and regular rhythm.      Heart sounds: Normal heart sounds.   Pulmonary:      Effort: Pulmonary effort is normal.      Breath sounds: Normal breath sounds.   Neurological:      General: No focal deficit present.      Mental Status: She is alert and oriented to person, place, and time.   Psychiatric:         Mood and Affect: Mood normal.         Behavior: Behavior normal.

## 2025-03-26 ENCOUNTER — RESULTS FOLLOW-UP (OUTPATIENT)
Dept: FAMILY MEDICINE CLINIC | Facility: CLINIC | Age: 60
End: 2025-03-26

## 2025-03-26 LAB — BACTERIA UR CULT: ABNORMAL

## 2025-04-01 ENCOUNTER — OFFICE VISIT (OUTPATIENT)
Dept: DERMATOLOGY | Facility: CLINIC | Age: 60
End: 2025-04-01
Payer: COMMERCIAL

## 2025-04-01 VITALS — WEIGHT: 244 LBS | BODY MASS INDEX: 33.05 KG/M2 | TEMPERATURE: 97.3 F | HEIGHT: 72 IN

## 2025-04-01 DIAGNOSIS — D18.01 CHERRY ANGIOMA: ICD-10-CM

## 2025-04-01 DIAGNOSIS — D48.9 NEOPLASM OF UNCERTAIN BEHAVIOR: Primary | ICD-10-CM

## 2025-04-01 DIAGNOSIS — D22.9 MULTIPLE BENIGN MELANOCYTIC NEVI: ICD-10-CM

## 2025-04-01 DIAGNOSIS — L81.4 LENTIGO: ICD-10-CM

## 2025-04-01 PROCEDURE — 11102 TANGNTL BX SKIN SINGLE LES: CPT | Performed by: NURSE PRACTITIONER

## 2025-04-01 PROCEDURE — 88342 IMHCHEM/IMCYTCHM 1ST ANTB: CPT | Performed by: PATHOLOGY

## 2025-04-01 PROCEDURE — 88305 TISSUE EXAM BY PATHOLOGIST: CPT | Performed by: PATHOLOGY

## 2025-04-01 PROCEDURE — 99214 OFFICE O/P EST MOD 30 MIN: CPT | Performed by: NURSE PRACTITIONER

## 2025-04-01 NOTE — PATIENT INSTRUCTIONS
"MELANOCYTIC NEVI (\"Moles\")    Assessment and Plan:  Based on a thorough discussion of this condition and the management approach to it (including a comprehensive discussion of the known risks, side effects and potential benefits of treatment), the patient (family) agrees to implement the following specific plan:  When outside we recommend using a wide brim hat, sunglasses, long sleeve and pants, sunscreen with SPF 30+ with reapplication every 2 hours, or SPF specific clothing   Benign, reassured  Annual skin check     Melanocytic Nevi  Melanocytic nevi (\"moles\") are tan or brown, raised or flat areas of the skin which have an increased number of melanocytes. Melanocytes are the cells in our body which make pigment and account for skin color.    Some moles are present at birth (I.e., \"congenital nevi\"), while others come up later in life (i.e., \"acquired nevi\").  The sun can stimulate the body to make more moles.  Sunburns are not the only thing that triggers more moles.  Chronic sun exposure can do it too.     Clinically distinguishing a healthy mole from melanoma may be difficult, even for experienced dermatologists. The \"ABCDE's\" of moles have been suggested as a means of helping to alert a person to a suspicious mole and the possible increased risk of melanoma.  The suggestions for raising alert are as follows:    Asymmetry: Healthy moles tend to be symmetric, while melanomas are often asymmetric.  Asymmetry means if you draw a line through the mole, the two halves do not match in color, size, shape, or surface texture. Asymmetry can be a result of rapid enlargement of a mole, the development of a raised area on a previously flat lesion, scaling, ulceration, bleeding or scabbing within the mole.  Any mole that starts to demonstrate \"asymmetry\" should be examined promptly by a board certified dermatologist.     Border: Healthy moles tend to have discrete, even borders.  The border of a melanoma often blends into " "the normal skin and does not sharply delineate the mole from normal skin.  Any mole that starts to demonstrate \"uneven borders\" should be examined promptly by a board certified dermatologist.     Color: Healthy moles tend to be one color throughout.  Melanomas tend to be made up of different colors ranging from dark black, blue, white, or red.  Any mole that demonstrates a color change should be examined promptly by a board certified dermatologist.     Diameter: Healthy moles tend to be smaller than 0.6 cm in size; an exception are \"congenital nevi\" that can be larger.  Melanomas tend to grow and can often be greater than 0.6 cm (1/4 of an inch, or the size of a pencil eraser). This is only a guideline, and many normal moles may be larger than 0.6 cm without being unhealthy.  Any mole that starts to change in size (small to bigger or bigger to smaller) should be examined promptly by a board certified dermatologist.     Evolving: Healthy moles tend to \"stay the same.\"  Melanomas may often show signs of change or evolution such as a change in size, shape, color, or elevation.  Any mole that starts to itch, bleed, crust, burn, hurt, or ulcerate or demonstrate a change or evolution should be examined promptly by a board certified dermatologist.      LENTIGO    Assessment and Plan:  Based on a thorough discussion of this condition and the management approach to it (including a comprehensive discussion of the known risks, side effects and potential benefits of treatment), the patient (family) agrees to implement the following specific plan:  When outside we recommend using a wide brim hat, sunglasses, long sleeve and pants, sunscreen with SPF 30+ with reapplication every 2 hours, or SPF specific clothing     What is a lentigo?  A lentigo is a pigmented flat or slightly raised lesion with a clearly defined edge. Unlike an ephelis (freckle), it does not fade in the winter months. There are several kinds of lentigo.  The " name lentigo originally referred to its appearance resembling a small lentil. The plural of lentigo is lentigines, although “lentigos” is also in common use.    Who gets lentigines?  Lentigines can affect males and females of all ages and races. Solar lentigines are especially prevalent in fair skinned adults. Lentigines associated with syndromes are present at birth or arise during childhood.    What causes lentigines?  Common forms of lentigo are due to exposure to ultraviolet radiation:  Sun damage including sunburn   Indoor tanning   Phototherapy, especially photochemotherapy (PUVA)    Ionizing radiation, eg radiation therapy, can also cause lentigines.  Several familial syndromes associated with widespread lentigines originate from mutations in [place]-MAP kinase, mTOR signaling and PTEN pathways.    What is the treatment for lentigines?  Most lentigines are left alone. Attempts to lighten them may not be successful. The following approaches are used:  SPF 50+ broad-spectrum sunscreen   Hydroquinone bleaching cream   Alpha hydroxy acids   Vitamin C   Retinoids   Azelaic acid   Chemical peels  Individual lesions can be permanently removed using:  Cryotherapy   Intense pulsed light   Pigment lasers    How can lentigines be prevented?  Lentigines associated with exposure ultraviolet radiation can be prevented by very careful sun protection. Clothing is more successful at preventing new lentigines than are sunscreens.    What is the outlook for lentigines?  Lentigines usually persist. They may increase in number with age and sun exposure. Some in sun-protected sites may fade and disappear.    CLEANING ANGIOMAS    Assessment and Plan:  Based on a thorough discussion of this condition and the management approach to it (including a comprehensive discussion of the known risks, side effects and potential benefits of treatment), the patient (family) agrees to implement the following specific plan:  Monitor for changes  Benign,  "reassured    Assessment and Plan:    Cherry angioma, also known as [place] spots, are benign vascular skin lesions. A \"cherry angioma\" is a firm red, blue or purple papule, 0.1-1 cm in diameter. When thrombosed, they can appear black in colour until evaluated with a dermatoscope when the red or purple colour is more easily seen. Cherry angioma may develop on any part of the body but most often appear on the scalp, face, lips and trunk.  An angioma is due to proliferating endothelial cells; these are the cells that line the inside of a blood vessel.    Angiomas can arise in early life or later in life; the most common type of angioma is a cherry angioma.  Cherry angiomas are very common in males and females of any age or race. They are more noticeable in white skin than in skin of colour. They markedly increase in number from about the age of 40. There may be a family history of similar lesions. Eruptive cherry angiomas have been rarely reported to be associated with internal malignancy. The cause of angiomas is unknown. Genetic analysis of cherry angiomas has shown that they frequently carry specific somatic missense mutations in the GNAQ and GNA11 (Q209H) genes, which are involved in other vascular and melanocytic proliferations.    NEOPLASM OF UNCERTAIN BEHAVIOR OF SKIN      PROCEDURE TANGENTIAL (SHAVE) BIOPSY NOTE:        After obtaining informed consent  at which time there was a discussion about the purpose of biopsy  and low risks of infection and bleeding.  The area was prepped and draped in the usual fashion. Anesthesia was obtained with 1% lidocaine with epinephrine. A shave biopsy to an appropriate sampling depth was obtained by Shave (Dermablade or 15 blade) The resulting wound was covered with surgical ointment and bandaged appropriately.     The patient tolerated the procedure well without complications and was without signs of functional compromise.      Specimen has been sent for review by " "Dermatopathology.    Standard post-procedure care has been explained and has been included in written form within the patient's copy of Informed Consent.    INFORMED CONSENT DISCUSSION AND POST-OPERATIVE INSTRUCTIONS FOR PATIENT    I.  RATIONALE FOR PROCEDURE  I understand that a skin biopsy allows the Dermatologist to test a lesion or rash under the microscope to obtain a diagnosis.  It usually involves numbing the area with numbing medication and removing a small piece of skin; sometimes the area will be closed with sutures. In this specific procedure, sutures are not usually needed.  If any sutures are placed, then they are usually need to be removed in 2 weeks or less.    I understand that my Dermatologist recommends that a skin \"shave\" biopsy be performed today.  A local anesthetic, similar to the kind that a dentist uses when filling a cavity, will be injected with a very small needle into the skin area to be sampled.  The injected skin and tissue underneath \"will go to sleep” and become numb so no pain should be felt afterwards.  An instrument shaped like a tiny \"razor blade\" (shave biopsy instrument) will be used to cut a small piece of tissue and skin from the area so that a sample of tissue can be taken and examined more closely under the microscope.  A slight amount of bleeding will occur, but it will be stopped with direct pressure and a pressure bandage and any other appropriate methods.  I understands that a scar will form where the wound was created.  Surgical ointment will be applied to help protect the wound.  Sutures are not usually needed.    II.  RISKS AND POTENTIAL COMPLICATIONS   I understand the risks and potential complications of a skin biopsy include but are not limited to the following:  Bleeding  Infection  Pain  Scar/keloid  Skin discoloration  Incomplete Removal  Recurrence  Nerve Damage/Numbness/Loss of Function  Allergic Reaction to Anesthesia  Biopsies are diagnostic procedures and " "based on findings additional treatment or evaluation may be required  Loss or destruction of specimen resulting in no additional findings    My Dermatologist has explained to me the nature of the condition, the nature of the procedure, and the benefits to be reasonably expected compared with alternative approaches.  My Dermatologist has discussed the likelihood of major risks or complications of this procedure including the specific risks listed above, such as bleeding, infection, and scarring/keloid.  I understand that a scar is expected after this procedure.  I understand that my physician cannot predict if the scar will form a \"keloid,\" which extends beyond the borders of the wound that is created.  A keloid is a thick, painful, and bumpy scar.  A keloid can be difficult to treat, as it does not always respond well to therapy, which includes injecting cortisone directly into the keloid every few weeks.  While this usually reduces the pain and size of the scar, it does not eliminate it.      I understand that photographs may be taken before and after the procedure.  These will be maintained as part of the medical providers confidential records and may not be made available to me.  I further authorize the medical provider to use the photographs for teaching purposes or to illustrate scientific papers, books, or lectures if in his/her judgment, medical research, education, or science may benefit from its use.    I have had an opportunity to fully inquire about the risks and benefits of this procedure and its alternatives.   I have been given ample time and opportunity to ask questions and to seek a second opinion if I wished to do so.  I acknowledge that there have specifically been no guarantees as to the cosmetic results from the procedure.  I am aware that with any procedure there is always the possibility of an unexpected complication.    III. POST-PROCEDURAL CARE (WHAT YOU WILL NEED TO DO \"AFTER THE BIOPSY\" TO " "OPTIMIZE HEALING)    Keep the area clean and dry.  Try NOT to remove the bandage or get it wet for the first 24 hours.    Gently clean the area and apply surgical ointment (such as Vaseline petrolatum ointment, which is available \"over the counter\" and not a prescription) to the biopsy site for up to 2 weeks straight.  This acts to protect the wound from the outside world.      Sutures are not usually placed in this procedure.  If any sutures were placed, return for suture removal as instructed (generally 1 week for the face, 2 weeks for the body).      Take Acetaminophen (Tylenol) for discomfort, if no contraindications.  Ibuprofen or aspirin could make bleeding worse.    Call our office immediately for signs of infection: fever, chills, increased redness, warmth, tenderness, discomfort/pain, or pus or foul smell coming from the wound.    WHAT TO DO IF THERE IS ANY BLEEDING?  If a small amount of bleeding is noticed, place a clean cloth over the area and apply firm pressure for ten minutes.  Check the wound after 10 minutes of direct pressure.  If bleeding persists, try one more time for an additional 10 minutes of direct pressure on the area.  If the bleeding becomes heavier or does not stop after the second attempt, or if you have any other questions about this procedure, then please call your [facility]'s Dermatologist by calling [phone] (SKIN).     I hereby acknowledge that I have reviewed and verified the site with my Dermatologist and have requested and authorized my Dermatologist to proceed with the procedure.    "

## 2025-04-01 NOTE — PROGRESS NOTES
"[facility] D[facility]atology Clinic Note     Patient Name: Shelby Foster  Encounter Date: 4/1/25     Have you been cared for by a [facility] Dermatologist in the last 3 years and, if so, which description applies to you?    Yes.  I have been here within the last 3 years, and my medical history has NOT changed since that time.  I am FEMALE/of child-bearing potential.    REVIEW OF SYSTEMS:  Have you recently had or currently have any of the following? No changes in my recent health.   PAST MEDICAL HISTORY:  Have you personally ever had or currently have any of the following?  If \"YES,\" then please provide more detail. No changes in my medical history.   HISTORY OF IMMUNOSUPPRESSION: Do you have a history of any of the following:  Systemic Immunosuppression such as Diabetes, Biologic or Immunotherapy, Chemotherapy, Organ Transplantation, Bone Marrow Transplantation or Prednisone?  YES, On Semaglutide compound and Wegovy injections     Answering \"YES\" requires the addition of the dotphrase \"IMMUNOSUPPRESSED\" as the first diagnosis of the patient's visit.   FAMILY HISTORY:  Any \"first degree relatives\" (parent, brother, sister, or child) with the following?    No changes in my family's known health.   PATIENT EXPERIENCE:    Do you want the Dermatologist to perform a COMPLETE skin exam today including a clinical examination under the \"bra and underwear\" areas?  Yes  If necessary, do we have your permission to call and leave a detailed message on your Preferred Phone number that includes your specific medical information?  Yes      Allergies   Allergen Reactions    Fluconazole Swelling     Of lips      Sulfamethoxazole-Trimethoprim Swelling     Of lips    Shellfish-Derived Products - Food Allergy Hives and Diarrhea    Eggs Or Egg-Derived Products - Food Allergy Diarrhea    Nuts - Food Allergy Diarrhea    Penicillins Other (See Comments)     As a child-can't recall reaction      Erythromycin Rash     Breaks aout on white " pimples      Tetracycline Rash     Breaks out on white pimples        Current Outpatient Medications:     albuterol (Ventolin HFA) 90 mcg/act inhaler, Inhale 2 puffs every 6 (six) hours as needed for wheezing, Disp: 25.5 g, Rfl: 3    Albuterol-Budesonide 90-80 MCG/ACT AERO, Inhale 2 Inhalations every 6 (six) hours as needed (shortness of breath or wheezing), Disp: 5.9 g, Rfl: 5    Aspirin-Acetaminophen-Caffeine (EXCEDRIN MIGRAINE PO), Take 1 tablet by mouth if needed, Disp: , Rfl:     cetirizine (ZyrTEC) 10 mg tablet, Take twice a day for a month, Disp: 60 tablet, Rfl: 0    clobetasol (TEMOVATE) 0.05 % cream, , Disp: , Rfl:     clotrimazole-betamethasone (LOTRISONE) 1-0.05 % cream, , Disp: , Rfl:     ferrous sulfate 324 (65 Fe) mg, Take 1 tablet (324 mg total) by mouth every other day (Patient not taking: Reported on 3/24/2025), Disp: 15 tablet, Rfl: 0    fluticasone (FLONASE) 50 mcg/act nasal spray, 1 spray into each nostril daily, Disp: 18.2 mL, Rfl: 3    Fluticasone-Salmeterol (Advair) 100-50 mcg/dose inhaler, Inhale 1 puff 2 (two) times a day Rinse mouth after use., Disp: 180 blister, Rfl: 3    levothyroxine (Synthroid) 175 mcg tablet, Take 1 tablet by mouth daily Monday-Saturday and 1/2 tablet on Sunday, Disp: 90 tablet, Rfl: 1    Melatonin 300 MCG TABS, daily at bedtime as needed, Disp: , Rfl:     metroNIDAZOLE (METROCREAM) 0.75 % cream, Apply topically daily, Disp: 45 g, Rfl: 2    montelukast (SINGULAIR) 10 mg tablet, Take 1 tablet (10 mg total) by mouth daily at bedtime, Disp: 90 tablet, Rfl: 3    NASAL WASH NA, into each nostril Every other day, Disp: , Rfl:     ondansetron (ZOFRAN-ODT) 4 mg disintegrating tablet, Take 1 tablet (4 mg total) by mouth every 6 (six) hours as needed for nausea or vomiting, Disp: 20 tablet, Rfl: 3    Probiotic Product (ALIGN PO), Take by mouth, Disp: , Rfl:     Semaglutide-Weight Management (Wegovy) 1.7 MG/0.75ML, Inject 1.7 mg under the skin weekly, Disp: 3 mL, Rfl: 0     "SEMAGLUTIDE-WEIGHT MANAGEMENT SC, Inject 2.5 mg under the skin once a week, Disp: , Rfl:     Sulfacetamide Sodium, Acne, 10 % LOTN, Apply topically daily, Disp: 118 mL, Rfl: 2    Vitamin D, Cholecalciferol, 50 MCG (2000 UT) CAPS, Take 2 capsules by mouth daily (Patient taking differently: Take 2 capsules by mouth daily 5000), Disp: , Rfl:           Whom besides the patient is providing clinical information about today's encounter?   NO ADDITIONAL HISTORIAN (patient alone provided history)    Physical Exam and Assessment/Plan by Diagnosis: Patient last evaluated by Dr. Lynch on 2/13/25.  Urticaria improved.  She denies any personal or family history of skin cancer.    MELANOCYTIC NEVI (\"Moles\")    Physical Exam:  Anatomic Location Affected:   Mostly on sun-exposed areas of the trunk and extremities  Morphological Description:  Scattered, 1-4mm round to ovoid, symmetrical-appearing, even bordered, skin colored to dark brown macules/papules, mostly in sun-exposed areas  Pertinent Positives:  Pertinent Negatives:    Additional History of Present Condition:  Patient presents today for a full body skin exam. Patient last seen by Dr. Lynch for Urticaria on 2/13/25. Patient reports she has no spots of concern at this time.    Assessment and Plan:  Based on a thorough discussion of this condition and the management approach to it (including a comprehensive discussion of the known risks, side effects and potential benefits of treatment), the patient (family) agrees to implement the following specific plan:  When outside we recommend using a wide brim hat, sunglasses, long sleeve and pants, sunscreen with SPF 30+ with reapplication every 2 hours, or SPF specific clothing   Benign, reassured  Annual skin check    LENTIGO    Physical Exam:  Anatomic Location Affected:  trunk, arms  Morphological Description:  Light brown macules  Pertinent Positives:  Pertinent Negatives:    Assessment and Plan:  Based on a thorough " "discussion of this condition and the management approach to it (including a comprehensive discussion of the known risks, side effects and potential benefits of treatment), the patient (family) agrees to implement the following specific plan:  When outside we recommend using a wide brim hat, sunglasses, long sleeve and pants, sunscreen with SPF 30+ with reapplication every 2 hours, or SPF specific clothing     CLEANING ANGIOMAS    Physical Exam:  Anatomic Location Affected:  Mostly on sun-exposed areas of the trunk and extremities  Morphological Description:  Scattered cherry red, 1-4 mm papules.  Pertinent Positives:  Pertinent Negatives:    Assessment and Plan:  Based on a thorough discussion of this condition and the management approach to it (including a comprehensive discussion of the known risks, side effects and potential benefits of treatment), the patient (family) agrees to implement the following specific plan:  Monitor for changes  Benign, reassured    NEOPLASM OF UNCERTAIN BEHAVIOR OF SKIN    Physical Exam:  (Anatomic Location); (Size and Morphological Description); (Differential Diagnosis):  Left second toe; 0.4 x 0.2 brown macule; Rule out atypia          Pertinent Positives:  Pertinent Negatives:    Additional History of Present Condition:  Found on exam.  Patient states she just noticed it 2 months ago.  States it is new.  Asymptomatic.    Assessment and Plan:  I have discussed with the patient that a sample of skin via a \"skin biopsy” would be potentially helpful to further make a specific diagnosis under the microscope.  Based on a thorough discussion of this condition and the management approach to it (including a comprehensive discussion of the known risks, side effects and potential benefits of treatment), the patient (family) agrees to implement the following specific plan:    Procedure:  Skin Biopsy.  After a thorough discussion of treatment options and risk/benefits/alternatives (including but " not limited to local pain, scarring, dyspigmentation, blistering, possible superinfection, and inability to confirm a diagnosis via histopathology), verbal and written consent were obtained and portion of the rash was biopsied for tissue sample.  See below for consent that was obtained from patient and subsequent Procedure Note.   Call with results.  Patient states she is traveling to [place] in 8 days, she will return on 4/18/25.  If results final when she is away, will call once she returns.      PROCEDURE TANGENTIAL (SHAVE) BIOPSY NOTE:    Performing Physician:  Ismael BROWN  Anatomic Location; Clinical Description with size (cm); Pre-Op Diagnosis:   SPECIMEN A; Skin; Anatomic Location: Left second toe; Procedure/Protocol: Skin Specimen (submit in [facility]):Tangential Biopsy (includes shave, scoop, saucerization, curette) ; 59 y.o. year old  Female with a Morphological Description:0.4 x 0.2 brown macule;  Differential Diagnosis and/or Specific Clinical Question:Rule out atypia  Post-op diagnosis: Same     Local anesthesia: 1% xylocaine with epi      Topical anesthesia: None    Hemostasis: Aluminum chloride       After obtaining informed consent  at which time there was a discussion about the purpose of biopsy  and low risks of infection and bleeding.  The area was prepped and draped in the usual fashion. Anesthesia was obtained with 1% lidocaine with epinephrine. A shave biopsy to an appropriate sampling depth was obtained by Shave (Dermablade or 15 blade) The resulting wound was covered with surgical ointment and bandaged appropriately.     The patient tolerated the procedure well without complications and was without signs of functional compromise.      Specimen has been sent for review by Dermatopathology.    Standard post-procedure care has been explained and has been included in written form within the patient's copy of Informed Consent.    INFORMED CONSENT DISCUSSION AND POST-OPERATIVE INSTRUCTIONS FOR  "PATIENT    I.  RATIONALE FOR PROCEDURE  I understand that a skin biopsy allows the Dermatologist to test a lesion or rash under the microscope to obtain a diagnosis.  It usually involves numbing the area with numbing medication and removing a small piece of skin; sometimes the area will be closed with sutures. In this specific procedure, sutures are not usually needed.  If any sutures are placed, then they are usually need to be removed in 2 weeks or less.    I understand that my Dermatologist recommends that a skin \"shave\" biopsy be performed today.  A local anesthetic, similar to the kind that a dentist uses when filling a cavity, will be injected with a very small needle into the skin area to be sampled.  The injected skin and tissue underneath \"will go to sleep” and become numb so no pain should be felt afterwards.  An instrument shaped like a tiny \"razor blade\" (shave biopsy instrument) will be used to cut a small piece of tissue and skin from the area so that a sample of tissue can be taken and examined more closely under the microscope.  A slight amount of bleeding will occur, but it will be stopped with direct pressure and a pressure bandage and any other appropriate methods.  I understands that a scar will form where the wound was created.  Surgical ointment will be applied to help protect the wound.  Sutures are not usually needed.    II.  RISKS AND POTENTIAL COMPLICATIONS   I understand the risks and potential complications of a skin biopsy include but are not limited to the following:  Bleeding  Infection  Pain  Scar/keloid  Skin discoloration  Incomplete Removal  Recurrence  Nerve Damage/Numbness/Loss of Function  Allergic Reaction to Anesthesia  Biopsies are diagnostic procedures and based on findings additional treatment or evaluation may be required  Loss or destruction of specimen resulting in no additional findings    My Dermatologist has explained to me the nature of the condition, the nature of " "the procedure, and the benefits to be reasonably expected compared with alternative approaches.  My Dermatologist has discussed the likelihood of major risks or complications of this procedure including the specific risks listed above, such as bleeding, infection, and scarring/keloid.  I understand that a scar is expected after this procedure.  I understand that my physician cannot predict if the scar will form a \"keloid,\" which extends beyond the borders of the wound that is created.  A keloid is a thick, painful, and bumpy scar.  A keloid can be difficult to treat, as it does not always respond well to therapy, which includes injecting cortisone directly into the keloid every few weeks.  While this usually reduces the pain and size of the scar, it does not eliminate it.      I understand that photographs may be taken before and after the procedure.  These will be maintained as part of the medical providers confidential records and may not be made available to me.  I further authorize the medical provider to use the photographs for teaching purposes or to illustrate scientific papers, books, or lectures if in his/her judgment, medical research, education, or science may benefit from its use.    I have had an opportunity to fully inquire about the risks and benefits of this procedure and its alternatives.   I have been given ample time and opportunity to ask questions and to seek a second opinion if I wished to do so.  I acknowledge that there have specifically been no guarantees as to the cosmetic results from the procedure.  I am aware that with any procedure there is always the possibility of an unexpected complication.    III. POST-PROCEDURAL CARE (WHAT YOU WILL NEED TO DO \"AFTER THE BIOPSY\" TO OPTIMIZE HEALING)    Keep the area clean and dry.  Try NOT to remove the bandage or get it wet for the first 24 hours.    Gently clean the area and apply surgical ointment (such as Vaseline petrolatum ointment, which is " "available \"over the counter\" and not a prescription) to the biopsy site for up to 2 weeks straight.  This acts to protect the wound from the outside world.      Sutures are not usually placed in this procedure.  If any sutures were placed, return for suture removal as instructed (generally 1 week for the face, 2 weeks for the body).      Take Acetaminophen (Tylenol) for discomfort, if no contraindications.  Ibuprofen or aspirin could make bleeding worse.    Call our office immediately for signs of infection: fever, chills, increased redness, warmth, tenderness, discomfort/pain, or pus or foul smell coming from the wound.    WHAT TO DO IF THERE IS ANY BLEEDING?  If a small amount of bleeding is noticed, place a clean cloth over the area and apply firm pressure for ten minutes.  Check the wound after 10 minutes of direct pressure.  If bleeding persists, try one more time for an additional 10 minutes of direct pressure on the area.  If the bleeding becomes heavier or does not stop after the second attempt, or if you have any other questions about this procedure, then please call your [facility]'s Dermatologist by calling [phone] (SKIN).     I hereby acknowledge that I have reviewed and verified the site with my Dermatologist and have requested and authorized my Dermatologist to proceed with the procedure.      Scribe Attestation      I,:  Rosa Black am acting as a scribe while in the presence of the attending physician.:       I,:  KEVIN Bañuelos personally performed the services described in this documentation    as scribed in my presence.:              "

## 2025-04-03 DIAGNOSIS — E66.811 CLASS 1 OBESITY DUE TO EXCESS CALORIES WITH SERIOUS COMORBIDITY AND BODY MASS INDEX (BMI) OF 34.0 TO 34.9 IN ADULT: ICD-10-CM

## 2025-04-03 DIAGNOSIS — E66.09 CLASS 1 OBESITY DUE TO EXCESS CALORIES WITH SERIOUS COMORBIDITY AND BODY MASS INDEX (BMI) OF 34.0 TO 34.9 IN ADULT: ICD-10-CM

## 2025-04-03 RX ORDER — SEMAGLUTIDE 1.7 MG/.75ML
INJECTION, SOLUTION SUBCUTANEOUS
Qty: 3 ML | Refills: 0 | Status: SHIPPED | OUTPATIENT
Start: 2025-04-03

## 2025-04-03 NOTE — TELEPHONE ENCOUNTER
Reason for call:   [x] Refill   [] Prior Auth  [] Other:     Office:   [x] PCP/Provider - Ree Vasquez,   [] Specialty/Provider -     Medication: Semaglutide-Weight Management (Wegovy) 1.7 MG/0.75ML   Dose/Frequency:  Inject 1.7 mg under the skin weekly,   Quantity: 3ml    [facility]    Local Pharmacy   Does the patient have enough for 3 days?   [x] Yes   [] No - Send as HP to POD - leaving to go to [place] next week, will need to  before Thursday asking for high priority so the pharmacy has time to process    Mail Away Pharmacy   Does the patient have enough for 10 days?   [] Yes   [] No - Send as HP to POD

## 2025-04-09 ENCOUNTER — RESULTS FOLLOW-UP (OUTPATIENT)
Dept: DERMATOLOGY | Facility: CLINIC | Age: 60
End: 2025-04-09

## 2025-04-09 PROCEDURE — 88342 IMHCHEM/IMCYTCHM 1ST ANTB: CPT | Performed by: PATHOLOGY

## 2025-04-09 PROCEDURE — 88305 TISSUE EXAM BY PATHOLOGIST: CPT | Performed by: PATHOLOGY

## 2025-04-09 NOTE — RESULT ENCOUNTER NOTE
DERMATOPATHOLOGY RESULT NOTE    Results reviewed by ordering physician.  Reviewed with Dr. Guzman, OK to monitor.  Please call patient of benign results and monitor for any recurrence or changes.  If no answer, OK to leave message, patient was traveling out of the country.  Will also send [employer] message.  Please assist with scheduling annual skin check.      Instructions for Clinical Derm Team:   (remember to route Result Note to appropriate staff):    None    Result & Plan by Specimen:    Specimen A: benign  Plan: monitor and reassured, benign    Tissue Exam: [id number]  Order: [id number]   Status: Final result      Dx: Neoplasm of uncertain behavior    Test Result Released: Yes (seen)    View Follow-Up Encounter     Component  Ref Range & Units (hover)   Case Report  Surgical Pathology Report                         Case: [id number]                                  Authorizing Provider:  Shanna Baker,     Collected:           04/01/2025 1510                                     KEVIN                                                                        Ordering Location:     [facility]      Received:            04/01/2025 [address] [place] [address]                                                                      Pathologist:           Randell Parrish MD                                                      Specimen:    Skin, Other, Specimen A: Left second toe                                                Final Diagnosis  A. Skin, left second toe:  COMPOUND MELANOCYTIC NEVUS WITH MODERATE ATYPIA    Note: The lesion extends to all margins. Melan-A immunostain is positive. Deeper sections were also evaluated.    Electronically signed by Randell Parrish MD on 4/9/2025 at 1027 EDT

## 2025-04-09 NOTE — TELEPHONE ENCOUNTER
"Patient called stating that she has completed her 5 days of Ciprofloxacin 500 mg prescribed 3/26. She stats she is still experiencing urinary symptoms. Patient states burning while urinating and frequency. Advised to come in to be re evaluated and do a repeat UA. Patient stated she does not have time to do that, would like another dose of antibiotic, as she feels the infection was \" pretty bad\" and she just needs an extended amount. Patient stated she had to go and to please call her back  and ended call.   "

## 2025-04-10 DIAGNOSIS — R30.0 DYSURIA: Primary | ICD-10-CM

## 2025-04-10 RX ORDER — CIPROFLOXACIN 500 MG/1
500 TABLET, FILM COATED ORAL EVERY 12 HOURS SCHEDULED
Qty: 10 TABLET | Refills: 0 | Status: SHIPPED | OUTPATIENT
Start: 2025-04-10 | End: 2025-04-15

## 2025-04-10 NOTE — TELEPHONE ENCOUNTER
Pt called in frustrated requesting another abx to be sent in and that she has waited over 24-hrs for a response. Pt reports she goes to [place] tomorrow in the morning and needs abx now. Pt also asked the triage nurse if she should just go through my (service now)-that has doctors help right away at her work.     Triage nurse informed the pt that I will get assistance on this from the office and see how we can help her. Triage nurse called the office and made 2-attempts calls. No answer.     PCP please call pt back to further advise.  Thank you

## 2025-04-29 ENCOUNTER — OFFICE VISIT (OUTPATIENT)
Dept: FAMILY MEDICINE CLINIC | Facility: CLINIC | Age: 60
End: 2025-04-29
Payer: COMMERCIAL

## 2025-04-29 VITALS
TEMPERATURE: 97.9 F | WEIGHT: 241.38 LBS | DIASTOLIC BLOOD PRESSURE: 80 MMHG | BODY MASS INDEX: 32.69 KG/M2 | HEART RATE: 84 BPM | HEIGHT: 72 IN | OXYGEN SATURATION: 97 % | SYSTOLIC BLOOD PRESSURE: 128 MMHG

## 2025-04-29 DIAGNOSIS — E66.09 CLASS 1 OBESITY DUE TO EXCESS CALORIES WITH SERIOUS COMORBIDITY AND BODY MASS INDEX (BMI) OF 34.0 TO 34.9 IN ADULT: Primary | ICD-10-CM

## 2025-04-29 DIAGNOSIS — R30.0 DYSURIA: ICD-10-CM

## 2025-04-29 DIAGNOSIS — E66.811 CLASS 1 OBESITY DUE TO EXCESS CALORIES WITH SERIOUS COMORBIDITY AND BODY MASS INDEX (BMI) OF 34.0 TO 34.9 IN ADULT: Primary | ICD-10-CM

## 2025-04-29 PROCEDURE — 99213 OFFICE O/P EST LOW 20 MIN: CPT | Performed by: STUDENT IN AN ORGANIZED HEALTH CARE EDUCATION/TRAINING PROGRAM

## 2025-04-29 RX ORDER — SEMAGLUTIDE 2.4 MG/.75ML
INJECTION, SOLUTION SUBCUTANEOUS
Qty: 3 ML | Refills: 0 | Status: SHIPPED | OUTPATIENT
Start: 2025-04-29

## 2025-04-29 NOTE — ASSESSMENT & PLAN NOTE
Will test for cure after recent UTI  Orders:  •  UA w Reflex to Microscopic w Reflex to Culture; Future

## 2025-04-29 NOTE — PROGRESS NOTES
"Name: Shelby Foster      : 1965      MRN: [id number]  Encounter Provider: Ree Vasquez DO  Encounter Date: 2025   Encounter department: [facility] PRIMARY CARE  :  Assessment & Plan  Class 1 obesity due to excess calories with serious comorbidity and body mass index (BMI) of 34.0 to 34.9 in adult  Patient has been doing well on Semaglutide. Is down 3lbs since last visit.  , does report some constipation and has been taking fiber gummies and Align. Will increase dose to 2.4mg q weekly.Encouraged adequate hydration. Patient reports will start exercise regimen     Orders:  •  Semaglutide-Weight Management (Wegovy) 2.4 MG/0.75ML; Inject 2.4 mg under the skin weekly    Dysuria  Will test for cure after recent UTI  Orders:  •  UA w Reflex to Microscopic w Reflex to Culture; Future           History of Present Illness     Patient presents today for weight check.      Review of Systems   Constitutional:  Negative for chills and fever.   HENT:  Negative for ear pain and sore throat.    Eyes:  Negative for pain and visual disturbance.   Respiratory:  Negative for cough and shortness of breath.    Cardiovascular:  Negative for chest pain and palpitations.   Gastrointestinal:  Positive for constipation. Negative for abdominal pain and vomiting.   Genitourinary:  Negative for dysuria and hematuria.   Musculoskeletal:  Negative for arthralgias and back pain.   Skin:  Negative for color change and rash.   Neurological:  Negative for seizures and syncope.   All other systems reviewed and are negative.      Objective   /80 (BP Location: Left arm, Patient Position: Sitting, Cuff Size: Large)   Pulse 84   Temp 97.9 °F (36.6 °C) (Temporal)   Ht 6' 4\" (1.93 m)   Wt 109 kg (241 lb 6 oz)   SpO2 97%   BMI 29.38 kg/m²      Physical Exam  Vitals reviewed.   Constitutional:       General: She is not in acute distress.     Appearance: Normal appearance. She is well-developed. She is obese. "   HENT:      Head: Normocephalic and atraumatic.   Eyes:      Conjunctiva/sclera: Conjunctivae normal.   Cardiovascular:      Rate and Rhythm: Normal rate and regular rhythm.      Heart sounds: No murmur heard.  Pulmonary:      Effort: Pulmonary effort is normal. No respiratory distress.      Breath sounds: Normal breath sounds.   Skin:     General: Skin is warm and dry.      Capillary Refill: Capillary refill takes less than 2 seconds.   Neurological:      General: No focal deficit present.      Mental Status: She is alert and oriented to person, place, and time.   Psychiatric:         Mood and Affect: Mood normal.         Behavior: Behavior normal.

## 2025-05-01 ENCOUNTER — APPOINTMENT (OUTPATIENT)
Dept: LAB | Facility: CLINIC | Age: 60
End: 2025-05-01
Payer: COMMERCIAL

## 2025-05-01 DIAGNOSIS — R30.0 DYSURIA: ICD-10-CM

## 2025-05-01 DIAGNOSIS — R39.9 UTI SYMPTOMS: ICD-10-CM

## 2025-05-01 LAB
BILIRUB UR QL STRIP: NEGATIVE
CLARITY UR: CLEAR
COLOR UR: COLORLESS
GLUCOSE UR STRIP-MCNC: NEGATIVE MG/DL
HGB UR QL STRIP.AUTO: NEGATIVE
KETONES UR STRIP-MCNC: NEGATIVE MG/DL
LEUKOCYTE ESTERASE UR QL STRIP: NEGATIVE
NITRITE UR QL STRIP: NEGATIVE
PH UR STRIP.AUTO: 6.5 [PH]
PROT UR STRIP-MCNC: NEGATIVE MG/DL
SP GR UR STRIP.AUTO: 1.01 (ref 1–1.03)
UROBILINOGEN UR STRIP-ACNC: <2 MG/DL

## 2025-05-01 PROCEDURE — 87086 URINE CULTURE/COLONY COUNT: CPT

## 2025-05-01 PROCEDURE — 81003 URINALYSIS AUTO W/O SCOPE: CPT

## 2025-05-02 LAB — BACTERIA UR CULT: NORMAL

## 2025-05-29 DIAGNOSIS — E66.09 CLASS 1 OBESITY DUE TO EXCESS CALORIES WITH SERIOUS COMORBIDITY AND BODY MASS INDEX (BMI) OF 34.0 TO 34.9 IN ADULT: ICD-10-CM

## 2025-05-29 DIAGNOSIS — E66.811 CLASS 1 OBESITY DUE TO EXCESS CALORIES WITH SERIOUS COMORBIDITY AND BODY MASS INDEX (BMI) OF 34.0 TO 34.9 IN ADULT: ICD-10-CM

## 2025-05-29 RX ORDER — SEMAGLUTIDE 2.4 MG/.75ML
INJECTION, SOLUTION SUBCUTANEOUS
Qty: 3 ML | Refills: 0 | Status: SHIPPED | OUTPATIENT
Start: 2025-05-29

## 2025-05-29 NOTE — TELEPHONE ENCOUNTER
Reason for call:   [x] Refill   [] Prior Auth  [] Other:     Office:   [x] PCP/Provider -   [] Specialty/Provider -     Medication: semaglutide - weight management 2.4 mg, inject 2.4 mg under the skin weekly      Pharmacy: [facility]    Local Pharmacy   Does the patient have enough for 3 days?   [x] Yes   [] No - Send as HP to POD

## 2025-06-11 ENCOUNTER — PATIENT MESSAGE (OUTPATIENT)
Age: 60
End: 2025-06-11

## 2025-06-11 DIAGNOSIS — E55.9 VITAMIN D DEFICIENCY: ICD-10-CM

## 2025-06-11 DIAGNOSIS — E03.9 ACQUIRED HYPOTHYROIDISM: Primary | ICD-10-CM

## 2025-06-11 DIAGNOSIS — R73.03 PREDIABETES: ICD-10-CM

## 2025-06-16 ENCOUNTER — APPOINTMENT (OUTPATIENT)
Dept: LAB | Facility: CLINIC | Age: 60
End: 2025-06-16
Payer: COMMERCIAL

## 2025-06-16 DIAGNOSIS — R39.9 UTI SYMPTOMS: ICD-10-CM

## 2025-06-16 LAB
25(OH)D3 SERPL-MCNC: 51.6 NG/ML (ref 30–100)
EST. AVERAGE GLUCOSE BLD GHB EST-MCNC: 114 MG/DL
HBA1C MFR BLD: 5.6 %
T4 FREE SERPL-MCNC: 1.4 NG/DL (ref 0.61–1.12)
TSH SERPL DL<=0.05 MIU/L-ACNC: 0.15 UIU/ML (ref 0.45–4.5)

## 2025-06-16 PROCEDURE — 36415 COLL VENOUS BLD VENIPUNCTURE: CPT

## 2025-06-16 PROCEDURE — 84439 ASSAY OF FREE THYROXINE: CPT

## 2025-06-16 PROCEDURE — 84443 ASSAY THYROID STIM HORMONE: CPT

## 2025-06-16 PROCEDURE — 83036 HEMOGLOBIN GLYCOSYLATED A1C: CPT

## 2025-06-16 PROCEDURE — 82306 VITAMIN D 25 HYDROXY: CPT

## 2025-06-17 ENCOUNTER — RESULTS FOLLOW-UP (OUTPATIENT)
Age: 60
End: 2025-06-17

## 2025-06-17 DIAGNOSIS — R73.03 PREDIABETES: Primary | ICD-10-CM

## 2025-06-17 DIAGNOSIS — E61.1 IRON DEFICIENCY: ICD-10-CM

## 2025-06-19 ENCOUNTER — OFFICE VISIT (OUTPATIENT)
Age: 60
End: 2025-06-19
Payer: COMMERCIAL

## 2025-06-19 VITALS
OXYGEN SATURATION: 97 % | BODY MASS INDEX: 31.91 KG/M2 | SYSTOLIC BLOOD PRESSURE: 118 MMHG | HEART RATE: 75 BPM | DIASTOLIC BLOOD PRESSURE: 70 MMHG | TEMPERATURE: 97.7 F | HEIGHT: 72 IN | WEIGHT: 235.6 LBS

## 2025-06-19 DIAGNOSIS — E61.1 IRON DEFICIENCY: ICD-10-CM

## 2025-06-19 DIAGNOSIS — G47.33 OSA (OBSTRUCTIVE SLEEP APNEA): ICD-10-CM

## 2025-06-19 DIAGNOSIS — E03.9 ACQUIRED HYPOTHYROIDISM: Primary | ICD-10-CM

## 2025-06-19 DIAGNOSIS — E55.9 VITAMIN D DEFICIENCY: ICD-10-CM

## 2025-06-19 PROCEDURE — 99214 OFFICE O/P EST MOD 30 MIN: CPT

## 2025-06-19 RX ORDER — LEVOTHYROXINE SODIUM 150 UG/1
TABLET ORAL
Qty: 90 TABLET | Refills: 0 | Status: SHIPPED | OUTPATIENT
Start: 2025-06-19

## 2025-06-19 RX ORDER — TIRZEPATIDE 7.5 MG/.5ML
7.5 INJECTION, SOLUTION SUBCUTANEOUS WEEKLY
Qty: 6 ML | Refills: 0 | Status: SHIPPED | OUTPATIENT
Start: 2025-06-19 | End: 2025-06-19

## 2025-06-19 RX ORDER — LEVOTHYROXINE SODIUM 150 UG/1
TABLET ORAL
Qty: 90 TABLET | Refills: 0 | Status: SHIPPED | OUTPATIENT
Start: 2025-06-19 | End: 2025-06-19

## 2025-06-19 RX ORDER — TIRZEPATIDE 7.5 MG/.5ML
7.5 INJECTION, SOLUTION SUBCUTANEOUS WEEKLY
Qty: 6 ML | Refills: 0 | Status: SHIPPED | OUTPATIENT
Start: 2025-06-19 | End: 2025-06-24

## 2025-06-19 NOTE — PATIENT INSTRUCTIONS
Decrease levothyroxine to 150 mcg daily  To finish current supply, take levothyroxine 175 mcg x 6 days per week  Repeat labs in 6 weeks after 7/31  We will try to get zepbound covered   Iron supplement  ferrous sulfate which contains 65 mg of elemental iron, I have seen sleep medicine recommend Vitron C which is ferrous fumarate one of the better observed iron forms which can be gentler on the stomach and help with absorption compared to ferrous sulfate at times. It also contains 65 mg of elemental iron +125 mg of vitamin C which boost absorption and has lower risk of constipation compared to ferrous sulfate and less GI discomfort for many people. Ideally should be taking on an empty stomach for best absorption, but can take with food if stomach irritation occurs

## 2025-06-19 NOTE — ASSESSMENT & PLAN NOTE
She has lost approximately 32 pounds since her previous visit.    Continue to decrease caloric intake, follow balanced diet, avoid processed foods and sweetened beverages, and stay well-hydrated.

## 2025-06-19 NOTE — ASSESSMENT & PLAN NOTE
Presents clinically and biochemically over supplemented.    Decrease levothyroxine to 150 mcg daily.  Repeat labs in 6 weeks.    Follow-up in 6 months.  Labs prior to next visit.  Orders:  •  TSH, 3rd generation; Future  •  T4, free; Future  •  TSH, 3rd generation; Future  •  T4, free; Future  •  levothyroxine (Synthroid) 150 mcg tablet; Take 1 tablet by mouth daily

## 2025-06-19 NOTE — PROGRESS NOTES
Name: Shelby Foster      : 1965      MRN: [id number]  Encounter Provider: KEVIN Khan  Encounter Date: 2025   Encounter department: [facility]  :  Assessment & Plan  Acquired hypothyroidism  Presents clinically and biochemically over supplemented.    Decrease levothyroxine to 150 mcg daily.  Repeat labs in 6 weeks.    Follow-up in 6 months.  Labs prior to next visit.  Orders:  •  TSH, 3rd generation; Future  •  T4, free; Future  •  TSH, 3rd generation; Future  •  T4, free; Future  •  levothyroxine (Synthroid) 150 mcg tablet; Take 1 tablet by mouth daily    BMI 28.0-28.9,adult  She has lost approximately 32 pounds since her previous visit.    Continue to decrease caloric intake, follow balanced diet, avoid processed foods and sweetened beverages, and stay well-hydrated.       PAPI (obstructive sleep apnea)  Continue weight loss efforts.  Will switch from compounded semaglutide to Zepbound 7.5 mg once weekly.  Continue routine follow-up with sleep medicine.  Orders:  •  tirzepatide (Zepbound) 7.5 mg/0.5 mL auto-injector; Inject 0.5 mL (7.5 mg total) under the skin once a week    Vitamin D deficiency  Vitamin D levels at goal.  Continue current supplementation.       Iron deficiency  Encouraged over-the-counter iron supplementation.  Increase dietary intake of iron.             History of Present Illness     Shelby Foster is a 59 y.o. female who presents to the office today for routine follow-up of primary hypothyroidism.  She was last seen in the office 2024     She has been on compounded semaglutide 2.4 mg and lost approximately 32 lbs since her previous visit.     No history of external radiation to head/neck/chest.  No recent iodine loading in form of medication, biotin or kelp supplements, or radiological diagnostic studies.       Family history of thyroid disease includes: None  Family history of thyroid cancer includes:  None     Current Medication Regimen:   Levothyroxine 175 mcg 1 tablet daily Monday-Saturday with half tablet on Sunday    Component      Latest Ref Rng 6/16/2025   FREE T4      0.61 - 1.12 ng/dL 1.40 (H)    TSH 3RD GENERATON      0.450 - 4.500 uIU/mL 0.148 (L)        Prior Authorization Clinical Questions for Weight Management Pharmacotherapy    1. Does the patient have a contrainidcation to medication prescribed for weight management?: No  2. Does the patient have a diagnosis of obesity, confirmed by a BMI greater than or equal to 30 kg/m^2?: No  3. Does the patient have a BMI of greater than or equal to 27 kg/m^2 with at least one weight-related comorbidity/risk factor/complication (e.g. diabetes, dyslipidemia, coronary artery disease)?: Yes  4. Weight-related co-morbidities/risk factors: prediabetes, obstructive sleep apnea (PAPI)  5. WEGOVY CVA Indication: Does patient have established documented cardiovascular disease (history of a prior heart attack (myocardial infarction), stroke, or symptomatic peripheral arterial disease (PAD)?: N/A  6. ZEPBOUND PAPI Indication: Does patient have documented PAPI diagnosed via sleep study (insurance will require copy of sleep study results for approval)?: Yes  7. Has the patient been on a weight loss regimen of low-calorie diet, increased physical activity, and lifestyle modifications for a minimum of 6 months?: Yes  9. Does the patient have a history of type 2 diabetes?: No  11. Will the member use requested medication in combination with another GLP agonist or weight loss drug?: No  12. Is the medication a controlled substance?: No  Additional comments: Currently on compounded semaglutide.  She has lost 45 pounds in 1 year.  Would prefer noncompounded drug covered by her insurance.     Baseline weight (in pounds): 235.6 lbs  Current weight (in pounds): 235.6 lbs  Weight loss percentage: 0%         History obtained from: patient    Review of Systems   Constitutional:  Positive  "for fatigue. Negative for unexpected weight change.   HENT:  Negative for trouble swallowing and voice change.    Eyes:  Negative for visual disturbance.   Gastrointestinal:  Positive for constipation.   Musculoskeletal:  Positive for arthralgias.   Neurological:  Negative for weakness.   Psychiatric/Behavioral:  Positive for sleep disturbance.      Medications Ordered Prior to Encounter[1]   Social History[2]     Objective   /70 (BP Location: Right arm, Patient Position: Sitting, Cuff Size: Standard)   Pulse 75   Temp 97.7 °F (36.5 °C) (Temporal)   Ht 6' 4\" (1.93 m)   Wt 107 kg (235 lb 9.6 oz)   SpO2 97%   BMI 28.68 kg/m²      Physical Exam  Vitals reviewed.   Constitutional:       General: She is not in acute distress.     Appearance: Normal appearance. She is well-groomed and overweight.   HENT:      Head: Normocephalic and atraumatic.      Mouth/Throat:      Mouth: Mucous membranes are moist.     Eyes:      Conjunctiva/sclera: Conjunctivae normal.     Neck:      Thyroid: No thyroid tenderness.     Cardiovascular:      Rate and Rhythm: Normal rate.   Pulmonary:      Effort: Pulmonary effort is normal. No respiratory distress.   Abdominal:      General: There is no distension.      Palpations: Abdomen is soft.     Musculoskeletal:         General: No swelling.      Cervical back: Normal range of motion.     Skin:     General: Skin is warm and dry.      Capillary Refill: Capillary refill takes less than 2 seconds.     Neurological:      General: No focal deficit present.      Mental Status: She is alert and oriented to person, place, and time.     Psychiatric:         Mood and Affect: Mood normal.         Behavior: Behavior normal. Behavior is cooperative.         Thought Content: Thought content normal.         Judgment: Judgment normal.         Administrative Statements   I have spent a total time of 36 minutes in caring for this patient on the day of the visit/encounter including Diagnostic results, " Prognosis, Risks and benefits of tx options, Instructions for management, Patient and family education, Importance of tx compliance, Risk factor reductions, Impressions, Counseling / Coordination of care, Documenting in the medical record, Reviewing/placing orders in the medical record (including tests, medications, and/or procedures), and Obtaining or reviewing history  .         [1]  Current Outpatient Medications on File Prior to Visit   Medication Sig Dispense Refill   • albuterol (Ventolin HFA) 90 mcg/act inhaler Inhale 2 puffs every 6 (six) hours as needed for wheezing 25.5 g 3   • Albuterol-Budesonide 90-80 MCG/ACT AERO Inhale 2 Inhalations every 6 (six) hours as needed (shortness of breath or wheezing) 5.9 g 5   • cetirizine (ZyrTEC) 10 mg tablet Take twice a day for a month 60 tablet 0   • fluticasone (FLONASE) 50 mcg/act nasal spray 1 spray into each nostril daily 18.2 mL 3   • Fluticasone-Salmeterol (Advair) 100-50 mcg/dose inhaler Inhale 1 puff 2 (two) times a day Rinse mouth after use. 180 blister 3   • metroNIDAZOLE (METROCREAM) 0.75 % cream Apply topically daily 45 g 2   • montelukast (SINGULAIR) 10 mg tablet Take 1 tablet (10 mg total) by mouth daily at bedtime 90 tablet 3   • NASAL WASH NA into each nostril Every other day     • ondansetron (ZOFRAN-ODT) 4 mg disintegrating tablet Take 1 tablet (4 mg total) by mouth every 6 (six) hours as needed for nausea or vomiting 20 tablet 3   • Probiotic Product (ALIGN PO) Take by mouth     • Semaglutide-Weight Management (Wegovy) 2.4 MG/0.75ML Inject 2.4 mg under the skin weekly 3 mL 0   • Sulfacetamide Sodium, Acne, 10 % LOTN Apply topically daily 118 mL 2   • Vitamin D, Cholecalciferol, 50 MCG (2000 UT) CAPS Take 2 capsules by mouth in the morning.     • [DISCONTINUED] levothyroxine (Synthroid) 175 mcg tablet Take 1 tablet by mouth daily Monday-Saturday and 1/2 tablet on Sunday 90 tablet 1   • [DISCONTINUED] Semaglutide-Weight Management (WEGOVY) 2.4  MG/0.75ML Inject 2.4 mg under the skin once a week     • Aspirin-Acetaminophen-Caffeine (EXCEDRIN MIGRAINE PO) Take 1 tablet by mouth if needed     • clotrimazole-betamethasone (LOTRISONE) 1-0.05 % cream      • Melatonin 300 MCG TABS daily at bedtime as needed (Patient not taking: Reported on 6/19/2025)       No current facility-administered medications on file prior to visit.   [2]  Social History  Tobacco Use   • Smoking status: Never     Passive exposure: Never   • Smokeless tobacco: Never   Vaping Use   • Vaping status: Never Used   Substance and Sexual Activity   • Alcohol use: Yes     Comment: rare   • Drug use: Never   • Sexual activity: Yes     Partners: Male     Birth control/protection: Post-menopausal     Comment: defer

## 2025-06-19 NOTE — ASSESSMENT & PLAN NOTE
Continue weight loss efforts.  Will switch from compounded semaglutide to Zepbound 7.5 mg once weekly.  Continue routine follow-up with sleep medicine.  Orders:  •  tirzepatide (Zepbound) 7.5 mg/0.5 mL auto-injector; Inject 0.5 mL (7.5 mg total) under the skin once a week

## 2025-06-23 ENCOUNTER — PATIENT MESSAGE (OUTPATIENT)
Dept: DERMATOLOGY | Facility: CLINIC | Age: 60
End: 2025-06-23

## 2025-06-24 ENCOUNTER — TELEPHONE (OUTPATIENT)
Age: 60
End: 2025-06-24

## 2025-06-24 DIAGNOSIS — G47.33 OSA (OBSTRUCTIVE SLEEP APNEA): ICD-10-CM

## 2025-06-24 RX ORDER — TIRZEPATIDE 7.5 MG/.5ML
7.5 INJECTION, SOLUTION SUBCUTANEOUS WEEKLY
Qty: 6 ML | Refills: 0 | Status: SHIPPED | OUTPATIENT
Start: 2025-06-24

## 2025-06-24 RX ORDER — TIRZEPATIDE 7.5 MG/.5ML
7.5 INJECTION, SOLUTION SUBCUTANEOUS WEEKLY
Refills: 0 | OUTPATIENT
Start: 2025-06-24

## 2025-06-24 NOTE — TELEPHONE ENCOUNTER
PA for zepbound 7.5mg SUBMITTED to [facility]    via    [x]CMM-KEY: [id number]  []Surescripts-Case ID #   []Availity-Auth ID # NDC #   []Faxed to plan   []Other website   []Phone call Case ID #     [x]PA sent as URGENT    All office notes, labs and other pertaining documents and studies sent. Clinical questions answered. Awaiting determination from insurance company.     Turnaround time for your insurance to make a decision on your Prior Authorization can take 7-21 business days.

## 2025-06-24 NOTE — TELEPHONE ENCOUNTER
PA for zepbound 7.5mg DENIED    Reason:(Screenshot if applicable)        Message sent to office clinical pool Yes    Denial letter scanned into Media Yes    We can gladly do an appeal but the process can take about 30-60 days to provide determination. Please have the office staff schedule a Peer to Peer at phone [phone]. If an appeal is truly warranted please have Provider send clinical documentation to the PA department to support the appeal.     **Please follow up with your patient regarding denial and next steps**

## 2025-06-24 NOTE — TELEPHONE ENCOUNTER
Reason for call:   [] Refill   [x] Prior Auth  [] Other:     Office:   [] PCP/Provider -   [x] Specialty/Provider - Jaja - KEVIN Khan     Medication: tirzepatide (Zepbound) 7.5 mg/0.5 mL auto-injector     Dose/Frequency:  Inject 0.5 mL (7.5 mg total) under the skin once a week     Quantity: 6 ml    Pharmacy:   [facility]/pharmacy #[id number] - [place], [place] - [id number]       Local Pharmacy   Does the patient have enough for 3 days?   [] Yes   [] No - Send as HP to POD    Mail Away Pharmacy   Does the patient have enough for 10 days?   [] Yes   [] No - Send as HP to POD

## 2025-06-24 NOTE — TELEPHONE ENCOUNTER
Forwarding to PA team for review.      Pt here for entyvio infusion   Pt reports that her stools are still mostly soft  with occasional constipation   Pt denies any cramping,or bleeding but does report some bloating  IV # 20 started in RAC on 1st attempt per DEAN Olmstead RN  Blood for lab work was drawn without difficulty  Pt gayla well  Entyvio 300 mg IVPB starting to infuse  IV site unremarkable Will monitor

## 2025-07-01 DIAGNOSIS — E66.09 CLASS 1 OBESITY DUE TO EXCESS CALORIES WITH SERIOUS COMORBIDITY AND BODY MASS INDEX (BMI) OF 34.0 TO 34.9 IN ADULT: ICD-10-CM

## 2025-07-01 DIAGNOSIS — E66.811 CLASS 1 OBESITY DUE TO EXCESS CALORIES WITH SERIOUS COMORBIDITY AND BODY MASS INDEX (BMI) OF 34.0 TO 34.9 IN ADULT: ICD-10-CM

## 2025-07-01 RX ORDER — SEMAGLUTIDE 2.4 MG/.75ML
INJECTION, SOLUTION SUBCUTANEOUS
Qty: 3 ML | Refills: 0 | OUTPATIENT
Start: 2025-07-01

## 2025-07-01 NOTE — TELEPHONE ENCOUNTER
Insurance denied the wegovy and the zepbound.   Patient states that she is taking the compound semaglutide 2.5mg. patient pays out of pocket for the compound.  Patient is requesting a refill to be sent to [facility] [place], [place] [address] .    Patient is due for her next injection on Friday. The pharmacy is closed on Friday for the holiday.

## 2025-07-01 NOTE — TELEPHONE ENCOUNTER
Reason for call:   [x] Refill   [] Prior Auth  [x] Other: Patient states that her insurance wont pay for the zepbound. Pharmacy closed on Friday and that's when the patient needs her injection for she wants to make sure this goes through before then     Office:   [x] PCP/Provider - [facility]   [] Specialty/Provider -     Medication: Semaglutide-Weight Management (Wegovy) 2.4 MG/0.75ML     Dose/Frequency:  Inject 2.4 mg under the skin weekly,     Quantity: 3 mL    Pharmacy: [facility]    [facility]   Does the patient have enough for 3 days?   [] Yes   [] No - Send as HP to POD    Mail Away Pharmacy   Does the patient have enough for 10 days?   [] Yes   [] No - Send as HP to POD  How are you tolerating the medication?   [] Nausea  [] Vomiting  [] Diarrhea  [x] Asymptomatic  [] Other:    Last visit weight: 235 lb    Current weight: 235 lb    Date of last injection: 6/27/25    How many injections do you have left: 0

## 2025-07-03 DIAGNOSIS — E66.09 CLASS 1 OBESITY DUE TO EXCESS CALORIES WITH SERIOUS COMORBIDITY AND BODY MASS INDEX (BMI) OF 34.0 TO 34.9 IN ADULT: ICD-10-CM

## 2025-07-03 DIAGNOSIS — E66.811 CLASS 1 OBESITY DUE TO EXCESS CALORIES WITH SERIOUS COMORBIDITY AND BODY MASS INDEX (BMI) OF 34.0 TO 34.9 IN ADULT: ICD-10-CM

## 2025-07-03 RX ORDER — SEMAGLUTIDE 2.4 MG/.75ML
INJECTION, SOLUTION SUBCUTANEOUS
Qty: 3 ML | Refills: 0 | Status: SHIPPED | OUTPATIENT
Start: 2025-07-03

## 2025-07-03 NOTE — TELEPHONE ENCOUNTER
Patient called into Rx refill line stating that she needs Semaglutide-Weight Management (Wegovy) 2.4 MG/0.75ML went to [facility] today as they are closed tomorrow for the holiday and her injection is due tomorrow.    Patient states and it is documented that she tried to get zepbound prescribed and covered. However, it was denied and her endo Ana María Barbara Patel suggested to continue   with the compounded semaglutide at this time.    Patient did not  zepbound at [facility]. This is not a duplicate request.     Patient expressed that she is very upset that this is the second time she has had an issue with getting a prescription refill through . I expressed understanding and apologized.     To note that  is the provider who originally prescribed patient the compounded semaglutide and she is looking to have it continued being managed by her PCP.     Patient is paying out of pocket.   Patient is requesting a call back from PCP office to confirm this is sent over and/or with any questions or concerns.    Doxycycline Pregnancy And Lactation Text: This medication is Pregnancy Category D and not consider safe during pregnancy. It is also excreted in breast milk but is considered safe for shorter treatment courses.

## 2025-07-11 ENCOUNTER — OFFICE VISIT (OUTPATIENT)
Dept: OBGYN CLINIC | Facility: CLINIC | Age: 60
End: 2025-07-11
Payer: COMMERCIAL

## 2025-07-11 ENCOUNTER — APPOINTMENT (OUTPATIENT)
Dept: RADIOLOGY | Age: 60
End: 2025-07-11
Attending: STUDENT IN AN ORGANIZED HEALTH CARE EDUCATION/TRAINING PROGRAM
Payer: COMMERCIAL

## 2025-07-11 VITALS — HEIGHT: 72 IN | WEIGHT: 235 LBS | BODY MASS INDEX: 31.83 KG/M2

## 2025-07-11 DIAGNOSIS — Z96.642 STATUS POST TOTAL REPLACEMENT OF LEFT HIP: Primary | ICD-10-CM

## 2025-07-11 DIAGNOSIS — Z96.642 STATUS POST TOTAL REPLACEMENT OF LEFT HIP: ICD-10-CM

## 2025-07-11 DIAGNOSIS — Z96.642 S/P TOTAL LEFT HIP ARTHROPLASTY: ICD-10-CM

## 2025-07-11 PROCEDURE — 99213 OFFICE O/P EST LOW 20 MIN: CPT | Performed by: STUDENT IN AN ORGANIZED HEALTH CARE EDUCATION/TRAINING PROGRAM

## 2025-07-11 PROCEDURE — 73502 X-RAY EXAM HIP UNI 2-3 VIEWS: CPT

## 2025-07-14 NOTE — PROGRESS NOTES
"      Date: 25  Shelby Foster   MRN# [id number]  : 1965      Chief Complaint: Status post left total hip arthroplasty    Assessment and Plan:    S/P total left hip arthroplasty  Patient is 1 year s/p left total hip arthroplasty  - WBAT    - OTC pain medication prn  - Continue compression stocking for swelling control as needed  - No antibiotic dental prophylaxis is necessary  - No restrictions from our standpoint. Let pain be a guide  - RTC in 2-3 years. left hip xrays needed          Subjective:     Patient is 1 year s/p left primary total hip arthroplasty.  Patient has gotten back to all of her usual activities including her frequent travels out of the country.  She notes minimal if any left hip discomfort.    Date of procedure: 24  Ambulating with no assistive device    Allergy:  Allergies[1]  Medications:  all current active meds have been reviewed  Past Medical History:  Past Medical History[2]  Past Surgical History:  Past Surgical History[3]  Family History:  Family History[4]  Social History:  Social History     Substance and Sexual Activity   Alcohol Use Yes    Comment: rare     Social History     Substance and Sexual Activity   Drug Use Never     Tobacco Use History[5]        ROS:   Review of Systems   All other systems reviewed and are negative.       Objective:   BP Readings from Last 1 Encounters:   25 118/70      Wt Readings from Last 1 Encounters:   25 107 kg (235 lb)      Pulse Readings from Last 1 Encounters:   25 75      BMI: Estimated body mass index is 28.61 kg/m² as calculated from the following:    Height as of this encounter: 6' 4\" (1.93 m).    Weight as of this encounter: 107 kg (235 lb).        Physical Exam  Constitutional:       General: She is not in acute distress.  HENT:      Head: Normocephalic and atraumatic.     Eyes:      Conjunctiva/sclera: Conjunctivae normal.       Cardiovascular:      Comments: Extremities well perfused   No LE " "edema    Pulmonary:      Effort: Pulmonary effort is normal.   Abdominal:      General: Abdomen is flat. Bowel sounds are normal.     Neurological:      Mental Status: She is alert. Mental status is at baseline.          Gait and Station:   normal    left Hip Examination  Incision: Well healed  Hip ROM:    Painless AROM and PROM WNL  Motor: 5/5 EHL/FHL/TA/GS/QD/HS  Neurovascular exam is intact.   No evidence of calf tightness or posterior cords    Images:    I personally reviewed relevant images in the PACS system and my interpretation is as follows:  X-rays of the left Hip reveal a total hip arthroplasty in appropriate alignment without evidence of dislocation, migration, wear or loosening.        Stephen Lr MD  Adult Reconstruction Specialist   [facility]        [1]   Allergies  Allergen Reactions    Fluconazole Swelling     Of lips      Sulfamethoxazole-Trimethoprim Swelling     Of lips    Shellfish-Derived Products - Food Allergy Hives and Diarrhea    Eggs Or Egg-Derived Products - Food Allergy Diarrhea    Nuts - Food Allergy Diarrhea    Penicillins Other (See Comments)     As a child-can't recall reaction      Erythromycin Rash     Breaks aout on white pimples      Tetracycline Rash     Breaks out on white pimples     [2]   Past Medical History:  Diagnosis Date    Abnormal Pap smear of cervix 2000's    Acne     Allergic     Allergic rhinitis 2015    Deviated septum, ployps    Anesthesia     \"always causes Constipation and PONV\"\"    Asthma     Complication of anesthesia     Disease of thyroid gland     Environmental and seasonal allergies     Fibroid 2000's    Food allergy     GERD (gastroesophageal reflux disease) 2018    Head injury 11/30/2022    Tree fell on roof of car, roof collapsed on right side of head    Heart murmur     History of anemia     History of concussion 11/30/2022    sees neurologist yearly--\"due to tree falling on head\"    History of repair of hiatal hernia  " "   History of rheumatic fever as a child     Hypothyroidism     Pneumonia 2/2020    Presence of dental bridge     left lower    Rosacea     Sleep apnea     mild    Urticaria     Varicella    [3]   Past Surgical History:  Procedure Laterality Date    ANKLE SURGERY Bilateral     APPENDECTOMY      CATARACT EXTRACTION Bilateral     CATARACT EXTRACTION      CHOLECYSTECTOMY      COLONOSCOPY      DENTAL IMPLANT Right     lower    DILATION AND CURETTAGE OF UTERUS      ENDOMETRIAL ABLATION  2005    FINGER SURGERY      thumb    HERNIA REPAIR  11/2021    \"hiatal\"    MYOMECTOMY  2002,3,4    NM ARTHRP ACETBLR/PROX FEM PROSTC AGRFT/ALGRFT Left 07/01/2024    Procedure: ARTHROPLASTY HIP TOTAL ANTERIOR, LEFT, SAME DAY;  Surgeon: Stephen Lr MD;  Location: [facility];  Service: Orthopedics    SINUS SURGERY  2015    TEAR DUCT SURGERY      TONSILLECTOMY      WISDOM TOOTH EXTRACTION      WRIST SURGERY  Joint   [4]   Family History  Problem Relation Name Age of Onset    Breast cancer Mother Kiana Fuller     Miscarriages / Stillbirths Mother Kiana Fuller     COPD Mother Kiana Fuller     Alcohol abuse Father Zhou Foster     Asthma Sister      Anesthesia problems Sister Nahed Cardenas         Nausea   [5]   Social History  Tobacco Use   Smoking Status Never    Passive exposure: Never   Smokeless Tobacco Never     "

## 2025-07-14 NOTE — ASSESSMENT & PLAN NOTE
Patient is 1 year s/p left total hip arthroplasty  - WBAT    - OTC pain medication prn  - Continue compression stocking for swelling control as needed  - No antibiotic dental prophylaxis is necessary  - No restrictions from our standpoint. Let pain be a guide  - RTC in 2-3 years. left hip xrays needed

## 2025-07-18 DIAGNOSIS — J45.991 COUGH VARIANT ASTHMA: ICD-10-CM

## 2025-07-18 RX ORDER — MONTELUKAST SODIUM 10 MG/1
10 TABLET ORAL
Qty: 90 TABLET | Refills: 1 | Status: SHIPPED | OUTPATIENT
Start: 2025-07-18

## 2025-07-18 NOTE — TELEPHONE ENCOUNTER
Alberto message request for refill    Medication: montelukast (SINGULAIR)     Dose/Frequency: 10mg / Take 1 tablet (10 mg total) by mouth daily at bedtime     Quantity: 90 tablet    [facility]    Office:   [] PCP/Provider -   [x] Speciality/Provider - Nabila Huber    Does the patient have enough for 3 days?   [x] Yes   [] No - Send as HP to POD

## 2025-07-29 DIAGNOSIS — E66.811 CLASS 1 OBESITY DUE TO EXCESS CALORIES WITH SERIOUS COMORBIDITY AND BODY MASS INDEX (BMI) OF 34.0 TO 34.9 IN ADULT: ICD-10-CM

## 2025-07-29 DIAGNOSIS — E66.09 CLASS 1 OBESITY DUE TO EXCESS CALORIES WITH SERIOUS COMORBIDITY AND BODY MASS INDEX (BMI) OF 34.0 TO 34.9 IN ADULT: ICD-10-CM

## 2025-07-29 RX ORDER — SEMAGLUTIDE 2.4 MG/.75ML
INJECTION, SOLUTION SUBCUTANEOUS
Qty: 3 ML | Refills: 2 | Status: SHIPPED | OUTPATIENT
Start: 2025-07-29

## 2025-08-01 ENCOUNTER — APPOINTMENT (OUTPATIENT)
Dept: LAB | Facility: CLINIC | Age: 60
End: 2025-08-01
Payer: COMMERCIAL

## 2025-08-01 DIAGNOSIS — E03.9 ACQUIRED HYPOTHYROIDISM: ICD-10-CM

## 2025-08-01 LAB
T4 FREE SERPL-MCNC: 1.46 NG/DL (ref 0.61–1.12)
TSH SERPL DL<=0.05 MIU/L-ACNC: 0.12 UIU/ML (ref 0.45–4.5)

## 2025-08-01 PROCEDURE — 36415 COLL VENOUS BLD VENIPUNCTURE: CPT

## 2025-08-01 PROCEDURE — 84439 ASSAY OF FREE THYROXINE: CPT

## 2025-08-01 PROCEDURE — 84443 ASSAY THYROID STIM HORMONE: CPT

## 2025-08-02 ENCOUNTER — RESULTS FOLLOW-UP (OUTPATIENT)
Dept: ENDOCRINOLOGY | Facility: CLINIC | Age: 60
End: 2025-08-02

## 2025-08-02 DIAGNOSIS — E03.9 ACQUIRED HYPOTHYROIDISM: Primary | ICD-10-CM

## 2025-08-02 RX ORDER — LEVOTHYROXINE SODIUM 137 UG/1
TABLET ORAL
Qty: 90 TABLET | Refills: 0 | Status: SHIPPED | OUTPATIENT
Start: 2025-08-02

## 2025-08-14 ENCOUNTER — TELEPHONE (OUTPATIENT)
Age: 60
End: 2025-08-14

## (undated) DEVICE — PLUMEPEN PRO 10FT

## (undated) DEVICE — NEEDLE SPINAL18G X 3.5 IN QUINCKE

## (undated) DEVICE — BETHLEHEM TOTAL HIP, KIT: Brand: CARDINAL HEALTH

## (undated) DEVICE — ELECTRODE BLADE E-Z CLEAN 4IN -0014A

## (undated) DEVICE — SYRINGE 50ML LL

## (undated) DEVICE — VEST SURGEON DISPOSABLE

## (undated) DEVICE — SUT MONOCRYL 3-0 PS-2 27 IN Y427H

## (undated) DEVICE — CAPIT UPCHRG EMPHASYS ACETABULAR

## (undated) DEVICE — SURGICAL GOWN, XL SMARTSLEEVE: Brand: CONVERTORS

## (undated) DEVICE — ANTIBACTERIAL UNDYED BRAIDED (POLYGLACTIN 910), SYNTHETIC ABSORBABLE SUTURE: Brand: COATED VICRYL

## (undated) DEVICE — 3M™ STERI-DRAPE™ U-DRAPE 1015: Brand: STERI-DRAPE™

## (undated) DEVICE — DRAPE EQUIPMENT RF WAND

## (undated) DEVICE — GLOVE SRG BIOGEL 7.5

## (undated) DEVICE — ELECTRODE BLADE E-Z CLEAN 6.5IN -0014

## (undated) DEVICE — CAPIT HIP COP -CMNT/POR-ACTIS

## (undated) DEVICE — WEBRIL 6 IN UNSTERILE

## (undated) DEVICE — HOOD WITH PEEL AWAY FACE SHIELD: Brand: T7PLUS

## (undated) DEVICE — GLOVE INDICATOR PI UNDERGLOVE SZ 8 BLUE

## (undated) DEVICE — SPECIMEN CONTAINER STERILE PEEL PACK

## (undated) DEVICE — TIBURON HIP DRAPE WITH POUCHES: Brand: CONVERTORS

## (undated) DEVICE — HANDPIECE SET WITH RETRACTABLE COAXIAL FAN SPRAY TIP AND SUCTION TUBE: Brand: INTERPULSE

## (undated) DEVICE — DRESSING MEPILEX AG BORDER POST-OP 4 X 10 IN

## (undated) DEVICE — SAW BLADE OSCILLATING BRAZOL 167

## (undated) DEVICE — POSITIONER HANA TABLE PACK

## (undated) DEVICE — DRAPE SHEET THREE QUARTER

## (undated) DEVICE — DRAPE C-ARM X-RAY

## (undated) DEVICE — ADHESIVE SKIN CLOSURE SYS EXOFIN FUSION 22CM

## (undated) DEVICE — HOOD: Brand: T7PLUS

## (undated) DEVICE — DRESSING MEPILEX AG BORDER POST-OP 4 X 8 IN

## (undated) DEVICE — SUT STRATAFIX SPIRAL PDS PLUS 1 CTX 18 IN SXPP1A400